# Patient Record
Sex: FEMALE | Race: WHITE | NOT HISPANIC OR LATINO | Employment: OTHER | URBAN - METROPOLITAN AREA
[De-identification: names, ages, dates, MRNs, and addresses within clinical notes are randomized per-mention and may not be internally consistent; named-entity substitution may affect disease eponyms.]

---

## 2017-02-13 RX ORDER — LANCETS
EACH MISCELLANEOUS
Qty: 100 EACH | Refills: 9 | Status: SHIPPED | OUTPATIENT
Start: 2017-02-13 | End: 2018-03-13 | Stop reason: SDUPTHER

## 2017-03-23 ENCOUNTER — OFFICE VISIT (OUTPATIENT)
Dept: INTERNAL MEDICINE | Facility: CLINIC | Age: 67
End: 2017-03-23

## 2017-03-23 VITALS
DIASTOLIC BLOOD PRESSURE: 80 MMHG | HEART RATE: 72 BPM | SYSTOLIC BLOOD PRESSURE: 126 MMHG | WEIGHT: 196.38 LBS | RESPIRATION RATE: 20 BRPM | BODY MASS INDEX: 31.22 KG/M2 | TEMPERATURE: 98.8 F

## 2017-03-23 DIAGNOSIS — E03.9 HYPOTHYROIDISM, UNSPECIFIED TYPE: ICD-10-CM

## 2017-03-23 DIAGNOSIS — E78.49 OTHER HYPERLIPIDEMIA: ICD-10-CM

## 2017-03-23 DIAGNOSIS — E11.9 TYPE 2 DIABETES MELLITUS WITHOUT COMPLICATION, WITHOUT LONG-TERM CURRENT USE OF INSULIN (HCC): ICD-10-CM

## 2017-03-23 DIAGNOSIS — I10 ESSENTIAL HYPERTENSION: Primary | ICD-10-CM

## 2017-03-23 DIAGNOSIS — K63.5 BENIGN COLONIC POLYP: ICD-10-CM

## 2017-03-23 DIAGNOSIS — Z11.59 NEED FOR HEPATITIS C SCREENING TEST: ICD-10-CM

## 2017-03-23 DIAGNOSIS — M19.90 OSTEOARTHRITIS, UNSPECIFIED OSTEOARTHRITIS TYPE, UNSPECIFIED SITE: ICD-10-CM

## 2017-03-23 LAB
ALBUMIN SERPL-MCNC: 4.1 G/DL (ref 3.2–4.8)
ALBUMIN/GLOB SERPL: 1.3 G/DL (ref 1.5–2.5)
ALP SERPL-CCNC: 99 U/L (ref 25–100)
ALT SERPL W P-5'-P-CCNC: 18 U/L (ref 7–40)
ANION GAP SERPL CALCULATED.3IONS-SCNC: 2 MMOL/L (ref 3–11)
ARTICHOKE IGE QN: 71 MG/DL (ref 0–130)
AST SERPL-CCNC: 21 U/L (ref 0–33)
BASOPHILS # BLD AUTO: 0.01 10*3/MM3 (ref 0–0.2)
BASOPHILS NFR BLD AUTO: 0.2 % (ref 0–1)
BILIRUB SERPL-MCNC: 0.3 MG/DL (ref 0.3–1.2)
BUN BLD-MCNC: 16 MG/DL (ref 9–23)
BUN/CREAT SERPL: 22.9 (ref 7–25)
CALCIUM SPEC-SCNC: 9.4 MG/DL (ref 8.7–10.4)
CHLORIDE SERPL-SCNC: 106 MMOL/L (ref 99–109)
CHOLEST SERPL-MCNC: 131 MG/DL (ref 0–200)
CO2 SERPL-SCNC: 34 MMOL/L (ref 20–31)
CREAT BLD-MCNC: 0.7 MG/DL (ref 0.6–1.3)
DEPRECATED RDW RBC AUTO: 46.4 FL (ref 37–54)
EOSINOPHIL # BLD AUTO: 0.2 10*3/MM3 (ref 0.1–0.3)
EOSINOPHIL NFR BLD AUTO: 4.1 % (ref 0–3)
ERYTHROCYTE [DISTWIDTH] IN BLOOD BY AUTOMATED COUNT: 13.2 % (ref 11.3–14.5)
EXPIRATION DATE: NORMAL
GFR SERPL CREATININE-BSD FRML MDRD: 83 ML/MIN/1.73
GLOBULIN UR ELPH-MCNC: 3.1 GM/DL
GLUCOSE BLD-MCNC: 97 MG/DL (ref 70–100)
HBA1C MFR BLD: 5.5 %
HCT VFR BLD AUTO: 42.3 % (ref 34.5–44)
HCV AB SER DONR QL: NORMAL
HDLC SERPL-MCNC: 46 MG/DL (ref 40–60)
HGB BLD-MCNC: 13.8 G/DL (ref 11.5–15.5)
IMM GRANULOCYTES # BLD: 0.01 10*3/MM3 (ref 0–0.03)
IMM GRANULOCYTES NFR BLD: 0.2 % (ref 0–0.6)
LYMPHOCYTES # BLD AUTO: 1.57 10*3/MM3 (ref 0.6–4.8)
LYMPHOCYTES NFR BLD AUTO: 32 % (ref 24–44)
Lab: NORMAL
MCH RBC QN AUTO: 31.4 PG (ref 27–31)
MCHC RBC AUTO-ENTMCNC: 32.6 G/DL (ref 32–36)
MCV RBC AUTO: 96.4 FL (ref 80–99)
MONOCYTES # BLD AUTO: 0.33 10*3/MM3 (ref 0–1)
MONOCYTES NFR BLD AUTO: 6.7 % (ref 0–12)
NEUTROPHILS # BLD AUTO: 2.78 10*3/MM3 (ref 1.5–8.3)
NEUTROPHILS NFR BLD AUTO: 56.8 % (ref 41–71)
PLATELET # BLD AUTO: 221 10*3/MM3 (ref 150–450)
PMV BLD AUTO: 10.2 FL (ref 6–12)
POTASSIUM BLD-SCNC: 4.6 MMOL/L (ref 3.5–5.5)
PROT SERPL-MCNC: 7.2 G/DL (ref 5.7–8.2)
RBC # BLD AUTO: 4.39 10*6/MM3 (ref 3.89–5.14)
SODIUM BLD-SCNC: 142 MMOL/L (ref 132–146)
TRIGL SERPL-MCNC: 57 MG/DL (ref 0–150)
TSH SERPL DL<=0.05 MIU/L-ACNC: 1 MIU/ML (ref 0.35–5.35)
WBC NRBC COR # BLD: 4.9 10*3/MM3 (ref 3.5–10.8)

## 2017-03-23 PROCEDURE — 84443 ASSAY THYROID STIM HORMONE: CPT | Performed by: INTERNAL MEDICINE

## 2017-03-23 PROCEDURE — 85025 COMPLETE CBC W/AUTO DIFF WBC: CPT | Performed by: INTERNAL MEDICINE

## 2017-03-23 PROCEDURE — 99214 OFFICE O/P EST MOD 30 MIN: CPT | Performed by: INTERNAL MEDICINE

## 2017-03-23 PROCEDURE — 36415 COLL VENOUS BLD VENIPUNCTURE: CPT | Performed by: INTERNAL MEDICINE

## 2017-03-23 PROCEDURE — 80061 LIPID PANEL: CPT | Performed by: INTERNAL MEDICINE

## 2017-03-23 PROCEDURE — 80053 COMPREHEN METABOLIC PANEL: CPT | Performed by: INTERNAL MEDICINE

## 2017-03-23 PROCEDURE — 86803 HEPATITIS C AB TEST: CPT | Performed by: INTERNAL MEDICINE

## 2017-03-23 PROCEDURE — 83036 HEMOGLOBIN GLYCOSYLATED A1C: CPT | Performed by: INTERNAL MEDICINE

## 2017-03-23 RX ORDER — CETIRIZINE HYDROCHLORIDE 10 MG/1
10 TABLET ORAL DAILY
COMMUNITY
End: 2018-08-30 | Stop reason: SDUPTHER

## 2017-03-23 NOTE — PROGRESS NOTES
Subjective       Angelina Pimentel is a 66 y.o. female.     Chief Complaint   Patient presents with   • Hypertension      3 month follow up fasting    • Cough       History obtained from the patient.      History of Present Illness     Primary Care Cardiac Diagnostic Constellation: The patient is here today for a 6 month follow-up visit.      Her Diabetes Mellitus type 2 is stable.   The patient is adherent with her medication regimen. She denies medication side effects.   Medication(s): Metformin HCl.   Her Hypertension is primary, but stable.   The patient is adherent with her medication regimen. She denies medication side effects.   Medication(s): Lisinopril.   Her Hyperlipidemia has been stable. Her LDL goal is 70 mg/dL and last LDL was 75 mg/dL.   The patient is adherent with her medication regimen. She denies medication side effects.   Medication(s): Simvastiatin.      Interval Events: Blood sugar at home are in mid 90's fasting.  Last HgA1C was 5.7. Last ophthalmology visit December 2016, normal. She states she checks her feet daily.      Symptoms: Denies chest pain, denies intermittent leg claudication, denies dyspnea, denies lower extremity edema, denies exercise intolerance, denies fatigue, denies numbness of the feet, denies foot pain, denies a foot ulcer, denies visual impairment, denies muscle pain and denies muscle weakness. Associated symptoms include no recent weight changes, no palpitations, no syncope, no headache, no orthopnea, no PND, no polydipsia, no polyuria, no focal neurologic deficits, and no memory loss.     Lifestyle and Disease Management: Diet: She consumes a diverse and healthy diet. Weight Issues: She has weight concerns. Exercise: She exercises regularly. She sees a  1  times per week for 60 minutes per session. Exercise includes walking daily. Smoking: She does not use tobacco.      Colonic Polyp (Brief): The patient is being seen for a routine clinic follow-up of  Colon Polyp(s). Current diagnosis was determined by colonoscopy and 8/30/12- normal.   Symptoms: no hematochezia, no melena, no diarrhea, no constipation, no decreased stool caliber, no change in bowel habits and no abdominal pain. Associated symptoms: no rectal prolapse.   The patient is not currently being treated for this problem.      Hypothyroidism (Follow-Up): The patient is being seen for follow-up of Hypothyroidism. The patient reports doing well.   She has had no significant interval events.   Interval symptoms: denies weight gain, denies cold intolerance, denies fatigue, denies weakness, denies trouble concentrating, denies hair loss and denies dry skin.   Associated symptoms: arthralgias, but no myalgias and no paresthesias.   Medications include levothyroxine (Synthroid).   Medications: the patient is adherent to her medication regimen, but she denies medication side effects.      Osteoarthritis (Follow-Up): The patient states her Osteoarthritis has been stable since the last visit. She has no complications from Osteoarthritis. The patient's Osteoarthritis has not resulted in physical disability. She has no significant interval events.   Symptoms: Associated symptoms include no localized joint swelling and no localized joint stiffness.   Activities: no limitations.   Medications: The patient is adherent with her medication regimen.   Medication(s): a non-steroidal antiinflammatory agent, occasionally.      She is having some allergy symptoms, improving some on Zyrtec.    Current Outpatient Prescriptions on File Prior to Visit   Medication Sig Dispense Refill   • fluticasone (FLONASE) 50 MCG/ACT nasal spray into each nostril Daily.     • Lancets (ONETOUCH ULTRASOFT) lancets USE AS DIRECTED ONCE DAILY 100 each 9   • levothyroxine (SYNTHROID, LEVOTHROID) 100 MCG tablet Take 1 tablet by mouth Daily. 90 tablet 3   • lisinopril (PRINIVIL,ZESTRIL) 10 MG tablet Take 1 tablet by mouth Every Night. 90 tablet 3   •  metFORMIN (GLUCOPHAGE) 500 MG tablet Take 1 tablet by mouth Daily With Breakfast. 90 tablet 2   • Multiple Vitamins-Minerals (CENTRUM SILVER ADULT 50+ PO) Take  by mouth Daily.     • ONE TOUCH ULTRA TEST test strip USE ONE TEST STRIP TO TEST DAILY 100 each 4   • simvastatin (ZOCOR) 20 MG tablet Take 1 tablet by mouth Every Night. 90 tablet 3     No current facility-administered medications on file prior to visit.          The following portions of the patient's history were reviewed and updated as appropriate: allergies, current medications, past family history, past medical history, past social history, past surgical history and problem list.    Review of Systems   Constitutional: Negative for fatigue and unexpected weight change.   HENT: Positive for congestion and postnasal drip.    Eyes: Negative for visual disturbance.   Respiratory: Positive for cough. Negative for shortness of breath and wheezing.    Cardiovascular: Negative for chest pain, palpitations and leg swelling.        No FLORES, orthopnea, or claudication.   Gastrointestinal: Negative for abdominal pain, blood in stool, constipation, diarrhea, nausea and vomiting.        Denies melena.   Endocrine: Negative for cold intolerance, heat intolerance, polydipsia and polyuria.   Musculoskeletal: Negative for arthralgias, joint swelling and myalgias.   Neurological: Negative for dizziness, syncope, light-headedness, numbness and headaches.        No memory issues.   Psychiatric/Behavioral: Negative for decreased concentration.         Objective       Blood pressure 126/80, pulse 72, temperature 98.8 °F (37.1 °C), temperature source Oral, resp. rate 20, weight 196 lb 6 oz (89.1 kg).      Physical Exam   Constitutional:   Borderline obese.   Neck: Normal range of motion. Neck supple. Carotid bruit is not present. No thyromegaly present.   Cardiovascular: Normal rate, regular rhythm, normal heart sounds and intact distal pulses.  Exam reveals no gallop and no  friction rub.    No murmur heard.  No peripheral edema.   Pulmonary/Chest: Effort normal and breath sounds normal.   Abdominal: Soft. Bowel sounds are normal. She exhibits no distension, no abdominal bruit and no mass. There is no hepatosplenomegaly. There is no tenderness.    Angelina had a diabetic foot exam performed today.   During the foot exam she had a monofilament test performed (see form).   Skin Integrity  -  Her right foot skin is not intact (dry).   Angelina's left foot skin is not intact (dry). .  Psychiatric: She has a normal mood and affect.   Nursing note and vitals reviewed.       Lab Results   Component Value Date    HGBA1C 5.5 03/23/2017         Assessment / Plan:    Angelina was seen today for hypertension and cough.    Diagnoses and all orders for this visit:    Essential hypertension    Type 2 diabetes mellitus without complication, without long-term current use of insulin  -     POC Glycosylated Hemoglobin (Hb A1C)  -     Comprehensive Metabolic Panel  -     CBC & Differential  -     CBC Auto Differential    Other hyperlipidemia  -     Lipid Panel    Hypothyroidism, unspecified type  -     TSH    Benign colonic polyp    Osteoarthritis, unspecified osteoarthritis type, unspecified site    Need for hepatitis C screening test  -     Hepatitis C Antibody      Recommend adding an otc nasal steroid for the cough and  allergy symptoms.    The patient agrees to schedule her Medicare Wellness Exam.    Return in about 6 months (around 9/23/2017) for Recheck-Diabetes, fasting.

## 2017-04-05 ENCOUNTER — TELEPHONE (OUTPATIENT)
Dept: INTERNAL MEDICINE | Facility: CLINIC | Age: 67
End: 2017-04-05

## 2017-04-05 ENCOUNTER — CLINICAL SUPPORT (OUTPATIENT)
Dept: INTERNAL MEDICINE | Facility: CLINIC | Age: 67
End: 2017-04-05

## 2017-04-05 DIAGNOSIS — S61.219A LACERATION OF FINGER, INITIAL ENCOUNTER: Primary | ICD-10-CM

## 2017-04-05 DIAGNOSIS — Z00.00 HEALTHCARE MAINTENANCE: Primary | ICD-10-CM

## 2017-04-05 PROCEDURE — 90715 TDAP VACCINE 7 YRS/> IM: CPT | Performed by: PHYSICIAN ASSISTANT

## 2017-04-05 PROCEDURE — 90471 IMMUNIZATION ADMIN: CPT | Performed by: PHYSICIAN ASSISTANT

## 2017-04-05 NOTE — TELEPHONE ENCOUNTER
She cut her finger on a arcadio razar blade while moving some boxes   Her last tetanus was   2/25/2009      It is just a small cut and does not needs sutures  but she is wondering if she can come in for a Tetanus shot    [phone]

## 2017-06-21 ENCOUNTER — OFFICE VISIT (OUTPATIENT)
Dept: NEUROLOGY | Facility: CLINIC | Age: 67
End: 2017-06-21

## 2017-06-21 VITALS
SYSTOLIC BLOOD PRESSURE: 140 MMHG | WEIGHT: 193 LBS | DIASTOLIC BLOOD PRESSURE: 90 MMHG | BODY MASS INDEX: 27.63 KG/M2 | HEIGHT: 70 IN

## 2017-06-21 DIAGNOSIS — G47.33 OBSTRUCTIVE SLEEP APNEA SYNDROME: Primary | ICD-10-CM

## 2017-06-21 PROCEDURE — 99204 OFFICE O/P NEW MOD 45 MIN: CPT | Performed by: PSYCHIATRY & NEUROLOGY

## 2017-06-21 RX ORDER — LISINOPRIL 5 MG/1
TABLET ORAL
COMMUNITY
Start: 2017-04-20 | End: 2017-09-28 | Stop reason: SDUPTHER

## 2017-06-21 NOTE — PROGRESS NOTES
"Subjective   Angelina Pimentel is a 66 y.o. female.     History of Present Illness     The following portions of the patient's history were reviewed today and updated as appropriate:  allergies, current medications, past family history, past medical history, past social history, past surgical history and problem list.      Chief complaint is sleep problems and the patient is seen today in consultation at the request of the referring health care provider  The time I spent with the patient today was used discussing the differential diagnosis, treatment and management options and prognosis. I addressed all of the patient's questions.  The patient was seen by Dr. Harper in 2013 for obstructive sleep apnea that was diagnosed in 2004. At that time she was in the CPAP setting of 13. She apparently would like to have an new mask and would like to have her CPAP adjusted.  She had the pressure of 8 most recently at that seemed to work. She travels a fair amount and this bothered by the size of her machine which is still in 2 parts with one part being the humidifier.    Review of Systems   Constitutional: Negative for appetite change, chills and fatigue.   HENT: Negative for congestion, ear pain, facial swelling and sinus pressure.    Eyes: Negative for pain and redness.   Respiratory: Negative for shortness of breath.    Cardiovascular: Negative for chest pain.   Gastrointestinal: Negative for abdominal pain.   Endocrine: Negative for cold intolerance and heat intolerance.   Genitourinary: Negative for dysuria.   Musculoskeletal: Negative for arthralgias.   Skin: Negative for rash.   Allergic/Immunologic: Negative for immunocompromised state.   Neurological:        Poor sleep   Hematological: Negative for adenopathy.   Psychiatric/Behavioral: Negative for hallucinations.         Objective      height is 70\" (177.8 cm) and weight is 193 lb (87.5 kg). Her blood pressure is 140/90.     The patient's general appearance was " normal today  Carotid pulses were palpable bilaterally  The ophthalmological exam showed the refractory media clear, no blurring of disc margins, there were no hemorrhages noted, blood vessels appeared normal.      Neurologic Exam     Mental Status   Oriented to person.   Oriented to place. Oriented to country, city, area and street.   Oriented to time. Oriented to year, month, date, day and season.   Registration: recalls 3 of 3 objects. Recall at 5 minutes: recalls 3 of 3 objects. Follows 3 step commands.   Attention: normal.   Speech: speech is normal   Level of consciousness: alert  Knowledge: consistent with education.   Able to name object. Able to read. Able to repeat. Able to write. Normal comprehension.     Cranial Nerves     CN II   Visual fields full to confrontation.     CN III, IV, VI   Right pupil: Shape: regular. Consensual response: intact. Accommodation: intact.   Left pupil: Shape: regular. Consensual response: intact.   CN III: no CN III palsy  CN VI: no CN VI palsy  Nystagmus: none   Diplopia: none  Ophthalmoparesis: none  Upgaze: normal  Downgaze: normal  Conjugate gaze: present  Vestibulo-ocular reflex: present    CN V   Facial sensation intact.     CN VII   Facial expression full, symmetric.     CN VIII   CN VIII normal.     CN IX, X   CN IX normal.     CN XI   CN XI normal.     CN XII   CN XII normal.     Motor Exam   Muscle bulk: normal  Overall muscle tone: normal  Right arm pronator drift: absent  Left arm pronator drift: absent    Strength   Strength 5/5 except as noted.     Sensory Exam   Light touch normal.     Gait, Coordination, and Reflexes     Gait  Gait: normal    Coordination   Romberg: negative  Finger to nose coordination: normal  Heel to shin coordination: normal  Tandem walking coordination: normal    Tremor   Resting tremor: absent  Intention tremor: absent  Action tremor: absent    Reflexes   Reflexes 2+ except as noted.       Assessment/Plan     Angelina was seen today for  "sleeping problem.    Diagnoses and all orders for this visit:    Obstructive sleep apnea syndrome          Discussion/Summary:      I explained to the patient that we would have to get her either an AutoPap machine or do a CPAP titration study to find out if she would be a candidate for a dental device because of her sleep apnea should not be more than mild for that. At this point we will get the CPAP printout to see if her sleep apnea is optimally treated. She is to talk to her provider about the possibility of getting a travel CPAP with an AutoPap setting.              Disclaimer: This letter was created using dragon voice recognition software.  This voice recognition software may create errors that may go undetected and can impact the meaning of a sentence or paragraph.  The most frequent error is that the software misidentifies \"he\" and \"she\". However, contextual reading is often able to identify this as a voice recognotion error.  Another frequent error is that the software misidentifies \"the patient\" as \"\"          "

## 2017-08-11 ENCOUNTER — TRANSCRIBE ORDERS (OUTPATIENT)
Dept: ADMINISTRATIVE | Facility: HOSPITAL | Age: 67
End: 2017-08-11

## 2017-08-11 DIAGNOSIS — Z12.31 VISIT FOR SCREENING MAMMOGRAM: Primary | ICD-10-CM

## 2017-09-20 RX ORDER — SIMVASTATIN 20 MG
TABLET ORAL
Qty: 90 TABLET | Refills: 0 | Status: SHIPPED | OUTPATIENT
Start: 2017-09-20 | End: 2017-12-11 | Stop reason: SDUPTHER

## 2017-09-25 ENCOUNTER — HOSPITAL ENCOUNTER (OUTPATIENT)
Dept: MAMMOGRAPHY | Facility: HOSPITAL | Age: 67
Discharge: HOME | End: 2017-09-25
Attending: INTERNAL MEDICINE | Admitting: INTERNAL MEDICINE

## 2017-09-25 DIAGNOSIS — Z12.31 VISIT FOR SCREENING MAMMOGRAM: ICD-10-CM

## 2017-09-25 PROCEDURE — G0202 SCR MAMMO BI INCL CAD: HCPCS

## 2017-09-25 PROCEDURE — 77063 BREAST TOMOSYNTHESIS BI: CPT | Performed by: RADIOLOGY

## 2017-09-25 PROCEDURE — G0202 SCR MAMMO BI INCL CAD: HCPCS | Performed by: RADIOLOGY

## 2017-09-25 PROCEDURE — 77063 BREAST TOMOSYNTHESIS BI: CPT

## 2017-09-28 RX ORDER — LISINOPRIL 5 MG/1
TABLET ORAL
Qty: 180 TABLET | Refills: 3 | Status: SHIPPED | OUTPATIENT
Start: 2017-09-28 | End: 2017-10-11

## 2017-09-28 RX ORDER — LEVOTHYROXINE SODIUM 0.1 MG/1
TABLET ORAL
Qty: 90 TABLET | Refills: 2 | Status: SHIPPED | OUTPATIENT
Start: 2017-09-28 | End: 2018-07-24 | Stop reason: SDUPTHER

## 2017-10-11 ENCOUNTER — OFFICE VISIT (OUTPATIENT)
Dept: INTERNAL MEDICINE | Facility: CLINIC | Age: 67
End: 2017-10-11

## 2017-10-11 VITALS
WEIGHT: 197.38 LBS | SYSTOLIC BLOOD PRESSURE: 120 MMHG | TEMPERATURE: 97 F | BODY MASS INDEX: 28.32 KG/M2 | RESPIRATION RATE: 20 BRPM | HEART RATE: 68 BPM | DIASTOLIC BLOOD PRESSURE: 72 MMHG

## 2017-10-11 DIAGNOSIS — M19.90 OSTEOARTHRITIS, UNSPECIFIED OSTEOARTHRITIS TYPE, UNSPECIFIED SITE: ICD-10-CM

## 2017-10-11 DIAGNOSIS — E03.9 HYPOTHYROIDISM, UNSPECIFIED TYPE: ICD-10-CM

## 2017-10-11 DIAGNOSIS — E78.49 OTHER HYPERLIPIDEMIA: ICD-10-CM

## 2017-10-11 DIAGNOSIS — Z78.0 MENOPAUSE: ICD-10-CM

## 2017-10-11 DIAGNOSIS — E11.9 TYPE 2 DIABETES MELLITUS WITHOUT COMPLICATION, WITHOUT LONG-TERM CURRENT USE OF INSULIN (HCC): Primary | ICD-10-CM

## 2017-10-11 DIAGNOSIS — Z23 NEED FOR IMMUNIZATION AGAINST INFLUENZA: ICD-10-CM

## 2017-10-11 DIAGNOSIS — K63.5 BENIGN COLONIC POLYP: ICD-10-CM

## 2017-10-11 DIAGNOSIS — I10 ESSENTIAL HYPERTENSION: ICD-10-CM

## 2017-10-11 LAB
A/C: NORMAL
ALBUMIN SERPL-MCNC: 4.1 G/DL (ref 3.2–4.8)
ALBUMIN/GLOB SERPL: 1.4 G/DL (ref 1.5–2.5)
ALP SERPL-CCNC: 104 U/L (ref 25–100)
ALT SERPL W P-5'-P-CCNC: 19 U/L (ref 7–40)
ANION GAP SERPL CALCULATED.3IONS-SCNC: 7 MMOL/L (ref 3–11)
ARTICHOKE IGE QN: 86 MG/DL (ref 0–130)
AST SERPL-CCNC: 18 U/L (ref 0–33)
BASOPHILS # BLD AUTO: 0.03 10*3/MM3 (ref 0–0.2)
BASOPHILS NFR BLD AUTO: 0.4 % (ref 0–1)
BILIRUB SERPL-MCNC: 0.4 MG/DL (ref 0.3–1.2)
BUN BLD-MCNC: 16 MG/DL (ref 9–23)
BUN/CREAT SERPL: 22.9 (ref 7–25)
CALCIUM SPEC-SCNC: 9.7 MG/DL (ref 8.7–10.4)
CHLORIDE SERPL-SCNC: 106 MMOL/L (ref 99–109)
CHOLEST SERPL-MCNC: 144 MG/DL (ref 0–200)
CLARITY, POC: CLEAR
CO2 SERPL-SCNC: 31 MMOL/L (ref 20–31)
COLOR UR: YELLOW
CREAT BLD-MCNC: 0.7 MG/DL (ref 0.6–1.3)
DEPRECATED RDW RBC AUTO: 45.7 FL (ref 37–54)
EOSINOPHIL # BLD AUTO: 0.18 10*3/MM3 (ref 0–0.3)
EOSINOPHIL NFR BLD AUTO: 2.7 % (ref 0–3)
ERYTHROCYTE [DISTWIDTH] IN BLOOD BY AUTOMATED COUNT: 13 % (ref 11.3–14.5)
EXPIRATION DATE: ABNORMAL
EXPIRATION DATE: NORMAL
EXPIRATION DATE: NORMAL
GFR SERPL CREATININE-BSD FRML MDRD: 83 ML/MIN/1.73
GLOBULIN UR ELPH-MCNC: 3 GM/DL
GLUCOSE BLD-MCNC: 97 MG/DL (ref 70–100)
GLUCOSE UR STRIP-MCNC: NEGATIVE MG/DL
HBA1C MFR BLD: 5.5 %
HCT VFR BLD AUTO: 42.7 % (ref 34.5–44)
HDLC SERPL-MCNC: 49 MG/DL (ref 40–60)
HGB BLD-MCNC: 14.1 G/DL (ref 11.5–15.5)
IMM GRANULOCYTES # BLD: 0.02 10*3/MM3 (ref 0–0.03)
IMM GRANULOCYTES NFR BLD: 0.3 % (ref 0–0.6)
KETONES UR QL: NEGATIVE
LEUKOCYTE EST, POC: ABNORMAL
LYMPHOCYTES # BLD AUTO: 1.97 10*3/MM3 (ref 0.6–4.8)
LYMPHOCYTES NFR BLD AUTO: 29.4 % (ref 24–44)
Lab: ABNORMAL
Lab: NORMAL
Lab: NORMAL
MCH RBC QN AUTO: 31.7 PG (ref 27–31)
MCHC RBC AUTO-ENTMCNC: 33 G/DL (ref 32–36)
MCV RBC AUTO: 96 FL (ref 80–99)
MONOCYTES # BLD AUTO: 0.42 10*3/MM3 (ref 0–1)
MONOCYTES NFR BLD AUTO: 6.3 % (ref 0–12)
NEUTROPHILS # BLD AUTO: 4.08 10*3/MM3 (ref 1.5–8.3)
NEUTROPHILS NFR BLD AUTO: 60.9 % (ref 41–71)
NITRITE UR-MCNC: NEGATIVE MG/ML
PH UR: 8.5 [PH] (ref 5–8)
PLATELET # BLD AUTO: 231 10*3/MM3 (ref 150–450)
PMV BLD AUTO: 10.2 FL (ref 6–12)
POC CREATININE URINE: 50
POC MICROALBUMIN URINE: 80
POTASSIUM BLD-SCNC: 4.9 MMOL/L (ref 3.5–5.5)
PROT SERPL-MCNC: 7.1 G/DL (ref 5.7–8.2)
PROT UR STRIP-MCNC: ABNORMAL MG/DL
PROT/CREAT UR: 100 MG/G CREA
RBC # BLD AUTO: 4.45 10*6/MM3 (ref 3.89–5.14)
RBC # UR STRIP: NEGATIVE /UL
SODIUM BLD-SCNC: 144 MMOL/L (ref 132–146)
SP GR UR: 1.01 (ref 1–1.03)
TRIGL SERPL-MCNC: 66 MG/DL (ref 0–150)
TSH SERPL DL<=0.05 MIU/L-ACNC: 2.82 MIU/ML (ref 0.35–5.35)
WBC NRBC COR # BLD: 6.7 10*3/MM3 (ref 3.5–10.8)

## 2017-10-11 PROCEDURE — 84443 ASSAY THYROID STIM HORMONE: CPT | Performed by: INTERNAL MEDICINE

## 2017-10-11 PROCEDURE — 90662 IIV NO PRSV INCREASED AG IM: CPT | Performed by: INTERNAL MEDICINE

## 2017-10-11 PROCEDURE — G0008 ADMIN INFLUENZA VIRUS VAC: HCPCS | Performed by: INTERNAL MEDICINE

## 2017-10-11 PROCEDURE — 99214 OFFICE O/P EST MOD 30 MIN: CPT | Performed by: INTERNAL MEDICINE

## 2017-10-11 PROCEDURE — 80061 LIPID PANEL: CPT | Performed by: INTERNAL MEDICINE

## 2017-10-11 PROCEDURE — 83036 HEMOGLOBIN GLYCOSYLATED A1C: CPT | Performed by: INTERNAL MEDICINE

## 2017-10-11 PROCEDURE — 36415 COLL VENOUS BLD VENIPUNCTURE: CPT | Performed by: INTERNAL MEDICINE

## 2017-10-11 PROCEDURE — 85025 COMPLETE CBC W/AUTO DIFF WBC: CPT | Performed by: INTERNAL MEDICINE

## 2017-10-11 PROCEDURE — 82044 UR ALBUMIN SEMIQUANTITATIVE: CPT | Performed by: INTERNAL MEDICINE

## 2017-10-11 PROCEDURE — 80053 COMPREHEN METABOLIC PANEL: CPT | Performed by: INTERNAL MEDICINE

## 2017-10-11 PROCEDURE — 81002 URINALYSIS NONAUTO W/O SCOPE: CPT | Performed by: INTERNAL MEDICINE

## 2017-10-11 NOTE — PROGRESS NOTES
Subjective       Angelina Pimentel is a 67 y.o. female.     Chief Complaint   Patient presents with   • Diabetes     6 month follow up  fasting        History obtained from the patient.      History of Present Illness     History of Present Illness      Primary Care Cardiac Diagnostic Constellation: The patient is here today for a 6 month follow-up visit.       Her Diabetes Mellitus type 2 is stable.   Medication(s): Metformin HCl.   Her Hypertension is stable.   Medication(s): Lisinopril.   Her Hyperlipidemia has been stable. Her LDL goal is 70 mg/dL and last LDL was 71 mg/dL.   Medication(s): Simvastiatin.   The patient is adherent with her medication regimen. She denies medication side effects.       Interval Events: Blood sugar at home are   fasting.  Last HgA1C was 5.5.   Last ophthalmology visit was 7/17/17, no retinopathy.   She states she checks her feet daily.       Symptoms: Denies chest pain, denies intermittent leg claudication, denies dyspnea, denies lower extremity edema, denies exercise intolerance, denies fatigue, denies numbness of the feet, denies foot pain, denies a foot ulcer, denies visual impairment, denies muscle pain,  and denies muscle weakness. Associated symptoms include a 4 pound weight loss, but no palpitations, no syncope, no headache, no orthopnea, no PND, no polydipsia, no polyuria, no focal neurologic deficits, and no memory loss.      Lifestyle and Disease Management: Diet: She consumes a diverse and healthy diet. Weight Issues: She has weight concerns. Exercise: She exercises regularly. She sees a  1  times per week for 60 minutes per session. Exercise includes walking 3-4 times per week.   Smoking: She does not use tobacco.       Colonic Polyp (Brief): The patient is being seen for a routine clinic follow-up of Colon Polyp(s).   Current diagnosis was determined by colonoscopy and 8/30/12- normal.   Symptoms: no hematochezia, no melena, no diarrhea, no  constipation, no decreased stool caliber, no change in bowel habits and no abdominal pain. Associated symptoms: no rectal prolapse.   The patient is not currently being treated for this problem.       Hypothyroidism (Follow-Up): The patient is being seen for follow-up of Hypothyroidism. The patient reports doing well.   Interval Events:  None   Interval symptoms: 4 pound  weight gain, but denies cold intolerance, denies fatigue, denies weakness, denies trouble concentrating, denies hair loss,  and denies dry skin.   Associated symptoms: no arthralgias,  no myalgias, and no paresthesias.   Medications include levothyroxine (Synthroid).    The patient is adherent to her medication regimen, and she denies medication side effects.       Osteoarthritis (Follow-Up): The patient states her Osteoarthritis has been stable since the last visit.   She has no complications from Osteoarthritis.   The patient's Osteoarthritis has not resulted in physical disability.   Interval events:  She has developed hip pain.   Activities: no limitations.   Symptoms: No arthralgias, back pain, or neck pain.  Associated symptoms include no localized joint swelling and no localized joint stiffness.   Medications:  None.    Current Outpatient Prescriptions on File Prior to Visit   Medication Sig Dispense Refill   • cetirizine (zyrTEC) 10 MG tablet Take 10 mg by mouth Daily.     • fluticasone (FLONASE) 50 MCG/ACT nasal spray into each nostril Daily.     • Lancets (ONETOUCH ULTRASOFT) lancets USE AS DIRECTED ONCE DAILY 100 each 9   • levothyroxine (SYNTHROID, LEVOTHROID) 100 MCG tablet TAKE 1 TABLET BY MOUTH  DAILY 90 tablet 2   • lisinopril (PRINIVIL,ZESTRIL) 10 MG tablet Take 1 tablet by mouth Every Night. 90 tablet 3   • metFORMIN (GLUCOPHAGE) 500 MG tablet Take 1 tablet by mouth  daily with breakfast 90 tablet 0   • Multiple Vitamins-Minerals (CENTRUM SILVER ADULT 50+ PO) Take  by mouth Daily.     • ONE TOUCH ULTRA TEST test strip USE ONE  STRIP TO TEST DAILY 100 each 3   • simvastatin (ZOCOR) 20 MG tablet Take 1 tablet by mouth  every evening 90 tablet 0   • BOOSTRIX 5-2.5-18.5 injection      • [DISCONTINUED] lisinopril (PRINIVIL,ZESTRIL) 5 MG tablet TAKE 2 TABLETS BY MOUTH  DAILY 180 tablet 3     No current facility-administered medications on file prior to visit.          The following portions of the patient's history were reviewed and updated as appropriate: allergies, current medications, past family history, past medical history, past social history, past surgical history and problem list.    Review of Systems   Constitutional: Negative for fatigue and unexpected weight change.   Eyes: Negative for visual disturbance.   Respiratory: Negative for cough, shortness of breath and wheezing.    Cardiovascular: Negative for chest pain, palpitations and leg swelling.        No FLORES, orthopnea, or claudication.   Gastrointestinal: Negative for abdominal pain, blood in stool, constipation, diarrhea, nausea and vomiting.        Denies melena.   Endocrine: Negative for cold intolerance, heat intolerance, polydipsia and polyuria.   Musculoskeletal: Negative for arthralgias and myalgias.   Neurological: Negative for dizziness, syncope, light-headedness, numbness and headaches.        No memory issues.   Psychiatric/Behavioral: Negative for decreased concentration.         Objective       Blood pressure 120/72, pulse 68, temperature 97 °F (36.1 °C), temperature source Temporal Artery , resp. rate 20, weight 197 lb 6 oz (89.5 kg).      Physical Exam   Constitutional:   Overweight.   Neck: Normal range of motion. Neck supple. Carotid bruit is not present. No thyromegaly present.   Cardiovascular: Normal rate, regular rhythm, normal heart sounds and intact distal pulses.  Exam reveals no gallop and no friction rub.    No murmur heard.  No peripheral edema.   Pulmonary/Chest: Effort normal and breath sounds normal.   Abdominal: Soft. Bowel sounds are normal. She  exhibits no distension, no abdominal bruit and no mass. There is no hepatosplenomegaly. There is no tenderness.   Psychiatric: She has a normal mood and affect.   Nursing note and vitals reviewed.       Results for orders placed or performed in visit on 10/11/17   POC Glycosylated Hemoglobin (Hb A1C)   Result Value Ref Range    Hemoglobin A1C 5.5 %    Lot Number [id number]     Expiration Date 5-19    POC Microalbumin   Result Value Ref Range    Microalbumin, Urine 80     Creatinine, Urine 50     A/C <300mg/g HIGH ABNORMAL     Lot Number [id number]     Expiration Date 8-31-18    POC Urinalysis Dipstick, Multipro   Result Value Ref Range    Color Yellow Yellow, Straw, Dark Yellow, Eileen    Clarity, UA Clear Clear    Glucose, UA Negative Negative, 1000 mg/dL (3+) mg/dL    Ketones, UA Negative Negative    Specific Gravity  1.015 1.005 - 1.030    Blood, UA Negative Negative    pH, Urine 8.5 (A) 5.0 - 8.0    Protein, POC 30 mg/dL (A) Negative mg/dL    Leukocytes Small (1+) (A) Negative    Nitrite, UA Negative Negative    Protein/Creatinine Ratio, Urine 100.0 mg/G Crea    Lot Number [id number]     Expiration Date 11-17          Assessment / Plan:    Angelina was seen today for hypertension.    Diagnoses and all orders for this visit:    Type 2 diabetes mellitus without complication, without long-term current use of insulin  -     POC Glycosylated Hemoglobin (Hb A1C)  -     POC Microalbumin  -     POC Urinalysis Dipstick, Multipro  -     CBC & Differential  -     TSH  -     Comprehensive Metabolic Panel  -     CBC Auto Differential    Essential hypertension    Other hyperlipidemia  -     Lipid Panel    Hypothyroidism, unspecified type    Benign colonic polyp  -     Ambulatory Referral For Screening Colonoscopy    Osteoarthritis, unspecified osteoarthritis type, unspecified site    Need for immunization against influenza  -     Flu Vaccine High Dose PF 65YR+    Menopause  -     DEXA Bone Density Axial; Future        Return in about 6  months (around 4/11/2018) for Recheck-Diabetes fasting.

## 2017-10-17 ENCOUNTER — OFFICE VISIT (OUTPATIENT)
Dept: INTERNAL MEDICINE | Facility: CLINIC | Age: 67
End: 2017-10-17

## 2017-10-17 ENCOUNTER — HOSPITAL ENCOUNTER (OUTPATIENT)
Dept: BONE DENSITY | Facility: HOSPITAL | Age: 67
Discharge: HOME | End: 2017-10-17
Attending: INTERNAL MEDICINE | Admitting: INTERNAL MEDICINE

## 2017-10-17 VITALS
TEMPERATURE: 97 F | WEIGHT: 197.38 LBS | HEART RATE: 80 BPM | BODY MASS INDEX: 30.98 KG/M2 | HEIGHT: 67 IN | RESPIRATION RATE: 20 BRPM | SYSTOLIC BLOOD PRESSURE: 130 MMHG | DIASTOLIC BLOOD PRESSURE: 70 MMHG

## 2017-10-17 DIAGNOSIS — Z00.00 MEDICARE ANNUAL WELLNESS VISIT, INITIAL: Primary | ICD-10-CM

## 2017-10-17 DIAGNOSIS — Z78.0 MENOPAUSE: ICD-10-CM

## 2017-10-17 DIAGNOSIS — Z00.00 ENCOUNTER FOR HEALTH MAINTENANCE EXAMINATION IN ADULT: ICD-10-CM

## 2017-10-17 DIAGNOSIS — Z13.6 SCREENING FOR CARDIOVASCULAR CONDITION: ICD-10-CM

## 2017-10-17 PROCEDURE — G0438 PPPS, INITIAL VISIT: HCPCS | Performed by: INTERNAL MEDICINE

## 2017-10-17 PROCEDURE — 77080 DXA BONE DENSITY AXIAL: CPT

## 2017-10-17 PROCEDURE — 99397 PER PM REEVAL EST PAT 65+ YR: CPT | Performed by: INTERNAL MEDICINE

## 2017-10-17 NOTE — PATIENT INSTRUCTIONS
Menopause is a normal process in which your reproductive ability comes to an end. This process happens gradually over a span of months to years, usually between the ages of 48 and 55. Menopause is complete when you have missed 12 consecutive menstrual periods.  It is important to talk with your health care provider about some of the most common conditions that affect postmenopausal women, such as heart disease, cancer, and bone loss (osteoporosis). Adopting a healthy lifestyle and getting preventive care can help to promote your health and wellness. Those actions can also lower your chances of developing some of these common conditions.  WHAT SHOULD I KNOW ABOUT MENOPAUSE?  During menopause, you may experience a number of symptoms, such as:  · Moderate-to-severe hot flashes.  · Night sweats.  · Decrease in sex drive.  · Mood swings.  · Headaches.  · Tiredness.  · Irritability.  · Memory problems.  · Insomnia.  Choosing to treat or not to treat menopausal changes is an individual decision that you make with your health care provider.  WHAT SHOULD I KNOW ABOUT HORMONE REPLACEMENT THERAPY AND SUPPLEMENTS?  Hormone therapy products are effective for treating symptoms that are associated with menopause, such as hot flashes and night sweats. Hormone replacement carries certain risks, especially as you become older. If you are thinking about using estrogen or estrogen with progestin treatments, discuss the benefits and risks with your health care provider.  WHAT SHOULD I KNOW ABOUT HEART DISEASE AND STROKE?  Heart disease, heart attack, and stroke become more likely as you age. This may be due, in part, to the hormonal changes that your body experiences during menopause. These can affect how your body processes dietary fats, triglycerides, and cholesterol. Heart attack and stroke are both medical emergencies.  There are many things that you can do to help prevent heart disease and stroke:  · Have your blood pressure  checked at least every 1-2 years. High blood pressure causes heart disease and increases the risk of stroke.  · If you are 55-79 years old, ask your health care provider if you should take aspirin to prevent a heart attack or a stroke.  · Do not use any tobacco products, including cigarettes, chewing tobacco, or electronic cigarettes. If you need help quitting, ask your health care provider.  · It is important to eat a healthy diet and maintain a healthy weight.    Be sure to include plenty of vegetables, fruits, low-fat dairy products, and lean protein.    Avoid eating foods that are high in solid fats, added sugars, or salt (sodium).  · Get regular exercise. This is one of the most important things that you can do for your health.    Try to exercise for at least 150 minutes each week. The type of exercise that you do should increase your heart rate and make you sweat. This is known as moderate-intensity exercise.    Try to do strengthening exercises at least twice each week. Do these in addition to the moderate-intensity exercise.  · Know your numbers. Ask your health care provider to check your cholesterol and your blood glucose. Continue to have your blood tested as directed by your health care provider.  WHAT SHOULD I KNOW ABOUT CANCER SCREENING?  There are several types of cancer. Take the following steps to reduce your risk and to catch any cancer development as early as possible.  Breast Cancer  · Practice breast self-awareness.    This means understanding how your breasts normally appear and feel.    It also means doing regular breast self-exams. Let your health care provider know about any changes, no matter how small.  · If you are 40 or older, have a clinician do a breast exam (clinical breast exam or CBE) every year. Depending on your age, family history, and medical history, it may be recommended that you also have a yearly breast X-ray (mammogram).  · If you have a family history of breast cancer,  talk with your health care provider about genetic screening.  · If you are at high risk for breast cancer, talk with your health care provider about having an MRI and a mammogram every year.  · Breast cancer (BRCA) gene test is recommended for women who have family members with BRCA-related cancers. Results of the assessment will determine the need for genetic counseling and BRCA1 and for BRCA2 testing. BRCA-related cancers include these types:    Breast. This occurs in males or females.    Ovarian.    Tubal. This may also be called fallopian tube cancer.    Cancer of the abdominal or pelvic lining (peritoneal cancer).    Prostate.    Pancreatic.  Cervical, Uterine, and Ovarian Cancer  Your health care provider may recommend that you be screened regularly for cancer of the pelvic organs. These include your ovaries, uterus, and vagina. This screening involves a pelvic exam, which includes checking for microscopic changes to the surface of your cervix (Pap test).  · For women ages 21-65, health care providers may recommend a pelvic exam and a Pap test every three years. For women ages 30-65, they may recommend the Pap test and pelvic exam, combined with testing for human papilloma virus (HPV), every five years. Some types of HPV increase your risk of cervical cancer. Testing for HPV may also be done on women of any age who have unclear Pap test results.  · Other health care providers may not recommend any screening for nonpregnant women who are considered low risk for pelvic cancer and have no symptoms. Ask your health care provider if a screening pelvic exam is right for you.  · If you have had past treatment for cervical cancer or a condition that could lead to cancer, you need Pap tests and screening for cancer for at least 20 years after your treatment. If Pap tests have been discontinued for you, your risk factors (such as having a new sexual partner) need to be reassessed to determine if you should start having  screenings again. Some women have medical problems that increase the chance of getting cervical cancer. In these cases, your health care provider may recommend that you have screening and Pap tests more often.  · If you have a family history of uterine cancer or ovarian cancer, talk with your health care provider about genetic screening.  · If you have vaginal bleeding after reaching menopause, tell your health care provider.  · There are currently no reliable tests available to screen for ovarian cancer.  Lung Cancer  Lung cancer screening is recommended for adults 55-80 years old who are at high risk for lung cancer because of a history of smoking. A yearly low-dose CT scan of the lungs is recommended if you:  · Currently smoke.  · Have a history of at least 30 pack-years of smoking and you currently smoke or have quit within the past 15 years. A pack-year is smoking an average of one pack of cigarettes per day for one year.  Yearly screening should:  · Continue until it has been 15 years since you quit.  · Stop if you develop a health problem that would prevent you from having lung cancer treatment.  Colorectal Cancer  · This type of cancer can be detected and can often be prevented.  · Routine colorectal cancer screening usually begins at age 50 and continues through age 75.  · If you have risk factors for colon cancer, your health care provider may recommend that you be screened at an earlier age.  · If you have a family history of colorectal cancer, talk with your health care provider about genetic screening.  · Your health care provider may also recommend using home test kits to check for hidden blood in your stool.  · A small camera at the end of a tube can be used to examine your colon directly (sigmoidoscopy or colonoscopy). This is done to check for the earliest forms of colorectal cancer.  · Direct examination of the colon should be repeated every 5-10 years until age 75. However, if early forms of  precancerous polyps or small growths are found or if you have a family history or genetic risk for colorectal cancer, you may need to be screened more often.  Skin Cancer  · Check your skin from head to toe regularly.  · Monitor any moles. Be sure to tell your health care provider:    About any new moles or changes in moles, especially if there is a change in a mole's shape or color.    If you have a mole that is larger than the size of a pencil eraser.  · If any of your family members has a history of skin cancer, especially at a young age, talk with your health care provider about genetic screening.  · Always use sunscreen. Apply sunscreen liberally and repeatedly throughout the day.  · Whenever you are outside, protect yourself by wearing long sleeves, pants, a wide-brimmed hat, and sunglasses.  WHAT SHOULD I KNOW ABOUT OSTEOPOROSIS?  Osteoporosis is a condition in which bone destruction happens more quickly than new bone creation. After menopause, you may be at an increased risk for osteoporosis. To help prevent osteoporosis or the bone fractures that can happen because of osteoporosis, the following is recommended:  · If you are 19-50 years old, get at least 1,000 mg of calcium and at least 600 mg of vitamin D per day.  · If you are older than age 50 but younger than age 70, get at least 1,200 mg of calcium and at least 600 mg of vitamin D per day.  · If you are older than age 70, get at least 1,200 mg of calcium and at least 800 mg of vitamin D per day.  Smoking and excessive alcohol intake increase the risk of osteoporosis. Eat foods that are rich in calcium and vitamin D, and do weight-bearing exercises several times each week as directed by your health care provider.  WHAT SHOULD I KNOW ABOUT HOW MENOPAUSE AFFECTS MY MENTAL HEALTH?  Depression may occur at any age, but it is more common as you become older. Common symptoms of depression include:  · Low or sad mood.  · Changes in sleep patterns.  · Changes  in appetite or eating patterns.  · Feeling an overall lack of motivation or enjoyment of activities that you previously enjoyed.  · Frequent crying spells.  Talk with your health care provider if you think that you are experiencing depression.  WHAT SHOULD I KNOW ABOUT IMMUNIZATIONS?  It is important that you get and maintain your immunizations. These include:  · Tetanus, diphtheria, and pertussis (Tdap) booster vaccine.  · Influenza every year before the flu season begins.  · Pneumonia vaccine.  · Shingles vaccine.  Your health care provider may also recommend other immunizations.     This information is not intended to replace advice given to you by your health care provider. Make sure you discuss any questions you have with your health care provider.     Document Released: 02/09/2007 Document Revised: 01/08/2016 Document Reviewed: 08/20/2015  [employer] Interactive Patient Education ©2017 [employer]    Exercising to Lose Weight  Exercising can help you to lose weight. In order to lose weight through exercise, you need to do vigorous-intensity exercise. You can tell that you are exercising with vigorous intensity if you are breathing very hard and fast and cannot hold a conversation while exercising.  Moderate-intensity exercise helps to maintain your current weight. You can tell that you are exercising at a moderate level if you have a higher heart rate and faster breathing, but you are still able to hold a conversation.  HOW OFTEN SHOULD I EXERCISE?  Choose an activity that you enjoy and set realistic goals. Your health care provider can help you to make an activity plan that works for you. Exercise regularly as directed by your health care provider. This may include:  · Doing resistance training twice each week, such as:    Push-ups.    Sit-ups.    Lifting weights.    Using resistance bands.  · Doing a given intensity of exercise for a given amount of time. Choose from these options:    150 minutes of  moderate-intensity exercise every week.    75 minutes of vigorous-intensity exercise every week.    A mix of moderate-intensity and vigorous-intensity exercise every week.  Children, pregnant women, people who are out of shape, people who are overweight, and older adults may need to consult a health care provider for individual recommendations. If you have any sort of medical condition, be sure to consult your health care provider before starting a new exercise program.  WHAT ARE SOME ACTIVITIES THAT CAN HELP ME TO LOSE WEIGHT?   · Walking at a rate of at least 4.5 miles an hour.  · Jogging or running at a rate of 5 miles per hour.  · Biking at a rate of at least 10 miles per hour.  · Lap swimming.  · Roller-skating or in-line skating.  · Cross-country skiing.  · Vigorous competitive sports, such as football, basketball, and soccer.  · Jumping rope.  · Aerobic dancing.  HOW CAN I BE MORE ACTIVE IN MY DAY-TO-DAY ACTIVITIES?  · Use the stairs instead of the elevator.  · Take a walk during your lunch break.  · If you drive, park your car farther away from work or school.  · If you take public transportation, get off one stop early and walk the rest of the way.  · Make all of your phone calls while standing up and walking around.  · Get up, stretch, and walk around every 30 minutes throughout the day.  WHAT GUIDELINES SHOULD I FOLLOW WHILE EXERCISING?  · Do not exercise so much that you hurt yourself, feel dizzy, or get very short of breath.  · Consult your health care provider prior to starting a new exercise program.  · Wear comfortable clothes and shoes with good support.  · Drink plenty of water while you exercise to prevent dehydration or heat stroke. Body water is lost during exercise and must be replaced.  · Work out until you breathe faster and your heart beats faster.     This information is not intended to replace advice given to you by your health care provider. Make sure you discuss any questions you have  with your health care provider.     Document Released: 01/20/2012 Document Revised: 01/08/2016 Document Reviewed: 05/21/2015  [employer] Interactive Patient Education ©2017 [employer]  Heart-Healthy Eating Plan  Many factors influence your heart health, including eating and exercise habits. Heart (coronary) risk increases with abnormal blood fat (lipid) levels. Heart-healthy meal planning includes limiting unhealthy fats, increasing healthy fats, and making other small dietary changes. This includes maintaining a healthy body weight to help keep lipid levels within a normal range.  WHAT IS MY PLAN?   Your health care provider recommends that you:  · Get no more than _________% of the total calories in your daily diet from fat.  · Limit your intake of saturated fat to less than _________% of your total calories each day.  · Limit the amount of cholesterol in your diet to less than _________ mg per day.  WHAT TYPES OF FAT SHOULD I CHOOSE?  · Choose healthy fats more often. Choose monounsaturated and polyunsaturated fats, such as olive oil and canola oil, flaxseeds, walnuts, almonds, and seeds.  · Eat more omega-3 fats. Good choices include salmon, mackerel, sardines, tuna, flaxseed oil, and ground flaxseeds. Aim to eat fish at least two times each week.  · Limit saturated fats. Saturated fats are primarily found in animal products, such as meats, butter, and cream. Plant sources of saturated fats include palm oil, palm kernel oil, and coconut oil.  · Avoid foods with partially hydrogenated oils in them. These contain trans fats. Examples of foods that contain trans fats are stick margarine, some tub margarines, cookies, crackers, and other baked goods.  WHAT GENERAL GUIDELINES DO I NEED TO FOLLOW?  · Check food labels carefully to identify foods with trans fats or high amounts of saturated fat.  · Fill one half of your plate with vegetables and green salads. Eat 4-5 servings of vegetables per day. A serving of  "vegetables equals 1 cup of raw leafy vegetables, ½ cup of raw or cooked cut-up vegetables, or ½ cup of vegetable juice.  · Fill one fourth of your plate with whole grains. Look for the word \"whole\" as the first word in the ingredient list.  · Fill one fourth of your plate with lean protein foods.  · Eat 4-5 servings of fruit per day. A serving of fruit equals one medium whole fruit, ¼ cup of dried fruit, ½ cup of fresh, frozen, or canned fruit, or ½ cup of 100% fruit juice.  · Eat more foods that contain soluble fiber. Examples of foods that contain this type of fiber are apples, broccoli, carrots, beans, peas, and barley. Aim to get 20-30 g of fiber per day.  · Eat more home-cooked food and less restaurant, buffet, and fast food.  · Limit or avoid alcohol.  · Limit foods that are high in starch and sugar.  · Avoid fried foods.  · Cook foods by using methods other than frying. Baking, boiling, grilling, and broiling are all great options. Other fat-reducing suggestions include:    Removing the skin from poultry.    Removing all visible fats from meats.    Skimming the fat off of stews, soups, and gravies before serving them.    Steaming vegetables in water or broth.  · Lose weight if you are overweight. Losing just 5-10% of your initial body weight can help your overall health and prevent diseases such as diabetes and heart disease.  · Increase your consumption of nuts, legumes, and seeds to 4-5 servings per week. One serving of dried beans or legumes equals ½ cup after being cooked, one serving of nuts equals 1½ ounces, and one serving of seeds equals ½ ounce or 1 tablespoon.  · You may need to monitor your salt (sodium) intake, especially if you have high blood pressure. Talk with your health care provider or dietitian to get more information about reducing sodium.  WHAT FOODS CAN I EAT?  Grains  Breads, including [place], white, nancy, wheat, raisin, rye, oatmeal, and Italian. Tortillas that are neither fried nor " made with lard or trans fat. Low-fat rolls, including hotdog and hamburger buns and English muffins. Biscuits. Muffins. Waffles. Pancakes. Light popcorn. Whole-grain cereals. Flatbread. [place] toast. Pretzels. Breadsticks. Rusks. Low-fat snacks and crackers, including oyster, saltine, matzo, rio, animal, and rye. Rice and pasta, including brown rice and those that are made with whole wheat.  Vegetables  All vegetables.  Fruits  All fruits, but limit coconut.  Meats and Other Protein Sources  Lean, well-trimmed beef, veal, pork, and lamb. Chicken and turkey without skin. All fish and shellfish. Wild duck, rabbit, pheasant, and venison. Egg whites or low-cholesterol egg substitutes. Dried beans, peas, lentils, and tofu. Seeds and most nuts.  Dairy  Low-fat or nonfat cheeses, including ricotta, string, and mozzarella. Skim or 1% milk that is liquid, powdered, or evaporated. Buttermilk that is made with low-fat milk. Nonfat or low-fat yogurt.  Beverages  Mineral water. Diet carbonated beverages.  Sweets and Desserts  Sherbets and fruit ices. Honey, jam, marmalade, jelly, and syrups. Meringues and gelatins. Pure sugar candy, such as hard candy, jelly beans, gumdrops, mints, marshmallows, and small amounts of dark chocolate. Lon food cake.  Eat all sweets and desserts in moderation.  Fats and Oils  Nonhydrogenated (trans-free) margarines. Vegetable oils, including soybean, sesame, sunflower, olive, peanut, safflower, corn, canola, and cottonseed. Salad dressings or mayonnaise that are made with a vegetable oil. Limit added fats and oils that you use for cooking, baking, salads, and as spreads.  Other  Cocoa powder. Coffee and tea. All seasonings and condiments.  The items listed above may not be a complete list of recommended foods or beverages. Contact your dietitian for more options.  WHAT FOODS ARE NOT RECOMMENDED?  Grains  Breads that are made with saturated or trans fats, oils, or whole milk. Croissants. Butter  rolls. Cheese breads. Sweet rolls. Donuts. Buttered popcorn. Chow mein noodles. High-fat crackers, such as cheese or butter crackers.  Meats and Other Protein Sources  Fatty meats, such as hotdogs, short ribs, sausage, spareribs, parisi, ribeye roast or steak, and mutton. High-fat deli meats, such as salami and bologna. Caviar. Domestic duck and goose. Organ meats, such as kidney, liver, sweetbreads, brains, gizzard, chitterlings, and heart.  Dairy  Cream, sour cream, cream cheese, and creamed cottage cheese. Whole milk cheeses, including blue (kim), [place] Samm, Brie, Bakari, American, Havarti, Swiss, cheddar, Camembert, and [place].  Whole or 2% milk that is liquid, evaporated, or condensed. Whole buttermilk. Cream sauce or high-fat cheese sauce. Yogurt that is made from whole milk.  Beverages  Regular sodas and drinks with added sugar.  Sweets and Desserts  Frosting. Pudding. Cookies. Cakes other than xiang food cake. Candy that has milk chocolate or white chocolate, hydrogenated fat, butter, coconut, or unknown ingredients. Buttered syrups. Full-fat ice cream or ice cream drinks.  Fats and Oils  Gravy that has suet, meat fat, or shortening. Cocoa butter, hydrogenated oils, palm oil, coconut oil, palm kernel oil. These can often be found in baked products, candy, fried foods, nondairy creamers, and whipped toppings. Solid fats and shortenings, including parisi fat, salt pork, lard, and butter. Nondairy cream substitutes, such as coffee creamers and sour cream substitutes. Salad dressings that are made of unknown oils, cheese, or sour cream.  The items listed above may not be a complete list of foods and beverages to avoid. Contact your dietitian for more information.     This information is not intended to replace advice given to you by your health care provider. Make sure you discuss any questions you have with your health care provider.     Document Released: 09/26/2009 Document Revised: 01/08/2016 Document  Reviewed: 06/11/2015  [employer] Interactive Patient Education ©2017 [employer]

## 2017-10-17 NOTE — PROGRESS NOTES
Subjective   History of Present Illness    History obtained from the patient.      Angelina Pimentel is a 67 y.o. female who presents for an Annual Physical.      Follow up and fasting labs were done 10/11/17.  Colonoscopy was ordered.  DEXA Scan scheduled for today.  Mammogram additional views scheduled for 10/26/17.    PMH, PSH, SocHx, FamHx, Allergies, and Medications: Reviewed and updated.    Outpatient Medications Prior to Visit   Medication Sig Dispense Refill   • cetirizine (zyrTEC) 10 MG tablet Take 10 mg by mouth Daily.     • fluticasone (FLONASE) 50 MCG/ACT nasal spray into each nostril Daily.     • Lancets (ONETOUCH ULTRASOFT) lancets USE AS DIRECTED ONCE DAILY 100 each 9   • levothyroxine (SYNTHROID, LEVOTHROID) 100 MCG tablet TAKE 1 TABLET BY MOUTH  DAILY 90 tablet 2   • lisinopril (PRINIVIL,ZESTRIL) 10 MG tablet Take 1 tablet by mouth Every Night. 90 tablet 3   • metFORMIN (GLUCOPHAGE) 500 MG tablet Take 1 tablet by mouth  daily with breakfast 90 tablet 0   • Multiple Vitamins-Minerals (CENTRUM SILVER ADULT 50+ PO) Take  by mouth Daily.     • ONE TOUCH ULTRA TEST test strip USE ONE STRIP TO TEST DAILY 100 each 3   • simvastatin (ZOCOR) 20 MG tablet Take 1 tablet by mouth  every evening 90 tablet 0     No facility-administered medications prior to visit.        Immunization History   Administered Date(s) Administered   • Flu Vaccine High Dose PF 65YR+ 10/14/2015, 10/10/2016, 10/11/2017   • Pneumococcal Conjugate 13-Valent 11/24/2014   • Pneumococcal Polysaccharide 09/20/2010, 03/25/2016   • Td 04/05/2017   • Tdap 02/25/2009   • Zoster 05/21/2010         Patient Active Problem List   Diagnosis   • Diabetes mellitus   • Benign colonic polyp   • Hyperlipidemia   • Hypothyroidism   • Osteoarthritis   • Hypertension   • Allergic rhinitis   • Basal cell carcinoma of skin   • Essential hypertriglyceridemia   • Mitral valve prolapse syndrome   • Squamous cell carcinoma of skin   • Obstructive sleep  apnea syndrome   • Menopause       Health Habits:  Dental Exam. up to date  Eye Exam. up to date  Hearing Loss:  No  Exercise: 7 times/week.  Current exercise activities include: walking and personal training  Diet: Healthy  Multivitamin: Yes    Safe Driving:  Yes  Seat Belt:  Yes  Bike Helmet:  N/A  Skin Screening:  Yes  Sunscreen: Yes  SBE / VIGNESH: No  Sexual Activity:  Yes  Birth Control:  MONICA  STD Prevention:  N/A    Last Pap: 2008, normal per patient ([facility]).  Last Mammogram:  9/25/17, cat 0  Last DEXA Scan: 8/13/12, normal  Last Colonoscopy: 8/30/12, normal except diverticulosis  Last PSA: N/A    Social:    Social History     Social History   • Marital status:      Spouse name: N/A   • Number of children: 2   • Years of education: N/A     Occupational History   • Retired     Social History Main Topics   • Smoking status: Never Smoker   • Smokeless tobacco: Not on file   • Alcohol use No      Comment: Never drank alcohol   • Drug use: Not on file   • Sexual activity: Not on file     Other Topics Concern   • Not on file     Social History Narrative         Current Medical Providers:    Leola Villa MD (Internal Medicine / Pediatrics)    The [facility] providers who are involved in the care of this patient are listed above.         Review of Systems   Constitutional: Negative for chills, fatigue, fever and unexpected weight change.        No weight gain or weight loss.  No night sweats.  No generalized pain.   HENT: Positive for postnasal drip and sneezing. Negative for congestion, ear pain, hearing loss, nosebleeds, rhinorrhea, sinus pressure, sore throat, tinnitus and voice change.         Denies snoring.   Eyes: Negative for photophobia, pain, discharge, redness, itching and visual disturbance.   Respiratory: Negative for cough, chest tightness, shortness of breath, wheezing and stridor.         No orthopnea, dyspnea on exertion, or PND.  No chest congestion.   No  hemoptysis.   Cardiovascular:  "Negative for chest pain, palpitations and leg swelling.        No claudication or syncope.   Gastrointestinal: Negative for abdominal pain, blood in stool, constipation, diarrhea, nausea, rectal pain and vomiting.        No hematemesis.  No heartburn, dysphagia or odynophagia.  No belching or bloating.  No melena.   Endocrine: Negative for cold intolerance, heat intolerance, polydipsia, polyphagia and polyuria.        No hair loss or dry skin.  No generalized weakness.  No hot flashes.   Genitourinary: Negative for difficulty urinating, dyspareunia, dysuria, flank pain, frequency, hematuria, pelvic pain, urgency, vaginal bleeding and vaginal discharge.        No nocturia, incomplete emptying, or incontinence.   Musculoskeletal: Negative for arthralgias, back pain, gait problem, joint swelling, myalgias, neck pain and neck stiffness.        No joint stiffness.   Has intermittent hip pain.   Skin: Negative for rash.        No new skin lesions or changes in skin lesions. No breast pain or masses.  No nipple discharge or nipple inversion.   Neurological: Negative for dizziness, tremors, syncope, speech difficulty, weakness, light-headedness, numbness and headaches.        No tingling.  No memory loss.  No decreased concentration.   Hematological: Negative for adenopathy. Does not bruise/bleed easily.   Psychiatric/Behavioral: Negative for confusion, sleep disturbance and suicidal ideas. The patient is not nervous/anxious.         No depression.           Objective     Vitals:    10/17/17 0815   BP: 130/70   BP Location: Right arm   Pulse: 80   Resp: 20   Temp: 97 °F (36.1 °C)   TempSrc: Temporal Artery    Weight: 197 lb 6 oz (89.5 kg)   Height: 66.5\" (168.9 cm)   PainSc: 0-No pain       Body mass index is 31.38 kg/(m^2).    Physical Exam   Constitutional:   Obese.   HENT:   Head: Normocephalic and atraumatic.   Right Ear: Tympanic membrane, external ear and ear canal normal.   Left Ear: Tympanic membrane, external ear " and ear canal normal.   Mouth/Throat: Oropharynx is clear and moist. No oropharyngeal exudate.   Tonsils absent.   Eyes: Conjunctivae and EOM are normal. Pupils are equal, round, and reactive to light.   Neck: Normal range of motion. Neck supple. No thyromegaly present.   Cardiovascular: Normal rate, regular rhythm and intact distal pulses.  Exam reveals no gallop and no friction rub.    No murmur heard.  Pulmonary/Chest: Effort normal and breath sounds normal.   Breast exam declined.   Abdominal: Soft. Bowel sounds are normal. She exhibits no distension and no mass. There is no hepatomegaly. There is no tenderness. There is no rebound and no guarding.   Rectal exam deferred.   Genitourinary:   Genitourinary Comments:  deferred.   Musculoskeletal: Normal range of motion. She exhibits no edema or tenderness.   Lymphadenopathy:     She has no cervical adenopathy.        Right cervical: No posterior cervical adenopathy present.       Left cervical: No posterior cervical adenopathy present.        Right: No inguinal and no supraclavicular adenopathy present.        Left: No inguinal and no supraclavicular adenopathy present.   Neurological: She is alert. She has normal strength and normal reflexes. She displays normal reflexes. No cranial nerve deficit. She exhibits normal muscle tone. Coordination and gait normal.   Skin: No lesion and no rash noted.   No atypical skin lesions.   Psychiatric: She has a normal mood and affect.   Nursing note and vitals reviewed.          Assessment/Plan      Angelina was seen today for annual exam.    Diagnoses and all orders for this visit:    Medicare annual wellness visit, initial    Encounter for health maintenance examination in adult    Screening for cardiovascular condition  -     CT Cardiac Calcium Score Without Dye; Future      Return in about 1 year (around 10/17/2018) for Annual physical and Subsequent Medicare Wellness Exam.

## 2017-10-17 NOTE — PROGRESS NOTES
QUICK REFERENCE INFORMATION:  The ABCs of the Annual Wellness Visit    Initial Medicare Wellness Visit    HEALTH RISK ASSESSMENT    1950    Recent Hospitalizations:  No hospitalization(s) within the last year..        Current Medical Providers:  Patient Care Team:  Leola Villa MD as PCP - General (Internal Medicine)  Leola Villa MD as PCP - Family Medicine  Kathie Arias MD as Consulting Physician (Dermatology)  Bc Red MD as Consulting Physician (Ophthalmology)  Almas Henao MD as Consulting Physician (Neurology)        Smoking Status:  History   Smoking Status   • Never Smoker   Smokeless Tobacco   • Not on file       Alcohol Consumption:  History   Alcohol Use No     Comment: Never drank alcohol       Depression Screen:   PHQ-2/PHQ-9 Depression Screening 10/17/2017   Little interest or pleasure in doing things 0   Feeling down, depressed, or hopeless 0   Total Score 0       Health Habits and Functional and Cognitive Screening:  Functional & Cognitive Status 10/17/2017   Do you have difficulty preparing food and eating? No   Do you have difficulty bathing yourself? No   Do you have difficulty getting dressed? No   Do you have difficulty using the toilet? No   Do you have difficulty moving around from place to place? No   In the past year have you fallen or experienced a near fall? No   Do you need help using the phone?  No   Are you deaf or do you have serious difficulty hearing?  No   Do you need help with transportation? No   Do you need help shopping? No   Do you need help preparing meals?  No   Do you need help with housework?  No   Do you need help with laundry? No   Do you need help taking your medications? No   Do you need help managing money? No       Health Habits  Current Diet: Low Carb Diet (limited junk food )  Dental Exam: Up to date  Eye Exam: Up to date  Exercise (times per week): 4 times per week (2-3 hours week )  Current Exercise Activities Include:  Walking          Does the patient have evidence of cognitive impairment? No    Asiprin use counseling: Does not need ASA (and currently is not on it)      Recent Lab Results:    Visual Acuity:  No exam data present    Age-appropriate Screening Schedule:  Refer to the list below for future screening recommendations based on patient's age, sex and/or medical conditions. Orders for these recommended tests are listed in the plan section. The patient has been provided with a written plan.    Health Maintenance   Topic Date Due   • DXA SCAN  08/13/2017   • COLONOSCOPY  08/30/2017   • DIABETIC FOOT EXAM  03/23/2018   • HEMOGLOBIN A1C  04/11/2018   • DIABETIC EYE EXAM  07/17/2018   • LIPID PANEL  10/11/2018   • URINE MICROALBUMIN  10/11/2018   • MAMMOGRAM  09/25/2019   • TDAP/TD VACCINES (3 - Td) 04/05/2027   • INFLUENZA VACCINE  Completed   • PNEUMOCOCCAL VACCINES (65+ LOW/MEDIUM RISK)  Completed   • ZOSTER VACCINE  Completed        Subjective   History of Present Illness    Angelina Pimentel is a 67 y.o. female who presents for an Annual Wellness Visit.    The following portions of the patient's history were reviewed and updated as appropriate: allergies, current medications, past family history, past medical history, past social history, past surgical history and problem list.    Outpatient Medications Prior to Visit   Medication Sig Dispense Refill   • cetirizine (zyrTEC) 10 MG tablet Take 10 mg by mouth Daily.     • fluticasone (FLONASE) 50 MCG/ACT nasal spray into each nostril Daily.     • Lancets (ONETOUCH ULTRASOFT) lancets USE AS DIRECTED ONCE DAILY 100 each 9   • levothyroxine (SYNTHROID, LEVOTHROID) 100 MCG tablet TAKE 1 TABLET BY MOUTH  DAILY 90 tablet 2   • lisinopril (PRINIVIL,ZESTRIL) 10 MG tablet Take 1 tablet by mouth Every Night. 90 tablet 3   • metFORMIN (GLUCOPHAGE) 500 MG tablet Take 1 tablet by mouth  daily with breakfast 90 tablet 0   • Multiple Vitamins-Minerals (CENTRUM SILVER ADULT 50+ PO) Take  by  "mouth Daily.     • ONE TOUCH ULTRA TEST test strip USE ONE STRIP TO TEST DAILY 100 each 3   • simvastatin (ZOCOR) 20 MG tablet Take 1 tablet by mouth  every evening 90 tablet 0     No facility-administered medications prior to visit.        Patient Active Problem List   Diagnosis   • Diabetes mellitus   • Benign colonic polyp   • Hyperlipidemia   • Hypothyroidism   • Osteoarthritis   • Hypertension   • Allergic rhinitis   • Basal cell carcinoma of skin   • Essential hypertriglyceridemia   • Mitral valve prolapse syndrome   • Squamous cell carcinoma of skin   • Obstructive sleep apnea syndrome   • Menopause       Advance Care Planning:  has an advance directive - a copy HAS NOT been provided. Have asked the patient to send this to us to add to record.    Identification of Risk Factors:  Risk factors include: weight , unhealthy diet and cardiovascular risk.    Review of Systems    Compared to one year ago, the patient feels her physical health is better.  Compared to one year ago, the patient feels her mental health is the same.    Objective     Physical Exam    Vitals:    10/17/17 0815   BP: 130/70   BP Location: Right arm   Pulse: 80   Resp: 20   Temp: 97 °F (36.1 °C)   TempSrc: Temporal Artery    Weight: 197 lb 6 oz (89.5 kg)   Height: 66.5\" (168.9 cm)   PainSc: 0-No pain       Body mass index is 31.38 kg/(m^2).  Discussed the patient's BMI with her. The BMI is above average; BMI management plan is completed.  Finger Rub Hearing{Test (right ear):passed  Finger Rub Hearing{Test (left ear):passed    Assessment/Plan      Angelina was seen today for annual exam.    Diagnoses and all orders for this visit:    Medicare annual wellness visit, initial    Encounter for health maintenance examination in adult    Screening for cardiovascular condition  -     CT Cardiac Calcium Score Without Dye; Future          Patient Self-Management and Personalized Health Advice  The patient has been provided with information about: diet, " exercise, weight management and prevention of cardiac or vascular disease and preventive services including:   · Exercise counseling provided, Nutrition counseling provided.    Visit Diagnoses:    Angelina was seen today for annual exam.    Diagnoses and all orders for this visit:    Medicare annual wellness visit, initial    Encounter for health maintenance examination in adult    Screening for cardiovascular condition  -     CT Cardiac Calcium Score Without Dye; Future          Outpatient Encounter Prescriptions as of 10/17/2017   Medication Sig Dispense Refill   • cetirizine (zyrTEC) 10 MG tablet Take 10 mg by mouth Daily.     • fluticasone (FLONASE) 50 MCG/ACT nasal spray into each nostril Daily.     • Lancets (ONETOUCH ULTRASOFT) lancets USE AS DIRECTED ONCE DAILY 100 each 9   • levothyroxine (SYNTHROID, LEVOTHROID) 100 MCG tablet TAKE 1 TABLET BY MOUTH  DAILY 90 tablet 2   • lisinopril (PRINIVIL,ZESTRIL) 10 MG tablet Take 1 tablet by mouth Every Night. 90 tablet 3   • metFORMIN (GLUCOPHAGE) 500 MG tablet Take 1 tablet by mouth  daily with breakfast 90 tablet 0   • Multiple Vitamins-Minerals (CENTRUM SILVER ADULT 50+ PO) Take  by mouth Daily.     • ONE TOUCH ULTRA TEST test strip USE ONE STRIP TO TEST DAILY 100 each 3   • simvastatin (ZOCOR) 20 MG tablet Take 1 tablet by mouth  every evening 90 tablet 0     No facility-administered encounter medications on file as of 10/17/2017.        Reviewed use of high risk medication in the elderly: yes  Reviewed for potential of harmful drug interactions in the elderly: yes    Follow Up:      An After Visit Summary and PPPS with all of these plans were given to the patient.        Return in about 1 year (around 10/17/2018) for Annual physical and Subsequent Medicare Wellness Exam.

## 2017-10-26 ENCOUNTER — HOSPITAL ENCOUNTER (OUTPATIENT)
Dept: MAMMOGRAPHY | Facility: HOSPITAL | Age: 67
Discharge: HOME | End: 2017-10-26
Admitting: INTERNAL MEDICINE

## 2017-10-26 ENCOUNTER — TRANSCRIBE ORDERS (OUTPATIENT)
Dept: MAMMOGRAPHY | Facility: HOSPITAL | Age: 67
End: 2017-10-26

## 2017-10-26 ENCOUNTER — HOSPITAL ENCOUNTER (OUTPATIENT)
Dept: ULTRASOUND IMAGING | Facility: HOSPITAL | Age: 67
Discharge: HOME | End: 2017-10-26

## 2017-10-26 DIAGNOSIS — R92.8 ABNORMAL MAMMOGRAM: Primary | ICD-10-CM

## 2017-10-26 DIAGNOSIS — R92.8 ABNORMAL MAMMOGRAM: ICD-10-CM

## 2017-10-26 PROCEDURE — G0279 TOMOSYNTHESIS, MAMMO: HCPCS

## 2017-10-26 PROCEDURE — G0206 DX MAMMO INCL CAD UNI: HCPCS

## 2017-10-26 PROCEDURE — 76642 ULTRASOUND BREAST LIMITED: CPT

## 2017-10-27 PROCEDURE — G0206 DX MAMMO INCL CAD UNI: HCPCS | Performed by: RADIOLOGY

## 2017-10-27 PROCEDURE — G0279 TOMOSYNTHESIS, MAMMO: HCPCS | Performed by: RADIOLOGY

## 2017-10-27 PROCEDURE — 76642 ULTRASOUND BREAST LIMITED: CPT | Performed by: RADIOLOGY

## 2017-11-14 ENCOUNTER — TELEPHONE (OUTPATIENT)
Dept: MAMMOGRAPHY | Facility: HOSPITAL | Age: 67
End: 2017-11-14

## 2017-11-14 NOTE — TELEPHONE ENCOUNTER
I spoke with Angelina Pimentel after she sent a message to MY Chart with the concern that she questioned  if that was the reason for her needing to return for the diagnostic mammogram. I had reviewed that images in question and found that the postioning was very good, with compression being almost exactly the same from the previous exams.  The postioning was well within the American College of Radiology and the American Registry of Radiolgistic guidelines.  had returned for her diagnotic mammogram and breast ultrasound and the area of concern persisted. She will be having a needle biopsy of the mary of concern on 11/20/ 2017.  I did explain to Ms Pimentel the many  processes in place for our mammography technologist.  She seemed to understand an appreciate the phone call. I did give her my number to call me if she had any other concerns or questions.   Felix REYES(DUTCH) (M)  Clinical Manager of Breast Imaging

## 2017-11-20 ENCOUNTER — HOSPITAL ENCOUNTER (OUTPATIENT)
Dept: MAMMOGRAPHY | Facility: HOSPITAL | Age: 67
Discharge: HOME | End: 2017-11-20
Attending: RADIOLOGY | Admitting: RADIOLOGY

## 2017-11-20 ENCOUNTER — HOSPITAL ENCOUNTER (OUTPATIENT)
Dept: MAMMOGRAPHY | Facility: HOSPITAL | Age: 67
Discharge: HOME | End: 2017-11-20

## 2017-11-20 DIAGNOSIS — R92.8 ABNORMAL MAMMOGRAM: ICD-10-CM

## 2017-11-20 PROCEDURE — A4648 IMPLANTABLE TISSUE MARKER: HCPCS

## 2017-11-20 PROCEDURE — 88305 TISSUE EXAM BY PATHOLOGIST: CPT | Performed by: RADIOLOGY

## 2017-11-20 RX ORDER — LIDOCAINE HYDROCHLORIDE 10 MG/ML
5 INJECTION, SOLUTION INFILTRATION; PERINEURAL ONCE
Status: COMPLETED | OUTPATIENT
Start: 2017-11-20 | End: 2017-11-20

## 2017-11-20 RX ORDER — LIDOCAINE HYDROCHLORIDE AND EPINEPHRINE 10; 10 MG/ML; UG/ML
10 INJECTION, SOLUTION INFILTRATION; PERINEURAL ONCE
Status: COMPLETED | OUTPATIENT
Start: 2017-11-20 | End: 2017-11-20

## 2017-11-20 RX ADMIN — LIDOCAINE HYDROCHLORIDE 5 ML: 10 INJECTION, SOLUTION INFILTRATION; PERINEURAL at 13:42

## 2017-11-20 RX ADMIN — LIDOCAINE HYDROCHLORIDE,EPINEPHRINE BITARTRATE 19 ML: 10; .01 INJECTION, SOLUTION INFILTRATION; PERINEURAL at 13:44

## 2017-11-21 ENCOUNTER — TELEPHONE (OUTPATIENT)
Dept: MAMMOGRAPHY | Facility: HOSPITAL | Age: 67
End: 2017-11-21

## 2017-11-21 LAB
CYTO UR: NORMAL
LAB AP CASE REPORT: NORMAL
LAB AP CLINICAL INFORMATION: NORMAL
LAB AP DIAGNOSIS COMMENT: NORMAL
Lab: NORMAL
PATH REPORT.FINAL DX SPEC: NORMAL
PATH REPORT.GROSS SPEC: NORMAL

## 2017-11-21 PROCEDURE — G0206 DX MAMMO INCL CAD UNI: HCPCS | Performed by: RADIOLOGY

## 2017-11-21 PROCEDURE — 19081 BX BREAST 1ST LESION STRTCTC: CPT | Performed by: RADIOLOGY

## 2017-11-21 NOTE — TELEPHONE ENCOUNTER
11.21.17 @ 1710: Pt notified of path result by Dr Kristina Browne this am.Pt notified recommendations. Verbalizes understanding. Denies discomfort. Denies any signs and symptoms of infection. Notified of surgical consult with Dr Zaldivar  12.14.17 @ 5330. Told to bring photo ID, insurance cards & list of current medications. Patient was encouraged to call back with any questions or concerns. Patient verbalized understanding.

## 2017-12-04 ENCOUNTER — APPOINTMENT (OUTPATIENT)
Dept: CT IMAGING | Facility: HOSPITAL | Age: 67
End: 2017-12-04
Attending: INTERNAL MEDICINE

## 2017-12-11 ENCOUNTER — HOSPITAL ENCOUNTER (OUTPATIENT)
Dept: CT IMAGING | Facility: HOSPITAL | Age: 67
Discharge: HOME | End: 2017-12-11
Attending: INTERNAL MEDICINE | Admitting: INTERNAL MEDICINE

## 2017-12-11 DIAGNOSIS — Z13.6 SCREENING FOR CARDIOVASCULAR CONDITION: ICD-10-CM

## 2017-12-11 PROCEDURE — 75571 CT HRT W/O DYE W/CA TEST: CPT

## 2017-12-11 RX ORDER — SIMVASTATIN 20 MG
20 TABLET ORAL NIGHTLY
Qty: 90 TABLET | Refills: 3 | Status: SHIPPED | OUTPATIENT
Start: 2017-12-11 | End: 2018-10-24 | Stop reason: SDUPTHER

## 2017-12-14 ENCOUNTER — TRANSCRIBE ORDERS (OUTPATIENT)
Dept: MAMMOGRAPHY | Facility: HOSPITAL | Age: 67
End: 2017-12-14

## 2017-12-14 DIAGNOSIS — R92.8 ABNORMAL MAMMOGRAM: Primary | ICD-10-CM

## 2018-02-05 ENCOUNTER — APPOINTMENT (OUTPATIENT)
Dept: PREADMISSION TESTING | Facility: HOSPITAL | Age: 68
End: 2018-02-05

## 2018-02-05 VITALS — HEIGHT: 67 IN | BODY MASS INDEX: 32.52 KG/M2 | WEIGHT: 207.2 LBS

## 2018-02-05 LAB
ANION GAP SERPL CALCULATED.3IONS-SCNC: 6 MMOL/L (ref 3–11)
BUN BLD-MCNC: 17 MG/DL (ref 9–23)
BUN/CREAT SERPL: 24.3 (ref 7–25)
CALCIUM SPEC-SCNC: 9.5 MG/DL (ref 8.7–10.4)
CHLORIDE SERPL-SCNC: 108 MMOL/L (ref 99–109)
CO2 SERPL-SCNC: 28 MMOL/L (ref 20–31)
CREAT BLD-MCNC: 0.7 MG/DL (ref 0.6–1.3)
DEPRECATED RDW RBC AUTO: 45.1 FL (ref 37–54)
ERYTHROCYTE [DISTWIDTH] IN BLOOD BY AUTOMATED COUNT: 12.9 % (ref 11.3–14.5)
GFR SERPL CREATININE-BSD FRML MDRD: 83 ML/MIN/1.73
GLUCOSE BLD-MCNC: 79 MG/DL (ref 70–100)
HCT VFR BLD AUTO: 43 % (ref 34.5–44)
HGB BLD-MCNC: 14.1 G/DL (ref 11.5–15.5)
MCH RBC QN AUTO: 31.4 PG (ref 27–31)
MCHC RBC AUTO-ENTMCNC: 32.8 G/DL (ref 32–36)
MCV RBC AUTO: 95.8 FL (ref 80–99)
PLATELET # BLD AUTO: 233 10*3/MM3 (ref 150–450)
PMV BLD AUTO: 9.7 FL (ref 6–12)
POTASSIUM BLD-SCNC: 4.5 MMOL/L (ref 3.5–5.5)
RBC # BLD AUTO: 4.49 10*6/MM3 (ref 3.89–5.14)
SODIUM BLD-SCNC: 142 MMOL/L (ref 132–146)
WBC NRBC COR # BLD: 7.11 10*3/MM3 (ref 3.5–10.8)

## 2018-02-05 PROCEDURE — 85027 COMPLETE CBC AUTOMATED: CPT | Performed by: ANESTHESIOLOGY

## 2018-02-05 PROCEDURE — 93005 ELECTROCARDIOGRAM TRACING: CPT

## 2018-02-05 PROCEDURE — 36415 COLL VENOUS BLD VENIPUNCTURE: CPT

## 2018-02-05 PROCEDURE — 93010 ELECTROCARDIOGRAM REPORT: CPT | Performed by: INTERNAL MEDICINE

## 2018-02-05 PROCEDURE — 80048 BASIC METABOLIC PNL TOTAL CA: CPT | Performed by: SURGERY

## 2018-02-05 RX ORDER — LISINOPRIL 5 MG/1
10 TABLET ORAL DAILY
COMMUNITY
End: 2018-09-17 | Stop reason: SDUPTHER

## 2018-02-05 NOTE — PAT
MEASURED FOR TEDS/SCDS IN PAT    CALF MEASUREMENT    15  LENGTH MEASUREMENT 14    Abnormal EKG discussed with Dr Parikh at 1014.  Recent heart CT due to family history was normal.  Patient denies chest pain or shortness of breath.  OK for surgery per Dr Parikh.

## 2018-02-06 ENCOUNTER — ANESTHESIA (OUTPATIENT)
Dept: PERIOP | Facility: HOSPITAL | Age: 68
End: 2018-02-06

## 2018-02-06 ENCOUNTER — HOSPITAL ENCOUNTER (OUTPATIENT)
Dept: MAMMOGRAPHY | Facility: HOSPITAL | Age: 68
Discharge: HOME | End: 2018-02-06
Attending: SURGERY

## 2018-02-06 ENCOUNTER — ANESTHESIA EVENT (OUTPATIENT)
Dept: PERIOP | Facility: HOSPITAL | Age: 68
End: 2018-02-06

## 2018-02-06 ENCOUNTER — HOSPITAL ENCOUNTER (OUTPATIENT)
Dept: MAMMOGRAPHY | Facility: HOSPITAL | Age: 68
Discharge: HOME | End: 2018-02-06

## 2018-02-06 ENCOUNTER — HOSPITAL ENCOUNTER (OUTPATIENT)
Facility: HOSPITAL | Age: 68
Setting detail: HOSPITAL OUTPATIENT SURGERY
Discharge: HOME | End: 2018-02-06
Attending: SURGERY | Admitting: SURGERY

## 2018-02-06 VITALS
RESPIRATION RATE: 20 BRPM | OXYGEN SATURATION: 98 % | DIASTOLIC BLOOD PRESSURE: 81 MMHG | WEIGHT: 207 LBS | TEMPERATURE: 98.5 F | BODY MASS INDEX: 32.49 KG/M2 | HEART RATE: 82 BPM | SYSTOLIC BLOOD PRESSURE: 159 MMHG | HEIGHT: 67 IN

## 2018-02-06 DIAGNOSIS — N63.20 MASS OF BREAST, LEFT: ICD-10-CM

## 2018-02-06 DIAGNOSIS — R92.8 ABNORMAL MAMMOGRAM: ICD-10-CM

## 2018-02-06 LAB — GLUCOSE BLDC GLUCOMTR-MCNC: 110 MG/DL (ref 70–130)

## 2018-02-06 PROCEDURE — 25010000002 DEXAMETHASONE PER 1 MG: Performed by: NURSE ANESTHETIST, CERTIFIED REGISTERED

## 2018-02-06 PROCEDURE — 82962 GLUCOSE BLOOD TEST: CPT

## 2018-02-06 PROCEDURE — 25010000002 PROPOFOL 10 MG/ML EMULSION: Performed by: NURSE ANESTHETIST, CERTIFIED REGISTERED

## 2018-02-06 PROCEDURE — 76098 X-RAY EXAM SURGICAL SPECIMEN: CPT | Performed by: RADIOLOGY

## 2018-02-06 PROCEDURE — 19281 PERQ DEVICE BREAST 1ST IMAG: CPT | Performed by: RADIOLOGY

## 2018-02-06 PROCEDURE — 88305 TISSUE EXAM BY PATHOLOGIST: CPT | Performed by: SURGERY

## 2018-02-06 PROCEDURE — 25010000002 ONDANSETRON PER 1 MG: Performed by: NURSE ANESTHETIST, CERTIFIED REGISTERED

## 2018-02-06 PROCEDURE — 25010000002 FENTANYL CITRATE (PF) 100 MCG/2ML SOLUTION: Performed by: NURSE ANESTHETIST, CERTIFIED REGISTERED

## 2018-02-06 PROCEDURE — 76098 X-RAY EXAM SURGICAL SPECIMEN: CPT

## 2018-02-06 RX ORDER — GLYCOPYRROLATE 0.2 MG/ML
INJECTION INTRAMUSCULAR; INTRAVENOUS AS NEEDED
Status: DISCONTINUED | OUTPATIENT
Start: 2018-02-06 | End: 2018-02-06 | Stop reason: SURG

## 2018-02-06 RX ORDER — IPRATROPIUM BROMIDE AND ALBUTEROL SULFATE 2.5; .5 MG/3ML; MG/3ML
3 SOLUTION RESPIRATORY (INHALATION) ONCE AS NEEDED
Status: DISCONTINUED | OUTPATIENT
Start: 2018-02-06 | End: 2018-02-06 | Stop reason: HOSPADM

## 2018-02-06 RX ORDER — LIDOCAINE HYDROCHLORIDE 10 MG/ML
5 INJECTION, SOLUTION INFILTRATION; PERINEURAL ONCE
Status: DISCONTINUED | OUTPATIENT
Start: 2018-02-06 | End: 2018-02-06 | Stop reason: HOSPADM

## 2018-02-06 RX ORDER — SODIUM CHLORIDE 0.9 % (FLUSH) 0.9 %
1-10 SYRINGE (ML) INJECTION AS NEEDED
Status: DISCONTINUED | OUTPATIENT
Start: 2018-02-06 | End: 2018-02-06 | Stop reason: HOSPADM

## 2018-02-06 RX ORDER — MEPERIDINE HYDROCHLORIDE 25 MG/ML
12.5 INJECTION INTRAMUSCULAR; INTRAVENOUS; SUBCUTANEOUS
Status: DISCONTINUED | OUTPATIENT
Start: 2018-02-06 | End: 2018-02-06 | Stop reason: HOSPADM

## 2018-02-06 RX ORDER — FAMOTIDINE 20 MG/1
20 TABLET, FILM COATED ORAL ONCE
Status: COMPLETED | OUTPATIENT
Start: 2018-02-06 | End: 2018-02-06

## 2018-02-06 RX ORDER — PROMETHAZINE HYDROCHLORIDE 25 MG/ML
6.25 INJECTION, SOLUTION INTRAMUSCULAR; INTRAVENOUS ONCE AS NEEDED
Status: DISCONTINUED | OUTPATIENT
Start: 2018-02-06 | End: 2018-02-06 | Stop reason: HOSPADM

## 2018-02-06 RX ORDER — PROMETHAZINE HYDROCHLORIDE 25 MG/1
25 TABLET ORAL ONCE AS NEEDED
Status: DISCONTINUED | OUTPATIENT
Start: 2018-02-06 | End: 2018-02-06 | Stop reason: HOSPADM

## 2018-02-06 RX ORDER — HYDROMORPHONE HYDROCHLORIDE 1 MG/ML
0.5 INJECTION, SOLUTION INTRAMUSCULAR; INTRAVENOUS; SUBCUTANEOUS
Status: DISCONTINUED | OUTPATIENT
Start: 2018-02-06 | End: 2018-02-06 | Stop reason: HOSPADM

## 2018-02-06 RX ORDER — LABETALOL HYDROCHLORIDE 5 MG/ML
5 INJECTION, SOLUTION INTRAVENOUS
Status: DISCONTINUED | OUTPATIENT
Start: 2018-02-06 | End: 2018-02-06 | Stop reason: HOSPADM

## 2018-02-06 RX ORDER — PROMETHAZINE HYDROCHLORIDE 25 MG/1
25 SUPPOSITORY RECTAL ONCE AS NEEDED
Status: DISCONTINUED | OUTPATIENT
Start: 2018-02-06 | End: 2018-02-06 | Stop reason: HOSPADM

## 2018-02-06 RX ORDER — FENTANYL CITRATE 50 UG/ML
INJECTION, SOLUTION INTRAMUSCULAR; INTRAVENOUS AS NEEDED
Status: DISCONTINUED | OUTPATIENT
Start: 2018-02-06 | End: 2018-02-06 | Stop reason: SURG

## 2018-02-06 RX ORDER — DEXAMETHASONE SODIUM PHOSPHATE 4 MG/ML
INJECTION, SOLUTION INTRA-ARTICULAR; INTRALESIONAL; INTRAMUSCULAR; INTRAVENOUS; SOFT TISSUE AS NEEDED
Status: DISCONTINUED | OUTPATIENT
Start: 2018-02-06 | End: 2018-02-06 | Stop reason: SURG

## 2018-02-06 RX ORDER — MAGNESIUM HYDROXIDE 1200 MG/15ML
LIQUID ORAL AS NEEDED
Status: DISCONTINUED | OUTPATIENT
Start: 2018-02-06 | End: 2018-02-06 | Stop reason: HOSPADM

## 2018-02-06 RX ORDER — LIDOCAINE HYDROCHLORIDE 10 MG/ML
INJECTION, SOLUTION EPIDURAL; INFILTRATION; INTRACAUDAL; PERINEURAL AS NEEDED
Status: DISCONTINUED | OUTPATIENT
Start: 2018-02-06 | End: 2018-02-06 | Stop reason: SURG

## 2018-02-06 RX ORDER — LIDOCAINE HYDROCHLORIDE 10 MG/ML
0.5 INJECTION, SOLUTION EPIDURAL; INFILTRATION; INTRACAUDAL; PERINEURAL ONCE AS NEEDED
Status: COMPLETED | OUTPATIENT
Start: 2018-02-06 | End: 2018-02-06

## 2018-02-06 RX ORDER — BUPIVACAINE HYDROCHLORIDE 2.5 MG/ML
INJECTION, SOLUTION INFILTRATION; PERINEURAL AS NEEDED
Status: DISCONTINUED | OUTPATIENT
Start: 2018-02-06 | End: 2018-02-06 | Stop reason: HOSPADM

## 2018-02-06 RX ORDER — PROPOFOL 10 MG/ML
VIAL (ML) INTRAVENOUS AS NEEDED
Status: DISCONTINUED | OUTPATIENT
Start: 2018-02-06 | End: 2018-02-06 | Stop reason: SURG

## 2018-02-06 RX ORDER — ONDANSETRON 2 MG/ML
INJECTION INTRAMUSCULAR; INTRAVENOUS AS NEEDED
Status: DISCONTINUED | OUTPATIENT
Start: 2018-02-06 | End: 2018-02-06 | Stop reason: SURG

## 2018-02-06 RX ORDER — FAMOTIDINE 10 MG/ML
20 INJECTION, SOLUTION INTRAVENOUS ONCE
Status: CANCELLED | OUTPATIENT
Start: 2018-02-06 | End: 2018-02-06

## 2018-02-06 RX ORDER — FENTANYL CITRATE 50 UG/ML
50 INJECTION, SOLUTION INTRAMUSCULAR; INTRAVENOUS
Status: DISCONTINUED | OUTPATIENT
Start: 2018-02-06 | End: 2018-02-06 | Stop reason: HOSPADM

## 2018-02-06 RX ORDER — SODIUM CHLORIDE, SODIUM LACTATE, POTASSIUM CHLORIDE, CALCIUM CHLORIDE 600; 310; 30; 20 MG/100ML; MG/100ML; MG/100ML; MG/100ML
9 INJECTION, SOLUTION INTRAVENOUS CONTINUOUS
Status: DISCONTINUED | OUTPATIENT
Start: 2018-02-06 | End: 2018-02-06 | Stop reason: HOSPADM

## 2018-02-06 RX ADMIN — LIDOCAINE HYDROCHLORIDE 1 ML: 10 INJECTION, SOLUTION INFILTRATION; PERINEURAL at 08:44

## 2018-02-06 RX ADMIN — SODIUM CHLORIDE, POTASSIUM CHLORIDE, SODIUM LACTATE AND CALCIUM CHLORIDE 9 ML/HR: 600; 310; 30; 20 INJECTION, SOLUTION INTRAVENOUS at 09:47

## 2018-02-06 RX ADMIN — PROPOFOL 200 MG: 10 INJECTION, EMULSION INTRAVENOUS at 10:02

## 2018-02-06 RX ADMIN — ONDANSETRON 4 MG: 2 INJECTION INTRAMUSCULAR; INTRAVENOUS at 10:33

## 2018-02-06 RX ADMIN — GLYCOPYRROLATE 0.2 MG: 0.2 INJECTION, SOLUTION INTRAMUSCULAR; INTRAVENOUS at 10:13

## 2018-02-06 RX ADMIN — LIDOCAINE HYDROCHLORIDE 50 MG: 10 INJECTION, SOLUTION EPIDURAL; INFILTRATION; INTRACAUDAL; PERINEURAL at 10:02

## 2018-02-06 RX ADMIN — FENTANYL CITRATE 100 MCG: 50 INJECTION, SOLUTION INTRAMUSCULAR; INTRAVENOUS at 10:02

## 2018-02-06 RX ADMIN — DEXAMETHASONE SODIUM PHOSPHATE 8 MG: 4 INJECTION, SOLUTION INTRAMUSCULAR; INTRAVENOUS at 10:06

## 2018-02-06 RX ADMIN — METHYLENE BLUE 1 MG: 10 INJECTION INTRAVENOUS at 08:43

## 2018-02-06 RX ADMIN — LIDOCAINE HYDROCHLORIDE 0.5 ML: 10 INJECTION, SOLUTION EPIDURAL; INFILTRATION; INTRACAUDAL; PERINEURAL at 09:47

## 2018-02-06 RX ADMIN — FAMOTIDINE 20 MG: 20 TABLET, FILM COATED ORAL at 09:47

## 2018-02-06 NOTE — OP NOTE
Operative Note    Date of Surgery:  2/6/2018    Pre-Operative Diagnosis: Left breast architectural distortion    Post-Operative Diagnosis: Same    Procedure:  Excisional biopsy of left breast architectural distortion following needle localization by radiology for diagnostic purpose    Anesthesia:  General    Surgeon:  Barbara Zaldivar MD    Estimated Blood Loss:  Very minimal    Findings: Clip and distortion appreciated in the specimen and confirmed by radiology    Complications: None      Indication for Procedure: Mrs. Pimentel is a pleasant 67 years old lady who presented with abnormal left breast mammogram showing our architectural distortion the medial aspect of the breast.  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  She does today for the above procedure for diagnostic purpose.    Procedure: Patient was taken out of anesthesia and placed supine on table.  Following induction of general anesthesia using LMA, SCDs were placed.  The left breast was then prepped and draped in a sterile fashion.  Timeout was observed.  Marcaine 0.5% plain was administered in the medial aspect of the left breast next the wire and a small transverse incision was made.  Dissection was carried to expose the wire which was transected skin level.  The breast tissue around the wire was excised with the wire intact and specimen x-ray showed the clip as well as the distortion in the specimen which was confirmed by radiology that sent to pathology for permanent section.  The wound was then irrigated with water with clear returns were noted.  The subcutaneous incision was approximated with interrupted 3-0 Vicryl sutures and the skin incision was approximated with 4-0 Monocryl subcuticular suture.  Steri-Strips and sterile dressing was applied.   Tolerated the procedure well with no consultations.  She was x-rayed and taken to recovery room in stable condition.  Sponge count and needle count were correct at the end of  the procedure.    Barbara Zaldivar MD  02/06/18  11:20 AM

## 2018-02-06 NOTE — ANESTHESIA POSTPROCEDURE EVALUATION
Patient: Angelina Pimentel    Procedure Summary     Date Anesthesia Start Anesthesia Stop Room / Location    02/06/18 0959   [facility] / [facility]       Procedure Diagnosis Surgeon Provider    BREAST BIOPSY WITH NEEDLE LOCALIZATION LEFT ARCHITECTURAL DISTORTION (Left Breast) Abnormal finding on breast imaging MD French Manzo MD          Anesthesia Type: general  Last vitals  /67   Temp 98.5   Pulse 94   Resp 15   SpO2 98     Post Anesthesia Care and Evaluation    Patient location during evaluation: PACU  Patient participation: complete - patient participated  Level of consciousness: awake and alert  Pain score: 0  Pain management: adequate  Airway patency: patent  Anesthetic complications: No anesthetic complications  PONV Status: none  Cardiovascular status: hemodynamically stable and acceptable  Respiratory status: nonlabored ventilation, acceptable and nasal cannula  Hydration status: acceptable

## 2018-02-06 NOTE — BRIEF OP NOTE
BREAST BIOPSY WITH NEEDLE LOCALIZATION  Progress Note    Angelina KIN Pimentel  2/6/2018    Pre-op Diagnosis:   Left breast architectural distortion       Post-Op Diagnosis Codes:     * Abnormal finding on breast imaging [R92.8]    Procedure/CPT® Codes:      Procedure(s):  BREAST BIOPSY WITH NEEDLE LOCALIZATION LEFT ARCHITECTURAL DISTORTION    Surgeon(s):  Barbara Zaldivar MD    Anesthesia: General    Staff:   Circulator: Marjan Baum RN  Scrub Person: Don Centeno  [place]ee: Bing Ruiz; Meg Nelson RN    Estimated Blood Loss: 5 mL    Urine Voided: * No values recorded between 2/6/2018  9:59 AM and 2/6/2018 10:37 AM *    Specimens:                  ID Type Source Tests Collected by Time Destination   A : architectural distortion, sent to breast center Tissue Breast, Left TISSUE EXAM Barbara Zaldivar MD 2/6/2018 1026          Drains:               Complications: none      Barbara Zaldivar MD     Date: 2/6/2018  Time: 10:53 AM

## 2018-02-06 NOTE — H&P
Pre-Op H&P  Angelina Pimentel  [id number]  1950    Chief complaint: Abnormal mammaogram    HPI:    Patient is a 67 y.o.female presents with history of abnormal mammogram with core biopsy with sclerosing adenosis and micropapilloma with discordant pathology.  Here today for excisional biopsy of left breast architectural distortion with needle localization     Review of Systems:  General ROS: negative for chills, fever or skin lesions;  No changes since last office visit  Cardiovascular ROS: no chest pain or dyspnea on exertion.  Cardiac CT neg 12/2017.  Neg activity intolerance  Respiratory ROS: no cough, shortness of breath, or wheezing    Allergies:   Allergies   Allergen Reactions   • Cefdinir Other (See Comments)     Cefdinir SUSR; Adverse Reaction; Abdominal pain   • Penicillins Rash   • Sulfa Antibiotics Rash     Sulfa Drugs       Home Meds:    No current facility-administered medications on file prior to encounter.      Current Outpatient Prescriptions on File Prior to Encounter   Medication Sig Dispense Refill   • cetirizine (zyrTEC) 10 MG tablet Take 10 mg by mouth Daily.     • fluticasone (FLONASE) 50 MCG/ACT nasal spray 1 spray into each nostril As Needed for Rhinitis.     • Lancets (ONETOUCH ULTRASOFT) lancets USE AS DIRECTED ONCE DAILY 100 each 9   • levothyroxine (SYNTHROID, LEVOTHROID) 100 MCG tablet TAKE 1 TABLET BY MOUTH  DAILY 90 tablet 2   • metFORMIN (GLUCOPHAGE) 500 MG tablet TAKE 1 TABLET BY MOUTH  DAILY WITH BREAKFAST 90 tablet 1   • Multiple Vitamins-Minerals (CENTRUM SILVER ADULT 50+ PO) Take 1 tablet by mouth Daily.     • ONE TOUCH ULTRA TEST test strip USE ONE STRIP TO TEST DAILY 100 each 3   • simvastatin (ZOCOR) 20 MG tablet Take 1 tablet by mouth Every Night. 90 tablet 3       PMH:   Past Medical History:   Diagnosis Date   • Basal cell carcinoma of skin     Description: x 3 dx 1996-2008-face x3 dx 1996-2008-face   • Benign colonic polyp     Description: precancerous dx 2000  "Precancerous Dx 2000   • Diabetes mellitus 2010    type II; Metformin daily; checks blood sugars at home 90s-100s   • Essential hypertriglyceridemia      Dx 1/10   • History of Papanicolaou smear of cervix 2008    Normal per pt   • Hyperlipidemia      Dx 1/10   • Hypothyroidism      Thyroid adenoma; thyroid meds daily    • Mitral valve prolapse syndrome    • Obstructive sleep apnea syndrome     wears cpap   • Osteoarthritis      Mild   • Squamous cell carcinoma of skin     Description: dx 2004-right shoulder    • Wears glasses      PSH:    Past Surgical History:   Procedure Laterality Date   • BLEPHAROPLASTY, QUAD Bilateral    • CATARACT EXTRACTION Bilateral     Rt? date, Left 2/12   • COLONOSCOPY     • MAMMO FINE NEEDLE ASPIRATION UNILATERAL Left 2017   • TONSILLECTOMY  1954   • TONSILLECTOMY  1954   • TOTAL ABDOMINAL HYSTERECTOMY WITH SALPINGO OOPHORECTOMY  1996       Immunization History:  Influenza: yes 2017  Pneumococcal: yes < 5 years  Tetanus: yes < 10 years    Social History:   Tobacco:   History   Smoking Status   • Never Smoker   Smokeless Tobacco   • Never Used      Alcohol:     History   Alcohol Use No     Comment: Never drank alcohol       Vitals:         /88 (BP Location: Right arm, Patient Position: Lying)  Pulse 72  Temp 98.7 °F (37.1 °C) (Tympanic)   Resp 18  Ht 170.2 cm (67\")  Wt 93.9 kg (207 lb)  SpO2 98%  BMI 32.42 kg/m2    Physical Exam:  General Appearance:    Alert, cooperative, no distress, appears stated age   Head:    Normocephalic, without obvious abnormality, atraumatic   Lungs:     Clear to auscultation bilaterally, respirations unlabored    Heart:   Regular rate and rhythm, S1 and S2 normal, no murmur, rub    or gallop    Abdomen:    Soft, non-tender.  +bowel sounds   Breast Exam:    deferred   Genitalia:    deferred   Extremities:   Extremities normal, atraumatic, no cyanosis or edema   Skin:   Skin color, texture, turgor normal, no rashes or lesions   Neurologic:   " Grossly intact   Results Review  I reviewed the patient's new clinical results.    Cancer Staging (if applicable)  Cancer Patient: __ yes _x_no __unknown; If yes, clinical stage T:__ N:__M:__, stage group or __N/A    Impression/Plan:  Abnormal mammogram with core biopsy with sclerosing adenosis and micropapilloma with discordant pathology - excisional biopsy of left breast architectural distortion with needle localization     Brea Leon, APRN   2/6/2018   9:44 AM

## 2018-02-06 NOTE — ANESTHESIA PREPROCEDURE EVALUATION
Anesthesia Evaluation     Patient summary reviewed and Nursing notes reviewed   NPO Solid Status: > 8 hours             Airway   Mallampati: II  TM distance: >3 FB  Neck ROM: full  no difficulty expected  Dental - normal exam     Pulmonary - normal exam   Cardiovascular   Exercise tolerance: good (4-7 METS)    Rhythm: regular  Rate: normal        Neuro/Psych  GI/Hepatic/Renal/Endo      Musculoskeletal     Abdominal    Substance History      OB/GYN          Other                      Anesthesia Plan    ASA 2     general     intravenous induction   Anesthetic plan and risks discussed with patient.

## 2018-02-06 NOTE — ANESTHESIA PROCEDURE NOTES
Airway  Urgency: elective    Airway not difficult    General Information and Staff    Patient location during procedure: OR  CRNA: ANDREWS CHRISTENSEN    Indications and Patient Condition  Indications for airway management: airway protection    Preoxygenated: yes  Mask difficulty assessment: 1 - vent by mask    Final Airway Details  Final airway type: supraglottic airway      Successful airway: classic  Size 4    Number of attempts at approach: 1    Additional Comments  LMA placed without difficulty, ventilation with assist, equal breath sounds and symmetric chest rise and fall

## 2018-02-07 ENCOUNTER — TELEPHONE (OUTPATIENT)
Dept: INTERNAL MEDICINE | Facility: CLINIC | Age: 68
End: 2018-02-07

## 2018-02-07 NOTE — TELEPHONE ENCOUNTER
----- Message from Zoie Kaye MA sent at 2/7/2018  9:07 AM EST -----  Contact: PT  PT WOULD LIKE SPEAK WITH HAYLEE. HAS COUPLE QUESTIONS. DID NOT WANT TO GIVE ANY OTHER INFO. CONTACT -[phone]

## 2018-02-07 NOTE — TELEPHONE ENCOUNTER
She states she had a lumpectomy done yesterday  and on the EKG for the  pre op at [facility] it said she had a  Sinus rhythm with premature atrial complexes and a septal infarct , Age undetermined . Abnormal EKG compared with EKG 9/2012.    She is wondering if she needs to be concerned or follow up with Dr boone

## 2018-02-08 ENCOUNTER — OFFICE VISIT (OUTPATIENT)
Dept: INTERNAL MEDICINE | Facility: CLINIC | Age: 68
End: 2018-02-08

## 2018-02-08 VITALS
RESPIRATION RATE: 20 BRPM | BODY MASS INDEX: 32.73 KG/M2 | SYSTOLIC BLOOD PRESSURE: 130 MMHG | HEART RATE: 72 BPM | DIASTOLIC BLOOD PRESSURE: 78 MMHG | WEIGHT: 209 LBS | TEMPERATURE: 98.2 F

## 2018-02-08 DIAGNOSIS — R94.31 ABNORMAL EKG: Primary | ICD-10-CM

## 2018-02-08 PROCEDURE — 93000 ELECTROCARDIOGRAM COMPLETE: CPT | Performed by: INTERNAL MEDICINE

## 2018-02-08 PROCEDURE — 99214 OFFICE O/P EST MOD 30 MIN: CPT | Performed by: INTERNAL MEDICINE

## 2018-02-08 NOTE — PROGRESS NOTES
Subjective       Angelina Pimentel is a 67 y.o. female.     Chief Complaint   Patient presents with   • Abnormal ECG     follow up       History obtained from the patient.      History of Present Illness     The patient had a left breast excisional biopsy on 2/6/18.  She states her pre-op EKG was abnormal.  It was read by Dr. Sebastian.  It was read as sinus rhythm with PAC's, septal infarct, age undetermined, which was new compared to 9/23/12.  There was not enough concern to postpone the procedure.  The patient had an EKG 2/24/12 (read as NSR with PVC's septal Q waves) and on 2/25/09 (sinus bradycardia, other wise normal).  She states she did not have any problem with the anesthesia.    The patient states she had a negative cardiac stress test many years ago.      The patient had a Cardiac CT Scan on 10/17/17, Calcium Score 0.    The following portions of the patient's history were reviewed and updated as appropriate: allergies, current medications, past family history, past medical history, past social history, past surgical history and problem list.      Review of Systems   Constitutional: Negative for chills, fatigue, fever and unexpected weight change.   Eyes: Negative for visual disturbance.   Respiratory: Negative for cough, shortness of breath and wheezing.    Cardiovascular: Negative for chest pain, palpitations and leg swelling.        No FLORES, orthopnea, or claudication.   Gastrointestinal: Negative for abdominal pain, blood in stool, constipation, diarrhea, nausea and vomiting.        Denies melena.   Endocrine: Negative for polydipsia and polyuria.   Musculoskeletal: Positive for arthralgias. Negative for myalgias.   Neurological: Negative for dizziness, syncope, light-headedness and headaches.        No memory issues.   Psychiatric/Behavioral: Negative for decreased concentration.           Objective     Blood pressure 130/78, pulse 72, temperature 98.2 °F (36.8 °C), temperature source Temporal Artery ,  resp. rate 20, weight 94.8 kg (209 lb).    Physical Exam   Constitutional:   Obese.   Neck: Normal range of motion. Neck supple. Carotid bruit is not present. No thyromegaly present.   Cardiovascular: Normal rate, regular rhythm, normal heart sounds and intact distal pulses.  Exam reveals no gallop and no friction rub.    No murmur heard.  No peripheral edema.   Pulmonary/Chest: Effort normal and breath sounds normal.   Abdominal: Soft. Bowel sounds are normal. She exhibits no distension, no abdominal bruit and no mass. There is no hepatosplenomegaly. There is no tenderness.   Psychiatric: She has a normal mood and affect.   Nursing note and vitals reviewed.      ECG 12 Lead  Date/Time: 2/8/2018 8:37 AM  Performed by: STAR LEI  Authorized by: STAR LEI   Comparison: compared with previous ECG from 2/24/2012  Comparison to previous ECG: New T wave inversions and flattening in anterior leads  Rhythm: sinus bradycardia  Ectopy comments: None.  Rate: bradycardic  Conduction: conduction normal  ST Segments: ST segments normal  T flattening: III, aVR, aVF and V1  Other: no other findings  Clinical impression: abnormal ECG            Assessment/Plan   Angelina was seen today for abnormal ecg.    Diagnoses and all orders for this visit:    Abnormal EKG  -     ECG 12 Lead  -     Ambulatory Referral to Cardiology      Return for Next scheduled follow up.

## 2018-02-19 ENCOUNTER — TRANSCRIBE ORDERS (OUTPATIENT)
Dept: MAMMOGRAPHY | Facility: HOSPITAL | Age: 68
End: 2018-02-19

## 2018-02-19 DIAGNOSIS — R92.8 ABNORMAL MAMMOGRAM: Primary | ICD-10-CM

## 2018-02-28 ENCOUNTER — OFFICE VISIT (OUTPATIENT)
Dept: CARDIOLOGY | Facility: CLINIC | Age: 68
End: 2018-02-28

## 2018-02-28 VITALS
BODY MASS INDEX: 32.02 KG/M2 | DIASTOLIC BLOOD PRESSURE: 68 MMHG | HEIGHT: 67 IN | SYSTOLIC BLOOD PRESSURE: 138 MMHG | HEART RATE: 74 BPM | WEIGHT: 204 LBS

## 2018-02-28 DIAGNOSIS — E11.9 TYPE 2 DIABETES MELLITUS WITHOUT COMPLICATION, WITHOUT LONG-TERM CURRENT USE OF INSULIN (HCC): Chronic | ICD-10-CM

## 2018-02-28 DIAGNOSIS — I10 ESSENTIAL HYPERTENSION: Chronic | ICD-10-CM

## 2018-02-28 DIAGNOSIS — E78.49 OTHER HYPERLIPIDEMIA: Chronic | ICD-10-CM

## 2018-02-28 DIAGNOSIS — I11.9 HYPERTENSIVE HEART DISEASE WITHOUT HEART FAILURE: Primary | ICD-10-CM

## 2018-02-28 DIAGNOSIS — R94.31 ABNORMAL ECG: ICD-10-CM

## 2018-02-28 PROCEDURE — 99204 OFFICE O/P NEW MOD 45 MIN: CPT | Performed by: INTERNAL MEDICINE

## 2018-02-28 NOTE — PROGRESS NOTES
Subjective:     Encounter Date:02/28/2018    Patient ID: Angelina Pimentel is a 67 y.o.  white female from [place], [place], a retired  at [employer].    PHYSICIAN: Leola Villa MD  GENERAL SURGEON: Barbara Zaldivar MD  COLORECTAL SURGEON:  Don Ramos MD  NEUROLOGIST/SLEEP PHYSICIAN: Almas Henao MD    Chief Complaint:   Chief Complaint   Patient presents with   • Abnormal ECG     Problem List:  1. Abnormal EKG/hypertensive cardiovascular disease:  A. Remote Stress test for left arm numbness before 2008; negative per patient-data deficit  B. CT cardiac calcium score was 0 on 10/17/17  C. Residual class I symptoms  2. Hypertension  3. Hyperlipidemia  4. Type 2 diabetes mellitus, diagnosed in 2010; last hemoglobin A1c was 5.5%, March 2017  5. History of mitral valve prolapse  6. Hypothyroidism after radiation for thyroid nodule/goiter  7. Mild obesity: BMI 32  8. Surgical history:  A. Left lumpectomy: Benign  B. Colonoscopy  C. MONICA  D. Cataracts  E. Tonsillectomy  F. Eyelid reduction    Allergies   Allergen Reactions   • Cefdinir Other (See Comments)     Cefdinir SUSR; Adverse Reaction; Abdominal pain   • Penicillins Rash   • Sulfa Antibiotics Rash     Sulfa Drugs         Current Outpatient Prescriptions:   •  cetirizine (zyrTEC) 10 MG tablet, Take 10 mg by mouth Daily., Disp: , Rfl:   •  fluticasone (FLONASE) 50 MCG/ACT nasal spray, 1 spray into each nostril As Needed for Rhinitis., Disp: , Rfl:   •  ibuprofen (ADVIL,MOTRIN) 100 MG/5ML suspension, Take  by mouth Every 6 (Six) Hours As Needed for Mild Pain ., Disp: , Rfl:   •  Lancets (ONETOUCH ULTRASOFT) lancets, USE AS DIRECTED ONCE DAILY, Disp: 100 each, Rfl: 9  •  levothyroxine (SYNTHROID, LEVOTHROID) 100 MCG tablet, TAKE 1 TABLET BY MOUTH  DAILY, Disp: 90 tablet, Rfl: 2  •  lisinopril (PRINIVIL,ZESTRIL) 5 MG tablet, Take 10 mg by mouth Daily., Disp: , Rfl:   •  metFORMIN (GLUCOPHAGE) 500 MG tablet, TAKE 1 TABLET  BY MOUTH  DAILY WITH BREAKFAST, Disp: 90 tablet, Rfl: 1  •  Multiple Vitamins-Minerals (CENTRUM SILVER ADULT 50+ PO), Take 1 tablet by mouth Daily., Disp: , Rfl:   •  ONE TOUCH ULTRA TEST test strip, USE ONE STRIP TO TEST DAILY, Disp: 100 each, Rfl: 3  •  simvastatin (ZOCOR) 20 MG tablet, Take 1 tablet by mouth Every Night., Disp: 90 tablet, Rfl: 3    History of Present Illness  This is a 67-year-old white female who presents to establish cardiology care today after having an abnormal ECG.  She is currently asymptomatic and denies any chest pressure, shortness of breath, palpitations, presyncope, syncope, edema, orthopnea, MIs, heart catheterizations, heart monitors, cardiomegaly, atrial fibrillation, PIH, preeclampsia, gestational diabetes, kidney dysfunction, GERD, COPD, or asthma.  She has known hypertension, hyperlipidemia, and type 2 diabetes mellitus.  She remotely had a thyroid nodule and received radiation and is now taking Synthroid.  She has a known murmur and also obstructive sleep apnea but is compliant with CPAP nightly.  She had a remote sleep study, but she is unsure of when this study was.  She also had a remote stress test before 2008 when she was having left arm numbness.  She had a CT cardiac calcium score in October 2017 with a score of 0.  She has never been a smoker.  She works with a  for 45 minutes twice a week and on the other days is doing approximately 40 minutes of walking without any cardiopulmonary complaints.  She has intentionally lost 30 pounds over the past year.  She is hoping that this will help with her diabetes as well as her blood pressure.  She has been told in the past that she had mitral valve prolapse, but she denies palpitations.  Her last hemoglobin A1c was 5.5%. She had a routine colonoscopy last month and denies any melena.  She recently had a left breast lumpectomy by Dr. MERIDA, which was benign.  She denies any CVAs, TIAs, seizures, DVTs, or PEs.  Patient  otherwise denies chest pain, shortness of breath, PND, edema, palpitations, syncope or presyncope at this time.  She is accompanied to the office today by her daughter.    Cardiovascular Disease Risk Factors  hyptertension, hyperlipidemia, diabetes mellitus, increased age    Social History     Social History   • Marital status:      Spouse name: N/A   • Number of children: 2   • Years of education: N/A     Occupational History   • Not on file.     Social History Main Topics   • Smoking status: Never Smoker   • Smokeless tobacco: Never Used   • Alcohol use No      Comment: Never drank alcohol   • Drug use: No   • Sexual activity: Defer     Other Topics Concern   • Not on file     Social History Narrative       Family History   Problem Relation Age of Onset   • Hyperlipidemia Mother    • Colon polyps Mother    • Arthritis Mother    • Cancer Mother    • Hypertension Mother    • Hypertension Other    • Heart disease Other    • Prostate cancer Father    • Heart attack Father    • Diabetes Maternal Grandmother    • Glaucoma Maternal Grandmother    • Diabetes Paternal Grandmother    • Breast cancer Neg Hx    • Ovarian cancer Neg Hx        Review of Systems   Constitution: Negative for weakness and malaise/fatigue.   HENT: Positive for tinnitus.    Eyes: Negative.    Cardiovascular: Negative for chest pain, claudication, cyanosis, dyspnea on exertion, irregular heartbeat, leg swelling, near-syncope, orthopnea, palpitations, paroxysmal nocturnal dyspnea and syncope.   Respiratory: Positive for sleep disturbances due to breathing (compliant with CPAP). Negative for shortness of breath and wheezing.    Endocrine:        Type 2 diabetes mellitus, hypothyroidism   Hematologic/Lymphatic: Negative.    Skin: Positive for dry skin and skin cancer (periodically).   Musculoskeletal: Positive for arthritis.   Gastrointestinal: Negative for change in bowel habit, heartburn and melena.   Genitourinary: Negative.    Neurological:  "Negative for excessive daytime sleepiness, dizziness, focal weakness, light-headedness and seizures.   Psychiatric/Behavioral: Negative.       Obtained and negative except as outlined in problem list and HPI.    Procedures       Objective:       Vitals:    02/28/18 0828 02/28/18 0829 02/28/18 0830 02/28/18 0831   BP: 128/78 118/70 125/75 138/68   BP Location: Right arm Right arm Left arm Left arm   Patient Position: Sitting Standing Standing Sitting   Pulse: 74 82 85 74   Weight: 92.5 kg (204 lb)      Height: 170.2 cm (67\")        Body mass index is 31.95 kg/(m^2).     Physical Exam   Constitutional: She is oriented to person, place, and time. She appears well-developed and well-nourished.   HENT:   Mouth/Throat: Oropharynx is clear and moist and mucous membranes are normal. She does not have dentures. No oral lesions. Normal dentition. No dental abscesses, uvula swelling, lacerations or dental caries.   Eyes:   Fundoscopic exam:       The right eye shows no AV nicking, no exudate and no hemorrhage.        The left eye shows no AV nicking, no exudate and no hemorrhage.   Neck: No JVD present. Carotid bruit is not present. No thyromegaly present.   Cardiovascular: Regular rhythm, S1 normal and S2 normal.  Exam reveals no gallop, no S3 and no friction rub.    Murmur heard.   Medium-pitched harsh early systolic murmur is present with a grade of 1/6  at the upper right sternal border  Pulses:       Dorsalis pedis pulses are 1+ on the right side, and 1+ on the left side.        Posterior tibial pulses are 1+ on the right side, and 1+ on the left side.   Pulmonary/Chest: Effort normal and breath sounds normal. She has no wheezes. She has no rhonchi. She has no rales.   Abdominal: Soft. She exhibits no mass. There is no hepatosplenomegaly. There is no tenderness. There is no guarding.   Bowel sounds audible x4   Musculoskeletal: Normal range of motion. She exhibits no edema.   Lymphadenopathy:     She has no cervical " adenopathy.   Neurological: She is alert and oriented to person, place, and time.   Skin: Skin is warm, dry and intact. No rash noted.   Vitals reviewed.      Lab Review:   Results for orders placed or performed during the hospital encounter of 02/06/18   POC Glucose Once   Result Value Ref Range    Glucose 110 70 - 130 mg/dL   Tissue Pathology Exam - Tissue, Breast, Left   Result Value Ref Range    Case Report       Surgical Pathology Report                         Case: [id number]                                  Authorizing Provider:  Barbara Zaldivar MD    Collected:           02/06/2018 10:26 AM          Ordering Location:     [facility]   Received:            02/06/2018 11:15 AM                                 OR                                                                           Pathologist:           Cal Hilliard MD                                                         Specimen:    Breast, Left, architectural distortion, sent to breast center                              Clinical Information       The working history is abnormal findings on breast imaging; architectural distortion.       Final Diagnosis       LEFT BREAST, SITE NOT FURTHER SPECIFIED, NEEDLE LOCALIZED EXCISIONAL BIOPSY:  Complex sclerosing lesion (see comment).  Fibrocystic changes including fibrosis, adenosis, and focal usual type ductal hyperplasia without atypia.   Microcalcifications present associated with benign fibrocystic change.   No atypia or malignancy identified.   PCC/klb       Comment       There are angulated glands within a fibroelastotic stroma in a pattern compatible with a complex sclerosing lesion. Associated fibrocystic changes are also present.  No atypia is identified.       Gross Description       Received fresh labeled architectural distortion is a 6.8x4.6x1.1 cm unoriented needle localized excisional breast biopsy. The outer surface is inked blue. The specimen is serially sectioned  starting with the margin opposite the needle localization wire to reveal approximately 90% yellow lobulated fat and 10% firm white fibrous tissue. No obvious masses are grossly identified. A biopsy clip is note identified. The specimen is submitted entirely as follows: 1A - margin opposite wire en face; 1B-1L - central section with block 1G containing one slice cut in half, H-I one slice cut in half, and J-K one slice cut in half; 1M - margin containing wire en face. Cold ischemic time is 48 minutes and total time in formalin is greater than 6 hours. LED/mbc       Microscopic Description       The slides are reviewed and demonstrate histopathologic features supporting the above rendered diagnosis.          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  We will obtain echocardiogram to assess for myocardial damage/scar.  We feel that the ECG was probably over read.  She has a history of mitral valve prolapse.  We are encouraged by her physical activity as well as her control of her type 2 diabetes mellitus as well as her blood pressure.  Her last lipid panel was excellent.     Diagnosis Plan   1. Hypertensive heart disease without heart failure  Stable   2. Abnormal ECG  Adult Transthoracic Echo Complete W/ Cont if Necessary Per Protocol   3. Essential hypertension  Controlled   4. Other hyperlipidemia   Controlled   5. Type 2 diabetes mellitus without complication, without long-term current use of insulin  Controlled          Plan:       1. Patient to continue current medications and close follow up with the above providers.  2. Tentative cardiology follow up in February 2019, or patient may return sooner PRN.  3. Echocardiogram  4. 1 800 card provided    Scribed for Louis Sebastian MD by Raisa Bobo, APRN. 2/28/2018  9:33 AM    I, Louis Sebastian MD, [facility], personally performed the services described in this documentation as scribed by the above named individual in my presence, and it is both accurate and complete. At 9:15 AM on 02/28/2018

## 2018-03-13 RX ORDER — LANCETS
EACH MISCELLANEOUS
Qty: 100 EACH | Refills: 8 | Status: SHIPPED | OUTPATIENT
Start: 2018-03-13 | End: 2019-04-24 | Stop reason: SDUPTHER

## 2018-04-12 ENCOUNTER — OFFICE VISIT (OUTPATIENT)
Dept: INTERNAL MEDICINE | Facility: CLINIC | Age: 68
End: 2018-04-12

## 2018-04-12 VITALS
SYSTOLIC BLOOD PRESSURE: 110 MMHG | TEMPERATURE: 97.7 F | DIASTOLIC BLOOD PRESSURE: 60 MMHG | HEART RATE: 72 BPM | RESPIRATION RATE: 20 BRPM | BODY MASS INDEX: 32.62 KG/M2 | WEIGHT: 208.25 LBS

## 2018-04-12 DIAGNOSIS — I10 ESSENTIAL HYPERTENSION: ICD-10-CM

## 2018-04-12 DIAGNOSIS — E03.9 HYPOTHYROIDISM, UNSPECIFIED TYPE: ICD-10-CM

## 2018-04-12 DIAGNOSIS — E11.9 TYPE 2 DIABETES MELLITUS WITHOUT COMPLICATION, WITHOUT LONG-TERM CURRENT USE OF INSULIN (HCC): Primary | ICD-10-CM

## 2018-04-12 DIAGNOSIS — E78.49 OTHER HYPERLIPIDEMIA: ICD-10-CM

## 2018-04-12 DIAGNOSIS — M15.9 PRIMARY OSTEOARTHRITIS INVOLVING MULTIPLE JOINTS: ICD-10-CM

## 2018-04-12 DIAGNOSIS — K63.5 BENIGN COLONIC POLYP: ICD-10-CM

## 2018-04-12 LAB
ALBUMIN SERPL-MCNC: 4.2 G/DL (ref 3.2–4.8)
ALBUMIN/GLOB SERPL: 1.5 G/DL (ref 1.5–2.5)
ALP SERPL-CCNC: 114 U/L (ref 25–100)
ALT SERPL W P-5'-P-CCNC: 33 U/L (ref 7–40)
ANION GAP SERPL CALCULATED.3IONS-SCNC: 7 MMOL/L (ref 3–11)
ARTICHOKE IGE QN: 94 MG/DL (ref 0–130)
AST SERPL-CCNC: 21 U/L (ref 0–33)
BILIRUB SERPL-MCNC: 0.4 MG/DL (ref 0.3–1.2)
BUN BLD-MCNC: 18 MG/DL (ref 9–23)
BUN/CREAT SERPL: 22.5 (ref 7–25)
CALCIUM SPEC-SCNC: 9.2 MG/DL (ref 8.7–10.4)
CHLORIDE SERPL-SCNC: 104 MMOL/L (ref 99–109)
CHOLEST SERPL-MCNC: 146 MG/DL (ref 0–200)
CO2 SERPL-SCNC: 29 MMOL/L (ref 20–31)
CREAT BLD-MCNC: 0.8 MG/DL (ref 0.6–1.3)
EXPIRATION DATE: NORMAL
GFR SERPL CREATININE-BSD FRML MDRD: 71 ML/MIN/1.73
GLOBULIN UR ELPH-MCNC: 2.8 GM/DL
GLUCOSE BLD-MCNC: 94 MG/DL (ref 70–100)
HBA1C MFR BLD: 5.5 %
HDLC SERPL-MCNC: 44 MG/DL (ref 40–60)
Lab: NORMAL
POTASSIUM BLD-SCNC: 4.2 MMOL/L (ref 3.5–5.5)
PROT SERPL-MCNC: 7 G/DL (ref 5.7–8.2)
SODIUM BLD-SCNC: 140 MMOL/L (ref 132–146)
T4 FREE SERPL-MCNC: 1.24 NG/DL (ref 0.89–1.76)
TRIGL SERPL-MCNC: 77 MG/DL (ref 0–150)
TSH SERPL DL<=0.05 MIU/L-ACNC: 2.08 MIU/ML (ref 0.35–5.35)

## 2018-04-12 PROCEDURE — 84443 ASSAY THYROID STIM HORMONE: CPT | Performed by: INTERNAL MEDICINE

## 2018-04-12 PROCEDURE — 84439 ASSAY OF FREE THYROXINE: CPT | Performed by: INTERNAL MEDICINE

## 2018-04-12 PROCEDURE — 83036 HEMOGLOBIN GLYCOSYLATED A1C: CPT | Performed by: INTERNAL MEDICINE

## 2018-04-12 PROCEDURE — 99214 OFFICE O/P EST MOD 30 MIN: CPT | Performed by: INTERNAL MEDICINE

## 2018-04-12 PROCEDURE — 36415 COLL VENOUS BLD VENIPUNCTURE: CPT | Performed by: INTERNAL MEDICINE

## 2018-04-12 PROCEDURE — 80053 COMPREHEN METABOLIC PANEL: CPT | Performed by: INTERNAL MEDICINE

## 2018-04-12 PROCEDURE — 80061 LIPID PANEL: CPT | Performed by: INTERNAL MEDICINE

## 2018-04-12 NOTE — PROGRESS NOTES
Subjective       Angelina Pimentel is a 67 y.o. female.     Chief Complaint   Patient presents with   • Diabetes     6 month follow up   fasting        History obtained from the patient.      History of Present Illness     Primary Care Cardiac Diagnostic Constellation: The patient is here today for a 6 month follow-up visit.       Her Diabetes Mellitus type 2 is stable.   Medication(s): Metformin HCl.   Her Hypertension is stable.   Medication(s): Lisinopril.   Her Hyperlipidemia has been stable.   Her LDL goal is 70 mg/dL and last LDL was 71 mg/dL.   Medication(s): Simvastiatin.   The patient is adherent with her medication regimen. She denies medication side effects.       Interval Events: The patient had a recent abnormal EKG.  She saw Dr Sebastian.  He has scheduled an echo.  Blood sugar at home are   fasting.  Last HgA1C was 5.5.   Last ophthalmology visit was 7/17/17, no retinopathy.   She states she checks her feet daily.       Symptoms: Denies chest pain,  intermittent leg claudication, lower extremity edema,  exercise intolerance,  fatigue, numbness of the feet,  foot pain,  a foot ulcer,  visual impairment,  muscle pain,  and  muscle weakness.   Associated symptoms:  4 pound weight gain, but no palpitations, no syncope, no headache, no orthopnea, no PND, no polydipsia, no polyuria, no focal neurologic deficits, and no memory loss.      Lifestyle and Disease Management: Diet: She consumes a diverse and healthy diet. Weight Issues: She has weight concerns. Exercise: She exercises regularly. She sees a  2  times per week for 60 minutes per session. Exercise includes walking daily.   Smoking: She does not use tobacco.     Hypothyroidism Follow-Up: The patient is being seen for follow-up of Hypothyroidism, which is stable.  Interval Events:  None   Interval symptoms: 4 pound  weight gain, but denies cold intolerance, denies fatigue, denies weakness, denies trouble concentrating, denies hair  loss,  and denies dry skin.   Associated symptoms: no arthralgias,  no myalgias, and no paresthesias.   Medications include levothyroxine (Synthroid).    The patient is adherent to her medication regimen, and she denies medication side effects.         Colonic Polyp Follow-up: The patient is being seen for a routine clinic follow-up of Colon Polyp(s),which is stable.   Current diagnosis was determined by colonoscopy and 1/4/18- no polyps  Symptoms: no hematochezia, no melena, no diarrhea, no constipation, no decreased stool caliber, no change in bowel habits and no abdominal pain. Associated symptoms: no rectal prolapse.   The patient is not currently being treated for this problem.         Osteoarthritis Follow-Up: The patient is being seen for a routine clinic follow-up of Osteoarthritis, which has been stable.   She has no complications from Osteoarthritis.   The patient's Osteoarthritis has not resulted in physical disability.   Interval Events:  None.  Activities: no limitations.   Symptoms: No arthralgias, back pain, or neck pain.  Associated symptoms include no localized joint swelling and no localized joint stiffness.   Medications:  None.    Current Outpatient Prescriptions on File Prior to Visit   Medication Sig Dispense Refill   • cetirizine (zyrTEC) 10 MG tablet Take 10 mg by mouth Daily.     • levothyroxine (SYNTHROID, LEVOTHROID) 100 MCG tablet TAKE 1 TABLET BY MOUTH  DAILY 90 tablet 2   • lisinopril (PRINIVIL,ZESTRIL) 5 MG tablet Take 10 mg by mouth Daily.     • metFORMIN (GLUCOPHAGE) 500 MG tablet TAKE 1 TABLET BY MOUTH  DAILY WITH BREAKFAST 90 tablet 1   • Multiple Vitamins-Minerals (CENTRUM SILVER ADULT 50+ PO) Take 1 tablet by mouth Daily.     • ONE TOUCH ULTRA TEST test strip USE ONE STRIP TO TEST DAILY 100 each 3   • simvastatin (ZOCOR) 20 MG tablet Take 1 tablet by mouth Every Night. 90 tablet 3   • fluticasone (FLONASE) 50 MCG/ACT nasal spray 1 spray into each nostril As Needed for Rhinitis.      • ibuprofen (ADVIL,MOTRIN) 100 MG/5ML suspension Take  by mouth Every 6 (Six) Hours As Needed for Mild Pain .     • Lancets (ONETOUCH ULTRASOFT) lancets USE AS DIRECTED ONCE DAILY 100 each 8     No current facility-administered medications on file prior to visit.        Current outpatient and discharge medications have been reconciled for the patient.  Leola Villa MD        The following portions of the patient's history were reviewed and updated as appropriate: allergies, current medications, past family history, past medical history, past social history, past surgical history and problem list.    Review of Systems   Constitutional: Negative for fatigue and unexpected weight change.   Eyes: Negative for visual disturbance.   Respiratory: Negative for cough, shortness of breath and wheezing.    Cardiovascular: Negative for chest pain, palpitations and leg swelling.        No FLORES, orthopnea, or claudication.   Gastrointestinal: Negative for abdominal pain, blood in stool, constipation, diarrhea, nausea and vomiting.        Denies melena.   Endocrine: Negative for polydipsia and polyuria.   Musculoskeletal: Negative for arthralgias, back pain, joint swelling, myalgias and neck pain.   Neurological: Negative for dizziness, syncope, light-headedness and headaches.        No memory issues.   Psychiatric/Behavioral: Negative for decreased concentration.         Objective       Blood pressure 110/60, pulse 72, temperature 97.7 °F (36.5 °C), temperature source Temporal Artery , resp. rate 20, weight 94.5 kg (208 lb 4 oz).      Physical Exam   Constitutional:   Obese.   Neck: Normal range of motion. Neck supple. Carotid bruit is not present. No thyromegaly present.   Cardiovascular: Normal rate, regular rhythm, normal heart sounds and intact distal pulses.  Exam reveals no gallop and no friction rub.    No murmur heard.  No peripheral edema.   Pulmonary/Chest: Effort normal and breath sounds normal.   Abdominal: Soft.  Bowel sounds are normal. She exhibits no distension, no abdominal bruit and no mass. There is no hepatosplenomegaly. There is no tenderness.    Angelina had a diabetic foot exam performed today.   During the foot exam she had a monofilament test performed (see form.).  Vascular Status -  Her right foot exhibits normal foot vasculature . Her left foot exhibits normal foot vasculature .  Skin Integrity  -  Her right foot skin is intact.Her left foot skin is intact..  Psychiatric: She has a normal mood and affect.   Nursing note and vitals reviewed.    Results for orders placed or performed in visit on 04/12/18   POC Glycosylated Hemoglobin (Hb A1C)   Result Value Ref Range    Hemoglobin A1C 5.5 %    Lot Number [id number]     Expiration Date 10-19          Assessment / Plan:    Angelina was seen today for diabetes.    Diagnoses and all orders for this visit:    Type 2 diabetes mellitus without complication, without long-term current use of insulin  -     POC Glycosylated Hemoglobin (Hb A1C)    Essential hypertension    Other hyperlipidemia  -     Lipid Panel  -     Comprehensive Metabolic Panel    Hypothyroidism, unspecified type  -     TSH  -     T4, Free    Benign colonic polyp    Primary osteoarthritis involving multiple joints      Return in about 6 months (around 10/12/2018) for Annual physical, fasting, and subsequent Medicare Wellness Exam.

## 2018-04-16 ENCOUNTER — HOSPITAL ENCOUNTER (OUTPATIENT)
Dept: CARDIOLOGY | Facility: HOSPITAL | Age: 68
Discharge: HOME | End: 2018-04-16
Attending: INTERNAL MEDICINE | Admitting: INTERNAL MEDICINE

## 2018-04-16 DIAGNOSIS — R94.31 ABNORMAL ECG: ICD-10-CM

## 2018-04-16 LAB
BH CV ECHO MEAS - AO ROOT AREA (BSA CORRECTED): 1.2
BH CV ECHO MEAS - AO ROOT AREA: 4.9 CM^2
BH CV ECHO MEAS - AO ROOT DIAM: 2.5 CM
BH CV ECHO MEAS - BSA(HAYCOCK): 2.1 M^2
BH CV ECHO MEAS - BSA: 2.1 M^2
BH CV ECHO MEAS - BZI_BMI: 32.6 KILOGRAMS/M^2
BH CV ECHO MEAS - BZI_METRIC_HEIGHT: 170.2 CM
BH CV ECHO MEAS - BZI_METRIC_WEIGHT: 94.3 KG
BH CV ECHO MEAS - EDV(CUBED): 84.1 ML
BH CV ECHO MEAS - EDV(TEICH): 86.8 ML
BH CV ECHO MEAS - EF(CUBED): 74.2 %
BH CV ECHO MEAS - EF(TEICH): 66.3 %
BH CV ECHO MEAS - ESV(CUBED): 21.7 ML
BH CV ECHO MEAS - ESV(TEICH): 29.3 ML
BH CV ECHO MEAS - FS: 36.3 %
BH CV ECHO MEAS - IVS/LVPW: 1
BH CV ECHO MEAS - IVSD: 1.2 CM
BH CV ECHO MEAS - LA DIMENSION: 2.8 CM
BH CV ECHO MEAS - LA/AO: 1.1
BH CV ECHO MEAS - LV MASS(C)D: 183.3 GRAMS
BH CV ECHO MEAS - LV MASS(C)DI: 89.2 GRAMS/M^2
BH CV ECHO MEAS - LVIDD: 4.4 CM
BH CV ECHO MEAS - LVIDS: 2.8 CM
BH CV ECHO MEAS - LVPWD: 1.2 CM
BH CV ECHO MEAS - MV A MAX VEL: 110.6 CM/SEC
BH CV ECHO MEAS - MV DEC SLOPE: 254.7 CM/SEC^2
BH CV ECHO MEAS - MV DEC TIME: 0.22 SEC
BH CV ECHO MEAS - MV E MAX VEL: 78.5 CM/SEC
BH CV ECHO MEAS - MV E/A: 0.71
BH CV ECHO MEAS - MV P1/2T MAX VEL: 101.1 CM/SEC
BH CV ECHO MEAS - MV P1/2T: 116.2 MSEC
BH CV ECHO MEAS - MVA P1/2T LCG: 2.2 CM^2
BH CV ECHO MEAS - MVA(P1/2T): 1.9 CM^2
BH CV ECHO MEAS - PA ACC SLOPE: 525.7 CM/SEC^2
BH CV ECHO MEAS - PA ACC TIME: 0.13 SEC
BH CV ECHO MEAS - PA PR(ACCEL): 22 MMHG
BH CV ECHO MEAS - RAP SYSTOLE: 3 MMHG
BH CV ECHO MEAS - RVSP: 27 MMHG
BH CV ECHO MEAS - SI(CUBED): 30.4 ML/M^2
BH CV ECHO MEAS - SI(TEICH): 28 ML/M^2
BH CV ECHO MEAS - SV(CUBED): 62.4 ML
BH CV ECHO MEAS - SV(TEICH): 57.5 ML
BH CV ECHO MEAS - TAPSE (>1.6): 2.7 CM2
BH CV ECHO MEAS - TR MAX VEL: 243.4 CM/SEC
BH CV XLRA - RV BASE: 3.9 CM
BH CV XLRA - RV LENGTH: 6.9 CM
BH CV XLRA - RV MID: 2.9 CM
BH CV XLRA - TDI S': 12.4 CM/SEC
LEFT ATRIUM VOLUME INDEX: 20.9 ML/M2
LEFT ATRIUM VOLUME: 43 CM3
LV EF 2D ECHO EST: 65 %
MAXIMAL PREDICTED HEART RATE: 152 BPM
STRESS TARGET HR: 129 BPM

## 2018-04-16 PROCEDURE — 93306 TTE W/DOPPLER COMPLETE: CPT

## 2018-04-16 PROCEDURE — 93306 TTE W/DOPPLER COMPLETE: CPT | Performed by: INTERNAL MEDICINE

## 2018-07-24 RX ORDER — LEVOTHYROXINE SODIUM 0.1 MG/1
TABLET ORAL
Qty: 90 TABLET | Refills: 0 | Status: SHIPPED | OUTPATIENT
Start: 2018-07-24 | End: 2018-09-27 | Stop reason: SDUPTHER

## 2018-08-07 ENCOUNTER — HOSPITAL ENCOUNTER (OUTPATIENT)
Dept: ULTRASOUND IMAGING | Facility: HOSPITAL | Age: 68
Discharge: HOME | End: 2018-08-07

## 2018-08-07 ENCOUNTER — HOSPITAL ENCOUNTER (OUTPATIENT)
Dept: MAMMOGRAPHY | Facility: HOSPITAL | Age: 68
Discharge: HOME | End: 2018-08-07
Attending: SURGERY | Admitting: SURGERY

## 2018-08-07 ENCOUNTER — TRANSCRIBE ORDERS (OUTPATIENT)
Dept: MAMMOGRAPHY | Facility: HOSPITAL | Age: 68
End: 2018-08-07

## 2018-08-07 DIAGNOSIS — R92.8 ABNORMAL MAMMOGRAM: Primary | ICD-10-CM

## 2018-08-07 DIAGNOSIS — R92.8 ABNORMAL MAMMOGRAM: ICD-10-CM

## 2018-08-07 PROCEDURE — 77066 DX MAMMO INCL CAD BI: CPT | Performed by: RADIOLOGY

## 2018-08-07 PROCEDURE — 77065 DX MAMMO INCL CAD UNI: CPT

## 2018-08-07 PROCEDURE — 77066 DX MAMMO INCL CAD BI: CPT

## 2018-08-07 PROCEDURE — G0279 TOMOSYNTHESIS, MAMMO: HCPCS

## 2018-08-07 PROCEDURE — 76642 ULTRASOUND BREAST LIMITED: CPT | Performed by: RADIOLOGY

## 2018-08-07 PROCEDURE — G0279 TOMOSYNTHESIS, MAMMO: HCPCS | Performed by: RADIOLOGY

## 2018-08-07 PROCEDURE — 76642 ULTRASOUND BREAST LIMITED: CPT

## 2018-08-30 ENCOUNTER — OFFICE VISIT (OUTPATIENT)
Dept: NEUROLOGY | Facility: CLINIC | Age: 68
End: 2018-08-30

## 2018-08-30 VITALS
SYSTOLIC BLOOD PRESSURE: 116 MMHG | DIASTOLIC BLOOD PRESSURE: 68 MMHG | HEIGHT: 67 IN | WEIGHT: 208 LBS | BODY MASS INDEX: 32.65 KG/M2

## 2018-08-30 DIAGNOSIS — G47.33 OBSTRUCTIVE SLEEP APNEA SYNDROME: Primary | ICD-10-CM

## 2018-08-30 PROCEDURE — 99213 OFFICE O/P EST LOW 20 MIN: CPT | Performed by: PHYSICIAN ASSISTANT

## 2018-08-30 NOTE — PROGRESS NOTES
"Subjective     Chief Complaint: RUPAL     History of Present Illness   Angelina Pimentel is a 67 y.o. female who returns to clinic today with a history of RUPAL. She was previously followed by Dr. Henao and Dr. Harper. She was initially diagnosed in 2004. She noted snoring and daytime somnolence. She is currently using her CPAP and has noted resolution of her symptoms. She denies any questions or concerns today.        I have reviewed and confirmed the past family, social and medical history as accurate on 8/30/18.     Review of Systems   Constitutional: Negative.    HENT: Negative.    Eyes: Negative.    Respiratory: Negative.    Cardiovascular: Negative.    Gastrointestinal: Negative.    Endocrine: Negative.    Genitourinary: Negative.    Musculoskeletal: Negative.    Skin: Negative.    Allergic/Immunologic: Negative.    Neurological:        RUPAL      Hematological: Negative.    Psychiatric/Behavioral: Negative.        Objective     /68   Ht 170.2 cm (67\")   Wt 94.3 kg (208 lb)   BMI 32.58 kg/m²     General appearance today is normal.       Physical Exam   Neurological: She has normal strength. She has a normal Finger-Nose-Finger Test.   Psychiatric: Her speech is normal.        Neurologic Exam     Mental Status   Speech: speech is normal   Level of consciousness: alert  Normal comprehension.     Cranial Nerves   Cranial nerves II through XII intact.     Motor Exam   Muscle bulk: normal  Overall muscle tone: normal    Strength   Strength 5/5 throughout.     Sensory Exam   Light touch normal.     Gait, Coordination, and Reflexes     Coordination   Finger to nose coordination: normal    Tremor   Resting tremor: absent          Assessment/Plan   Angelina was seen today for sleep apnea.    Diagnoses and all orders for this visit:    Obstructive sleep apnea syndrome          Discussion/Summary   Angelina Pimentel returns to clinic today with a history of RUPAL. This was discussed in detail. As she is doing well " overall, it was elected to continue using her CPAP machine with her current settings. She will then follow up in 1 year, or sooner if needed.   I spent 15 minutes minutes face to face with the patient with 10 minutes spent on discussing diagnosis, prognosis, evaluation, current status, treatment options and management as discussed above.       As part of this visit I discussed the history with the patient .      Brissa Don PA-C

## 2018-09-10 ENCOUNTER — TELEPHONE (OUTPATIENT)
Dept: CARDIOLOGY | Facility: CLINIC | Age: 68
End: 2018-09-10

## 2018-09-17 ENCOUNTER — OFFICE VISIT (OUTPATIENT)
Dept: INTERNAL MEDICINE | Facility: CLINIC | Age: 68
End: 2018-09-17

## 2018-09-17 VITALS
DIASTOLIC BLOOD PRESSURE: 72 MMHG | HEART RATE: 76 BPM | SYSTOLIC BLOOD PRESSURE: 140 MMHG | TEMPERATURE: 98.1 F | BODY MASS INDEX: 34.63 KG/M2 | WEIGHT: 221.13 LBS | RESPIRATION RATE: 20 BRPM

## 2018-09-17 DIAGNOSIS — L23.7 CONTACT DERMATITIS DUE TO POISON IVY: Primary | ICD-10-CM

## 2018-09-17 PROCEDURE — 99213 OFFICE O/P EST LOW 20 MIN: CPT | Performed by: INTERNAL MEDICINE

## 2018-09-17 RX ORDER — LISINOPRIL 5 MG/1
TABLET ORAL
Qty: 180 TABLET | Refills: 3 | Status: SHIPPED | OUTPATIENT
Start: 2018-09-17 | End: 2019-03-15 | Stop reason: SDUPTHER

## 2018-09-17 RX ORDER — MOMETASONE FUROATE 1 MG/G
CREAM TOPICAL DAILY PRN
Qty: 60 G | Refills: 0 | Status: SHIPPED | OUTPATIENT
Start: 2018-09-17 | End: 2018-10-01

## 2018-09-17 RX ORDER — METHYLPREDNISOLONE 4 MG/1
TABLET ORAL
Qty: 1 EACH | Refills: 0 | Status: SHIPPED | OUTPATIENT
Start: 2018-09-17 | End: 2018-10-23

## 2018-09-17 NOTE — PROGRESS NOTES
Subjective       Angelina Pimentel is a 68 y.o. female.     Chief Complaint   Patient presents with   • Poison Corrine     Lt arm        History obtained from the patient.      Poison Ivy   This is a new problem. Episode onset: 2 weeks ago. The problem has been gradually improving since onset. The affected locations include the left arm. The rash is characterized by blistering, itchiness and redness. Associated symptoms include joint pain (not new). Pertinent negatives include no congestion, cough, diarrhea, eye pain, facial edema, fatigue, fever, rhinorrhea, shortness of breath, sore throat or vomiting. Past treatments include anti-itch cream. The treatment provided mild relief. Her past medical history is significant for allergies. There is no history of asthma or eczema.        The following portions of the patient's history were reviewed and updated as appropriate: allergies, current medications, past family history, past medical history, past social history, past surgical history and problem list.      Review of Systems   Constitutional: Negative for chills, fatigue and fever.   HENT: Negative for congestion, postnasal drip, rhinorrhea and sore throat.    Eyes: Negative for pain.   Respiratory: Negative for cough, shortness of breath and wheezing.    Cardiovascular: Negative for chest pain.   Gastrointestinal: Negative for abdominal pain, diarrhea, nausea and vomiting.   Musculoskeletal: Positive for arthralgias and joint pain (not new). Negative for joint swelling and myalgias.   Skin: Positive for rash.   Hematological: Negative for adenopathy.           Objective     Blood pressure 140/72, pulse 76, temperature 98.1 °F (36.7 °C), temperature source Temporal Artery , resp. rate 20, weight 100 kg (221 lb 2 oz).    Physical Exam   Constitutional:   Obese.   Cardiovascular: Normal rate, regular rhythm and normal heart sounds.    No murmur heard.  Pulmonary/Chest: Effort normal and breath sounds normal.   No stridor.    Neurological: She is alert.   Skin: Rash (erythematous, papular vesicular rash on the left upper arm and left forearm) noted.   Psychiatric: She has a normal mood and affect.   Nursing note and vitals reviewed.        Assessment/Plan   Angelina was seen today for poison ivy.    Diagnoses and all orders for this visit:    Contact dermatitis due to poison ivy  -     MethylPREDNISolone (MEDROL, JAC,) 4 MG tablet; Take as directed on package instructions.  -     mometasone (ELOCON) 0.1 % cream; Apply  topically to the appropriate area as directed Daily As Needed (rash) for up to 14 days.            Return if symptoms worsen or fail to improve.

## 2018-09-27 RX ORDER — LEVOTHYROXINE SODIUM 0.1 MG/1
100 TABLET ORAL DAILY
Qty: 90 TABLET | Refills: 1 | Status: SHIPPED | OUTPATIENT
Start: 2018-09-27 | End: 2019-03-15 | Stop reason: SDUPTHER

## 2018-10-23 ENCOUNTER — OFFICE VISIT (OUTPATIENT)
Dept: INTERNAL MEDICINE | Facility: CLINIC | Age: 68
End: 2018-10-23

## 2018-10-23 VITALS
HEART RATE: 72 BPM | WEIGHT: 218 LBS | TEMPERATURE: 97 F | SYSTOLIC BLOOD PRESSURE: 126 MMHG | DIASTOLIC BLOOD PRESSURE: 80 MMHG | HEIGHT: 67 IN | BODY MASS INDEX: 34.21 KG/M2

## 2018-10-23 DIAGNOSIS — E78.49 OTHER HYPERLIPIDEMIA: ICD-10-CM

## 2018-10-23 DIAGNOSIS — M15.9 PRIMARY OSTEOARTHRITIS INVOLVING MULTIPLE JOINTS: ICD-10-CM

## 2018-10-23 DIAGNOSIS — K63.5 BENIGN COLONIC POLYP: ICD-10-CM

## 2018-10-23 DIAGNOSIS — E11.9 TYPE 2 DIABETES MELLITUS WITHOUT COMPLICATION, WITHOUT LONG-TERM CURRENT USE OF INSULIN (HCC): ICD-10-CM

## 2018-10-23 DIAGNOSIS — E03.9 ACQUIRED HYPOTHYROIDISM: ICD-10-CM

## 2018-10-23 DIAGNOSIS — Z00.00 ENCOUNTER FOR HEALTH MAINTENANCE EXAMINATION IN ADULT: ICD-10-CM

## 2018-10-23 DIAGNOSIS — Z00.00 MEDICARE ANNUAL WELLNESS VISIT, SUBSEQUENT: Primary | ICD-10-CM

## 2018-10-23 DIAGNOSIS — I10 ESSENTIAL HYPERTENSION: ICD-10-CM

## 2018-10-23 LAB
A/C: NORMAL
ALBUMIN SERPL-MCNC: 4.23 G/DL (ref 3.2–4.8)
ALBUMIN/GLOB SERPL: 1.8 G/DL (ref 1.5–2.5)
ALP SERPL-CCNC: 117 U/L (ref 25–100)
ALT SERPL W P-5'-P-CCNC: 21 U/L (ref 7–40)
ANION GAP SERPL CALCULATED.3IONS-SCNC: 4 MMOL/L (ref 3–11)
ARTICHOKE IGE QN: 103 MG/DL (ref 0–130)
AST SERPL-CCNC: 21 U/L (ref 0–33)
BASOPHILS # BLD AUTO: 0.02 10*3/MM3 (ref 0–0.2)
BASOPHILS NFR BLD AUTO: 0.3 % (ref 0–1)
BILIRUB SERPL-MCNC: 0.4 MG/DL (ref 0.3–1.2)
BUN BLD-MCNC: 14 MG/DL (ref 9–23)
BUN/CREAT SERPL: 17.9 (ref 7–25)
CALCIUM SPEC-SCNC: 8.9 MG/DL (ref 8.7–10.4)
CHLORIDE SERPL-SCNC: 110 MMOL/L (ref 99–109)
CHOLEST SERPL-MCNC: 153 MG/DL (ref 0–200)
CLARITY, POC: CLEAR
CO2 SERPL-SCNC: 27 MMOL/L (ref 20–31)
COLOR UR: YELLOW
CREAT BLD-MCNC: 0.78 MG/DL (ref 0.6–1.3)
DEPRECATED RDW RBC AUTO: 46.5 FL (ref 37–54)
EOSINOPHIL # BLD AUTO: 0.13 10*3/MM3 (ref 0–0.3)
EOSINOPHIL NFR BLD AUTO: 1.9 % (ref 0–3)
ERYTHROCYTE [DISTWIDTH] IN BLOOD BY AUTOMATED COUNT: 13.4 % (ref 11.3–14.5)
EXPIRATION DATE: ABNORMAL
EXPIRATION DATE: NORMAL
EXPIRATION DATE: NORMAL
GFR SERPL CREATININE-BSD FRML MDRD: 73 ML/MIN/1.73
GLOBULIN UR ELPH-MCNC: 2.4 GM/DL
GLUCOSE BLD-MCNC: 86 MG/DL (ref 70–100)
GLUCOSE UR STRIP-MCNC: NEGATIVE MG/DL
HBA1C MFR BLD: 5.7 %
HCT VFR BLD AUTO: 42 % (ref 34.5–44)
HDLC SERPL-MCNC: 45 MG/DL (ref 40–60)
HGB BLD-MCNC: 13.8 G/DL (ref 11.5–15.5)
IMM GRANULOCYTES # BLD: 0.03 10*3/MM3 (ref 0–0.03)
IMM GRANULOCYTES NFR BLD: 0.4 % (ref 0–0.6)
KETONES UR QL: NEGATIVE
LEUKOCYTE EST, POC: ABNORMAL
LYMPHOCYTES # BLD AUTO: 1.8 10*3/MM3 (ref 0.6–4.8)
LYMPHOCYTES NFR BLD AUTO: 25.7 % (ref 24–44)
Lab: ABNORMAL
Lab: NORMAL
Lab: NORMAL
MCH RBC QN AUTO: 31 PG (ref 27–31)
MCHC RBC AUTO-ENTMCNC: 32.9 G/DL (ref 32–36)
MCV RBC AUTO: 94.4 FL (ref 80–99)
MONOCYTES # BLD AUTO: 0.48 10*3/MM3 (ref 0–1)
MONOCYTES NFR BLD AUTO: 6.9 % (ref 0–12)
NEUTROPHILS # BLD AUTO: 4.57 10*3/MM3 (ref 1.5–8.3)
NEUTROPHILS NFR BLD AUTO: 65.2 % (ref 41–71)
NITRITE UR-MCNC: NEGATIVE MG/ML
NRBC BLD MANUAL-RTO: 0 /100 WBC (ref 0–0)
PH UR: 7 [PH] (ref 5–8)
PLATELET # BLD AUTO: 256 10*3/MM3 (ref 150–450)
PMV BLD AUTO: 10.5 FL (ref 6–12)
POC CREATININE URINE: 100
POC MICROALBUMIN URINE: 30
POTASSIUM BLD-SCNC: 4.5 MMOL/L (ref 3.5–5.5)
PROT SERPL-MCNC: 6.6 G/DL (ref 5.7–8.2)
PROT UR STRIP-MCNC: NEGATIVE MG/DL
PROT/CREAT UR: 100 MG/G CREA
RBC # BLD AUTO: 4.45 10*6/MM3 (ref 3.89–5.14)
RBC # UR STRIP: NEGATIVE /UL
SODIUM BLD-SCNC: 141 MMOL/L (ref 132–146)
SP GR UR: 1.01 (ref 1–1.03)
TRIGL SERPL-MCNC: 85 MG/DL (ref 0–150)
TSH SERPL DL<=0.05 MIU/L-ACNC: 2.19 MIU/ML (ref 0.35–5.35)
WBC NRBC COR # BLD: 7 10*3/MM3 (ref 3.5–10.8)

## 2018-10-23 PROCEDURE — 80053 COMPREHEN METABOLIC PANEL: CPT | Performed by: INTERNAL MEDICINE

## 2018-10-23 PROCEDURE — 36415 COLL VENOUS BLD VENIPUNCTURE: CPT | Performed by: INTERNAL MEDICINE

## 2018-10-23 PROCEDURE — 99397 PER PM REEVAL EST PAT 65+ YR: CPT | Performed by: INTERNAL MEDICINE

## 2018-10-23 PROCEDURE — 84443 ASSAY THYROID STIM HORMONE: CPT | Performed by: INTERNAL MEDICINE

## 2018-10-23 PROCEDURE — 81003 URINALYSIS AUTO W/O SCOPE: CPT | Performed by: INTERNAL MEDICINE

## 2018-10-23 PROCEDURE — 80061 LIPID PANEL: CPT | Performed by: INTERNAL MEDICINE

## 2018-10-23 PROCEDURE — 85025 COMPLETE CBC W/AUTO DIFF WBC: CPT | Performed by: INTERNAL MEDICINE

## 2018-10-23 PROCEDURE — 82044 UR ALBUMIN SEMIQUANTITATIVE: CPT | Performed by: INTERNAL MEDICINE

## 2018-10-23 PROCEDURE — G0439 PPPS, SUBSEQ VISIT: HCPCS | Performed by: INTERNAL MEDICINE

## 2018-10-23 PROCEDURE — 83036 HEMOGLOBIN GLYCOSYLATED A1C: CPT | Performed by: INTERNAL MEDICINE

## 2018-10-23 NOTE — PATIENT INSTRUCTIONS
Health Maintenance for Postmenopausal Women  Menopause is a normal process in which your reproductive ability comes to an end. This process happens gradually over a span of months to years, usually between the ages of 48 and 55. Menopause is complete when you have missed 12 consecutive menstrual periods.  It is important to talk with your health care provider about some of the most common conditions that affect postmenopausal women, such as heart disease, cancer, and bone loss (osteoporosis). Adopting a healthy lifestyle and getting preventive care can help to promote your health and wellness. Those actions can also lower your chances of developing some of these common conditions.  What should I know about menopause?  During menopause, you may experience a number of symptoms, such as:  · Moderate-to-severe hot flashes.  · Night sweats.  · Decrease in sex drive.  · Mood swings.  · Headaches.  · Tiredness.  · Irritability.  · Memory problems.  · Insomnia.    Choosing to treat or not to treat menopausal changes is an individual decision that you make with your health care provider.  What should I know about hormone replacement therapy and supplements?  Hormone therapy products are effective for treating symptoms that are associated with menopause, such as hot flashes and night sweats. Hormone replacement carries certain risks, especially as you become older. If you are thinking about using estrogen or estrogen with progestin treatments, discuss the benefits and risks with your health care provider.  What should I know about heart disease and stroke?  Heart disease, heart attack, and stroke become more likely as you age. This may be due, in part, to the hormonal changes that your body experiences during menopause. These can affect how your body processes dietary fats, triglycerides, and cholesterol. Heart attack and stroke are both medical emergencies.  There are many things that you can do to help prevent heart disease  and stroke:  · Have your blood pressure checked at least every 1-2 years. High blood pressure causes heart disease and increases the risk of stroke.  · If you are 55-79 years old, ask your health care provider if you should take aspirin to prevent a heart attack or a stroke.  · Do not use any tobacco products, including cigarettes, chewing tobacco, or electronic cigarettes. If you need help quitting, ask your health care provider.  · It is important to eat a healthy diet and maintain a healthy weight.  ? Be sure to include plenty of vegetables, fruits, low-fat dairy products, and lean protein.  ? Avoid eating foods that are high in solid fats, added sugars, or salt (sodium).  · Get regular exercise. This is one of the most important things that you can do for your health.  ? Try to exercise for at least 150 minutes each week. The type of exercise that you do should increase your heart rate and make you sweat. This is known as moderate-intensity exercise.  ? Try to do strengthening exercises at least twice each week. Do these in addition to the moderate-intensity exercise.  · Know your numbers. Ask your health care provider to check your cholesterol and your blood glucose. Continue to have your blood tested as directed by your health care provider.    What should I know about cancer screening?  There are several types of cancer. Take the following steps to reduce your risk and to catch any cancer development as early as possible.  Breast Cancer  · Practice breast self-awareness.  ? This means understanding how your breasts normally appear and feel.  ? It also means doing regular breast self-exams. Let your health care provider know about any changes, no matter how small.  · If you are 40 or older, have a clinician do a breast exam (clinical breast exam or CBE) every year. Depending on your age, family history, and medical history, it may be recommended that you also have a yearly breast X-ray (mammogram).  · If you  have a family history of breast cancer, talk with your health care provider about genetic screening.  · If you are at high risk for breast cancer, talk with your health care provider about having an MRI and a mammogram every year.  · Breast cancer (BRCA) gene test is recommended for women who have family members with BRCA-related cancers. Results of the assessment will determine the need for genetic counseling and BRCA1 and for BRCA2 testing. BRCA-related cancers include these types:  ? Breast. This occurs in males or females.  ? Ovarian.  ? Tubal. This may also be called fallopian tube cancer.  ? Cancer of the abdominal or pelvic lining (peritoneal cancer).  ? Prostate.  ? Pancreatic.    Cervical, Uterine, and Ovarian Cancer  Your health care provider may recommend that you be screened regularly for cancer of the pelvic organs. These include your ovaries, uterus, and vagina. This screening involves a pelvic exam, which includes checking for microscopic changes to the surface of your cervix (Pap test).  · For women ages 21-65, health care providers may recommend a pelvic exam and a Pap test every three years. For women ages 30-65, they may recommend the Pap test and pelvic exam, combined with testing for human papilloma virus (HPV), every five years. Some types of HPV increase your risk of cervical cancer. Testing for HPV may also be done on women of any age who have unclear Pap test results.  · Other health care providers may not recommend any screening for nonpregnant women who are considered low risk for pelvic cancer and have no symptoms. Ask your health care provider if a screening pelvic exam is right for you.  · If you have had past treatment for cervical cancer or a condition that could lead to cancer, you need Pap tests and screening for cancer for at least 20 years after your treatment. If Pap tests have been discontinued for you, your risk factors (such as having a new sexual partner) need to be  reassessed to determine if you should start having screenings again. Some women have medical problems that increase the chance of getting cervical cancer. In these cases, your health care provider may recommend that you have screening and Pap tests more often.  · If you have a family history of uterine cancer or ovarian cancer, talk with your health care provider about genetic screening.  · If you have vaginal bleeding after reaching menopause, tell your health care provider.  · There are currently no reliable tests available to screen for ovarian cancer.    Lung Cancer  Lung cancer screening is recommended for adults 55-80 years old who are at high risk for lung cancer because of a history of smoking. A yearly low-dose CT scan of the lungs is recommended if you:  · Currently smoke.  · Have a history of at least 30 pack-years of smoking and you currently smoke or have quit within the past 15 years. A pack-year is smoking an average of one pack of cigarettes per day for one year.    Yearly screening should:  · Continue until it has been 15 years since you quit.  · Stop if you develop a health problem that would prevent you from having lung cancer treatment.    Colorectal Cancer  · This type of cancer can be detected and can often be prevented.  · Routine colorectal cancer screening usually begins at age 50 and continues through age 75.  · If you have risk factors for colon cancer, your health care provider may recommend that you be screened at an earlier age.  · If you have a family history of colorectal cancer, talk with your health care provider about genetic screening.  · Your health care provider may also recommend using home test kits to check for hidden blood in your stool.  · A small camera at the end of a tube can be used to examine your colon directly (sigmoidoscopy or colonoscopy). This is done to check for the earliest forms of colorectal cancer.  · Direct examination of the colon should be repeated every  5-10 years until age 75. However, if early forms of precancerous polyps or small growths are found or if you have a family history or genetic risk for colorectal cancer, you may need to be screened more often.    Skin Cancer  · Check your skin from head to toe regularly.  · Monitor any moles. Be sure to tell your health care provider:  ? About any new moles or changes in moles, especially if there is a change in a mole's shape or color.  ? If you have a mole that is larger than the size of a pencil eraser.  · If any of your family members has a history of skin cancer, especially at a young age, talk with your health care provider about genetic screening.  · Always use sunscreen. Apply sunscreen liberally and repeatedly throughout the day.  · Whenever you are outside, protect yourself by wearing long sleeves, pants, a wide-brimmed hat, and sunglasses.    What should I know about osteoporosis?  Osteoporosis is a condition in which bone destruction happens more quickly than new bone creation. After menopause, you may be at an increased risk for osteoporosis. To help prevent osteoporosis or the bone fractures that can happen because of osteoporosis, the following is recommended:  · If you are 19-50 years old, get at least 1,000 mg of calcium and at least 600 mg of vitamin D per day.  · If you are older than age 50 but younger than age 70, get at least 1,200 mg of calcium and at least 600 mg of vitamin D per day.  · If you are older than age 70, get at least 1,200 mg of calcium and at least 800 mg of vitamin D per day.    Smoking and excessive alcohol intake increase the risk of osteoporosis. Eat foods that are rich in calcium and vitamin D, and do weight-bearing exercises several times each week as directed by your health care provider.  What should I know about how menopause affects my mental health?  Depression may occur at any age, but it is more common as you become older. Common symptoms of depression  include:  · Low or sad mood.  · Changes in sleep patterns.  · Changes in appetite or eating patterns.  · Feeling an overall lack of motivation or enjoyment of activities that you previously enjoyed.  · Frequent crying spells.    Talk with your health care provider if you think that you are experiencing depression.  What should I know about immunizations?  It is important that you get and maintain your immunizations. These include:  · Tetanus, diphtheria, and pertussis (Tdap) booster vaccine.  · Influenza every year before the flu season begins.  · Pneumonia vaccine.  · Shingles vaccine.    Your health care provider may also recommend other immunizations.  This information is not intended to replace advice given to you by your health care provider. Make sure you discuss any questions you have with your health care provider.  Document Released: 02/09/2007 Document Revised: 07/07/2017 Document Reviewed: 09/20/2016  [employer] Interactive Patient Education © 2018 [employer]    Heart-Healthy Eating Plan  Many factors influence your heart health, including eating and exercise habits. Heart (coronary) risk increases with abnormal blood fat (lipid) levels. Heart-healthy meal planning includes limiting unhealthy fats, increasing healthy fats, and making other small dietary changes. This includes maintaining a healthy body weight to help keep lipid levels within a normal range.  What is my plan?  Your health care provider recommends that you:  · Get no more than _________% of the total calories in your daily diet from fat.  · Limit your intake of saturated fat to less than _________% of your total calories each day.  · Limit the amount of cholesterol in your diet to less than _________ mg per day.    What types of fat should I choose?  · Choose healthy fats more often. Choose monounsaturated and polyunsaturated fats, such as olive oil and canola oil, flaxseeds, walnuts, almonds, and seeds.  · Eat more omega-3 fats. Good  "choices include salmon, mackerel, sardines, tuna, flaxseed oil, and ground flaxseeds. Aim to eat fish at least two times each week.  · Limit saturated fats. Saturated fats are primarily found in animal products, such as meats, butter, and cream. Plant sources of saturated fats include palm oil, palm kernel oil, and coconut oil.  · Avoid foods with partially hydrogenated oils in them. These contain trans fats. Examples of foods that contain trans fats are stick margarine, some tub margarines, cookies, crackers, and other baked goods.  What general guidelines do I need to follow?  · Check food labels carefully to identify foods with trans fats or high amounts of saturated fat.  · Fill one half of your plate with vegetables and green salads. Eat 4-5 servings of vegetables per day. A serving of vegetables equals 1 cup of raw leafy vegetables, ½ cup of raw or cooked cut-up vegetables, or ½ cup of vegetable juice.  · Fill one fourth of your plate with whole grains. Look for the word \"whole\" as the first word in the ingredient list.  · Fill one fourth of your plate with lean protein foods.  · Eat 4-5 servings of fruit per day. A serving of fruit equals one medium whole fruit, ¼ cup of dried fruit, ½ cup of fresh, frozen, or canned fruit, or ½ cup of 100% fruit juice.  · Eat more foods that contain soluble fiber. Examples of foods that contain this type of fiber are apples, broccoli, carrots, beans, peas, and barley. Aim to get 20-30 g of fiber per day.  · Eat more home-cooked food and less restaurant, buffet, and fast food.  · Limit or avoid alcohol.  · Limit foods that are high in starch and sugar.  · Avoid fried foods.  · Cook foods by using methods other than frying. Baking, boiling, grilling, and broiling are all great options. Other fat-reducing suggestions include:  ? Removing the skin from poultry.  ? Removing all visible fats from meats.  ? Skimming the fat off of stews, soups, and gravies before serving " them.  ? Steaming vegetables in water or broth.  · Lose weight if you are overweight. Losing just 5-10% of your initial body weight can help your overall health and prevent diseases such as diabetes and heart disease.  · Increase your consumption of nuts, legumes, and seeds to 4-5 servings per week. One serving of dried beans or legumes equals ½ cup after being cooked, one serving of nuts equals 1½ ounces, and one serving of seeds equals ½ ounce or 1 tablespoon.  · You may need to monitor your salt (sodium) intake, especially if you have high blood pressure. Talk with your health care provider or dietitian to get more information about reducing sodium.  What foods can I eat?  Grains    Breads, including [place], white, nancy, wheat, raisin, rye, oatmeal, and Italian. Tortillas that are neither fried nor made with lard or trans fat. Low-fat rolls, including hotdog and hamburger buns and English muffins. Biscuits. Muffins. Waffles. Pancakes. Light popcorn. Whole-grain cereals. Flatbread. [place] toast. Pretzels. Breadsticks. Rusks. Low-fat snacks and crackers, including oyster, saltine, matzo, rio, animal, and rye. Rice and pasta, including brown rice and those that are made with whole wheat.  Vegetables  All vegetables.  Fruits  All fruits, but limit coconut.  Meats and Other Protein Sources  Lean, well-trimmed beef, veal, pork, and lamb. Chicken and turkey without skin. All fish and shellfish. Wild duck, rabbit, pheasant, and venison. Egg whites or low-cholesterol egg substitutes. Dried beans, peas, lentils, and tofu. Seeds and most nuts.  Dairy  Low-fat or nonfat cheeses, including ricotta, string, and mozzarella. Skim or 1% milk that is liquid, powdered, or evaporated. Buttermilk that is made with low-fat milk. Nonfat or low-fat yogurt.  Beverages  Mineral water. Diet carbonated beverages.  Sweets and Desserts  Sherbets and fruit ices. Honey, jam, marmalade, jelly, and syrups. Meringues and gelatins. Pure sugar  candy, such as hard candy, jelly beans, gumdrops, mints, marshmallows, and small amounts of dark chocolate. Lon food cake.  Eat all sweets and desserts in moderation.  Fats and Oils  Nonhydrogenated (trans-free) margarines. Vegetable oils, including soybean, sesame, sunflower, olive, peanut, safflower, corn, canola, and cottonseed. Salad dressings or mayonnaise that are made with a vegetable oil. Limit added fats and oils that you use for cooking, baking, salads, and as spreads.  Other  Cocoa powder. Coffee and tea. All seasonings and condiments.  The items listed above may not be a complete list of recommended foods or beverages. Contact your dietitian for more options.  What foods are not recommended?  Grains  Breads that are made with saturated or trans fats, oils, or whole milk. Croissants. Butter rolls. Cheese breads. Sweet rolls. Donuts. Buttered popcorn. Chow mein noodles. High-fat crackers, such as cheese or butter crackers.  Meats and Other Protein Sources  Fatty meats, such as hotdogs, short ribs, sausage, spareribs, parisi, ribeye roast or steak, and mutton. High-fat deli meats, such as salami and bologna. Caviar. Domestic duck and goose. Organ meats, such as kidney, liver, sweetbreads, brains, gizzard, chitterlings, and heart.  Dairy  Cream, sour cream, cream cheese, and creamed cottage cheese. Whole milk cheeses, including blue (kim), [place] Samm, Brie, Bakari, American, Hav[place]i, Swiss, cheddar, Camembert, and [place]. Whole or 2% milk that is liquid, evaporated, or condensed. Whole buttermilk. Cream sauce or high-fat cheese sauce. Yogurt that is made from whole milk.  Beverages  Regular sodas and drinks with added sugar.  Sweets and Desserts  Frosting. Pudding. Cookies. Cakes other than lon food cake. Candy that has milk chocolate or white chocolate, hydrogenated fat, butter, coconut, or unknown ingredients. Buttered syrups. Full-fat ice cream or ice cream drinks.  Fats and Oils  Gravy that has  suet, meat fat, or shortening. Cocoa butter, hydrogenated oils, palm oil, coconut oil, palm kernel oil. These can often be found in baked products, candy, fried foods, nondairy creamers, and whipped toppings. Solid fats and shortenings, including parisi fat, salt pork, lard, and butter. Nondairy cream substitutes, such as coffee creamers and sour cream substitutes. Salad dressings that are made of unknown oils, cheese, or sour cream.  The items listed above may not be a complete list of foods and beverages to avoid. Contact your dietitian for more information.  This information is not intended to replace advice given to you by your health care provider. Make sure you discuss any questions you have with your health care provider.  Document Released: 09/26/2009 Document Revised: 07/07/2017 Document Reviewed: 06/11/2015  [employer] Interactive Patient Education © 2018 [employer]    Exercising to Lose Weight  Exercising can help you to lose weight. In order to lose weight through exercise, you need to do vigorous-intensity exercise. You can tell that you are exercising with vigorous intensity if you are breathing very hard and fast and cannot hold a conversation while exercising.  Moderate-intensity exercise helps to maintain your current weight. You can tell that you are exercising at a moderate level if you have a higher heart rate and faster breathing, but you are still able to hold a conversation.  How often should I exercise?  Choose an activity that you enjoy and set realistic goals. Your health care provider can help you to make an activity plan that works for you. Exercise regularly as directed by your health care provider. This may include:  · Doing resistance training twice each week, such as:  ? Push-ups.  ? Sit-ups.  ? Lifting weights.  ? Using resistance bands.  · Doing a given intensity of exercise for a given amount of time. Choose from these options:  ? 150 minutes of moderate-intensity exercise every  week.  ? 75 minutes of vigorous-intensity exercise every week.  ? A mix of moderate-intensity and vigorous-intensity exercise every week.    Children, pregnant women, people who are out of shape, people who are overweight, and older adults may need to consult a health care provider for individual recommendations. If you have any sort of medical condition, be sure to consult your health care provider before starting a new exercise program.  What are some activities that can help me to lose weight?  · Walking at a rate of at least 4.5 miles an hour.  · Jogging or running at a rate of 5 miles per hour.  · Biking at a rate of at least 10 miles per hour.  · Lap swimming.  · Roller-skating or in-line skating.  · Cross-country skiing.  · Vigorous competitive sports, such as football, basketball, and soccer.  · Jumping rope.  · Aerobic dancing.  How can I be more active in my day-to-day activities?  · Use the stairs instead of the elevator.  · Take a walk during your lunch break.  · If you drive, park your car farther away from work or school.  · If you take public transportation, get off one stop early and walk the rest of the way.  · Make all of your phone calls while standing up and walking around.  · Get up, stretch, and walk around every 30 minutes throughout the day.  What guidelines should I follow while exercising?  · Do not exercise so much that you hurt yourself, feel dizzy, or get very short of breath.  · Consult your health care provider prior to starting a new exercise program.  · Wear comfortable clothes and shoes with good support.  · Drink plenty of water while you exercise to prevent dehydration or heat stroke. Body water is lost during exercise and must be replaced.  · Work out until you breathe faster and your heart beats faster.  This information is not intended to replace advice given to you by your health care provider. Make sure you discuss any questions you have with your health care  provider.  Document Released: 01/20/2012 Document Revised: 05/25/2017 Document Reviewed: 05/21/2015  [employer] Interactive Patient Education © 2018 [employer]

## 2018-10-23 NOTE — PROGRESS NOTES
QUICK REFERENCE INFORMATION:  The ABCs of the Annual Wellness Visit    Subsequent Medicare Wellness Visit    HEALTH RISK ASSESSMENT    1950    Recent Hospitalizations:  No hospitalization(s) within the last year..        Current Medical Providers:  Patient Care Team:  Leola Villa MD as PCP - General (Internal Medicine)  Leola Villa MD as PCP - Family Medicine  Kathie Arias MD as Consulting Physician (Dermatology)  Bc Red MD as Consulting Physician (Ophthalmology)  Almas Henao MD (Inactive) as Consulting Physician (Neurology)  Aurea Summers DPM as Consulting Physician (Podiatry)  Louis Sebastian MD as Consulting Physician (Cardiology)        Smoking Status:  History   Smoking Status   • Never Smoker   Smokeless Tobacco   • Never Used       Alcohol Consumption:  History   Alcohol Use No     Comment: Never drank alcohol       Depression Screen:   PHQ-2/PHQ-9 Depression Screening 10/23/2018   Little interest or pleasure in doing things 0   Feeling down, depressed, or hopeless 0   Total Score 0       Health Habits and Functional and Cognitive Screening:  Functional & Cognitive Status 10/23/2018   Do you have difficulty preparing food and eating? No   Do you have difficulty bathing yourself, getting dressed or grooming yourself? No   Do you have difficulty using the toilet? No   Do you have difficulty moving around from place to place? No   Do you have trouble with steps or getting out of a bed or a chair? No   In the past year have you fallen or experienced a near fall? No   Current Diet Limited Junk Food   Dental Exam Up to date   Eye Exam Up to date   Exercise (times per week) 4 times per week   Current Exercise Activities Include Walking   Do you need help using the phone?  No   Are you deaf or do you have serious difficulty hearing?  No   Do you need help with transportation? -   Do you need help shopping? No   Do you need help preparing meals?  No   Do you need help  with housework?  No   Do you need help with laundry? No   Do you need help taking your medications? No   Do you need help managing money? No   Do you ever drive or ride in a car without wearing a seat belt? No   Have you felt unusual stress, anger or loneliness in the last month? No   Who do you live with? Spouse   If you need help, do you have trouble finding someone available to you? No   Have you been bothered in the last four weeks by sexual problems? No   Do you have difficulty concentrating, remembering or making decisions? No           Does the patient have evidence of cognitive impairment? No    Aspirin use counseling: Does not need ASA (and currently is not on it)      Recent Lab Results:  CMP:  Lab Results   Component Value Date     (H) 09/10/2015    BUN 14 10/23/2018    CREATININE 0.78 10/23/2018    EGFRIFNONA 73 10/23/2018    EGFRIFAFRI 95 09/10/2015    BCR 17.9 10/23/2018     10/23/2018    K 4.5 10/23/2018    CO2 27.0 10/23/2018    CALCIUM 8.9 10/23/2018    PROTENTOTREF 7.4 09/10/2015    ALBUMIN 4.23 10/23/2018    LABGLOBREF 3.1 09/10/2015    LABIL2 1.4 09/10/2015    BILITOT 0.4 10/23/2018    ALKPHOS 117 (H) 10/23/2018    AST 21 10/23/2018    ALT 21 10/23/2018     Lipid Panel:  Lab Results   Component Value Date    CHOL 153 10/23/2018    TRIG 85 10/23/2018    HDL 45 10/23/2018    LDLHDL 1.60 09/29/2016     HbA1c:  Lab Results   Component Value Date    HGBA1C 5.7 10/23/2018       Visual Acuity:  No exam data present    Age-appropriate Screening Schedule:  Refer to the list below for future screening recommendations based on patient's age, sex and/or medical conditions. Orders for these recommended tests are listed in the plan section. The patient has been provided with a written plan.    Health Maintenance   Topic Date Due   • DIABETIC EYE EXAM  07/17/2018   • URINE MICROALBUMIN  10/11/2018   • HEMOGLOBIN A1C  10/12/2018   • ZOSTER VACCINE (3 of 3) 10/25/2018   • DIABETIC FOOT EXAM   04/12/2019   • LIPID PANEL  04/12/2019   • MAMMOGRAM  08/07/2020   • DXA SCAN  10/17/2022   • COLONOSCOPY  01/04/2023   • TDAP/TD VACCINES (3 - Td) 04/05/2027   • INFLUENZA VACCINE  Completed   • PNEUMOCOCCAL VACCINES (65+ LOW/MEDIUM RISK)  Completed        Subjective   History of Present Illness    Angelina Pimentel is a 68 y.o. female who presents for an Subsequent Wellness Visit.    The following portions of the patient's history were reviewed and updated as appropriate: allergies, current medications, past family history, past medical history, past social history, past surgical history and problem list.    Outpatient Medications Prior to Visit   Medication Sig Dispense Refill   • Cetirizine HCl (ZYRTEC ALLERGY) 10 MG capsule Zyrtec 10 mg tablet     • Cholecalciferol (VITAMIN D3) 5000 units capsule capsule Take 5,000 Units by mouth Daily.     • fluticasone (FLONASE) 50 MCG/ACT nasal spray 1 spray into each nostril As Needed for Rhinitis.     • ibuprofen (ADVIL,MOTRIN) 100 MG/5ML suspension Take  by mouth Every 6 (Six) Hours As Needed for Mild Pain .     • Lancets (ONETOUCH ULTRASOFT) lancets USE AS DIRECTED ONCE DAILY 100 each 8   • levothyroxine (SYNTHROID, LEVOTHROID) 100 MCG tablet Take 1 tablet by mouth Daily. 90 tablet 1   • lisinopril (PRINIVIL,ZESTRIL) 5 MG tablet TAKE 2 TABLETS BY MOUTH  DAILY (Patient taking differently: TAKE 2 TABLETS BY MOUTH  AT BEDTIME) 180 tablet 3   • metFORMIN (GLUCOPHAGE) 500 MG tablet TAKE 1 TABLET BY MOUTH  DAILY WITH BREAKFAST 90 tablet 1   • Multiple Vitamins-Minerals (CENTRUM SILVER ADULT 50+ PO) Take 1 tablet by mouth Daily.     • ONE TOUCH ULTRA TEST test strip USE ONE STRIP TO TEST DAILY 100 each 4   • simvastatin (ZOCOR) 20 MG tablet Take 1 tablet by mouth Every Night. 90 tablet 3   • MethylPREDNISolone (MEDROL, JAC,) 4 MG tablet Take as directed on package instructions. 1 each 0     No facility-administered medications prior to visit.        Patient Active Problem List  "  Diagnosis   • Diabetes mellitus (CMS/[facility])   • Benign colonic polyp   • Hyperlipidemia   • Hypothyroidism   • Osteoarthritis   • Hypertension   • Allergic rhinitis   • Basal cell carcinoma of skin   • Essential hypertriglyceridemia   • Mitral valve prolapse syndrome   • Squamous cell carcinoma of skin   • Obstructive sleep apnea syndrome   • Menopause   • Hypertensive heart disease without heart failure   • Abnormal ECG       Advance Care Planning:  has an advance directive - a copy HAS NOT been provided. Have asked the patient to send this to us to add to record.    Identification of Risk Factors:  Risk factors include: weight , unhealthy diet and cardiovascular risk.    Review of Systems    Compared to one year ago, the patient feels her physical health is the same.  Compared to one year ago, the patient feels her mental health is the same.    Objective     Physical Exam    Vitals:    10/23/18 0805   BP: 126/80   BP Location: Right arm   Pulse: 72   Temp: 97 °F (36.1 °C)   TempSrc: Temporal Artery    Weight: 98.9 kg (218 lb)   Height: 169.5 cm (66.75\")   PainSc: 0-No pain       Patient's Body mass index is 34.4 kg/m². BMI is above normal parameters. Recommendations include: educational material, exercise counseling and nutrition counseling.  Finger Rub Hearing{Test (right ear):passed  Finger Rub Hearing{Test (left ear):passed        Assessment/Plan   Patient Self-Management and Personalized Health Advice  The patient has been provided with information about: diet, exercise and weight management and preventive services including:   · Advance directive, Exercise counseling provided, Fall Risk assessment done, Nutrition counseling provided.    Visit Diagnoses:    ICD-10-CM ICD-9-CM   1. Medicare annual wellness visit, subsequent Z00.00 V70.0   2. Encounter for health maintenance examination in adult Z00.00 V70.0   3. Type 2 diabetes mellitus without complication, without long-term current use of insulin (CMS/[facility]) " E11.9 250.00   4. Essential hypertension I10 401.9   5. Other hyperlipidemia E78.49 272.4   6. Acquired hypothyroidism E03.9 244.9   7. Benign colonic polyp K63.5 211.3   8. Primary osteoarthritis involving multiple joints M15.0 715.09       Orders Placed This Encounter   Procedures   • Lipid Panel   • Comprehensive Metabolic Panel   • TSH   • CBC Auto Differential   • POC Microalbumin   • POC Urinalysis Dipstick, Multipro   • POC Glycosylated Hemoglobin (Hb A1C)   • CBC & Differential     Order Specific Question:   Manual Differential     Answer:   No       Outpatient Encounter Prescriptions as of 10/23/2018   Medication Sig Dispense Refill   • Cetirizine HCl (ZYRTEC ALLERGY) 10 MG capsule Zyrtec 10 mg tablet     • Cholecalciferol (VITAMIN D3) 5000 units capsule capsule Take 5,000 Units by mouth Daily.     • fluticasone (FLONASE) 50 MCG/ACT nasal spray 1 spray into each nostril As Needed for Rhinitis.     • ibuprofen (ADVIL,MOTRIN) 100 MG/5ML suspension Take  by mouth Every 6 (Six) Hours As Needed for Mild Pain .     • Lancets (ONETOUCH ULTRASOFT) lancets USE AS DIRECTED ONCE DAILY 100 each 8   • levothyroxine (SYNTHROID, LEVOTHROID) 100 MCG tablet Take 1 tablet by mouth Daily. 90 tablet 1   • lisinopril (PRINIVIL,ZESTRIL) 5 MG tablet TAKE 2 TABLETS BY MOUTH  DAILY (Patient taking differently: TAKE 2 TABLETS BY MOUTH  AT BEDTIME) 180 tablet 3   • metFORMIN (GLUCOPHAGE) 500 MG tablet TAKE 1 TABLET BY MOUTH  DAILY WITH BREAKFAST 90 tablet 1   • Multiple Vitamins-Minerals (CENTRUM SILVER ADULT 50+ PO) Take 1 tablet by mouth Daily.     • ONE TOUCH ULTRA TEST test strip USE ONE STRIP TO TEST DAILY 100 each 4   • simvastatin (ZOCOR) 20 MG tablet Take 1 tablet by mouth Every Night. 90 tablet 3   • [DISCONTINUED] MethylPREDNISolone (MEDROL, JAC,) 4 MG tablet Take as directed on package instructions. 1 each 0     No facility-administered encounter medications on file as of 10/23/2018.        Reviewed use of high risk  medication in the elderly: yes  Reviewed for potential of harmful drug interactions in the elderly: yes    Follow Up:  Return in about 6 months (around 4/23/2019) for Recheck- Diabetes, fasting.     An After Visit Summary and PPPS with all of these plans were given to the patient.

## 2018-10-23 NOTE — PROGRESS NOTES
Subjective     Chief Complaint:  Physical Exam.    History of Present Illness    History obtained from the patient.    Primary Care Cardiac Diagnostic Constellation: The patient is here today for a 6 month follow-up visit.       Her Diabetes Mellitus type 2 is stable.   Medication(s): Metformin HCl and Lisinopril.   Her Hypertension is stable.   Medication(s): Lisinopril.   Her Hyperlipidemia has been unstable.   Her LDL goal is 70 mg/dL and last LDL was 94 mg/dL.   Medication(s): Simvastiatin.   The patient is adherent with her medication regimen. She denies medication side effects.       Interval Events:  Last HgA1C was 5.5.   Last ophthalmology visit was in January 2018, no retinopathy per patient.   She states she checks her feet daily.       Symptoms: Denies chest pain,  intermittent leg claudication, lower extremity edema, palpitations, syncope, orthopnea, and PND,     Associated symptoms:  10 pound weight gain in 6 months, but no headache, fatigue, polydipsia, polyuria, myalgias, arthralgias, visual impairment,    focal neurologic deficits, or memory loss. Denies numbness of the feet,  foot pain,  or a foot ulcer,     Lifestyle and Disease Management: Diet: She consumes a diverse and healthy diet (but was just on a cruise). Weight Issues: She has weight concerns. Exercise: She exercises regularly. She sees a  2  times per week for 60 minutes per session.  She walks 3 x per week.   Smoking: She does not use tobacco.      Hypothyroidism Follow-Up: The patient is being seen for follow-up of Hypothyroidism, which is stable.  Interval Events:  T4 and TSH were normal 4/12/18.  Symptoms: 10 pound  weight gain, but denies cold intolerance,  fatigue, weakness,  trouble concentrating, hair loss, and dry skin.   Associated symptoms: no arthralgias,  no myalgias, and no paresthesias.   Medications:  Levothyroxine (Synthroid).    The patient is adherent to her medication regimen, and she denies  medication side effects.          Colonic Polyp Follow-up: The patient is being seen for a routine clinic follow-up of Colon Polyp(s),which is stable.   Interval Events:  Current diagnosis was determined by Colonoscopy and last 1/4/18- no polyps  Symptoms: no hematochezia, no melena, no diarrhea, no constipation, no decreased stool caliber, no change in bowel habits,  and no abdominal pain. Associated symptoms: no rectal prolapse.   Medication:  None.         Osteoarthritis Follow-Up: The patient is being seen for a routine clinic follow-up of Osteoarthritis, which has been stable.   She has no complications from Osteoarthritis.   The patient's Osteoarthritis has not resulted in physical disability.   Interval Events:  None.  Activities: no limitations.   Symptoms: has finger pain.  No arthralgias, back pain,hip pain, shoulder pain, knee pain,  or neck pain.  Associated symptoms:  joint swelling and  joint stiffness in fingers.   Medications:  Ibuprofen prn.      Angelina Pimentel is a 68 y.o. female who presents for an Annual Physical.      PMH, PSH, SocHx, FamHx, Allergies, and Medications: Reviewed and updated.    Outpatient Medications Prior to Visit   Medication Sig Dispense Refill   • Cetirizine HCl (ZYRTEC ALLERGY) 10 MG capsule Zyrtec 10 mg tablet     • Cholecalciferol (VITAMIN D3) 5000 units capsule capsule Take 5,000 Units by mouth Daily.     • fluticasone (FLONASE) 50 MCG/ACT nasal spray 1 spray into each nostril As Needed for Rhinitis.     • ibuprofen (ADVIL,MOTRIN) 100 MG/5ML suspension Take  by mouth Every 6 (Six) Hours As Needed for Mild Pain .     • Lancets (ONETOUCH ULTRASOFT) lancets USE AS DIRECTED ONCE DAILY 100 each 8   • levothyroxine (SYNTHROID, LEVOTHROID) 100 MCG tablet Take 1 tablet by mouth Daily. 90 tablet 1   • lisinopril (PRINIVIL,ZESTRIL) 5 MG tablet TAKE 2 TABLETS BY MOUTH  DAILY (Patient taking differently: TAKE 2 TABLETS BY MOUTH  AT BEDTIME) 180 tablet 3   • metFORMIN (GLUCOPHAGE)  500 MG tablet TAKE 1 TABLET BY MOUTH  DAILY WITH BREAKFAST 90 tablet 1   • Multiple Vitamins-Minerals (CENTRUM SILVER ADULT 50+ PO) Take 1 tablet by mouth Daily.     • ONE TOUCH ULTRA TEST test strip USE ONE STRIP TO TEST DAILY 100 each 4   • simvastatin (ZOCOR) 20 MG tablet Take 1 tablet by mouth Every Night. 90 tablet 3   • MethylPREDNISolone (MEDROL, JAC,) 4 MG tablet Take as directed on package instructions. 1 each 0     No facility-administered medications prior to visit.        Immunization History   Administered Date(s) Administered   • Flu Vaccine High Dose PF 65YR+ 10/14/2015, 10/10/2016, 10/11/2017, 08/30/2018   • Hepatitis A 06/01/1998, 01/01/1999   • Pneumococcal Conjugate 13-Valent (PCV13) 11/24/2014   • Pneumococcal Polysaccharide (PPSV23) 09/20/2010, 03/25/2016   • Shingrix 05/30/2018, 08/30/2018   • Td 04/05/2017   • Tdap 02/25/2009   • Zostavax 05/21/2010         Patient Active Problem List   Diagnosis   • Diabetes mellitus (CMS/HCC)   • Benign colonic polyp   • Hyperlipidemia   • Hypothyroidism   • Osteoarthritis   • Hypertension   • Allergic rhinitis   • Basal cell carcinoma of skin   • Essential hypertriglyceridemia   • Mitral valve prolapse syndrome   • Squamous cell carcinoma of skin   • Obstructive sleep apnea syndrome   • Menopause   • Hypertensive heart disease without heart failure   • Abnormal ECG       Health Habits:  Dental Exam. up to date  Eye Exam. up to date  Hearing Loss:  No  Exercise: 5 times/week.  Current exercise activities include: walking and works out with a   Diet: overall healthy  Multivitamin: Yes    Safe Driving:  Yes  Seat Belt:  Yes  Bike Helmet:  N/A  Skin Screening:  Yes  Sunscreen: Yes  SBE / VIGNESH: No  Sexual Activity:  Yes  Birth Control:  Menopause  STD Prevention:  N/A    Last Pap: 2008, normal per patient (MONICA/BSO).  Last Mammogram:  8/7/18, cat 3  Last DEXA Scan: 10/17/17, normal  Last Colonoscopy: 1/4/18, diverticulosis, no polyp  Last PSA:  N/A    Social:    Social History     Social History   • Marital status:      Spouse name: N/A   • Number of children: 2   • Years of education: N/A     Occupational History   • Retired      Social History Main Topics   • Smoking status: Never Smoker   • Smokeless tobacco: Never Used   • Alcohol use No      Comment: Never drank alcohol   • Drug use: No   • Sexual activity: Yes     Partners: Male     Birth control/ protection: Post-menopausal     Other Topics Concern   • Not on file     Social History Narrative   • No narrative on file         Current Medical Providers:    Leola Villa MD (Internal Medicine / Pediatrics)    The [facility] providers who are involved in the care of this patient are listed above.         Review of Systems   Constitutional: Positive for unexpected weight change. Negative for chills, fatigue and fever.        No night sweats.  No generalized pain.   HENT: Negative for congestion, ear pain, hearing loss, nosebleeds, postnasal drip, rhinorrhea, sinus pressure, sneezing, sore throat, tinnitus and voice change.         Reports occasional snoring, on CPAP.   Eyes: Negative for photophobia, pain, discharge, redness, itching and visual disturbance.   Respiratory: Negative for apnea, cough, chest tightness, shortness of breath, wheezing and stridor.         No orthopnea, dyspnea on exertion, or PND.  No chest congestion.   No  hemoptysis.   Cardiovascular: Negative for chest pain, palpitations and leg swelling.        No claudication or syncope.   Gastrointestinal: Negative for abdominal pain, blood in stool, constipation, diarrhea, nausea, rectal pain and vomiting.        No hematemesis.  No heartburn, dysphagia or odynophagia.  No belching or bloating.  No melena.   Endocrine: Negative for cold intolerance, heat intolerance, polydipsia, polyphagia and polyuria.        No hair loss or dry skin.  No generalized weakness.  No hot flashes.   Genitourinary: Negative for difficulty  "urinating, dyspareunia, dysuria, flank pain, frequency, hematuria, pelvic pain, urgency, vaginal bleeding and vaginal discharge.        No nocturia, incomplete emptying, or incontinence.   Musculoskeletal: Positive for joint swelling. Negative for arthralgias, back pain, gait problem, myalgias, neck pain and neck stiffness.        Has finger joint stiffness.   Skin: Negative for rash.        No new skin lesions or changes in skin lesions. No breast pain or masses.  No nipple discharge or nipple inversion.   Neurological: Negative for dizziness, tremors, syncope, speech difficulty, weakness, light-headedness, numbness and headaches.        No tingling.  No memory loss.  No decreased concentration.   Hematological: Negative for adenopathy. Does not bruise/bleed easily.   Psychiatric/Behavioral: Negative for confusion, sleep disturbance and suicidal ideas. The patient is not nervous/anxious.         No depression.           Objective     Vitals:    10/23/18 0805   BP: 126/80   BP Location: Right arm   Pulse: 72   Temp: 97 °F (36.1 °C)   TempSrc: Temporal Artery    Weight: 98.9 kg (218 lb)   Height: 169.5 cm (66.75\")   PainSc: 0-No pain       Body mass index is 34.4 kg/m².    Physical Exam   Constitutional:   Obese.   HENT:   Head: Normocephalic and atraumatic.   Right Ear: Tympanic membrane, external ear and ear canal normal.   Left Ear: Tympanic membrane, external ear and ear canal normal.   Mouth/Throat: Oropharynx is clear and moist.   Tonsils absent.   Eyes: Pupils are equal, round, and reactive to light. Conjunctivae and EOM are normal.   Fundi normal bilaterally.   Neck: Normal range of motion. Neck supple. Carotid bruit is not present. No thyromegaly present.   Cardiovascular: Normal rate, regular rhythm, normal heart sounds and intact distal pulses.  Exam reveals no gallop and no friction rub.    No murmur heard.  Pulmonary/Chest: Effort normal and breath sounds normal. Right breast exhibits no inverted nipple, " no mass, no nipple discharge, no skin change and no tenderness. Left breast exhibits no inverted nipple, no mass, no nipple discharge, no skin change and no tenderness.   Abdominal: Soft. Bowel sounds are normal. She exhibits no distension, no abdominal bruit and no mass. There is no hepatosplenomegaly. There is no tenderness.   Genitourinary:   Genitourinary Comments:  and rectal exam deferred.   Musculoskeletal: Normal range of motion.   Lymphadenopathy:     She has no cervical adenopathy.     She has no axillary adenopathy.        Right: No inguinal and no supraclavicular adenopathy present.        Left: No inguinal and no supraclavicular adenopathy present.   Neurological: She is alert. She has normal strength and normal reflexes. No cranial nerve deficit.   Skin: No rash noted.   No atypical skin lesions.   Psychiatric: She has a normal mood and affect.   Nursing note and vitals reviewed.    Counseling was given to patient for the following topics: appropriate exercise, healthy eating habits, disease prevention, risk factors for cancer, importance of self breast exam and breast health, importance of immunizations, including risks and benefits, bone health, sun safety, seatbelt use and safe driving.  Written information provided to patient on these topics and other health maintenance issues.      Results for orders placed or performed in visit on 10/23/18   POC Microalbumin   Result Value Ref Range    Microalbumin, Urine 30     Creatinine, Urine 100     A/C <30mg/g NORMAL     Lot Number [id number]     Expiration Date 8-31-19    POC Urinalysis Dipstick, Multipro   Result Value Ref Range    Color Yellow Yellow, Straw, Dark Yellow, Eileen    Clarity, UA Clear Clear    Glucose, UA Negative Negative, 1000 mg/dL (3+) mg/dL    Ketones, UA Negative Negative    Specific Gravity  1.015 1.005 - 1.030    Blood, UA Negative Negative    pH, Urine 7.0 5.0 - 8.0    Protein, POC Negative Negative mg/dL    Nitrite, UA Negative  Negative    Leukocytes Small (1+) (A) Negative    Protein/Creatinine Ratio, Urine 100.0 mg/G Crea    Lot Number [id number]     Expiration Date 12-31-18    POC Glycosylated Hemoglobin (Hb A1C)   Result Value Ref Range    Hemoglobin A1C 5.7 %    Lot Number [id number]     Expiration Date 5-20          Assessment/Plan       Angelina was seen today for annual exam.    Diagnoses and all orders for this visit:    Medicare annual wellness visit, subsequent    Encounter for health maintenance examination in adult  -     POC Microalbumin  -     POC Urinalysis Dipstick, Multipro  -     POC Glycosylated Hemoglobin (Hb A1C)  -     Lipid Panel  -     Comprehensive Metabolic Panel  -     TSH  -     CBC & Differential  -     CBC Auto Differential    Type 2 diabetes mellitus without complication, without long-term current use of insulin (CMS/[facility])  -     POC Microalbumin  -     POC Urinalysis Dipstick, Multipro  -     POC Glycosylated Hemoglobin (Hb A1C)  -     Comprehensive Metabolic Panel  -     CBC & Differential  -     CBC Auto Differential    Essential hypertension    Other hyperlipidemia  -     Lipid Panel    Acquired hypothyroidism  -     TSH    Benign colonic polyp    Primary osteoarthritis involving multiple joints          Return in about 6 months (around 4/23/2019) for Recheck- Diabetes, fasting.

## 2018-10-24 RX ORDER — SIMVASTATIN 20 MG
20 TABLET ORAL NIGHTLY
Qty: 90 TABLET | Refills: 2 | Status: SHIPPED | OUTPATIENT
Start: 2018-10-24 | End: 2019-06-17 | Stop reason: SDUPTHER

## 2018-12-10 ENCOUNTER — OFFICE VISIT (OUTPATIENT)
Dept: INTERNAL MEDICINE | Facility: CLINIC | Age: 68
End: 2018-12-10

## 2018-12-10 VITALS
SYSTOLIC BLOOD PRESSURE: 140 MMHG | WEIGHT: 218.38 LBS | TEMPERATURE: 97.6 F | RESPIRATION RATE: 20 BRPM | HEART RATE: 72 BPM | DIASTOLIC BLOOD PRESSURE: 82 MMHG | BODY MASS INDEX: 34.46 KG/M2

## 2018-12-10 DIAGNOSIS — J06.9 UPPER RESPIRATORY TRACT INFECTION, UNSPECIFIED TYPE: Primary | ICD-10-CM

## 2018-12-10 PROCEDURE — 99214 OFFICE O/P EST MOD 30 MIN: CPT | Performed by: INTERNAL MEDICINE

## 2018-12-10 RX ORDER — AZITHROMYCIN 250 MG/1
TABLET, FILM COATED ORAL
Qty: 6 TABLET | Refills: 0 | Status: SHIPPED | OUTPATIENT
Start: 2018-12-10 | End: 2019-04-24

## 2018-12-10 NOTE — PROGRESS NOTES
Subjective       Angelina Pimentel is a 68 y.o. female.     Chief Complaint   Patient presents with   • URI     x 10 days        History obtained from the patient.      URI    This is a new problem. Episode onset: 9 days ago. The problem has been waxing and waning. There has been no fever. Associated symptoms include congestion, coughing (wet, producti ve), headaches, nausea, rhinorrhea (clear), sneezing and swollen glands. Pertinent negatives include no abdominal pain, chest pain, diarrhea, ear pain, joint pain, joint swelling, neck pain, rash, sinus pain, sore throat, vomiting or wheezing. Treatments tried: Allegra D, Zyrtec, and Mucinex. The treatment provided mild relief.        The following portions of the patient's history were reviewed and updated as appropriate: allergies, current medications, past family history, past medical history, past social history, past surgical history and problem list.      Review of Systems   Constitutional: Negative for chills, fatigue and fever.   HENT: Positive for congestion, postnasal drip, rhinorrhea (clear), sneezing and voice change (hoarse). Negative for ear pain, sinus pressure, sinus pain and sore throat.    Eyes: Negative for pain, discharge, redness and itching.   Respiratory: Positive for cough (wet, producti ve). Negative for shortness of breath and wheezing.    Cardiovascular: Negative for chest pain.   Gastrointestinal: Positive for nausea. Negative for abdominal pain, diarrhea and vomiting.   Musculoskeletal: Negative for arthralgias, joint pain, joint swelling, myalgias, neck pain and neck stiffness.   Skin: Negative for rash.   Neurological: Positive for headaches.   Hematological: Positive for adenopathy.           Objective     Blood pressure 140/82, pulse 72, temperature 97.6 °F (36.4 °C), temperature source Temporal, resp. rate 20, weight 99.1 kg (218 lb 6 oz).    Physical Exam   Constitutional: She appears well-developed and well-nourished.   HENT:   Head:  Normocephalic and atraumatic.   Right Ear: Tympanic membrane, external ear and ear canal normal.   Left Ear: Tympanic membrane, external ear and ear canal normal.   Mouth/Throat: Oropharynx is clear and moist and mucous membranes are normal. No oral lesions.   Tonsils absent.  No sinus tenderness to palpation.   Eyes: Conjunctivae are normal.   Neck: Normal range of motion. Neck supple.   Cardiovascular: Normal rate and regular rhythm.   No murmur heard.  Pulmonary/Chest: Effort normal and breath sounds normal.   Lymphadenopathy:     She has no cervical adenopathy.   Skin: No rash noted.   Psychiatric: She has a normal mood and affect.   Nursing note and vitals reviewed.        Assessment/Plan   Angelina was seen today for uri.    Diagnoses and all orders for this visit:    Upper respiratory tract infection, unspecified type  -     azithromycin (ZITHROMAX Z-JAC) 250 MG tablet; Take 2 tablets the first day, then 1 tablet daily for 4 days.      Continue Mucinex.  Add Flonase.      Return if symptoms worsen or fail to improve.

## 2019-02-08 ENCOUNTER — APPOINTMENT (OUTPATIENT)
Dept: MAMMOGRAPHY | Facility: HOSPITAL | Age: 69
End: 2019-02-08

## 2019-03-08 ENCOUNTER — HOSPITAL ENCOUNTER (OUTPATIENT)
Dept: MAMMOGRAPHY | Facility: HOSPITAL | Age: 69
Discharge: HOME | End: 2019-03-08
Admitting: RADIOLOGY

## 2019-03-08 ENCOUNTER — TRANSCRIBE ORDERS (OUTPATIENT)
Dept: MAMMOGRAPHY | Facility: HOSPITAL | Age: 69
End: 2019-03-08

## 2019-03-08 DIAGNOSIS — R92.8 ABNORMAL MAMMOGRAM: ICD-10-CM

## 2019-03-08 DIAGNOSIS — R92.8 ABNORMAL MAMMOGRAM: Primary | ICD-10-CM

## 2019-03-08 PROCEDURE — 77065 DX MAMMO INCL CAD UNI: CPT

## 2019-03-08 PROCEDURE — 77065 DX MAMMO INCL CAD UNI: CPT | Performed by: RADIOLOGY

## 2019-03-15 ENCOUNTER — TELEPHONE (OUTPATIENT)
Dept: INTERNAL MEDICINE | Facility: CLINIC | Age: 69
End: 2019-03-15

## 2019-03-15 RX ORDER — LISINOPRIL 5 MG/1
TABLET ORAL
Qty: 90 TABLET | Refills: 1 | Status: SHIPPED | OUTPATIENT
Start: 2019-03-15 | End: 2019-03-18

## 2019-03-15 RX ORDER — LEVOTHYROXINE SODIUM 0.1 MG/1
100 TABLET ORAL DAILY
Qty: 90 TABLET | Refills: 1 | Status: SHIPPED | OUTPATIENT
Start: 2019-03-15 | End: 2019-10-07 | Stop reason: SDUPTHER

## 2019-03-15 NOTE — TELEPHONE ENCOUNTER
----- Message from Amaury Hunter sent at 3/14/2019  2:45 PM EDT -----  Contact: PATIENT(CAME BY)  PATIENT CAME BY TO SHOW NEW INSURANCE CARD.  ALSO: NEEDS MED REFILLS:    LEVOTHYROXINE  TEST STRIPS  LACINOPRIL    PLEASE CALL IN TO [email or web address].

## 2019-03-18 ENCOUNTER — TELEPHONE (OUTPATIENT)
Dept: INTERNAL MEDICINE | Facility: CLINIC | Age: 69
End: 2019-03-18

## 2019-03-18 RX ORDER — LISINOPRIL 10 MG/1
TABLET ORAL
Qty: 90 TABLET | Refills: 3 | Status: SHIPPED | OUTPATIENT
Start: 2019-03-18 | End: 2019-06-13 | Stop reason: SDUPTHER

## 2019-03-18 NOTE — TELEPHONE ENCOUNTER
----- Message from Alanis Zavaleta sent at 3/18/2019  3:29 PM EDT -----  Patient called stating she has been taking 2 Lisinopril a day. Her new rx states to only take 1 a day. She needs to clarify how many she is to be taking. Patient can be reached at [phone].

## 2019-03-18 NOTE — TELEPHONE ENCOUNTER
Spoke with Angelina    rx was sent in for 5mg daily.  Corrected rx for 10 mg daily sent to pharmacy.   Angelina notified  Verb understanding given

## 2019-04-24 ENCOUNTER — OFFICE VISIT (OUTPATIENT)
Dept: INTERNAL MEDICINE | Facility: CLINIC | Age: 69
End: 2019-04-24

## 2019-04-24 VITALS
BODY MASS INDEX: 35.37 KG/M2 | SYSTOLIC BLOOD PRESSURE: 120 MMHG | HEART RATE: 72 BPM | TEMPERATURE: 97 F | DIASTOLIC BLOOD PRESSURE: 72 MMHG | WEIGHT: 224.13 LBS | RESPIRATION RATE: 20 BRPM

## 2019-04-24 DIAGNOSIS — E11.9 TYPE 2 DIABETES MELLITUS WITHOUT COMPLICATION, WITHOUT LONG-TERM CURRENT USE OF INSULIN (HCC): Primary | ICD-10-CM

## 2019-04-24 DIAGNOSIS — R41.3 MEMORY LOSS: ICD-10-CM

## 2019-04-24 DIAGNOSIS — K63.5 BENIGN COLONIC POLYP: ICD-10-CM

## 2019-04-24 DIAGNOSIS — I10 ESSENTIAL HYPERTENSION: ICD-10-CM

## 2019-04-24 DIAGNOSIS — M15.9 PRIMARY OSTEOARTHRITIS INVOLVING MULTIPLE JOINTS: ICD-10-CM

## 2019-04-24 DIAGNOSIS — E03.9 ACQUIRED HYPOTHYROIDISM: ICD-10-CM

## 2019-04-24 DIAGNOSIS — E78.49 OTHER HYPERLIPIDEMIA: ICD-10-CM

## 2019-04-24 LAB
ALBUMIN SERPL-MCNC: 4.3 G/DL (ref 3.5–5.2)
ALBUMIN/GLOB SERPL: 1.3 G/DL
ALP SERPL-CCNC: 116 U/L (ref 39–117)
ALT SERPL W P-5'-P-CCNC: 20 U/L (ref 1–33)
ANION GAP SERPL CALCULATED.3IONS-SCNC: 10.3 MMOL/L
AST SERPL-CCNC: 20 U/L (ref 1–32)
BASOPHILS # BLD AUTO: 0.03 10*3/MM3 (ref 0–0.2)
BASOPHILS NFR BLD AUTO: 0.5 % (ref 0–1.5)
BILIRUB SERPL-MCNC: 0.4 MG/DL (ref 0.2–1.2)
BUN BLD-MCNC: 16 MG/DL (ref 8–23)
BUN/CREAT SERPL: 20.5 (ref 7–25)
CALCIUM SPEC-SCNC: 9 MG/DL (ref 8.6–10.5)
CHLORIDE SERPL-SCNC: 108 MMOL/L (ref 98–107)
CHOLEST SERPL-MCNC: 130 MG/DL (ref 0–200)
CO2 SERPL-SCNC: 24.7 MMOL/L (ref 22–29)
CREAT BLD-MCNC: 0.78 MG/DL (ref 0.57–1)
DEPRECATED RDW RBC AUTO: 46.9 FL (ref 37–54)
EOSINOPHIL # BLD AUTO: 0.12 10*3/MM3 (ref 0–0.4)
EOSINOPHIL NFR BLD AUTO: 1.9 % (ref 0.3–6.2)
ERYTHROCYTE [DISTWIDTH] IN BLOOD BY AUTOMATED COUNT: 13.2 % (ref 12.3–15.4)
EXPIRATION DATE: NORMAL
GFR SERPL CREATININE-BSD FRML MDRD: 73 ML/MIN/1.73
GLOBULIN UR ELPH-MCNC: 3.2 GM/DL
GLUCOSE BLD-MCNC: 101 MG/DL (ref 65–99)
HBA1C MFR BLD: 5.7 %
HCT VFR BLD AUTO: 44.7 % (ref 34–46.6)
HDLC SERPL-MCNC: 41 MG/DL (ref 40–60)
HGB BLD-MCNC: 14.3 G/DL (ref 12–15.9)
IMM GRANULOCYTES # BLD AUTO: 0.02 10*3/MM3 (ref 0–0.05)
IMM GRANULOCYTES NFR BLD AUTO: 0.3 % (ref 0–0.5)
LDLC SERPL CALC-MCNC: 75 MG/DL (ref 0–100)
LDLC/HDLC SERPL: 1.83 {RATIO}
LYMPHOCYTES # BLD AUTO: 1.92 10*3/MM3 (ref 0.7–3.1)
LYMPHOCYTES NFR BLD AUTO: 30.8 % (ref 19.6–45.3)
Lab: NORMAL
MCH RBC QN AUTO: 31 PG (ref 26.6–33)
MCHC RBC AUTO-ENTMCNC: 32 G/DL (ref 31.5–35.7)
MCV RBC AUTO: 96.8 FL (ref 79–97)
MONOCYTES # BLD AUTO: 0.44 10*3/MM3 (ref 0.1–0.9)
MONOCYTES NFR BLD AUTO: 7.1 % (ref 5–12)
NEUTROPHILS # BLD AUTO: 3.71 10*3/MM3 (ref 1.7–7)
NEUTROPHILS NFR BLD AUTO: 59.4 % (ref 42.7–76)
NRBC BLD AUTO-RTO: 0 /100 WBC (ref 0–0.2)
PLATELET # BLD AUTO: 266 10*3/MM3 (ref 140–450)
PMV BLD AUTO: 10.8 FL (ref 6–12)
POTASSIUM BLD-SCNC: 4.5 MMOL/L (ref 3.5–5.2)
PROT SERPL-MCNC: 7.5 G/DL (ref 6–8.5)
RBC # BLD AUTO: 4.62 10*6/MM3 (ref 3.77–5.28)
SODIUM BLD-SCNC: 143 MMOL/L (ref 136–145)
T4 FREE SERPL-MCNC: 1.27 NG/DL (ref 0.93–1.7)
TRIGL SERPL-MCNC: 69 MG/DL (ref 0–150)
TSH SERPL DL<=0.05 MIU/L-ACNC: 2.22 MIU/ML (ref 0.27–4.2)
VIT B12 BLD-MCNC: 386 PG/ML (ref 211–946)
VLDLC SERPL-MCNC: 13.8 MG/DL (ref 5–40)
WBC NRBC COR # BLD: 6.24 10*3/MM3 (ref 3.4–10.8)

## 2019-04-24 PROCEDURE — 36415 COLL VENOUS BLD VENIPUNCTURE: CPT | Performed by: INTERNAL MEDICINE

## 2019-04-24 PROCEDURE — 85025 COMPLETE CBC W/AUTO DIFF WBC: CPT | Performed by: INTERNAL MEDICINE

## 2019-04-24 PROCEDURE — 84439 ASSAY OF FREE THYROXINE: CPT | Performed by: INTERNAL MEDICINE

## 2019-04-24 PROCEDURE — 84443 ASSAY THYROID STIM HORMONE: CPT | Performed by: INTERNAL MEDICINE

## 2019-04-24 PROCEDURE — 80053 COMPREHEN METABOLIC PANEL: CPT | Performed by: INTERNAL MEDICINE

## 2019-04-24 PROCEDURE — 99214 OFFICE O/P EST MOD 30 MIN: CPT | Performed by: INTERNAL MEDICINE

## 2019-04-24 PROCEDURE — 80061 LIPID PANEL: CPT | Performed by: INTERNAL MEDICINE

## 2019-04-24 PROCEDURE — 82607 VITAMIN B-12: CPT | Performed by: INTERNAL MEDICINE

## 2019-04-24 PROCEDURE — 83036 HEMOGLOBIN GLYCOSYLATED A1C: CPT | Performed by: INTERNAL MEDICINE

## 2019-04-24 RX ORDER — LANCETS
EACH MISCELLANEOUS
Qty: 100 EACH | Refills: 3 | Status: SHIPPED | OUTPATIENT
Start: 2019-04-24 | End: 2020-07-22

## 2019-04-24 NOTE — PROGRESS NOTES
Subjective       Angelina Pimentel is a 68 y.o. female.     Chief Complaint   Patient presents with   • Diabetes     6 month follow up  fasting        History obtained from the patient.      History of Present Illness     Primary Care Cardiac Diagnostic Constellation: The patient is here today for a 6 month follow-up visit.       Her Diabetes Mellitus type 2 is stable.   Medication(s): Metformin HCl and Lisinopril.   Her Hypertension is stable.   Medication(s): Lisinopril.   Her Hyperlipidemia has been unstable.   Her LDL goal is 70 mg/dL and last LDL was 103 mg/dL.   Medication(s): Simvastiatin.   The patient is adherent with her medication regimen. She denies medication side effects.       Interval Events: The patient states she had an episode of memory loss last week.  She was outside gardening, and then came in to take a shower.  After the shower, she could not remember what day it was.  She did check the date on her phone and then went to speak with her , telling him she did not know what day it was.  She also could not remember anything that happened in the last couple of days.  This episode lasted 30 minutes.  The patient did not have any other symptoms associated with the memory loss episode, and it has not recurred.  Blood sugar at home has been 100-110, fasting.  She does not check postprandial levels.  She denies episodes of low blood sugar.  Last HgA1C was 5.7.  Last ophthalmology visit was in July 2018, no retinopathy per patient.   She states she checks her feet daily.       Symptoms: Denies chest pain, dyspnea, FLORES, orthopnea, PND, palpitations, syncope, lower extremity edema, intermittent leg claudication, lightheadedness, and dizziness.   Associated symptoms: 6 pound weight gain in 6 months.  No fatigue, headache, polydipsia, polyuria, myalgias, arthralgias, visual impairment, memory loss, concentration problems, or focal neurologic deficits. Denies numbness / tingling of the feet, foot  pain, and a foot ulcer,      Lifestyle and Disease Management: Diet: The patient has not been consuming a diverse and healthy diet. Weight Issues: She has weight concerns. Exercise: She exercises regularly, but less than before. She sees a  1  times per week for 60 minutes per session.  She walks 2-3 x per week.   Smoking: She does not use tobacco.      Hypothyroidism Follow-Up: The patient is being seen for follow-up of Hypothyroidism, which is stable.  Interval Events:  TSH was normal 10/23/18.  Symptoms: 6 pound weight gain since last visit.  Denies cold intolerance, fatigue,weakness, weight loss, trouble concentrating, hair loss, and dry skin.   Associated symptoms: no arthralgias, myalgias, or paresthesias.   Medications:  Levothyroxine (Synthroid).    The patient is adherent to her medication regimen, and she denies medication side effects.          Colonic Polyp Follow-up: The patient is being seen for a routine clinic follow-up of Colon Polyp(s), which is stable.   Interval Events:  Current diagnosis was determined by Colonoscopy and last 1/4/18- no polyps  Symptoms: no abdominal pain, diarrhea, constipation, hematochezia, melena,  decreased stool caliber, or change in bowel habits.  Associated symptoms: no rectal prolapse.   Medication:  None.         Osteoarthritis Follow-Up: The patient is being seen for a routine clinic follow-up of Osteoarthritis, which has been stable.   She has no complications from Osteoarthritis.   The patient's Osteoarthritis has not resulted in physical disability.   Interval Events:  None.  Activities: no limitations.   Symptoms: has finger pain.  No arthralgias, back pain, neck pain, hip pain, shoulder pain, or knee pain.  Associated symptoms:  joint swelling and joint stiffness in finger.   Medications:  Ibuprofen prn.       Current Outpatient Medications on File Prior to Visit   Medication Sig Dispense Refill   • Cetirizine HCl (ZYRTEC ALLERGY) 10 MG capsule  Zyrtec 10 mg tablet     • Cholecalciferol (VITAMIN D3) 5000 units capsule capsule Take 5,000 Units by mouth Daily.     • fluticasone (FLONASE) 50 MCG/ACT nasal spray 1 spray into each nostril As Needed for Rhinitis.     • glucose blood (ONE TOUCH ULTRA TEST) test strip Test once daily 100 each 11   • ibuprofen (ADVIL,MOTRIN) 100 MG/5ML suspension Take  by mouth Every 6 (Six) Hours As Needed for Mild Pain .     • levothyroxine (SYNTHROID, LEVOTHROID) 100 MCG tablet Take 1 tablet by mouth Daily. 90 tablet 1   • lisinopril (PRINIVIL,ZESTRIL) 10 MG tablet Take one tablet at bedtime 90 tablet 3   • Multiple Vitamins-Minerals (CENTRUM SILVER ADULT 50+ PO) Take 1 tablet by mouth Daily.     • simvastatin (ZOCOR) 20 MG tablet TAKE 1 TABLET BY MOUTH  EVERY NIGHT 90 tablet 2   • [DISCONTINUED] Lancets (ONETOUCH ULTRASOFT) lancets USE AS DIRECTED ONCE DAILY 100 each 8   • [DISCONTINUED] metFORMIN (GLUCOPHAGE) 500 MG tablet TAKE 1 TABLET BY MOUTH  DAILY WITH BREAKFAST 90 tablet 2   • [DISCONTINUED] azithromycin (ZITHROMAX Z-JAC) 250 MG tablet Take 2 tablets the first day, then 1 tablet daily for 4 days. 6 tablet 0     No current facility-administered medications on file prior to visit.        Current outpatient and discharge medications have been reconciled for the patient.  Reviewed by: Leola Villa MD        The following portions of the patient's history were reviewed and updated as appropriate: allergies, current medications, past family history, past medical history, past social history, past surgical history and problem list.    Review of Systems   Constitutional: Positive for unexpected weight change. Negative for fatigue.   Eyes: Negative for visual disturbance.   Respiratory: Negative for cough, shortness of breath and wheezing.    Cardiovascular: Negative for chest pain, palpitations and leg swelling.        No FLORES, orthopnea, or claudication.   Gastrointestinal: Negative for abdominal pain, blood in stool,  constipation, diarrhea, nausea and vomiting.        Denies melena.   Endocrine: Negative for polydipsia and polyuria.   Musculoskeletal: Negative for arthralgias and myalgias.   Neurological: Negative for dizziness, syncope, light-headedness and headaches.        No memory issues.   Psychiatric/Behavioral: Negative for decreased concentration.         Objective       Blood pressure 120/72, pulse 72, temperature 97 °F (36.1 °C), temperature source Temporal, resp. rate 20, weight 102 kg (224 lb 2 oz).      Physical Exam   Constitutional:   Obese.   Neck: Normal range of motion. Neck supple. Carotid bruit is not present. No thyromegaly present.   Cardiovascular: Normal rate, regular rhythm, normal heart sounds and intact distal pulses. Exam reveals no gallop and no friction rub.   No murmur heard.  No peripheral edema.   Pulmonary/Chest: Effort normal and breath sounds normal.   Abdominal: Soft. Bowel sounds are normal. She exhibits no distension, no abdominal bruit and no mass. There is no hepatosplenomegaly. There is no tenderness.    Angelina had a diabetic foot exam performed today.   During the foot exam she had a monofilament test performed (See form).  Vascular Status -  Her right foot exhibits normal foot vasculature . Her left foot exhibits normal foot vasculature .  Skin Integrity  -  Her right foot skin is intact (slightly dry).Her left foot skin is intact (slightly dry)..  Psychiatric: She has a normal mood and affect.   Nursing note and vitals reviewed.       Results for orders placed or performed in visit on 04/24/19   POC Glycosylated Hemoglobin (Hb A1C)   Result Value Ref Range    Hemoglobin A1C 5.7 %    Lot Number [id number]     Expiration Date 1-27-21        Assessment / Plan:  Angelina was seen today for diabetes.    Diagnoses and all orders for this visit:    Type 2 diabetes mellitus without complication, without long-term current use of insulin (CMS/[facility])  -     Comprehensive Metabolic Panel  -      CBC & Differential  -     POC Glycosylated Hemoglobin (Hb A1C)  -     CBC Auto Differential  -     Lancets (ONETOUCH ULTRASOFT) lancets; use as directed  -     metFORMIN (GLUCOPHAGE) 500 MG tablet; Take 1 tablet by mouth Daily With Breakfast.    Essential hypertension    Other hyperlipidemia  -     Lipid Panel    Acquired hypothyroidism  -     TSH  -     T4, Free    Benign colonic polyp    Primary osteoarthritis involving multiple joints    Memory loss (Rule out TIA)  -     Vitamin B12  -     MRI Brain With & Without Contrast; Future   Start Aspirin 81 mg daily.   Keep Neurology follow-up appointment on 8/30/19.      Return in about 6 months (around 10/24/2019) for Annual physical, fasting, and schedule subseq Medicare Wellness Exam same day.

## 2019-04-29 ENCOUNTER — TELEPHONE (OUTPATIENT)
Dept: INTERNAL MEDICINE | Facility: CLINIC | Age: 69
End: 2019-04-29

## 2019-05-01 ENCOUNTER — HOSPITAL ENCOUNTER (OUTPATIENT)
Dept: MRI IMAGING | Facility: HOSPITAL | Age: 69
Discharge: HOME | End: 2019-05-01
Admitting: INTERNAL MEDICINE

## 2019-05-01 DIAGNOSIS — R41.3 MEMORY LOSS: ICD-10-CM

## 2019-05-01 PROCEDURE — 70553 MRI BRAIN STEM W/O & W/DYE: CPT

## 2019-05-01 PROCEDURE — A9577 INJ MULTIHANCE: HCPCS | Performed by: INTERNAL MEDICINE

## 2019-05-01 PROCEDURE — 0 GADOBENATE DIMEGLUMINE 529 MG/ML SOLUTION: Performed by: INTERNAL MEDICINE

## 2019-05-01 RX ADMIN — GADOBENATE DIMEGLUMINE 20 ML: 529 INJECTION, SOLUTION INTRAVENOUS at 08:40

## 2019-05-01 NOTE — TELEPHONE ENCOUNTER
Spoke with patient and advised to continue current medication and keep monitoring BP's. If she noticed a trend in the BP's going up to let our office know so we could possibly re-evaluate. She verbalized good understanding, thanked our office and we ended the call.

## 2019-05-01 NOTE — TELEPHONE ENCOUNTER
The patient has dropped off blood pressure readings.  I have reviewed these.  Most of them are in the normal range.  Despite the elevated reading she had over the weekend, I would recommend continuing her current  medication.  She should continue to monitor this however

## 2019-05-03 ENCOUNTER — TELEPHONE (OUTPATIENT)
Dept: INTERNAL MEDICINE | Facility: CLINIC | Age: 69
End: 2019-05-03

## 2019-05-03 NOTE — TELEPHONE ENCOUNTER
----- Message from Amaury Abrams sent at 5/3/2019  9:23 AM EDT -----  Contact: Angelina Pimentel  Pt called wanting to know if she should continue takiing the aspirin since her results from MRI was negative. Please give pt a call back at [phone]

## 2019-05-13 ENCOUNTER — APPOINTMENT (OUTPATIENT)
Dept: MAMMOGRAPHY | Facility: HOSPITAL | Age: 69
End: 2019-05-13

## 2019-06-13 ENCOUNTER — TELEPHONE (OUTPATIENT)
Dept: INTERNAL MEDICINE | Facility: CLINIC | Age: 69
End: 2019-06-13

## 2019-06-13 RX ORDER — LISINOPRIL 10 MG/1
TABLET ORAL
Qty: 90 TABLET | Refills: 3 | Status: SHIPPED | OUTPATIENT
Start: 2019-06-13 | End: 2020-02-25 | Stop reason: DRUGHIGH

## 2019-06-13 NOTE — TELEPHONE ENCOUNTER
Patient no longer uses [facility], Rx sent to [facility] at [facility] per patient request. Verbally understands.

## 2019-06-13 NOTE — TELEPHONE ENCOUNTER
----- Message from Gricelda Calloway sent at 6/13/2019  8:45 AM EDT -----  [phone]  PT NEEDS REFILL ON LISINOPRIL 2XS A DAY , [facility] ONLY HAS IT AT 1 A DAY AND PT TAKES 2   [facility]

## 2019-06-13 NOTE — TELEPHONE ENCOUNTER
Please call and clarify dosing with the patient.  Per our medication list, she is on Lisinopril 10 mg, 1 daily.

## 2019-06-17 ENCOUNTER — TELEPHONE (OUTPATIENT)
Dept: INTERNAL MEDICINE | Facility: CLINIC | Age: 69
End: 2019-06-17

## 2019-06-17 RX ORDER — SIMVASTATIN 20 MG
20 TABLET ORAL NIGHTLY
Qty: 90 TABLET | Refills: 2 | Status: SHIPPED | OUTPATIENT
Start: 2019-06-17 | End: 2020-03-16 | Stop reason: SDUPTHER

## 2019-06-17 NOTE — TELEPHONE ENCOUNTER
----- Message from Mónica Edmond sent at 6/17/2019  9:05 AM EDT -----  Patient states she needs a refill on simvastatin (ZOCOR) 20 MG tablet 90 day supply [facility]. She can be reached at [phone]

## 2019-09-10 ENCOUNTER — HOSPITAL ENCOUNTER (OUTPATIENT)
Dept: MAMMOGRAPHY | Facility: HOSPITAL | Age: 69
Discharge: HOME | End: 2019-09-10
Admitting: SURGERY

## 2019-09-10 DIAGNOSIS — R92.8 ABNORMAL MAMMOGRAM: ICD-10-CM

## 2019-09-10 PROCEDURE — 77066 DX MAMMO INCL CAD BI: CPT | Performed by: RADIOLOGY

## 2019-09-10 PROCEDURE — 77066 DX MAMMO INCL CAD BI: CPT

## 2019-09-10 PROCEDURE — G0279 TOMOSYNTHESIS, MAMMO: HCPCS

## 2019-09-10 PROCEDURE — G0279 TOMOSYNTHESIS, MAMMO: HCPCS | Performed by: RADIOLOGY

## 2019-09-17 ENCOUNTER — TRANSCRIBE ORDERS (OUTPATIENT)
Dept: ADMINISTRATIVE | Facility: HOSPITAL | Age: 69
End: 2019-09-17

## 2019-09-17 DIAGNOSIS — R92.8 ABNORMAL MAMMOGRAPHY: Primary | ICD-10-CM

## 2019-10-07 RX ORDER — LEVOTHYROXINE SODIUM 0.1 MG/1
TABLET ORAL
Qty: 90 TABLET | Refills: 0 | Status: SHIPPED | OUTPATIENT
Start: 2019-10-07 | End: 2020-01-09

## 2019-10-11 ENCOUNTER — FLU SHOT (OUTPATIENT)
Dept: INTERNAL MEDICINE | Facility: CLINIC | Age: 69
End: 2019-10-11

## 2019-10-11 DIAGNOSIS — Z23 NEED FOR IMMUNIZATION AGAINST INFLUENZA: Primary | ICD-10-CM

## 2019-10-11 PROCEDURE — 90653 IIV ADJUVANT VACCINE IM: CPT | Performed by: INTERNAL MEDICINE

## 2019-10-11 PROCEDURE — G0008 ADMIN INFLUENZA VIRUS VAC: HCPCS | Performed by: INTERNAL MEDICINE

## 2019-10-23 ENCOUNTER — OFFICE VISIT (OUTPATIENT)
Dept: INTERNAL MEDICINE | Facility: CLINIC | Age: 69
End: 2019-10-23

## 2019-10-23 VITALS
HEART RATE: 76 BPM | SYSTOLIC BLOOD PRESSURE: 132 MMHG | TEMPERATURE: 98.7 F | WEIGHT: 223 LBS | BODY MASS INDEX: 35 KG/M2 | RESPIRATION RATE: 20 BRPM | HEIGHT: 67 IN | DIASTOLIC BLOOD PRESSURE: 82 MMHG

## 2019-10-23 DIAGNOSIS — Z00.00 MEDICARE ANNUAL WELLNESS VISIT, SUBSEQUENT: Primary | ICD-10-CM

## 2019-10-23 DIAGNOSIS — R82.998 LEUKOCYTES IN URINE: ICD-10-CM

## 2019-10-23 DIAGNOSIS — E78.49 OTHER HYPERLIPIDEMIA: ICD-10-CM

## 2019-10-23 DIAGNOSIS — E03.9 ACQUIRED HYPOTHYROIDISM: ICD-10-CM

## 2019-10-23 DIAGNOSIS — I10 ESSENTIAL HYPERTENSION: ICD-10-CM

## 2019-10-23 DIAGNOSIS — E11.9 TYPE 2 DIABETES MELLITUS WITHOUT COMPLICATION, WITHOUT LONG-TERM CURRENT USE OF INSULIN (HCC): ICD-10-CM

## 2019-10-23 DIAGNOSIS — Z00.00 ENCOUNTER FOR HEALTH MAINTENANCE EXAMINATION IN ADULT: ICD-10-CM

## 2019-10-23 DIAGNOSIS — M15.9 PRIMARY OSTEOARTHRITIS INVOLVING MULTIPLE JOINTS: ICD-10-CM

## 2019-10-23 DIAGNOSIS — K63.5 BENIGN COLONIC POLYP: ICD-10-CM

## 2019-10-23 LAB
A/C: NORMAL
ALBUMIN SERPL-MCNC: 4.4 G/DL (ref 3.5–5.2)
ALBUMIN/GLOB SERPL: 1.4 G/DL
ALP SERPL-CCNC: 124 U/L (ref 39–117)
ALT SERPL W P-5'-P-CCNC: 16 U/L (ref 1–33)
ANION GAP SERPL CALCULATED.3IONS-SCNC: 13.1 MMOL/L (ref 5–15)
AST SERPL-CCNC: 17 U/L (ref 1–32)
BASOPHILS # BLD AUTO: 0.04 10*3/MM3 (ref 0–0.2)
BASOPHILS NFR BLD AUTO: 0.6 % (ref 0–1.5)
BILIRUB SERPL-MCNC: 0.3 MG/DL (ref 0.2–1.2)
BUN BLD-MCNC: 14 MG/DL (ref 8–23)
BUN/CREAT SERPL: 17.5 (ref 7–25)
CALCIUM SPEC-SCNC: 9.3 MG/DL (ref 8.6–10.5)
CHLORIDE SERPL-SCNC: 102 MMOL/L (ref 98–107)
CHOLEST SERPL-MCNC: 135 MG/DL (ref 0–200)
CLARITY, POC: ABNORMAL
CO2 SERPL-SCNC: 26.9 MMOL/L (ref 22–29)
COLOR UR: YELLOW
CREAT BLD-MCNC: 0.8 MG/DL (ref 0.57–1)
DEPRECATED RDW RBC AUTO: 42.1 FL (ref 37–54)
EOSINOPHIL # BLD AUTO: 0.14 10*3/MM3 (ref 0–0.4)
EOSINOPHIL NFR BLD AUTO: 2.2 % (ref 0.3–6.2)
ERYTHROCYTE [DISTWIDTH] IN BLOOD BY AUTOMATED COUNT: 12.4 % (ref 12.3–15.4)
EXPIRATION DATE: ABNORMAL
EXPIRATION DATE: NORMAL
EXPIRATION DATE: NORMAL
GFR SERPL CREATININE-BSD FRML MDRD: 71 ML/MIN/1.73
GLOBULIN UR ELPH-MCNC: 3.2 GM/DL
GLUCOSE BLD-MCNC: 100 MG/DL (ref 65–99)
GLUCOSE UR STRIP-MCNC: NEGATIVE MG/DL
HBA1C MFR BLD: 5.9 %
HCT VFR BLD AUTO: 42.3 % (ref 34–46.6)
HDLC SERPL-MCNC: 41 MG/DL (ref 40–60)
HGB BLD-MCNC: 14.7 G/DL (ref 12–15.9)
IMM GRANULOCYTES # BLD AUTO: 0.03 10*3/MM3 (ref 0–0.05)
IMM GRANULOCYTES NFR BLD AUTO: 0.5 % (ref 0–0.5)
KETONES UR QL: NEGATIVE
LDLC SERPL CALC-MCNC: 75 MG/DL (ref 0–100)
LDLC/HDLC SERPL: 1.82 {RATIO}
LEUKOCYTE EST, POC: ABNORMAL
LYMPHOCYTES # BLD AUTO: 1.64 10*3/MM3 (ref 0.7–3.1)
LYMPHOCYTES NFR BLD AUTO: 25.4 % (ref 19.6–45.3)
Lab: ABNORMAL
Lab: NORMAL
Lab: NORMAL
MCH RBC QN AUTO: 32.3 PG (ref 26.6–33)
MCHC RBC AUTO-ENTMCNC: 34.8 G/DL (ref 31.5–35.7)
MCV RBC AUTO: 93 FL (ref 79–97)
MONOCYTES # BLD AUTO: 0.44 10*3/MM3 (ref 0.1–0.9)
MONOCYTES NFR BLD AUTO: 6.8 % (ref 5–12)
NEUTROPHILS # BLD AUTO: 4.17 10*3/MM3 (ref 1.7–7)
NEUTROPHILS NFR BLD AUTO: 64.5 % (ref 42.7–76)
NITRITE UR-MCNC: NEGATIVE MG/ML
NRBC BLD AUTO-RTO: 0 /100 WBC (ref 0–0.2)
PH UR: 7 [PH] (ref 5–8)
PLATELET # BLD AUTO: 267 10*3/MM3 (ref 140–450)
PMV BLD AUTO: 10.1 FL (ref 6–12)
POC CREATININE URINE: 100
POC MICROALBUMIN URINE: 80
POTASSIUM BLD-SCNC: 4.7 MMOL/L (ref 3.5–5.2)
PROT SERPL-MCNC: 7.6 G/DL (ref 6–8.5)
PROT UR STRIP-MCNC: NEGATIVE MG/DL
PROT/CREAT UR: 100 MG/G CREA
RBC # BLD AUTO: 4.55 10*6/MM3 (ref 3.77–5.28)
RBC # UR STRIP: ABNORMAL /UL
SODIUM BLD-SCNC: 142 MMOL/L (ref 136–145)
SP GR UR: 1.01 (ref 1–1.03)
T4 FREE SERPL-MCNC: 1.28 NG/DL (ref 0.93–1.7)
TRIGL SERPL-MCNC: 97 MG/DL (ref 0–150)
TSH SERPL DL<=0.05 MIU/L-ACNC: 2.77 UIU/ML (ref 0.27–4.2)
VLDLC SERPL-MCNC: 19.4 MG/DL (ref 5–40)
WBC NRBC COR # BLD: 6.46 10*3/MM3 (ref 3.4–10.8)

## 2019-10-23 PROCEDURE — 83036 HEMOGLOBIN GLYCOSYLATED A1C: CPT | Performed by: INTERNAL MEDICINE

## 2019-10-23 PROCEDURE — 84443 ASSAY THYROID STIM HORMONE: CPT | Performed by: INTERNAL MEDICINE

## 2019-10-23 PROCEDURE — 81003 URINALYSIS AUTO W/O SCOPE: CPT | Performed by: INTERNAL MEDICINE

## 2019-10-23 PROCEDURE — 80053 COMPREHEN METABOLIC PANEL: CPT | Performed by: INTERNAL MEDICINE

## 2019-10-23 PROCEDURE — 84439 ASSAY OF FREE THYROXINE: CPT | Performed by: INTERNAL MEDICINE

## 2019-10-23 PROCEDURE — 99397 PER PM REEVAL EST PAT 65+ YR: CPT | Performed by: INTERNAL MEDICINE

## 2019-10-23 PROCEDURE — 85025 COMPLETE CBC W/AUTO DIFF WBC: CPT | Performed by: INTERNAL MEDICINE

## 2019-10-23 PROCEDURE — G0439 PPPS, SUBSEQ VISIT: HCPCS | Performed by: INTERNAL MEDICINE

## 2019-10-23 PROCEDURE — 36415 COLL VENOUS BLD VENIPUNCTURE: CPT | Performed by: INTERNAL MEDICINE

## 2019-10-23 PROCEDURE — 87086 URINE CULTURE/COLONY COUNT: CPT | Performed by: INTERNAL MEDICINE

## 2019-10-23 PROCEDURE — 80061 LIPID PANEL: CPT | Performed by: INTERNAL MEDICINE

## 2019-10-23 PROCEDURE — 82044 UR ALBUMIN SEMIQUANTITATIVE: CPT | Performed by: INTERNAL MEDICINE

## 2019-10-23 NOTE — PATIENT INSTRUCTIONS
Health Maintenance for Postmenopausal Women  Menopause is a normal process in which your reproductive ability comes to an end. This process happens gradually over a span of months to years, usually between the ages of 48 and 55. Menopause is complete when you have missed 12 consecutive menstrual periods.  It is important to talk with your health care provider about some of the most common conditions that affect postmenopausal women, such as heart disease, cancer, and bone loss (osteoporosis). Adopting a healthy lifestyle and getting preventive care can help to promote your health and wellness. Those actions can also lower your chances of developing some of these common conditions.  What should I know about menopause?  During menopause, you may experience a number of symptoms, such as:  · Moderate-to-severe hot flashes.  · Night sweats.  · Decrease in sex drive.  · Mood swings.  · Headaches.  · Tiredness.  · Irritability.  · Memory problems.  · Insomnia.  Choosing to treat or not to treat menopausal changes is an individual decision that you make with your health care provider.  What should I know about hormone replacement therapy and supplements?  Hormone therapy products are effective for treating symptoms that are associated with menopause, such as hot flashes and night sweats. Hormone replacement carries certain risks, especially as you become older. If you are thinking about using estrogen or estrogen with progestin treatments, discuss the benefits and risks with your health care provider.  What should I know about heart disease and stroke?  Heart disease, heart attack, and stroke become more likely as you age. This may be due, in part, to the hormonal changes that your body experiences during menopause. These can affect how your body processes dietary fats, triglycerides, and cholesterol. Heart attack and stroke are both medical emergencies.  There are many things that you can do to help prevent heart disease  and stroke:  · Have your blood pressure checked at least every 1-2 years. High blood pressure causes heart disease and increases the risk of stroke.  · If you are 55-79 years old, ask your health care provider if you should take aspirin to prevent a heart attack or a stroke.  · Do not use any tobacco products, including cigarettes, chewing tobacco, or electronic cigarettes. If you need help quitting, ask your health care provider.  · It is important to eat a healthy diet and maintain a healthy weight.  ? Be sure to include plenty of vegetables, fruits, low-fat dairy products, and lean protein.  ? Avoid eating foods that are high in solid fats, added sugars, or salt (sodium).  · Get regular exercise. This is one of the most important things that you can do for your health.  ? Try to exercise for at least 150 minutes each week. The type of exercise that you do should increase your heart rate and make you sweat. This is known as moderate-intensity exercise.  ? Try to do strengthening exercises at least twice each week. Do these in addition to the moderate-intensity exercise.  · Know your numbers. Ask your health care provider to check your cholesterol and your blood glucose. Continue to have your blood tested as directed by your health care provider.    What should I know about cancer screening?  There are several types of cancer. Take the following steps to reduce your risk and to catch any cancer development as early as possible.  Breast Cancer  · Practice breast self-awareness.  ? This means understanding how your breasts normally appear and feel.  ? It also means doing regular breast self-exams. Let your health care provider know about any changes, no matter how small.  · If you are 40 or older, have a clinician do a breast exam (clinical breast exam or CBE) every year. Depending on your age, family history, and medical history, it may be recommended that you also have a yearly breast X-ray (mammogram).  · If you  have a family history of breast cancer, talk with your health care provider about genetic screening.  · If you are at high risk for breast cancer, talk with your health care provider about having an MRI and a mammogram every year.  · Breast cancer (BRCA) gene test is recommended for women who have family members with BRCA-related cancers. Results of the assessment will determine the need for genetic counseling and BRCA1 and for BRCA2 testing. BRCA-related cancers include these types:  ? Breast. This occurs in males or females.  ? Ovarian.  ? Tubal. This may also be called fallopian tube cancer.  ? Cancer of the abdominal or pelvic lining (peritoneal cancer).  ? Prostate.  ? Pancreatic.  Cervical, Uterine, and Ovarian Cancer  Your health care provider may recommend that you be screened regularly for cancer of the pelvic organs. These include your ovaries, uterus, and vagina. This screening involves a pelvic exam, which includes checking for microscopic changes to the surface of your cervix (Pap test).  · For women ages 21-65, health care providers may recommend a pelvic exam and a Pap test every three years. For women ages 30-65, they may recommend the Pap test and pelvic exam, combined with testing for human papilloma virus (HPV), every five years. Some types of HPV increase your risk of cervical cancer. Testing for HPV may also be done on women of any age who have unclear Pap test results.  · Other health care providers may not recommend any screening for nonpregnant women who are considered low risk for pelvic cancer and have no symptoms. Ask your health care provider if a screening pelvic exam is right for you.  · If you have had past treatment for cervical cancer or a condition that could lead to cancer, you need Pap tests and screening for cancer for at least 20 years after your treatment. If Pap tests have been discontinued for you, your risk factors (such as having a new sexual partner) need to be reassessed  to determine if you should start having screenings again. Some women have medical problems that increase the chance of getting cervical cancer. In these cases, your health care provider may recommend that you have screening and Pap tests more often.  · If you have a family history of uterine cancer or ovarian cancer, talk with your health care provider about genetic screening.  · If you have vaginal bleeding after reaching menopause, tell your health care provider.  · There are currently no reliable tests available to screen for ovarian cancer.  Lung Cancer  Lung cancer screening is recommended for adults 55-80 years old who are at high risk for lung cancer because of a history of smoking. A yearly low-dose CT scan of the lungs is recommended if you:  · Currently smoke.  · Have a history of at least 30 pack-years of smoking and you currently smoke or have quit within the past 15 years. A pack-year is smoking an average of one pack of cigarettes per day for one year.  Yearly screening should:  · Continue until it has been 15 years since you quit.  · Stop if you develop a health problem that would prevent you from having lung cancer treatment.  Colorectal Cancer  · This type of cancer can be detected and can often be prevented.  · Routine colorectal cancer screening usually begins at age 50 and continues through age 75.  · If you have risk factors for colon cancer, your health care provider may recommend that you be screened at an earlier age.  · If you have a family history of colorectal cancer, talk with your health care provider about genetic screening.  · Your health care provider may also recommend using home test kits to check for hidden blood in your stool.  · A small camera at the end of a tube can be used to examine your colon directly (sigmoidoscopy or colonoscopy). This is done to check for the earliest forms of colorectal cancer.  · Direct examination of the colon should be repeated every 5-10 years until  age 75. However, if early forms of precancerous polyps or small growths are found or if you have a family history or genetic risk for colorectal cancer, you may need to be screened more often.  Skin Cancer  · Check your skin from head to toe regularly.  · Monitor any moles. Be sure to tell your health care provider:  ? About any new moles or changes in moles, especially if there is a change in a mole's shape or color.  ? If you have a mole that is larger than the size of a pencil eraser.  · If any of your family members has a history of skin cancer, especially at a young age, talk with your health care provider about genetic screening.  · Always use sunscreen. Apply sunscreen liberally and repeatedly throughout the day.  · Whenever you are outside, protect yourself by wearing long sleeves, pants, a wide-brimmed hat, and sunglasses.  What should I know about osteoporosis?  Osteoporosis is a condition in which bone destruction happens more quickly than new bone creation. After menopause, you may be at an increased risk for osteoporosis. To help prevent osteoporosis or the bone fractures that can happen because of osteoporosis, the following is recommended:  · If you are 19-50 years old, get at least 1,000 mg of calcium and at least 600 mg of vitamin D per day.  · If you are older than age 50 but younger than age 70, get at least 1,200 mg of calcium and at least 600 mg of vitamin D per day.  · If you are older than age 70, get at least 1,200 mg of calcium and at least 800 mg of vitamin D per day.  Smoking and excessive alcohol intake increase the risk of osteoporosis. Eat foods that are rich in calcium and vitamin D, and do weight-bearing exercises several times each week as directed by your health care provider.  What should I know about how menopause affects my mental health?  Depression may occur at any age, but it is more common as you become older. Common symptoms of depression include:  · Low or sad  mood.  · Changes in sleep patterns.  · Changes in appetite or eating patterns.  · Feeling an overall lack of motivation or enjoyment of activities that you previously enjoyed.  · Frequent crying spells.  Talk with your health care provider if you think that you are experiencing depression.  What should I know about immunizations?  It is important that you get and maintain your immunizations. These include:  · Tetanus, diphtheria, and pertussis (Tdap) booster vaccine.  · Influenza every year before the flu season begins.  · Pneumonia vaccine.  · Shingles vaccine.  Your health care provider may also recommend other immunizations.  This information is not intended to replace advice given to you by your health care provider. Make sure you discuss any questions you have with your health care provider.  Document Released: 02/09/2007 Document Revised: 07/07/2017 Document Reviewed: 09/20/2016  [employer] Patient Education © 2019 [employer]      Heart-Healthy Eating Plan  Many factors influence your heart (coronary) health, including eating and exercise habits. Coronary risk increases with abnormal blood fat (lipid) levels. Heart-healthy meal planning includes limiting unhealthy fats, increasing healthy fats, and making other diet and lifestyle changes.  What is my plan?  Your health care provider may recommend that you:  · Limit your fat intake to _________% or less of your total calories each day.  · Limit your saturated fat intake to _________% or less of your total calories each day.  · Limit the amount of cholesterol in your diet to less than _________ mg per day.  What are tips for following this plan?  Cooking  Cook foods using methods other than frying. Baking, boiling, grilling, and broiling are all good options. Other ways to reduce fat include:  · Removing the skin from poultry.  · Removing all visible fats from meats.  · Steaming vegetables in water or broth.  Meal planning    · At meals, imagine  dividing your plate into fourths:  ? Fill one-half of your plate with vegetables and green salads.  ? Fill one-fourth of your plate with whole grains.  ? Fill one-fourth of your plate with lean protein foods.  · Eat 4-5 servings of vegetables per day. One serving equals 1 cup raw or cooked vegetable, or 2 cups raw leafy greens.  · Eat 4-5 servings of fruit per day. One serving equals 1 medium whole fruit, ¼ cup dried fruit, ½ cup fresh, frozen, or canned fruit, or ½ cup 100% fruit juice.  · Eat more foods that contain soluble fiber. Examples include apples, broccoli, carrots, beans, peas, and barley. Aim to get 25-30 g of fiber per day.  · Increase your consumption of legumes, nuts, and seeds to 4-5 servings per week. One serving of dried beans or legumes equals ½ cup cooked, 1 serving of nuts is ¼ cup, and 1 serving of seeds equals 1 tablespoon.  Fats  · Choose healthy fats more often. Choose monounsaturated and polyunsaturated fats, such as olive and canola oils, flaxseeds, walnuts, almonds, and seeds.  · Eat more omega-3 fats. Choose salmon, mackerel, sardines, tuna, flaxseed oil, and ground flaxseeds. Aim to eat fish at least 2 times each week.  · Check food labels carefully to identify foods with trans fats or high amounts of saturated fat.  · Limit saturated fats. These are found in animal products, such as meats, butter, and cream. Plant sources of saturated fats include palm oil, palm kernel oil, and coconut oil.  · Avoid foods with partially hydrogenated oils in them. These contain trans fats. Examples are stick margarine, some tub margarines, cookies, crackers, and other baked goods.  · Avoid fried foods.  General information  · Eat more home-cooked food and less restaurant, buffet, and fast food.  · Limit or avoid alcohol.  · Limit foods that are high in starch and sugar.  · Lose weight if you are overweight. Losing just 5-10% of your body weight can help your overall health and prevent diseases such as  diabetes and heart disease.  · Monitor your salt (sodium) intake, especially if you have high blood pressure. Talk with your health care provider about your sodium intake.  · Try to incorporate more vegetarian meals weekly.  What foods can I eat?  Fruits  All fresh, canned (in natural juice), or frozen fruits.  Vegetables  Fresh or frozen vegetables (raw, steamed, roasted, or grilled). Green salads.  Grains  Most grains. Choose whole wheat and whole grains most of the time. Rice and pasta, including brown rice and pastas made with whole wheat.  Meats and other proteins  Lean, well-trimmed beef, veal, pork, and lamb. Chicken and turkey without skin. All fish and shellfish. Wild duck, rabbit, pheasant, and venison. Egg whites or low-cholesterol egg substitutes. Dried beans, peas, lentils, and tofu. Seeds and most nuts.  Dairy  Low-fat or nonfat cheeses, including ricotta and mozzarella. Skim or 1% milk (liquid, powdered, or evaporated). Buttermilk made with low-fat milk. Nonfat or low-fat yogurt.  Fats and oils  Non-hydrogenated (trans-free) margarines. Vegetable oils, including soybean, sesame, sunflower, olive, peanut, safflower, corn, canola, and cottonseed. Salad dressings or mayonnaise made with a vegetable oil.  Beverages  Water (mineral or sparkling). Coffee and tea. Diet carbonated beverages.  Sweets and desserts  Sherbet, gelatin, and fruit ice. Small amounts of dark chocolate.  Limit all sweets and desserts.  Seasonings and condiments  All seasonings and condiments.  The items listed above may not be a complete list of foods and beverages you can eat. Contact a dietitian for more options.  What foods are not recommended?  Fruits  Canned fruit in heavy syrup. Fruit in cream or butter sauce. Fried fruit. Limit coconut.  Vegetables  Vegetables cooked in cheese, cream, or butter sauce. Fried vegetables.  Grains  Breads made with saturated or trans fats, oils, or whole milk. Croissants. Sweet rolls. Donuts.  High-fat crackers, such as cheese crackers.  Meats and other proteins  Fatty meats, such as hot dogs, ribs, sausage, parisi, rib-eye roast or steak. High-fat deli meats, such as salami and bologna. Caviar. Domestic duck and goose. Organ meats, such as liver.  Dairy  Cream, sour cream, cream cheese, and creamed cottage cheese. Whole milk cheeses. Whole or 2% milk (liquid, evaporated, or condensed). Whole buttermilk. Cream sauce or high-fat cheese sauce. Whole-milk yogurt.  Fats and oils  Meat fat, or shortening. Cocoa butter, hydrogenated oils, palm oil, coconut oil, palm kernel oil. Solid fats and shortenings, including parisi fat, salt pork, lard, and butter. Nondairy cream substitutes. Salad dressings with cheese or sour cream.  Beverages  Regular sodas and any drinks with added sugar.  Sweets and desserts  Frosting. Pudding. Cookies. Cakes. Pies. Milk chocolate or white chocolate. Buttered syrups. Full-fat ice cream or ice cream drinks.  The items listed above may not be a complete list of foods and beverages to avoid. Contact a dietitian for more information.  Summary  · Heart-healthy meal planning includes limiting unhealthy fats, increasing healthy fats, and making other diet and lifestyle changes.  · Lose weight if you are overweight. Losing just 5-10% of your body weight can help your overall health and prevent diseases such as diabetes and heart disease.  · Focus on eating a balance of foods, including fruits and vegetables, low-fat or nonfat dairy, lean protein, nuts and legumes, whole grains, and heart-healthy oils and fats.  This information is not intended to replace advice given to you by your health care provider. Make sure you discuss any questions you have with your health care provider.  Document Released: 09/26/2009 Document Revised: 01/25/2019 Document Reviewed: 01/25/2019  [employer] Interactive Patient Education © 2019 [employer]      Exercising to Lose Weight  Exercise is structured, repetitive  physical activity to improve fitness and health. Getting regular exercise is important for everyone. It is especially important if you are overweight. Being overweight increases your risk of heart disease, stroke, diabetes, high blood pressure, and several types of cancer. Reducing your calorie intake and exercising can help you lose weight.  Exercise is usually categorized as moderate or vigorous intensity. To lose weight, most people need to do a certain amount of moderate-intensity or vigorous-intensity exercise each week.  Moderate-intensity exercise    Moderate-intensity exercise is any activity that gets you moving enough to burn at least three times more energy (calories) than if you were sitting.  Examples of moderate exercise include:  · Walking a mile in 15 minutes.  · Doing light yard work.  · Biking at an easy pace.  Most people should get at least 150 minutes (2 hours and 30 minutes) a week of moderate-intensity exercise to maintain their body weight.  Vigorous-intensity exercise  Vigorous-intensity exercise is any activity that gets you moving enough to burn at least six times more calories than if you were sitting. When you exercise at this intensity, you should be working hard enough that you are not able to carry on a conversation.  Examples of vigorous exercise include:  · Running.  · Playing a team sport, such as football, basketball, and soccer.  · Jumping rope.  Most people should get at least 75 minutes (1 hour and 15 minutes) a week of vigorous-intensity exercise to maintain their body weight.  How can exercise affect me?  When you exercise enough to burn more calories than you eat, you lose weight. Exercise also reduces body fat and builds muscle. The more muscle you have, the more calories you burn. Exercise also:  · Improves mood.  · Reduces stress and tension.  · Improves your overall fitness, flexibility, and endurance.  · Increases bone strength.  The amount of exercise you need to lose  weight depends on:  · Your age.  · The type of exercise.  · Any health conditions you have.  · Your overall physical ability.  Talk to your health care provider about how much exercise you need and what types of activities are safe for you.  What actions can I take to lose weight?  Nutrition    · Make changes to your diet as told by your health care provider or diet and nutrition specialist (dietitian). This may include:  ? Eating fewer calories.  ? Eating more protein.  ? Eating less unhealthy fats.  ? Eating a diet that includes fresh fruits and vegetables, whole grains, low-fat dairy products, and lean protein.  ? Avoiding foods with added fat, salt, and sugar.  · Drink plenty of water while you exercise to prevent dehydration or heat stroke.  Activity  · Choose an activity that you enjoy and set realistic goals. Your health care provider can help you make an exercise plan that works for you.  · Exercise at a moderate or vigorous intensity most days of the week.  ? The intensity of exercise may vary from person to person. You can tell how intense a workout is for you by paying attention to your breathing and heartbeat. Most people will notice their breathing and heartbeat get faster with more intense exercise.  · Do resistance training twice each week, such as:  ? Push-ups.  ? Sit-ups.  ? Lifting weights.  ? Using resistance bands.  · Getting short amounts of exercise can be just as helpful as long structured periods of exercise. If you have trouble finding time to exercise, try to include exercise in your daily routine.  ? Get up, stretch, and walk around every 30 minutes throughout the day.  ? Go for a walk during your lunch break.  ? Park your car farther away from your destination.  ? If you take public transportation, get off one stop early and walk the rest of the way.  ? Make phone calls while standing up and walking around.  ? Take the stairs instead of elevators or escalators.  · Wear comfortable clothes  and shoes with good support.  · Do not exercise so much that you hurt yourself, feel dizzy, or get very short of breath.  Where to find more information  · [place] Department of Health and Human Services: www.hhs.gov  · Centers for Disease Control and Prevention (CDC): www.cdc.gov  Contact a health care provider:  · Before starting a new exercise program.  · If you have questions or concerns about your weight.  · If you have a medical problem that keeps you from exercising.  Get help right away if you have any of the following while exercising:  · Injury.  · Dizziness.  · Difficulty breathing or shortness of breath that does not go away when you stop exercising.  · Chest pain.  · Rapid heartbeat.  Summary  · Being overweight increases your risk of heart disease, stroke, diabetes, high blood pressure, and several types of cancer.  · Losing weight happens when you burn more calories than you eat.  · Reducing the amount of calories you eat in addition to getting regular moderate or vigorous exercise each week helps you lose weight.  This information is not intended to replace advice given to you by your health care provider. Make sure you discuss any questions you have with your health care provider.  Document Released: 01/20/2012 Document Revised: 12/31/2018 Document Reviewed: 12/31/2018  [employer] Interactive Patient Education © 2019 [employer]

## 2019-10-23 NOTE — PROGRESS NOTES
Subjective     Chief Complaint:  Physical Exam.    History of Present Illness    History obtained from the patient.    Primary Care Cardiac Diagnostic Constellation: The patient is here today for a 6 month follow-up visit.       Her Diabetes Mellitus type 2 has been stable.   Medication(s): Metformin HCl, Simvastatin, and Lisinopril.   Her Hypertension has been stable.   Medication(s): Lisinopril.   Her Hyperlipidemia has been stable.   Her LDL goal is < 70 mg/dL and last LDL was 75 mg/dL.   Medication(s): Simvastiatin.   The patient is adherent with her medication regimen. She denies medication side effects.       Interval Events: The patient had a 30 minute episode of memory loss in April 2019.  Labs and head MRI were normal.  She has not had a recurrence.  Last Hemoglobin A1c on 4/24/2019 was 5.7.   Blood sugar at home has been 100-110, fasting.  She does not check postprandial levels.  She denies episodes of low blood sugar.   Last Ophthalmology visit was in July 2019, no retinopathy per patient (Dr. Red).   She states she checks her feet daily.       Symptoms: Denies chest pain, dyspnea, FLORES, orthopnea, PND, palpitations, syncope, lower extremity edema, intermittent leg claudication, lightheadedness, and dizziness.   Associated Symptoms: No significant weight change since last visit.  No fatigue, headache, polydipsia, polyuria, myalgias, arthralgias, visual impairment, memory loss, concentration problems, or focal neurologic deficits. Denies numbness / tingling of the feet, foot pain, and a foot ulcer,      Lifestyle and Disease Management: Diet: The patient has not been consuming a diverse and healthy diet. Weight Issues: She has weight concerns. Exercise: She exercises regularly, but less than before. She sees a  1  times per week for 60 minutes per session.  She walks 2-3 x per week.   Smoking: She does not use tobacco.      Hypothyroidism Follow-Up: The patient is being seen for  follow-up of Hypothyroidism, which is stable.  Interval Events:  TSH and T4 were normal 4/24/19.  Symptoms:  No significant weight change since last visit.. Denies cold intolerance, fatigue,weakness, memory loss,  trouble concentrating, hair loss, and dry skin.   Associated Symptoms: no arthralgias, myalgias, or paresthesias.   Medications:  Levothyroxine (Synthroid).    The patient is adherent to her medication regimen, and she denies medication side effects.      Colonic Polyp Follow-up: The patient is being seen for a routine clinic follow-up of Colon Polyp(s), which is stable.   Interval Events:  Current diagnosis was determined by Colonoscopy and last 1/4/18- no polyps  Symptoms: no abdominal pain, diarrhea, constipation, hematochezia, melena, decreased stool caliber, or change in bowel habits.  Associated symptoms: no rectal prolapse.   Medication:  None.         Osteoarthritis Follow-Up: The patient is being seen for a routine clinic follow-up of Osteoarthritis, which has been stable.   She has no complications from Osteoarthritis.   The patient's Osteoarthritis has not resulted in physical disability.   Interval Events:  None.  Activities: no limitations.   Symptoms: has finger pain.  No arthralgias, back pain, neck pain, hip pain, shoulder pain, or knee pain.  Associated Symptoms:  joint swelling and joint stiffness in fingers.   Medications:  Ibuprofen prn.               Current Outpatient Medications on File Prior to Visit   Medication Sig Dispense Refill   • Cetirizine HCl (ZYRTEC ALLERGY) 10 MG capsule Zyrtec 10 mg tablet       • Cholecalciferol (VITAMIN D3) 5000 units capsule capsule Take 5,000 Units by mouth Daily.       • fluticasone (FLONASE) 50 MCG/ACT nasal spray 1 spray into each nostril As Needed for Rhinitis.       • glucose blood (ONE TOUCH ULTRA TEST) test strip Test once daily 100 each 11   • ibuprofen (ADVIL,MOTRIN) 100 MG/5ML suspension Take  by mouth Every 6 (Six) Hours As Needed for Mild  Pain .       • levothyroxine (SYNTHROID, LEVOTHROID) 100 MCG tablet Take 1 tablet by mouth Daily. 90 tablet 1   • lisinopril (PRINIVIL,ZESTRIL) 10 MG tablet Take one tablet at bedtime 90 tablet 3   • Multiple Vitamins-Minerals (CENTRUM SILVER ADULT 50+ PO) Take 1 tablet by mouth Daily.       • simvastatin (ZOCOR) 20 MG tablet TAKE 1 TABLET BY MOUTH  EVERY NIGHT 90 tablet 2   • [DISCONTINUED] Lancets (ONETOUCH ULTRASOFT) lancets USE AS DIRECTED ONCE DAILY 100 each 8   • [DISCONTINUED] metFORMIN (GLUCOPHAGE) 500 MG tablet TAKE 1 TABLET BY MOUTH  DAILY WITH BREAKFAST 90 tablet 2   • [DISCONTINUED] azithromycin (ZITHROMAX Z-JAC) 250 MG tablet Take 2 tablets the first day, then 1 tablet daily for 4 days. 6 tablet 0      No current facility-administered medications on file prior to visit.          Current outpatient and discharge medications have been reconciled for the patient.  Reviewed by: Leola Villa MD           The following portions of the patient's history were reviewed and updated as appropriate: allergies, current medications, past family history, past medical history, past social history, past surgical history and problem list.     Review of Systems   Constitutional: Positive for unexpected weight change. Negative for fatigue.   Eyes: Negative for visual disturbance.   Respiratory: Negative for cough, shortness of breath and wheezing.    Cardiovascular: Negative for chest pain, palpitations and leg swelling.        No FLORES, orthopnea, or claudication.   Gastrointestinal: Negative for abdominal pain, blood in stool, constipation, diarrhea, nausea and vomiting.        Denies melena.   Endocrine: Negative for polydipsia and polyuria.   Musculoskeletal: Negative for arthralgias and myalgias.   Neurological: Negative for dizziness, syncope, light-headedness and headaches.        No memory issues.   Psychiatric/Behavioral: Negative for decreased concentration.         Angelina Pimentel is a 69 y.o. female who  presents for an Annual Physical.      PMH, PSH, SocHx, FamHx, Allergies, and Medications: Reviewed and updated.    Outpatient Medications Prior to Visit   Medication Sig Dispense Refill   • Cetirizine HCl (ZYRTEC ALLERGY) 10 MG capsule Zyrtec 10 mg tablet     • fluticasone (FLONASE) 50 MCG/ACT nasal spray 1 spray into each nostril As Needed for Rhinitis.     • glucose blood (ONE TOUCH ULTRA TEST) test strip Test once daily 100 each 11   • ibuprofen (ADVIL,MOTRIN) 100 MG/5ML suspension Take  by mouth Every 6 (Six) Hours As Needed for Mild Pain .     • Lancets (ONETOUCH ULTRASOFT) lancets use as directed 100 each 3   • levothyroxine (SYNTHROID, LEVOTHROID) 100 MCG tablet TAKE ONE TABLET BY MOUTH DAILY 90 tablet 0   • lisinopril (PRINIVIL,ZESTRIL) 10 MG tablet Take one tablet at bedtime 90 tablet 3   • metFORMIN (GLUCOPHAGE) 500 MG tablet Take 1 tablet by mouth Daily With Breakfast. 90 tablet 3   • Multiple Vitamins-Minerals (CENTRUM SILVER ADULT 50+ PO) Take 1 tablet by mouth Daily.     • simvastatin (ZOCOR) 20 MG tablet Take 1 tablet by mouth Every Night. 90 tablet 2   • Cholecalciferol (VITAMIN D3) 5000 units capsule capsule Take 5,000 Units by mouth Daily.       No facility-administered medications prior to visit.        Immunization History   Administered Date(s) Administered   • FLUAD TRI 65YR+ 10/11/2019   • Flu Vaccine High Dose PF 65YR+ 10/14/2015, 10/10/2016, 10/11/2017, 08/30/2018   • Hepatitis A 06/01/1998, 01/01/1999   • Pneumococcal Conjugate 13-Valent (PCV13) 11/24/2014   • Pneumococcal Polysaccharide (PPSV23) 09/20/2010, 03/25/2016   • Shingrix 05/30/2018, 08/30/2018   • Td 04/05/2017   • Tdap 02/25/2009   • Zostavax 05/21/2010         Patient Active Problem List   Diagnosis   • Diabetes mellitus (CMS/HCC)   • Benign colonic polyp   • Hyperlipidemia   • Hypothyroidism   • Osteoarthritis   • Hypertension   • Allergic rhinitis   • Basal cell carcinoma of skin   • Essential hypertriglyceridemia   • Mitral  valve prolapse syndrome   • Squamous cell carcinoma of skin   • Obstructive sleep apnea syndrome   • Menopause   • Hypertensive heart disease without heart failure   • Abnormal ECG       Health Habits:  Dental Exam. up to date  Eye Exam. up to date  Hearing Loss:  No  Exercise: 2-3 times/week.  Current exercise activities include: walking and   Diet: Healthy  Multivitamin: Yes    Safe Driving:  Yes  Seat Belt:  Yes  Bike Helmet:  N/A  Skin Screening:  Yes  Sunscreen: Yes  SBE / VIGNESH: Yes  Sexual Activity:  No  Birth Control:  Menopause  STD Prevention:  N/A    Last Pap: 2008, normal per patient report (MONICA/BSO in 1996)  Last Mammogram: 9/10/2019, category 3.  Last DEXA Scan: 10/17/2017, normal.  Last Colonoscopy: 1/4/2018, normal except diverticulosis.  Last PSA: N/A    Social:    Social History     Socioeconomic History   • Marital status:      Spouse name: Not on file   • Number of children: 2   • Years of education: Not on file   • Highest education level: Not on file   Occupational History   • Occupation: Retired   Tobacco Use   • Smoking status: Never Smoker   • Smokeless tobacco: Never Used   Substance and Sexual Activity   • Alcohol use: No     Comment: Never drank alcohol   • Drug use: No   • Sexual activity: Yes     Partners: Male     Birth control/protection: Post-menopausal         Current Medical Providers:    Leola Villa MD (Internal Medicine / Pediatrics)    The [facility] providers who are involved in the care of this patient are listed above.         Review of Systems   Constitutional: Negative for chills, fatigue, fever and unexpected weight change.        No night sweats.    HENT: Negative for congestion, ear pain, hearing loss, nosebleeds, postnasal drip, rhinorrhea, sinus pressure, sinus pain, sneezing, sore throat, tinnitus and voice change.         Denies snoring.   Eyes: Negative for photophobia, pain, discharge, redness, itching and visual disturbance.  "  Respiratory: Negative for cough, chest tightness, shortness of breath, wheezing and stridor.         No chest congestion.  No hemoptysis.   Cardiovascular: Negative for chest pain, palpitations and leg swelling.        No orthopnea, FLORES, or PND.  No claudication or syncope.   Gastrointestinal: Negative for abdominal pain, blood in stool, constipation, diarrhea, nausea, rectal pain and vomiting.        No melena.  No hematemesis.  No heartburn, dysphagia or odynophagia.  No early satiety, belching, or bloating.    Endocrine: Negative for cold intolerance, heat intolerance, polydipsia, polyphagia and polyuria.        No hair loss or dry skin.  No hot flashes.     Genitourinary: Negative for difficulty urinating, dyspareunia, dysuria, flank pain, frequency, hematuria, pelvic pain, urgency, vaginal bleeding and vaginal discharge.        No nocturia, incomplete emptying, or incontinence.   Musculoskeletal: Positive for joint swelling. Negative for arthralgias, back pain, gait problem, myalgias, neck pain and neck stiffness.        Has joint stiffness fingers.   Skin: Negative for rash.        No new skin lesions or changes in skin lesions. No breast pain or masses.  No nipple discharge or nipple inversion.   Neurological: Negative for dizziness, tremors, syncope, speech difficulty, weakness, light-headedness, numbness and headaches.        No tingling.  No memory loss.  No decreased concentration.   Hematological: Negative for adenopathy. Does not bruise/bleed easily.   Psychiatric/Behavioral: Negative for confusion, sleep disturbance and suicidal ideas. The patient is not nervous/anxious.         No depression.           Objective     Vitals:    10/23/19 0833   BP: 132/82   BP Location: Right arm   Pulse: 76   Resp: 20   Temp: 98.7 °F (37.1 °C)   TempSrc: Temporal   Weight: 101 kg (223 lb)   Height: 168.9 cm (66.5\")       Body mass index is 35.45 kg/m².    Physical Exam   Constitutional:   Obese.   HENT:   Head: " Normocephalic and atraumatic.   Right Ear: Tympanic membrane, external ear and ear canal normal.   Left Ear: Tympanic membrane, external ear and ear canal normal.   Mouth/Throat: Oropharynx is clear and moist. No oral lesions.   Tonsils normal.   Eyes: Conjunctivae and EOM are normal. Pupils are equal, round, and reactive to light.   Neck: Normal range of motion. Neck supple. Carotid bruit is not present. No thyroid mass and no thyromegaly present.   Cardiovascular: Normal rate, regular rhythm, normal heart sounds and intact distal pulses. Exam reveals no gallop and no friction rub.   No murmur heard.  No peripheral edema.   Pulmonary/Chest: Effort normal and breath sounds normal.   Breast exam declined due to recent mammogram.   Abdominal: Soft. Bowel sounds are normal. She exhibits no distension, no abdominal bruit and no mass. There is no hepatosplenomegaly. There is no tenderness.   Genitourinary:   Genitourinary Comments:  and rectal exam deferred.   Musculoskeletal: Normal range of motion.   Lymphadenopathy:     She has no cervical adenopathy. No inguinal adenopathy noted on the right or left side.        Right: No inguinal and no supraclavicular adenopathy present.        Left: No inguinal and no supraclavicular adenopathy present.   Neurological: She is alert. She has normal strength and normal reflexes. No cranial nerve deficit. Coordination and gait normal.   Skin: No lesion and no rash noted.   No atypical skin lesions.   Psychiatric: She has a normal mood and affect.   Nursing note and vitals reviewed.      PHQ-2 Depression Screening  Little interest or pleasure in doing things? 0   Feeling down, depressed, or hopeless? 0   PHQ-2 Total Score 0         Counseling was given to patient for the following topics:  appropriate exercise, healthy eating habits, disease prevention, risk factors for cancer, importance of self breast exam and breast health, importance of immunizations, including risks and  benefits, bone health, sun safety, seatbelt use and safe driving. Also discussed the importance of regular dental and vision care, as well recommendation for a yearly screening skin exam after age 40.  Written information provided to patient on these topics and other health maintenance issues.    Results for orders placed or performed in visit on 10/23/19   POC Glycosylated Hemoglobin (Hb A1C)   Result Value Ref Range    Hemoglobin A1C 5.9 %    Lot Number [id number]     Expiration Date 6-16-21    POC Urinalysis Dipstick, Multipro   Result Value Ref Range    Color Yellow Yellow, Straw, Dark Yellow, Eileen    Clarity, UA Hazy (A) Clear    Glucose, UA Negative Negative, 1000 mg/dL (3+) mg/dL    Ketones, UA Negative Negative    Specific Gravity  1.015 1.005 - 1.030    Blood, UA Trace (A) Negative    pH, Urine 7.0 5.0 - 8.0    Protein, POC Negative Negative mg/dL    Nitrite, UA Negative Negative    Leukocytes Moderate (2+) (A) Negative    Protein/Creatinine Ratio, Urine 100.0 mg/G Crea    Lot Number [id number]     Expiration Date 3-31-20    POC Microalbumin   Result Value Ref Range    Microalbumin, Urine 80     Creatinine, Urine 100     A/C 30-300mg/g ABNORMAL     Lot Number [id number]     Expiration Date 5-31-20          Assessment/Plan       Angelina was seen today for medicare wellness-subsequent and annual exam.    Diagnoses and all orders for this visit:    Medicare annual wellness visit, subsequent    Encounter for health maintenance examination in adult  -     CBC & Differential  -     Lipid Panel  -     Comprehensive Metabolic Panel  -     TSH  -     T4, Free  -     POC Glycosylated Hemoglobin (Hb A1C)  -     POC Urinalysis Dipstick, Multipro  -     POC Microalbumin  -     CBC Auto Differential    Type 2 diabetes mellitus without complication, without long-term current use of insulin (CMS/[facility])  -     CBC & Differential  -     Lipid Panel  -     Comprehensive Metabolic Panel  -     TSH  -     POC Glycosylated Hemoglobin  (Hb A1C)  -     POC Urinalysis Dipstick, Multipro  -     POC Microalbumin  -     CBC Auto Differential   Continue current medication(s) as noted in the history of present illness.    Essential hypertension  -     CBC & Differential  -     Lipid Panel  -     Comprehensive Metabolic Panel  -     TSH  -     POC Urinalysis Dipstick, Multipro  -     CBC Auto Differential   Continue current medication(s) as noted in the history of present illness.    Other hyperlipidemia  -     CBC & Differential  -     Lipid Panel  -     Comprehensive Metabolic Panel  -     TSH  -     CBC Auto Differential   Continue current medication(s) as noted in the history of present illness.    Acquired hypothyroidism  -     TSH  -     T4, Free   Continue current medication(s) as noted in the history of present illness.    Benign colonic polyp   Colonoscopy up-to-date.    Primary osteoarthritis involving multiple joints   Continue current medication(s) as noted in the history of present illness.    Leukocytes in urine  -     Urine Culture - Urine, Urine, Clean Catch          Return in about 6 months (around 4/23/2020) for Recheck Diabetes, fasting.

## 2019-10-23 NOTE — PROGRESS NOTES
The ABCs of the Annual Wellness Visit  Subsequent Medicare Wellness Visit    Chief Complaint   Patient presents with   • Medicare Wellness-subsequent   • Annual Exam     fasting        Subjective   History of Present Illness:  Angelina Pimentel is a 69 y.o. female who presents for a Subsequent Medicare Wellness Visit.    HEALTH RISK ASSESSMENT    Recent Hospitalizations:  No hospitalization(s) within the last year.    Current Medical Providers:  Patient Care Team:  Leola Villa MD as PCP - General (Internal Medicine)  Leola Villa MD as PCP - Family Medicine  Kathie Arias MD as Consulting Physician (Dermatology)  Bc Red MD as Consulting Physician (Ophthalmology)  Louis Sebastian MD as Consulting Physician (Cardiology)    Smoking Status:  Social History     Tobacco Use   Smoking Status Never Smoker   Smokeless Tobacco Never Used       Alcohol Consumption:  Social History     Substance and Sexual Activity   Alcohol Use No    Comment: Never drank alcohol       Depression Screen:   PHQ-2/PHQ-9 Depression Screening 10/23/2019   Little interest or pleasure in doing things 0   Feeling down, depressed, or hopeless 0   Total Score 0       Fall Risk Screen:  STEADI Fall Risk Assessment was completed, and patient is at LOW risk for falls.Assessment completed on:10/23/2019    Health Habits and Functional and Cognitive Screening:  Functional & Cognitive Status 10/23/2019   Do you have difficulty preparing food and eating? No   Do you have difficulty bathing yourself, getting dressed or grooming yourself? No   Do you have difficulty using the toilet? No   Do you have difficulty moving around from place to place? No   Do you have trouble with steps or getting out of a bed or a chair? No   Current Diet Diabetic Diet   Dental Exam Up to date   Eye Exam Up to date   Exercise (times per week) 2 times per week   Current Exercise Activities Include Walking   Do you need help using the phone?  No   Are you  deaf or do you have serious difficulty hearing?  No   Do you need help with transportation? No   Do you need help shopping? No   Do you need help preparing meals?  No   Do you need help with housework?  No   Do you need help with laundry? No   Do you need help taking your medications? No   Do you need help managing money? No   Do you ever drive or ride in a car without wearing a seat belt? No   Have you felt unusual stress, anger or loneliness in the last month? Yes   Who do you live with? Spouse   If you need help, do you have trouble finding someone available to you? No   Have you been bothered in the last four weeks by sexual problems? No   Do you have difficulty concentrating, remembering or making decisions? No         Does the patient have evidence of cognitive impairment? No    Asprin use counseling:Does not need ASA (and currently is not on it)    Age-appropriate Screening Schedule:  Refer to the list below for future screening recommendations based on patient's age, sex and/or medical conditions. Orders for these recommended tests are listed in the plan section. The patient has been provided with a written plan.    Health Maintenance   Topic Date Due   • HEMOGLOBIN A1C  04/23/2020   • DIABETIC FOOT EXAM  04/24/2020   • DIABETIC EYE EXAM  07/01/2020   • LIPID PANEL  10/23/2020   • URINE MICROALBUMIN  10/23/2020   • MAMMOGRAM  09/10/2021   • DXA SCAN  10/17/2022   • COLONOSCOPY  01/04/2023   • TDAP/TD VACCINES (3 - Td) 04/05/2027   • INFLUENZA VACCINE  Completed   • PNEUMOCOCCAL VACCINES (65+ LOW/MEDIUM RISK)  Completed   • ZOSTER VACCINE  Completed          The following portions of the patient's history were reviewed and updated as appropriate: allergies, current medications, past family history, past medical history, past social history, past surgical history and problem list.    Outpatient Medications Prior to Visit   Medication Sig Dispense Refill   • Cetirizine HCl (ZYRTEC ALLERGY) 10 MG capsule Zyrtec  10 mg tablet     • fluticasone (FLONASE) 50 MCG/ACT nasal spray 1 spray into each nostril As Needed for Rhinitis.     • glucose blood (ONE TOUCH ULTRA TEST) test strip Test once daily 100 each 11   • ibuprofen (ADVIL,MOTRIN) 100 MG/5ML suspension Take  by mouth Every 6 (Six) Hours As Needed for Mild Pain .     • Lancets (ONETOUCH ULTRASOFT) lancets use as directed 100 each 3   • levothyroxine (SYNTHROID, LEVOTHROID) 100 MCG tablet TAKE ONE TABLET BY MOUTH DAILY 90 tablet 0   • lisinopril (PRINIVIL,ZESTRIL) 10 MG tablet Take one tablet at bedtime 90 tablet 3   • metFORMIN (GLUCOPHAGE) 500 MG tablet Take 1 tablet by mouth Daily With Breakfast. 90 tablet 3   • Multiple Vitamins-Minerals (CENTRUM SILVER ADULT 50+ PO) Take 1 tablet by mouth Daily.     • simvastatin (ZOCOR) 20 MG tablet Take 1 tablet by mouth Every Night. 90 tablet 2   • Cholecalciferol (VITAMIN D3) 5000 units capsule capsule Take 5,000 Units by mouth Daily.       No facility-administered medications prior to visit.        Patient Active Problem List   Diagnosis   • Diabetes mellitus (CMS/HCC)   • Benign colonic polyp   • Hyperlipidemia   • Hypothyroidism   • Osteoarthritis   • Hypertension   • Allergic rhinitis   • Basal cell carcinoma of skin   • Essential hypertriglyceridemia   • Mitral valve prolapse syndrome   • Squamous cell carcinoma of skin   • Obstructive sleep apnea syndrome   • Menopause   • Hypertensive heart disease without heart failure   • Abnormal ECG       Advanced Care Planning:  Patient has an advance directive - a copy has not been provided. Have asked the patient to send this to us to add to record    Review of Systems    Compared to one year ago, the patient feels her physical health is the same.  Compared to one year ago, the patient feels her mental health is the same.    Reviewed chart for potential of high risk medication in the elderly: yes  Reviewed chart for potential of harmful drug interactions in the  "elderly:yes    Objective         Vitals:    10/23/19 0833   BP: 132/82   BP Location: Right arm   Pulse: 76   Resp: 20   Temp: 98.7 °F (37.1 °C)   TempSrc: Temporal   Weight: 101 kg (223 lb)   Height: 168.9 cm (66.5\")       Body mass index is 35.45 kg/m².  Discussed the patient's BMI with her. The BMI is above average; BMI management plan is completed.  Finger Rub Hearing{Test (right ear):passed  Finger Rub Hearing{Test (left ear):passed    Physical Exam       Assessment/Plan   Medicare Risks and Personalized Health Plan  CMS Preventative Services Quick Reference  Cardiovascular risk  Fall Risk  Obesity/Overweight   ACP information given    The above risks/problems have been discussed with the patient.  Pertinent information has been shared with the patient in the After Visit Summary.  Follow up plans and orders are seen below in the Assessment/Plan Section.    Diagnoses and all orders for this visit:    1. Medicare annual wellness visit, subsequent (Primary)    2. Encounter for health maintenance examination in adult  -     CBC & Differential  -     Lipid Panel  -     Comprehensive Metabolic Panel  -     TSH  -     T4, Free  -     POC Glycosylated Hemoglobin (Hb A1C)  -     POC Urinalysis Dipstick, Multipro  -     POC Microalbumin  -     CBC Auto Differential    3. Type 2 diabetes mellitus without complication, without long-term current use of insulin (CMS/[facility])  -     CBC & Differential  -     Lipid Panel  -     Comprehensive Metabolic Panel  -     TSH  -     POC Glycosylated Hemoglobin (Hb A1C)  -     POC Urinalysis Dipstick, Multipro  -     POC Microalbumin  -     CBC Auto Differential    4. Essential hypertension  -     CBC & Differential  -     Lipid Panel  -     Comprehensive Metabolic Panel  -     TSH  -     POC Urinalysis Dipstick, Multipro  -     CBC Auto Differential    5. Other hyperlipidemia  -     CBC & Differential  -     Lipid Panel  -     Comprehensive Metabolic Panel  -     TSH  -     CBC Auto " Differential    6. Acquired hypothyroidism  -     TSH  -     T4, Free    7. Benign colonic polyp    8. Primary osteoarthritis involving multiple joints    9. Leukocytes in urine  -     Urine Culture - Urine, Urine, Clean Catch    Other orders  -     Cancel: Urinalysis, Microscopic Only - Urine, Clean Catch      Follow Up:  Return in about 6 months (around 4/23/2020) for Recheck Diabetes, fasting.     An After Visit Summary and PPPS were given to the patient.

## 2019-10-24 LAB — BACTERIA SPEC AEROBE CULT: NORMAL

## 2020-01-09 RX ORDER — LEVOTHYROXINE SODIUM 0.1 MG/1
TABLET ORAL
Qty: 90 TABLET | Refills: 0 | Status: SHIPPED | OUTPATIENT
Start: 2020-01-09 | End: 2020-03-23

## 2020-01-15 ENCOUNTER — TELEPHONE (OUTPATIENT)
Dept: INTERNAL MEDICINE | Facility: CLINIC | Age: 70
End: 2020-01-15

## 2020-01-15 NOTE — TELEPHONE ENCOUNTER
Patient is leaving Friday and requests these be filled as quickly as possible.  Please return call and advise.

## 2020-02-25 ENCOUNTER — OFFICE VISIT (OUTPATIENT)
Dept: CARDIOLOGY | Facility: CLINIC | Age: 70
End: 2020-02-25

## 2020-02-25 VITALS
HEIGHT: 67 IN | SYSTOLIC BLOOD PRESSURE: 160 MMHG | HEART RATE: 76 BPM | WEIGHT: 230 LBS | OXYGEN SATURATION: 97 % | BODY MASS INDEX: 36.1 KG/M2 | DIASTOLIC BLOOD PRESSURE: 86 MMHG

## 2020-02-25 DIAGNOSIS — E11.9 TYPE 2 DIABETES MELLITUS WITHOUT COMPLICATION, WITHOUT LONG-TERM CURRENT USE OF INSULIN (HCC): ICD-10-CM

## 2020-02-25 DIAGNOSIS — E78.5 DYSLIPIDEMIA: ICD-10-CM

## 2020-02-25 DIAGNOSIS — I11.9 HYPERTENSIVE HEART DISEASE WITHOUT HEART FAILURE: ICD-10-CM

## 2020-02-25 DIAGNOSIS — I34.1 MITRAL VALVE PROLAPSE SYNDROME: Chronic | ICD-10-CM

## 2020-02-25 DIAGNOSIS — I10 ESSENTIAL HYPERTENSION: Primary | Chronic | ICD-10-CM

## 2020-02-25 PROCEDURE — 99214 OFFICE O/P EST MOD 30 MIN: CPT | Performed by: INTERNAL MEDICINE

## 2020-02-25 RX ORDER — LISINOPRIL 20 MG/1
20 TABLET ORAL DAILY
Qty: 90 TABLET | Refills: 3 | Status: SHIPPED | OUTPATIENT
Start: 2020-02-25 | End: 2021-03-01

## 2020-02-25 NOTE — PROGRESS NOTES
Subjective:     Encounter Date:02/25/2020    Patient ID: Angelina Pimentel is a 69 y.o.  white female from [place], [place], a retired  at [employer].     INTERNIST: Leola Villa MD  GENERAL SURGEON: Barbara Zaldivar MD  COLORECTAL SURGEON:  Don Ramos MD  NEUROLOGIST/SLEEP PHYSICIAN: Almas Henao MD    Chief Complaint:   Chief Complaint   Patient presents with   • Hypertensive heart disease without heart failure     F/U     Problem List:  1. Abnormal EKG/hypertensive cardiovascular disease:  a. Remote stress test for left arm numbness before 2008; negative per patient-data deficit  b. CT cardiac calcium score was 0 on 10/17/2017  c. Residual class I symptoms with echocardiogram demonstrating mild LVH and diastolic relaxation abnormality, no PE, valvulopathy, or pulmonary artery hypertension, April 2018  d. Residual class I symptoms, February 2020  2. Hypertension  3. Hyperlipidemia  4. Type 2 diabetes mellitus, diagnosed in 2010; last hemoglobin A1c was 5.5%, March 2017  5. History of mitral valve prolapse  6. Hypothyroidism after radiation for thyroid nodule/goiter  7. Moderate obesity: BMI 36  8. Benign positional vertigo, December 2019, with right CRP  9. Transient global amnesia with acceptable brain MRI, May 2019  10. Surgical history:  a. Left lumpectomy: Benign  b. Colonoscopy  c. MONICA  d. Cataracts  e. Tonsillectomy  f. Eyelid reduction    Allergies   Allergen Reactions   • Cefdinir Other (See Comments)     Cefdinir SUSR; Adverse Reaction; Abdominal pain   • Penicillins Rash   • Sulfa Antibiotics Rash     Sulfa Drugs       Current Outpatient Medications:   •  Cetirizine HCl (ZYRTEC ALLERGY) 10 MG capsule, Zyrtec 10 mg tablet, Disp: , Rfl:   •  fluticasone (FLONASE) 50 MCG/ACT nasal spray, 1 spray into each nostril As Needed for Rhinitis., Disp: , Rfl:   •  glucose blood (ONE TOUCH ULTRA TEST) test strip, Test once daily, Disp: 100 each, Rfl: 11  •   "ibuprofen (ADVIL,MOTRIN) 100 MG/5ML suspension, Take  by mouth Every 6 (Six) Hours As Needed for Mild Pain ., Disp: , Rfl:   •  Lancets (ONETOUCH ULTRASOFT) lancets, use as directed, Disp: 100 each, Rfl: 3  •  levothyroxine (SYNTHROID, LEVOTHROID) 100 MCG tablet, TAKE ONE TABLET BY MOUTH DAILY, Disp: 90 tablet, Rfl: 0  •  lisinopril (PRINIVIL,ZESTRIL) 10 MG tablet, Take one tablet at bedtime, Disp: 90 tablet, Rfl: 3  •  metFORMIN (GLUCOPHAGE) 500 MG tablet, Take 1 tablet by mouth Daily With Breakfast., Disp: 90 tablet, Rfl: 3  •  Multiple Vitamins-Minerals (CENTRUM SILVER ADULT 50+ PO), Take 1 tablet by mouth Daily., Disp: , Rfl:   •  simvastatin (ZOCOR) 20 MG tablet, Take 1 tablet by mouth Every Night., Disp: 90 tablet, Rfl: 2    History of Present Illness: Patient returns for followup after a nearly 2-year hiatus; she was a consult at her last office visit on 02/28/2018.  She had influenza immunization on 10/11/2019 in Dr. Carrington Gomez's office.  She has felt well since last being seen in our office, \"and that's part of the reason I haven't been back.\"  Her weight is up quite a bit since her last office visit, and the problems with increased weight are discussed with her.  She states that she signed up for [employer] last week.  She does not monitor her blood pressure but does have a cuff at home.  She is advised to monitor her blood pressure about 1-2 times weekly and is told that it should remain around 130-140/80-90 on a regular basis.  She has had no ER visits, hospitalizations, serious illnesses, or surgeries in the interim.  She has not traveled much lately, other than visiting her grandchildren in [place], [place].  Patient otherwise denies chest pain, shortness of breath, PND, edema, palpitations, syncope or presyncope at this time.      Review of Systems   Musculoskeletal: Positive for joint pain (occasionally, not constant).   Allergic/Immunologic: Positive for environmental allergies.        " "Drug allergies      Obtained and negative except as outlined in problem list and HPI.    Procedures       Objective:       Vitals:    02/25/20 0847 02/25/20 0851 02/25/20 0913   BP: 167/90 141/87 160/86   BP Location: Left arm Left arm Left arm   Patient Position: Sitting Standing Sitting   Pulse: 72 76    SpO2: 97%     Weight: 104 kg (230 lb)     Height: 170.2 cm (67\")       Body mass index is 36.02 kg/m².   Last weight:  204 lbs.    Physical Exam   Constitutional: She is oriented to person, place, and time. She appears well-developed and well-nourished.   Neck: No JVD present. Carotid bruit is not present. No thyromegaly present.   Cardiovascular: Regular rhythm, S1 normal and S2 normal. Exam reveals no gallop, no S3 and no friction rub.   Murmur heard.   Medium-pitched early systolic murmur is present with a grade of 2/6 at the lower left sternal border.  Pulses:       Dorsalis pedis pulses are 2+ on the right side, and 2+ on the left side.        Posterior tibial pulses are 2+ on the right side, and 2+ on the left side.   Pulmonary/Chest: Effort normal and breath sounds normal. She has no wheezes. She has no rhonchi. She has no rales.   Abdominal: Soft. She exhibits no mass. There is no hepatosplenomegaly. There is no tenderness. There is no guarding.   Bowel sounds audible x4   Musculoskeletal: Normal range of motion. She exhibits no edema.   Lymphadenopathy:     She has no cervical adenopathy.   Neurological: She is alert and oriented to person, place, and time.   Skin: Skin is warm, dry and intact. No rash noted.   Vitals reviewed.        Lab Review:   10/23/2019:  · CHITRA - 80  · Urinalysis - hazy with trace blood and moderate leukocytes, otherwise unremarkable    Lab Results   Component Value Date    GLUCOSE 100 (H) 10/23/2019    BUN 14 10/23/2019    CREATININE 0.80 10/23/2019    EGFRIFNONA 71 10/23/2019    EGFRIFAFRI 95 09/10/2015    BCR 17.5 10/23/2019    CO2 26.9 10/23/2019    CALCIUM 9.3 10/23/2019    " PROTENTOTREF 7.4 09/10/2015    ALBUMIN 4.40 10/23/2019    LABIL2 1.4 09/10/2015    AST 17 10/23/2019    ALT 16 10/23/2019   Sodium - 142  Potassium - 4.7  Chloride - 102    Lab Results   Component Value Date    WBC 6.46 10/23/2019    HGB 14.7 10/23/2019    HCT 42.3 10/23/2019    MCV 93.0 10/23/2019     10/23/2019       Lab Results   Component Value Date    HGBA1C 5.9 10/23/2019       Lab Results   Component Value Date    TSH 2.770 10/23/2019   Free T4 - 1.28    Lab Results   Component Value Date    CHOL 135 10/23/2019    CHOL 130 04/24/2019    CHOL 153 10/23/2018     Lab Results   Component Value Date    TRIG 97 10/23/2019    TRIG 69 04/24/2019    TRIG 85 10/23/2018     Lab Results   Component Value Date    HDL 41 10/23/2019    HDL 41 04/24/2019    HDL 45 10/23/2018   LDL - 75 (10/23/2019)    04/16/2018, echocardiogram:  · Mild tricuspid valve regurgitation is present.  · Mild mitral valve regurgitation is present.  · Left ventricular systolic function is normal. Estimated EF = 65%.  · Left ventricular wall thickness is consistent with mild concentric hypertrophy.  · Left ventricular diastolic dysfunction (grade I) consistent with impaired relaxation.  · There is no evidence of pericardial effusion.  · No evidence of pulmonary hypertension is present.  · Normal right ventricular cavity size, wall thickness, systolic function and septal motion noted.  · No significant structural valvular abnormality demonstrated.    05/01/2019, brain MRI: Pt sates she had an episode of memory loss that lasted about 30 minutes about 2 weeks ago. She was unable to remember events that had just happened in the the last 2 days, and could not remember the date.  She has had no further similar episodes     IMPRESSION:  No evidence of acute intracranial disease.    Assessment:       Overall continued acceptable course with no new interim cardiopulmonary complaints with good functional status. We will defer additional diagnostic or  therapeutic intervention from a cardiac perspective at this time.  Hopefully, we will be advised when the patient has laboratory studies done so we can review those results, if possible.     Diagnosis Plan   1. Essential hypertension  Suboptimal control in large part related to weight gain; see below   2. Mitral valve prolapse syndrome  Asymptomatic; defer additional testing   3. Hypertensive heart disease without heart failure  No recurrent angina pectoris or CHF on current activity schedule; continue current treatment     4. Type 2 diabetes mellitus without complication, without long-term current use of insulin (CMS/[facility])  Excellent recent assessment, October 2019   5. Dyslipidemia  Acceptable FLP, October 2019; continue simvastatin          Plan:         1. Patient to continue current medications and close follow up with the above providers other than to alter her dose of lisinopril to 20 mg daily.  2. Patient is to monitor her blood pressure 1-2 times weekly and advise our office if it consistently runs greater than 140/90.  3. Tentative cardiology follow up in February 2021, or patient may return sooner PRN.  4. 1-800 card provided.    Transcribed by Rebekah Finch for Dr. Louis Sebastian at 9:08 AM on 02/25/2020    ILouis MD, [facility], personally performed the services described in this documentation as scribed by the above named individual in my presence, and it is both accurate and complete. At 9:44 AM on 02/25/2020

## 2020-03-13 RX ORDER — SIMVASTATIN 20 MG
TABLET ORAL
Qty: 90 TABLET | Refills: 1 | OUTPATIENT
Start: 2020-03-13

## 2020-03-16 ENCOUNTER — TELEPHONE (OUTPATIENT)
Dept: INTERNAL MEDICINE | Facility: CLINIC | Age: 70
End: 2020-03-16

## 2020-03-16 RX ORDER — SIMVASTATIN 20 MG
20 TABLET ORAL NIGHTLY
Qty: 90 TABLET | Refills: 2 | Status: SHIPPED | OUTPATIENT
Start: 2020-03-16 | End: 2020-12-14

## 2020-03-16 NOTE — TELEPHONE ENCOUNTER
Pt called and stated that her refill request for simvastatin (ZOCOR) 20 MG tablet was denied and she doesn't know why.  Pt states that she has been taking this med for many years.    Pt callback: [phone]    Please advise.

## 2020-03-23 RX ORDER — LEVOTHYROXINE SODIUM 0.1 MG/1
TABLET ORAL
Qty: 90 TABLET | Refills: 0 | Status: SHIPPED | OUTPATIENT
Start: 2020-03-23 | End: 2020-07-09

## 2020-05-21 DIAGNOSIS — E11.9 TYPE 2 DIABETES MELLITUS WITHOUT COMPLICATION, WITHOUT LONG-TERM CURRENT USE OF INSULIN (HCC): ICD-10-CM

## 2020-06-10 ENCOUNTER — OFFICE VISIT (OUTPATIENT)
Dept: INTERNAL MEDICINE | Facility: CLINIC | Age: 70
End: 2020-06-10

## 2020-06-10 VITALS
HEART RATE: 64 BPM | RESPIRATION RATE: 20 BRPM | SYSTOLIC BLOOD PRESSURE: 124 MMHG | WEIGHT: 218.13 LBS | BODY MASS INDEX: 34.16 KG/M2 | TEMPERATURE: 97.7 F | DIASTOLIC BLOOD PRESSURE: 70 MMHG

## 2020-06-10 DIAGNOSIS — K63.5 BENIGN COLONIC POLYP: ICD-10-CM

## 2020-06-10 DIAGNOSIS — E03.9 ACQUIRED HYPOTHYROIDISM: ICD-10-CM

## 2020-06-10 DIAGNOSIS — I10 ESSENTIAL HYPERTENSION: ICD-10-CM

## 2020-06-10 DIAGNOSIS — M15.9 PRIMARY OSTEOARTHRITIS INVOLVING MULTIPLE JOINTS: ICD-10-CM

## 2020-06-10 DIAGNOSIS — E11.9 TYPE 2 DIABETES MELLITUS WITHOUT COMPLICATION, WITHOUT LONG-TERM CURRENT USE OF INSULIN (HCC): Primary | ICD-10-CM

## 2020-06-10 DIAGNOSIS — E78.49 OTHER HYPERLIPIDEMIA: ICD-10-CM

## 2020-06-10 LAB
ALBUMIN SERPL-MCNC: 4.1 G/DL (ref 3.5–5.2)
ALBUMIN/GLOB SERPL: 1.3 G/DL
ALP SERPL-CCNC: 119 U/L (ref 39–117)
ALT SERPL W P-5'-P-CCNC: 13 U/L (ref 1–33)
ANION GAP SERPL CALCULATED.3IONS-SCNC: 9.4 MMOL/L (ref 5–15)
AST SERPL-CCNC: 17 U/L (ref 1–32)
BASOPHILS # BLD AUTO: 0.03 10*3/MM3 (ref 0–0.2)
BASOPHILS NFR BLD AUTO: 0.4 % (ref 0–1.5)
BILIRUB SERPL-MCNC: 0.3 MG/DL (ref 0.2–1.2)
BUN BLD-MCNC: 11 MG/DL (ref 8–23)
BUN/CREAT SERPL: 14.3 (ref 7–25)
CALCIUM SPEC-SCNC: 9.1 MG/DL (ref 8.6–10.5)
CHLORIDE SERPL-SCNC: 105 MMOL/L (ref 98–107)
CHOLEST SERPL-MCNC: 115 MG/DL (ref 0–200)
CO2 SERPL-SCNC: 26.6 MMOL/L (ref 22–29)
CREAT BLD-MCNC: 0.77 MG/DL (ref 0.57–1)
DEPRECATED RDW RBC AUTO: 43.3 FL (ref 37–54)
EOSINOPHIL # BLD AUTO: 0.12 10*3/MM3 (ref 0–0.4)
EOSINOPHIL NFR BLD AUTO: 1.7 % (ref 0.3–6.2)
ERYTHROCYTE [DISTWIDTH] IN BLOOD BY AUTOMATED COUNT: 12.4 % (ref 12.3–15.4)
EXPIRATION DATE: NORMAL
GFR SERPL CREATININE-BSD FRML MDRD: 74 ML/MIN/1.73
GLOBULIN UR ELPH-MCNC: 3.1 GM/DL
GLUCOSE BLD-MCNC: 96 MG/DL (ref 65–99)
HBA1C MFR BLD: 6.1 %
HCT VFR BLD AUTO: 42.5 % (ref 34–46.6)
HDLC SERPL-MCNC: 40 MG/DL (ref 40–60)
HGB BLD-MCNC: 14.2 G/DL (ref 12–15.9)
IMM GRANULOCYTES # BLD AUTO: 0.02 10*3/MM3 (ref 0–0.05)
IMM GRANULOCYTES NFR BLD AUTO: 0.3 % (ref 0–0.5)
LDLC SERPL CALC-MCNC: 61 MG/DL (ref 0–100)
LDLC/HDLC SERPL: 1.54 {RATIO}
LYMPHOCYTES # BLD AUTO: 1.92 10*3/MM3 (ref 0.7–3.1)
LYMPHOCYTES NFR BLD AUTO: 27.9 % (ref 19.6–45.3)
Lab: NORMAL
MCH RBC QN AUTO: 31.2 PG (ref 26.6–33)
MCHC RBC AUTO-ENTMCNC: 33.4 G/DL (ref 31.5–35.7)
MCV RBC AUTO: 93.4 FL (ref 79–97)
MONOCYTES # BLD AUTO: 0.44 10*3/MM3 (ref 0.1–0.9)
MONOCYTES NFR BLD AUTO: 6.4 % (ref 5–12)
NEUTROPHILS # BLD AUTO: 4.34 10*3/MM3 (ref 1.7–7)
NEUTROPHILS NFR BLD AUTO: 63.3 % (ref 42.7–76)
NRBC BLD AUTO-RTO: 0 /100 WBC (ref 0–0.2)
PLATELET # BLD AUTO: 252 10*3/MM3 (ref 140–450)
PMV BLD AUTO: 10.5 FL (ref 6–12)
POTASSIUM BLD-SCNC: 4.1 MMOL/L (ref 3.5–5.2)
PROT SERPL-MCNC: 7.2 G/DL (ref 6–8.5)
RBC # BLD AUTO: 4.55 10*6/MM3 (ref 3.77–5.28)
SODIUM BLD-SCNC: 141 MMOL/L (ref 136–145)
TRIGL SERPL-MCNC: 68 MG/DL (ref 0–150)
TSH SERPL DL<=0.05 MIU/L-ACNC: 1.77 UIU/ML (ref 0.27–4.2)
VLDLC SERPL-MCNC: 13.6 MG/DL (ref 5–40)
WBC NRBC COR # BLD: 6.87 10*3/MM3 (ref 3.4–10.8)

## 2020-06-10 PROCEDURE — 80053 COMPREHEN METABOLIC PANEL: CPT | Performed by: INTERNAL MEDICINE

## 2020-06-10 PROCEDURE — 83036 HEMOGLOBIN GLYCOSYLATED A1C: CPT | Performed by: INTERNAL MEDICINE

## 2020-06-10 PROCEDURE — 80061 LIPID PANEL: CPT | Performed by: INTERNAL MEDICINE

## 2020-06-10 PROCEDURE — 99214 OFFICE O/P EST MOD 30 MIN: CPT | Performed by: INTERNAL MEDICINE

## 2020-06-10 PROCEDURE — 36415 COLL VENOUS BLD VENIPUNCTURE: CPT | Performed by: INTERNAL MEDICINE

## 2020-06-10 PROCEDURE — 85025 COMPLETE CBC W/AUTO DIFF WBC: CPT | Performed by: INTERNAL MEDICINE

## 2020-06-10 PROCEDURE — 84443 ASSAY THYROID STIM HORMONE: CPT | Performed by: INTERNAL MEDICINE

## 2020-06-10 NOTE — PROGRESS NOTES
Subjective       Angelina Pimentel is a 69 y.o. female.     Chief Complaint   Patient presents with   • Diabetes     6 month follow up   fasting        History obtained from the patient.      History of Present Illness     The patient is here today for a 6 month follow-up visit.     Primary Care Cardiac Diagnostic Constellation:       Her Diabetes Mellitus type 2 has been stable.   Medication(s): Metformin HCl, Simvastatin, and Lisinopril.   Her Hypertension has been stable.   Medication(s): Lisinopril.   Her Hyperlipidemia has been stable.   Her LDL goal is < 70 mg/dL and last LDL was 75 mg/dL.   Medication(s): Simvastiatin.   The patient is adherent with her medication regimen. She denies medication side effects.       Interval Events: She saw Dr. Sebastian, Cardiology, on 2/25/2020.  He increased her Lisinopril to 20 mg daily. Last Hemoglobin A1c on 10/23/2019 was 5.7.  Blood sugar at home has been 100-110, fasting.  She does not check postprandial levels.  She denies episodes of low blood sugar.  She has not been checking blood pressure at home.  Last Ophthalmology visit was in July 2019, no retinopathy per patient (Dr. Red).   She states she checks her feet daily.       Symptoms: Denies chest pain, dyspnea, FLORES, orthopnea, PND, palpitations, syncope, lower extremity edema, intermittent leg claudication, lightheadedness, and dizziness.   Associated Symptoms: Weight decreased 12 pounds intentionally, since 2/25/2020.  No fatigue, headache, polydipsia, polyuria, myalgias, arthralgias, visual impairment, memory loss, concentration problems, or focal neurologic deficits. Denies numbness / tingling of the feet, foot pain, and a foot ulcer,      Lifestyle and Disease Management: Diet: The patient has not been consuming a diverse and healthy diet (Weight Watchers). Weight Issues: She has weight concerns. Exercise:  She sees a  1  times per week for 60 minutes per session.  She walks 2-3 x per  week.  Tobacco Use: Never a smoker.      Hypothyroidism Follow-Up: The patient is being seen for follow-up of Hypothyroidism, which is stable.  Interval Events:  TSH and T4 were normal 10/23/19.  Symptoms:  Weight decreased 12 pounds intentionally, since 2/25/2020.  Denies cold intolerance, fatigue,weakness, memory loss,  trouble concentrating, hair loss, and dry skin.   Associated Symptoms: no arthralgias, myalgias, or paresthesias.   Medications:  Levothyroxine (Synthroid).    The patient is adherent to her medication regimen, and she denies medication side effects.      Colonic Polyp Follow-up: The patient is being seen for a routine clinic follow-up of Colon Polyp(s), which is stable.   Interval Events:  Current diagnosis was determined by Colonoscopy and last 1/4/18- no polyps  Symptoms: no abdominal pain, diarrhea, constipation, hematochezia, melena, decreased stool caliber, or change in bowel habits.  Associated Symptoms: no rectal prolapse.   Medication:  None.         Osteoarthritis Follow-Up: The patient is being seen for a routine clinic follow-up of Osteoarthritis, which has been stable.   She has no complications from Osteoarthritis.   The patient's Osteoarthritis has not resulted in physical disability.   Interval Events:  None.  Activities: no limitations.   Symptoms:  finger pain resolved.  No arthralgias, back pain, neck pain, hip pain, shoulder pain, or knee pain.  Associated Symptoms: No joint swelling or joint stiffness.   Medications:  Ibuprofen prn.          Current Outpatient Medications on File Prior to Visit   Medication Sig Dispense Refill   • Cetirizine HCl (ZYRTEC ALLERGY) 10 MG capsule Zyrtec 10 mg tablet     • fluticasone (FLONASE) 50 MCG/ACT nasal spray 1 spray into each nostril As Needed for Rhinitis.     • glucose blood (ONE TOUCH ULTRA TEST) test strip Test once daily 100 each 11   • ibuprofen (ADVIL,MOTRIN) 100 MG/5ML suspension Take  by mouth Every 6 (Six) Hours As Needed for Mild  Pain .     • Lancets (ONETOUCH ULTRASOFT) lancets use as directed 100 each 3   • levothyroxine (SYNTHROID, LEVOTHROID) 100 MCG tablet TAKE ONE TABLET BY MOUTH DAILY 90 tablet 0   • lisinopril (PRINIVIL,ZESTRIL) 20 MG tablet Take 1 tablet by mouth Daily. 90 tablet 3   • metFORMIN (GLUCOPHAGE) 500 MG tablet TAKE ONE TABLET BY MOUTH DAILY WITH BREAKFAST 90 tablet 0   • Multiple Vitamins-Minerals (CENTRUM SILVER ADULT 50+ PO) Take 1 tablet by mouth Daily.     • simvastatin (ZOCOR) 20 MG tablet Take 1 tablet by mouth Every Night. 90 tablet 2   • glucose blood test strip Daily.       No current facility-administered medications on file prior to visit.        Current outpatient and discharge medications have been reconciled for the patient.  Reviewed by: Leola Villa MD        The following portions of the patient's history were reviewed and updated as appropriate: allergies, current medications, past family history, past medical history, past social history, past surgical history and problem list.    Review of Systems   Constitutional: Negative for fatigue and unexpected weight change.   Eyes: Negative for visual disturbance.   Respiratory: Negative for cough, shortness of breath and wheezing.    Cardiovascular: Negative for chest pain, palpitations and leg swelling.        No FLORES, orthopnea, or claudication.   Gastrointestinal: Negative for abdominal pain, blood in stool, constipation, diarrhea, nausea and vomiting.        Denies melena.   Endocrine: Negative for polydipsia and polyuria.   Musculoskeletal: Negative for arthralgias and myalgias.   Neurological: Negative for dizziness, syncope, light-headedness and headaches.        No memory issues.   Psychiatric/Behavioral: Negative for decreased concentration.         Objective       Blood pressure 124/70, pulse 64, temperature 97.7 °F (36.5 °C), temperature source Temporal, resp. rate 20, weight 98.9 kg (218 lb 2 oz).      Physical Exam   Constitutional:   Obese.    Neck: Normal range of motion. Neck supple. Carotid bruit is not present. No thyromegaly present.   Cardiovascular: Normal rate, regular rhythm, normal heart sounds and intact distal pulses. Exam reveals no gallop and no friction rub.   No murmur heard.  No peripheral edema.   Pulmonary/Chest: Effort normal and breath sounds normal.   Abdominal: Soft. Bowel sounds are normal. She exhibits no distension, no abdominal bruit and no mass. There is no hepatosplenomegaly. There is no tenderness.    Angelina had a diabetic foot exam performed today.   During the foot exam she had a monofilament test performed (see form).  Vascular Status -  Her right foot exhibits normal foot vasculature . Her left foot exhibits normal foot vasculature .  Skin Integrity  -  Her right foot skin is intact.Her left foot skin is intact..  Psychiatric: She has a normal mood and affect.   Nursing note and vitals reviewed.       Advance Care Planning   ACP discussion was held with the patient during this visit. Patient does not have an advance directive, information provided.   ACP information provided on the AVS.    Results for orders placed or performed in visit on 06/10/20   POC Glycosylated Hemoglobin (Hb A1C)   Result Value Ref Range    Hemoglobin A1C 6.1 %    Lot Number [id number]     Expiration Date 1-27-22        Assessment / Plan:  Angelina was seen today for diabetes.    Diagnoses and all orders for this visit:    Type 2 diabetes mellitus without complication, without long-term current use of insulin (CMS/[facility])  -     POC Glycosylated Hemoglobin (Hb A1C)  -     TSH  -     CBC & Differential  -     Lipid Panel  -     Comprehensive Metabolic Panel  -     CBC Auto Differential   Continue current medication(s) as noted in the history of present illness.    Essential hypertension  -     TSH  -     CBC & Differential  -     Lipid Panel  -     Comprehensive Metabolic Panel  -     CBC Auto Differential   Continue current medication(s) as noted  in the history of present illness.    Other hyperlipidemia  -     TSH  -     CBC & Differential  -     Lipid Panel  -     Comprehensive Metabolic Panel  -     CBC Auto Differential   Continue current medication(s) as noted in the history of present illness.    Acquired hypothyroidism  -     TSH   Continue current medication(s) as noted in the history of present illness.    Benign colonic polyp   Colonoscopy up-to-date.    Primary osteoarthritis involving multiple joints   Continue current medication(s) as noted in the history of present illness.          Return in about 6 months (around 12/10/2020) for Annual physical, fasting, and schedule subseq Medicare Wellness Examsame day/same appt time.

## 2020-06-10 NOTE — PATIENT INSTRUCTIONS
Advance Care Planning and Advance Directives     You make decisions on a daily basis - decisions about where you want to live, your career, your home, your life. Perhaps one of the most important decisions you face is your choice for future medical care. Take time to talk with your family and your healthcare team and start planning today.  Advance Care Planning is a process that can help you:  · Understand possible future healthcare decisions in light of your own experiences  · Reflect on those decision in light of your goals and values  · Discuss your decisions with those closest to you and the healthcare professionals that care for you  · Make a plan by creating a document that reflects your wishes    Surrogate Decision Maker  In the event of a medical emergency, which has left you unable to communicate or to make your own decisions, you would need someone to make decisions for you.  It is important to discuss your preferences for medical treatment with this person while you are in good health.     Qualities of a surrogate decision maker:  • Willing to take on this role and responsibility  • Knows what you want for future medical care  • Willing to follow your wishes even if they don't agree with them  • Able to make difficult medical decisions under stressful circumstances    Advance Directives  These are legal documents you can create that will guide your healthcare team and decision maker(s) when needed. These documents can be stored in the electronic medical record.    · Living Will - a legal document to guide your care if you have a terminal condition or a serious illness and are unable to communicate. States vary by statute in document names/types, but most forms may include one or more of the following:        -  Directions regarding life-prolonging treatments        -  Directions regarding artificially provided nutrition/hydration        -  Choosing a healthcare decision maker        -  Direction  regarding organ/tissue donation    · Durable Power of  for Healthcare - this document names an -in-fact to make medical decisions for you, but it may also allow this person to make personal and financial decisions for you. Please seek the advice of an  if you need this type of document.    **Advance Directives are not required and no one may discriminate against you if you do not sign one.    Medical Orders  Many states allow specific forms/orders signed by your physician to record your wishes for medical treatment in your current state of health. This form, signed in personal communication with your physician, addresses resuscitation and other medical interventions that you may or may not want.      For more information or to schedule a time with a [facility] Advance Care Planning Facilitator contact: [email or web address] or call [phone] and someone will contact you directly.  4

## 2020-07-09 RX ORDER — LEVOTHYROXINE SODIUM 0.1 MG/1
TABLET ORAL
Qty: 90 TABLET | Refills: 0 | Status: SHIPPED | OUTPATIENT
Start: 2020-07-09 | End: 2020-10-02

## 2020-07-22 DIAGNOSIS — E11.9 TYPE 2 DIABETES MELLITUS WITHOUT COMPLICATION, WITHOUT LONG-TERM CURRENT USE OF INSULIN (HCC): ICD-10-CM

## 2020-07-22 RX ORDER — LANCETS
EACH MISCELLANEOUS
Qty: 100 EACH | Refills: 4 | Status: SHIPPED | OUTPATIENT
Start: 2020-07-22 | End: 2021-07-23

## 2020-08-20 DIAGNOSIS — E11.9 TYPE 2 DIABETES MELLITUS WITHOUT COMPLICATION, WITHOUT LONG-TERM CURRENT USE OF INSULIN (HCC): ICD-10-CM

## 2020-09-14 ENCOUNTER — HOSPITAL ENCOUNTER (OUTPATIENT)
Dept: MAMMOGRAPHY | Facility: HOSPITAL | Age: 70
Discharge: HOME | End: 2020-09-14
Admitting: SURGERY

## 2020-09-14 DIAGNOSIS — R92.8 ABNORMAL MAMMOGRAPHY: ICD-10-CM

## 2020-09-14 PROCEDURE — 77066 DX MAMMO INCL CAD BI: CPT

## 2020-09-14 PROCEDURE — 77066 DX MAMMO INCL CAD BI: CPT | Performed by: RADIOLOGY

## 2020-09-14 PROCEDURE — G0279 TOMOSYNTHESIS, MAMMO: HCPCS

## 2020-09-14 PROCEDURE — G0279 TOMOSYNTHESIS, MAMMO: HCPCS | Performed by: RADIOLOGY

## 2020-09-15 ENCOUNTER — TRANSCRIBE ORDERS (OUTPATIENT)
Dept: MAMMOGRAPHY | Facility: HOSPITAL | Age: 70
End: 2020-09-15

## 2020-09-15 DIAGNOSIS — Z12.31 VISIT FOR SCREENING MAMMOGRAM: Primary | ICD-10-CM

## 2020-10-02 DIAGNOSIS — E03.9 ACQUIRED HYPOTHYROIDISM: Primary | Chronic | ICD-10-CM

## 2020-10-02 RX ORDER — LEVOTHYROXINE SODIUM 0.1 MG/1
TABLET ORAL
Qty: 90 TABLET | Refills: 3 | Status: SHIPPED | OUTPATIENT
Start: 2020-10-02 | End: 2021-09-27

## 2020-11-21 DIAGNOSIS — E11.9 TYPE 2 DIABETES MELLITUS WITHOUT COMPLICATION, WITHOUT LONG-TERM CURRENT USE OF INSULIN (HCC): ICD-10-CM

## 2020-12-10 ENCOUNTER — OFFICE VISIT (OUTPATIENT)
Dept: INTERNAL MEDICINE | Facility: CLINIC | Age: 70
End: 2020-12-10

## 2020-12-10 VITALS
WEIGHT: 221.25 LBS | TEMPERATURE: 96.4 F | DIASTOLIC BLOOD PRESSURE: 80 MMHG | HEIGHT: 66 IN | SYSTOLIC BLOOD PRESSURE: 130 MMHG | HEART RATE: 64 BPM | RESPIRATION RATE: 20 BRPM | BODY MASS INDEX: 35.56 KG/M2

## 2020-12-10 DIAGNOSIS — Z00.00 ENCOUNTER FOR HEALTH MAINTENANCE EXAMINATION IN ADULT: ICD-10-CM

## 2020-12-10 DIAGNOSIS — K63.5 BENIGN COLONIC POLYP: ICD-10-CM

## 2020-12-10 DIAGNOSIS — E03.9 ACQUIRED HYPOTHYROIDISM: ICD-10-CM

## 2020-12-10 DIAGNOSIS — M15.9 PRIMARY OSTEOARTHRITIS INVOLVING MULTIPLE JOINTS: ICD-10-CM

## 2020-12-10 DIAGNOSIS — E78.1 ESSENTIAL HYPERTRIGLYCERIDEMIA: ICD-10-CM

## 2020-12-10 DIAGNOSIS — E11.9 TYPE 2 DIABETES MELLITUS WITHOUT COMPLICATION, WITHOUT LONG-TERM CURRENT USE OF INSULIN (HCC): ICD-10-CM

## 2020-12-10 DIAGNOSIS — Z00.00 MEDICARE ANNUAL WELLNESS VISIT, SUBSEQUENT: Primary | ICD-10-CM

## 2020-12-10 DIAGNOSIS — I10 ESSENTIAL HYPERTENSION: ICD-10-CM

## 2020-12-10 LAB
ALBUMIN SERPL-MCNC: 4.3 G/DL (ref 3.5–5.2)
ALBUMIN/GLOB SERPL: 1.3 G/DL
ALP SERPL-CCNC: 132 U/L (ref 39–117)
ALT SERPL W P-5'-P-CCNC: 16 U/L (ref 1–33)
ANION GAP SERPL CALCULATED.3IONS-SCNC: 10.7 MMOL/L (ref 5–15)
AST SERPL-CCNC: 16 U/L (ref 1–32)
BASOPHILS # BLD AUTO: 0.04 10*3/MM3 (ref 0–0.2)
BASOPHILS NFR BLD AUTO: 0.6 % (ref 0–1.5)
BILIRUB SERPL-MCNC: 0.2 MG/DL (ref 0–1.2)
BUN SERPL-MCNC: 13 MG/DL (ref 8–23)
BUN/CREAT SERPL: 15.5 (ref 7–25)
CALCIUM SPEC-SCNC: 9.4 MG/DL (ref 8.6–10.5)
CHLORIDE SERPL-SCNC: 106 MMOL/L (ref 98–107)
CHOLEST SERPL-MCNC: 151 MG/DL (ref 0–200)
CO2 SERPL-SCNC: 26.3 MMOL/L (ref 22–29)
CREAT SERPL-MCNC: 0.84 MG/DL (ref 0.57–1)
DEPRECATED RDW RBC AUTO: 41.2 FL (ref 37–54)
EOSINOPHIL # BLD AUTO: 0.14 10*3/MM3 (ref 0–0.4)
EOSINOPHIL NFR BLD AUTO: 2.1 % (ref 0.3–6.2)
ERYTHROCYTE [DISTWIDTH] IN BLOOD BY AUTOMATED COUNT: 12.1 % (ref 12.3–15.4)
EXPIRATION DATE: NORMAL
GFR SERPL CREATININE-BSD FRML MDRD: 67 ML/MIN/1.73
GLOBULIN UR ELPH-MCNC: 3.4 GM/DL
GLUCOSE SERPL-MCNC: 102 MG/DL (ref 65–99)
HBA1C MFR BLD: 5.6 %
HCT VFR BLD AUTO: 42.8 % (ref 34–46.6)
HDLC SERPL-MCNC: 49 MG/DL (ref 40–60)
HGB BLD-MCNC: 14.6 G/DL (ref 12–15.9)
IMM GRANULOCYTES # BLD AUTO: 0.03 10*3/MM3 (ref 0–0.05)
IMM GRANULOCYTES NFR BLD AUTO: 0.4 % (ref 0–0.5)
LDLC SERPL CALC-MCNC: 84 MG/DL (ref 0–100)
LDLC/HDLC SERPL: 1.7 {RATIO}
LYMPHOCYTES # BLD AUTO: 1.76 10*3/MM3 (ref 0.7–3.1)
LYMPHOCYTES NFR BLD AUTO: 26 % (ref 19.6–45.3)
Lab: NORMAL
MCH RBC QN AUTO: 31.5 PG (ref 26.6–33)
MCHC RBC AUTO-ENTMCNC: 34.1 G/DL (ref 31.5–35.7)
MCV RBC AUTO: 92.4 FL (ref 79–97)
MONOCYTES # BLD AUTO: 0.42 10*3/MM3 (ref 0.1–0.9)
MONOCYTES NFR BLD AUTO: 6.2 % (ref 5–12)
NEUTROPHILS NFR BLD AUTO: 4.39 10*3/MM3 (ref 1.7–7)
NEUTROPHILS NFR BLD AUTO: 64.7 % (ref 42.7–76)
NRBC BLD AUTO-RTO: 0 /100 WBC (ref 0–0.2)
PLATELET # BLD AUTO: 269 10*3/MM3 (ref 140–450)
PMV BLD AUTO: 10.1 FL (ref 6–12)
POTASSIUM SERPL-SCNC: 4.7 MMOL/L (ref 3.5–5.2)
PROT SERPL-MCNC: 7.7 G/DL (ref 6–8.5)
RBC # BLD AUTO: 4.63 10*6/MM3 (ref 3.77–5.28)
SODIUM SERPL-SCNC: 143 MMOL/L (ref 136–145)
T4 FREE SERPL-MCNC: 1.29 NG/DL (ref 0.93–1.7)
TRIGL SERPL-MCNC: 94 MG/DL (ref 0–150)
TSH SERPL DL<=0.05 MIU/L-ACNC: 2.96 UIU/ML (ref 0.27–4.2)
VLDLC SERPL-MCNC: 18 MG/DL (ref 5–40)
WBC # BLD AUTO: 6.78 10*3/MM3 (ref 3.4–10.8)

## 2020-12-10 PROCEDURE — 85025 COMPLETE CBC W/AUTO DIFF WBC: CPT | Performed by: INTERNAL MEDICINE

## 2020-12-10 PROCEDURE — 83036 HEMOGLOBIN GLYCOSYLATED A1C: CPT | Performed by: INTERNAL MEDICINE

## 2020-12-10 PROCEDURE — 84439 ASSAY OF FREE THYROXINE: CPT | Performed by: INTERNAL MEDICINE

## 2020-12-10 PROCEDURE — 36415 COLL VENOUS BLD VENIPUNCTURE: CPT | Performed by: INTERNAL MEDICINE

## 2020-12-10 PROCEDURE — 80061 LIPID PANEL: CPT | Performed by: INTERNAL MEDICINE

## 2020-12-10 PROCEDURE — G0439 PPPS, SUBSEQ VISIT: HCPCS | Performed by: INTERNAL MEDICINE

## 2020-12-10 PROCEDURE — 84443 ASSAY THYROID STIM HORMONE: CPT | Performed by: INTERNAL MEDICINE

## 2020-12-10 PROCEDURE — 99397 PER PM REEVAL EST PAT 65+ YR: CPT | Performed by: INTERNAL MEDICINE

## 2020-12-10 PROCEDURE — 80053 COMPREHEN METABOLIC PANEL: CPT | Performed by: INTERNAL MEDICINE

## 2020-12-10 NOTE — PROGRESS NOTES
The ABCs of the Annual Wellness Visit  Subsequent Medicare Wellness Visit    Chief Complaint   Patient presents with   • Medicare Wellness-subsequent   • Annual Exam     fasting        Subjective   History of Present Illness:  Angelina Pimentel is a 70 y.o. female who presents for a Subsequent Medicare Wellness Visit.    HEALTH RISK ASSESSMENT    Recent Hospitalizations:  No hospitalization(s) within the last year.    Current Medical Providers:  Patient Care Team:  Leola Villa MD as PCP - General (Internal Medicine)  Leola Villa MD as PCP - Family Medicine  Kathie Arias MD as Consulting Physician (Dermatology)  Bc Red MD as Consulting Physician (Ophthalmology)  Louis Sebastian MD as Consulting Physician (Cardiology)    Smoking Status:  Social History     Tobacco Use   Smoking Status Never Smoker   Smokeless Tobacco Never Used       Alcohol Consumption:  Social History     Substance and Sexual Activity   Alcohol Use No    Comment: Never drank alcohol       Depression Screen:   PHQ-2/PHQ-9 Depression Screening 12/10/2020   Little interest or pleasure in doing things 0   Feeling down, depressed, or hopeless 0   Total Score 0       Fall Risk Screen:  STEADI Fall Risk Assessment was completed, and patient is at LOW risk for falls.Assessment completed on:12/10/2020    Health Habits and Functional and Cognitive Screening:  Functional & Cognitive Status 12/10/2020   Do you have difficulty preparing food and eating? No   Do you have difficulty bathing yourself, getting dressed or grooming yourself? No   Do you have difficulty using the toilet? No   Do you have difficulty moving around from place to place? No   Do you have trouble with steps or getting out of a bed or a chair? No   Current Diet Well Balanced Diet        Current Diet Comment unhealthy    Dental Exam Up to date   Eye Exam Up to date        Eye Exam Comment -   Exercise (times per week) 2 times per week        Exercise Frequency  Comment -   Current Exercise Activities Include (No Data)   Do you need help using the phone?  No   Are you deaf or do you have serious difficulty hearing?  No   Do you need help with transportation? No   Do you need help shopping? No   Do you need help preparing meals?  No   Do you need help with housework?  No   Do you need help with laundry? No   Do you need help taking your medications? No   Do you need help managing money? No   Do you ever drive or ride in a car without wearing a seat belt? No   Have you felt unusual stress, anger or loneliness in the last month? No   Who do you live with? Spouse   If you need help, do you have trouble finding someone available to you? No   Have you been bothered in the last four weeks by sexual problems? No   Do you have difficulty concentrating, remembering or making decisions? -         Does the patient have evidence of cognitive impairment? No    Asprin use counseling:Does not need ASA (and currently is not on it)    Age-appropriate Screening Schedule:  Refer to the list below for future screening recommendations based on patient's age, sex and/or medical conditions. Orders for these recommended tests are listed in the plan section. The patient has been provided with a written plan.    Health Maintenance   Topic Date Due   • URINE MICROALBUMIN  10/23/2020   • HEMOGLOBIN A1C  06/10/2021   • DIABETIC FOOT EXAM  06/15/2021   • DIABETIC EYE EXAM  07/06/2021   • LIPID PANEL  12/10/2021   • MAMMOGRAM  09/14/2022   • DXA SCAN  10/17/2022   • COLONOSCOPY  01/04/2023   • TDAP/TD VACCINES (3 - Td) 04/05/2027   • INFLUENZA VACCINE  Completed   • ZOSTER VACCINE  Completed          The following portions of the patient's history were reviewed and updated as appropriate: allergies, current medications, past family history, past medical history, past social history, past surgical history and problem list.    Outpatient Medications Prior to Visit   Medication Sig Dispense Refill   •  Cetirizine HCl (ZYRTEC ALLERGY) 10 MG capsule Zyrtec 10 mg tablet     • fluticasone (FLONASE) 50 MCG/ACT nasal spray 1 spray into each nostril As Needed for Rhinitis.     • glucose blood (ONE TOUCH ULTRA TEST) test strip Test once daily 100 each 11   • glucose blood test strip Daily.     • ibuprofen (ADVIL,MOTRIN) 100 MG/5ML suspension Take  by mouth Every 6 (Six) Hours As Needed for Mild Pain .     • Lancets (ONETOUCH ULTRASOFT) lancets USE ONE LANCET TO TEST DAILY 100 each 4   • levothyroxine (SYNTHROID, LEVOTHROID) 100 MCG tablet TAKE ONE TABLET BY MOUTH DAILY 90 tablet 3   • lisinopril (PRINIVIL,ZESTRIL) 20 MG tablet Take 1 tablet by mouth Daily. 90 tablet 3   • metFORMIN (GLUCOPHAGE) 500 MG tablet TAKE ONE TABLET BY MOUTH DAILY WITH BREAKFAST 90 tablet 0   • Multiple Vitamins-Minerals (CENTRUM SILVER ADULT 50+ PO) Take 1 tablet by mouth Daily.     • simvastatin (ZOCOR) 20 MG tablet Take 1 tablet by mouth Every Night. 90 tablet 2     No facility-administered medications prior to visit.        Patient Active Problem List   Diagnosis   • Diabetes mellitus (CMS/HCC)   • Benign colonic polyp   • Hypothyroidism   • Osteoarthritis   • Hypertension   • Allergic rhinitis   • Basal cell carcinoma of skin   • Essential hypertriglyceridemia   • Mitral valve prolapse syndrome   • Squamous cell carcinoma of skin   • Obstructive sleep apnea syndrome   • Menopause   • Hypertensive heart disease without heart failure   • Abnormal ECG   • Dyslipidemia       Advanced Care Planning:  ACP discussion was held with the patient during this visit. Patient does not have an advance directive, information provided.   ACP information provided on the AVS.      Review of Systems    Compared to one year ago, the patient feels her physical health is the same.  Compared to one year ago, the patient feels her mental health is the same.    Reviewed chart for potential of high risk medication in the elderly: yes  Reviewed chart for potential of  "harmful drug interactions in the elderly:yes    Objective         Vitals:    12/10/20 0817   BP: 130/80   BP Location: Right arm   Pulse: 64   Resp: 20   Temp: 96.4 °F (35.8 °C)   TempSrc: Temporal   Weight: 100 kg (221 lb 4 oz)   Height: 167.6 cm (66\")   PainSc: 0-No pain       Body mass index is 35.71 kg/m².  Discussed the patient's BMI with her. The BMI is above average; no BMI management plan is appropriate..  Finger Rub Hearing{Test (right ear):passed  Finger Rub Hearing{Test (left ear):passed     Physical Exam    Lab Results   Component Value Date    TRIG 94 12/10/2020    HDL 49 12/10/2020    LDL 84 12/10/2020    VLDL 18 12/10/2020    HGBA1C 5.6 12/10/2020        Assessment/Plan   Medicare Risks and Personalized Health Plan  CMS Preventative Services Quick Reference  Advance Directive Discussion  Depression/Dysphoria (screen)  Fall Risk (screen)  Obesity/Overweight     The above risks/problems have been discussed with the patient.  Pertinent information has been shared with the patient in the After Visit Summary.  Follow up plans and orders are seen below in the Assessment/Plan Section.    Diagnoses and all orders for this visit:    1. Medicare annual wellness visit, subsequent (Primary)    2. Encounter for health maintenance examination in adult  -     POC Glycosylated Hemoglobin (Hb A1C)  -     Cancel: POC Microalbumin  -     Cancel: POC Urinalysis Dipstick, Automated  -     TSH  -     T4, Free  -     CBC & Differential  -     Lipid Panel  -     Comprehensive Metabolic Panel  -     CBC Auto Differential    3. Type 2 diabetes mellitus without complication, without long-term current use of insulin (CMS/[facility])  -     POC Glycosylated Hemoglobin (Hb A1C)  -     Cancel: POC Microalbumin  -     Cancel: POC Urinalysis Dipstick, Automated  -     TSH  -     CBC & Differential  -     Lipid Panel  -     Comprehensive Metabolic Panel  -     CBC Auto Differential    4. Essential hypertension  -     Cancel: POC Urinalysis " Dipstick, Automated  -     TSH  -     CBC & Differential  -     Lipid Panel  -     Comprehensive Metabolic Panel  -     CBC Auto Differential    5. Essential hypertriglyceridemia  -     TSH  -     CBC & Differential  -     Lipid Panel  -     Comprehensive Metabolic Panel  -     CBC Auto Differential    6. Acquired hypothyroidism  -     TSH  -     T4, Free    7. Benign colonic polyp    8. Primary osteoarthritis involving multiple joints      Follow Up:  Return in about 6 months (around 6/10/2021) for Recheck Diabetes, fasting.     An After Visit Summary and PPPS were given to the patient.

## 2020-12-10 NOTE — PATIENT INSTRUCTIONS
Health Maintenance for Postmenopausal Women  Menopause is a normal process in which your ability to get pregnant comes to an end. This process happens slowly over many months or years, usually between the ages of 48 and 55. Menopause is complete when you have missed your menstrual periods for 12 months.  It is important to talk with your health care provider about some of the most common conditions that affect women after menopause (postmenopausal women). These include heart disease, cancer, and bone loss (osteoporosis). Adopting a healthy lifestyle and getting preventive care can help to promote your health and wellness. The actions you take can also lower your chances of developing some of these common conditions.  What should I know about menopause?  During menopause, you may get a number of symptoms, such as:  · Hot flashes. These can be moderate or severe.  · Night sweats.  · Decrease in sex drive.  · Mood swings.  · Headaches.  · Tiredness.  · Irritability.  · Memory problems.  · Insomnia.  Choosing to treat or not to treat these symptoms is a decision that you make with your health care provider.  Do I need hormone replacement therapy?  · Hormone replacement therapy is effective in treating symptoms that are caused by menopause, such as hot flashes and night sweats.  · Hormone replacement carries certain risks, especially as you become older. If you are thinking about using estrogen or estrogen with progestin, discuss the benefits and risks with your health care provider.  What is my risk for heart disease and stroke?  The risk of heart disease, heart attack, and stroke increases as you age. One of the causes may be a change in the body's hormones during menopause. This can affect how your body uses dietary fats, triglycerides, and cholesterol. Heart attack and stroke are medical emergencies. There are many things that you can do to help prevent heart disease and stroke.  Watch your blood pressure  · High  blood pressure causes heart disease and increases the risk of stroke. This is more likely to develop in people who have high blood pressure readings, are of  descent, or are overweight.  · Have your blood pressure checked:  ? Every 3-5 years if you are 18-39 years of age.  ? Every year if you are 40 years old or older.  Eat a healthy diet    · Eat a diet that includes plenty of vegetables, fruits, low-fat dairy products, and lean protein.  · Do not eat a lot of foods that are high in solid fats, added sugars, or sodium.  Get regular exercise  Get regular exercise. This is one of the most important things you can do for your health. Most adults should:  · Try to exercise for at least 150 minutes each week. The exercise should increase your heart rate and make you sweat (moderate-intensity exercise).  · Try to do strengthening exercises at least twice each week. Do these in addition to the moderate-intensity exercise.  · Spend less time sitting. Even light physical activity can be beneficial.  Other tips  · Work with your health care provider to achieve or maintain a healthy weight.  · Do not use any products that contain nicotine or tobacco, such as cigarettes, e-cigarettes, and chewing tobacco. If you need help quitting, ask your health care provider.  · Know your numbers. Ask your health care provider to check your cholesterol and your blood sugar (glucose). Continue to have your blood tested as directed by your health care provider.  Do I need screening for cancer?  Depending on your health history and family history, you may need to have cancer screening at different stages of your life. This may include screening for:  · Breast cancer.  · Cervical cancer.  · Lung cancer.  · Colorectal cancer.  What is my risk for osteoporosis?  After menopause, you may be at increased risk for osteoporosis. Osteoporosis is a condition in which bone destruction happens more quickly than new bone creation. To help prevent  osteoporosis or the bone fractures that can happen because of osteoporosis, you may take the following actions:  · If you are 19-50 years old, get at least 1,000 mg of calcium and at least 600 mg of vitamin D per day.  · If you are older than age 50 but younger than age 70, get at least 1,200 mg of calcium and at least 600 mg of vitamin D per day.  · If you are older than age 70, get at least 1,200 mg of calcium and at least 800 mg of vitamin D per day.  Smoking and drinking excessive alcohol increase the risk of osteoporosis. Eat foods that are rich in calcium and vitamin D, and do weight-bearing exercises several times each week as directed by your health care provider.  How does menopause affect my mental health?  Depression may occur at any age, but it is more common as you become older. Common symptoms of depression include:  · Low or sad mood.  · Changes in sleep patterns.  · Changes in appetite or eating patterns.  · Feeling an overall lack of motivation or enjoyment of activities that you previously enjoyed.  · Frequent crying spells.  Talk with your health care provider if you think that you are experiencing depression.  General instructions  See your health care provider for regular wellness exams and vaccines. This may include:  · Scheduling regular health, dental, and eye exams.  · Getting and maintaining your vaccines. These include:  ? Influenza vaccine. Get this vaccine each year before the flu season begins.  ? Pneumonia vaccine.  ? Shingles vaccine.  ? Tetanus, diphtheria, and pertussis (Tdap) booster vaccine.  Your health care provider may also recommend other immunizations.  Tell your health care provider if you have ever been abused or do not feel safe at home.  Summary  · Menopause is a normal process in which your ability to get pregnant comes to an end.  · This condition causes hot flashes, night sweats, decreased interest in sex, mood swings, headaches, or lack of sleep.  · Treatment for this  condition may include hormone replacement therapy.  · Take actions to keep yourself healthy, including exercising regularly, eating a healthy diet, watching your weight, and checking your blood pressure and blood sugar levels.  · Get screened for cancer and depression. Make sure that you are up to date with all your vaccines.  This information is not intended to replace advice given to you by your health care provider. Make sure you discuss any questions you have with your health care provider.  Document Revised: 12/11/2019 Document Reviewed: 12/11/2019  [employer] Patient Education © 2020 [employer]      Heart-Healthy Eating Plan  Many factors influence your heart (coronary) health, including eating and exercise habits. Coronary risk increases with abnormal blood fat (lipid) levels. Heart-healthy meal planning includes limiting unhealthy fats, increasing healthy fats, and making other diet and lifestyle changes.  What is my plan?  Your health care provider may recommend that you:  · Limit your fat intake to _________% or less of your total calories each day.  · Limit your saturated fat intake to _________% or less of your total calories each day.  · Limit the amount of cholesterol in your diet to less than _________ mg per day.  What are tips for following this plan?  Cooking  Cook foods using methods other than frying. Baking, boiling, grilling, and broiling are all good options. Other ways to reduce fat include:  · Removing the skin from poultry.  · Removing all visible fats from meats.  · Steaming vegetables in water or broth.  Meal planning    · At meals, imagine dividing your plate into fourths:  ? Fill one-half of your plate with vegetables and green salads.  ? Fill one-fourth of your plate with whole grains.  ? Fill one-fourth of your plate with lean protein foods.  · Eat 4-5 servings of vegetables per day. One serving equals 1 cup raw or cooked vegetable, or 2 cups raw leafy greens.  · Eat 4-5 servings of  fruit per day. One serving equals 1 medium whole fruit, ¼ cup dried fruit, ½ cup fresh, frozen, or canned fruit, or ½ cup 100% fruit juice.  · Eat more foods that contain soluble fiber. Examples include apples, broccoli, carrots, beans, peas, and barley. Aim to get 25-30 g of fiber per day.  · Increase your consumption of legumes, nuts, and seeds to 4-5 servings per week. One serving of dried beans or legumes equals ½ cup cooked, 1 serving of nuts is ¼ cup, and 1 serving of seeds equals 1 tablespoon.  Fats  · Choose healthy fats more often. Choose monounsaturated and polyunsaturated fats, such as olive and canola oils, flaxseeds, walnuts, almonds, and seeds.  · Eat more omega-3 fats. Choose salmon, mackerel, sardines, tuna, flaxseed oil, and ground flaxseeds. Aim to eat fish at least 2 times each week.  · Check food labels carefully to identify foods with trans fats or high amounts of saturated fat.  · Limit saturated fats. These are found in animal products, such as meats, butter, and cream. Plant sources of saturated fats include palm oil, palm kernel oil, and coconut oil.  · Avoid foods with partially hydrogenated oils in them. These contain trans fats. Examples are stick margarine, some tub margarines, cookies, crackers, and other baked goods.  · Avoid fried foods.  General information  · Eat more home-cooked food and less restaurant, buffet, and fast food.  · Limit or avoid alcohol.  · Limit foods that are high in starch and sugar.  · Lose weight if you are overweight. Losing just 5-10% of your body weight can help your overall health and prevent diseases such as diabetes and heart disease.  · Monitor your salt (sodium) intake, especially if you have high blood pressure. Talk with your health care provider about your sodium intake.  · Try to incorporate more vegetarian meals weekly.  What foods can I eat?  Fruits  All fresh, canned (in natural juice), or frozen fruits.  Vegetables  Fresh or frozen vegetables  (raw, steamed, roasted, or grilled). Green salads.  Grains  Most grains. Choose whole wheat and whole grains most of the time. Rice and pasta, including brown rice and pastas made with whole wheat.  Meats and other proteins  Lean, well-trimmed beef, veal, pork, and lamb. Chicken and turkey without skin. All fish and shellfish. Wild duck, rabbit, pheasant, and venison. Egg whites or low-cholesterol egg substitutes. Dried beans, peas, lentils, and tofu. Seeds and most nuts.  Dairy  Low-fat or nonfat cheeses, including ricotta and mozzarella. Skim or 1% milk (liquid, powdered, or evaporated). Buttermilk made with low-fat milk. Nonfat or low-fat yogurt.  Fats and oils  Non-hydrogenated (trans-free) margarines. Vegetable oils, including soybean, sesame, sunflower, olive, peanut, safflower, corn, canola, and cottonseed. Salad dressings or mayonnaise made with a vegetable oil.  Beverages  Water (mineral or sparkling). Coffee and tea. Diet carbonated beverages.  Sweets and desserts  Sherbet, gelatin, and fruit ice. Small amounts of dark chocolate.  Limit all sweets and desserts.  Seasonings and condiments  All seasonings and condiments.  The items listed above may not be a complete list of foods and beverages you can eat. Contact a dietitian for more options.  What foods are not recommended?  Fruits  Canned fruit in heavy syrup. Fruit in cream or butter sauce. Fried fruit. Limit coconut.  Vegetables  Vegetables cooked in cheese, cream, or butter sauce. Fried vegetables.  Grains  Breads made with saturated or trans fats, oils, or whole milk. Croissants. Sweet rolls. Donuts. High-fat crackers, such as cheese crackers.  Meats and other proteins  Fatty meats, such as hot dogs, ribs, sausage, parisi, rib-eye roast or steak. High-fat deli meats, such as salami and bologna. Caviar. Domestic duck and goose. Organ meats, such as liver.  Dairy  Cream, sour cream, cream cheese, and creamed cottage cheese. Whole milk cheeses. Whole or  2% milk (liquid, evaporated, or condensed). Whole buttermilk. Cream sauce or high-fat cheese sauce. Whole-milk yogurt.  Fats and oils  Meat fat, or shortening. Cocoa butter, hydrogenated oils, palm oil, coconut oil, palm kernel oil. Solid fats and shortenings, including parisi fat, salt pork, lard, and butter. Nondairy cream substitutes. Salad dressings with cheese or sour cream.  Beverages  Regular sodas and any drinks with added sugar.  Sweets and desserts  Frosting. Pudding. Cookies. Cakes. Pies. Milk chocolate or white chocolate. Buttered syrups. Full-fat ice cream or ice cream drinks.  The items listed above may not be a complete list of foods and beverages to avoid. Contact a dietitian for more information.  Summary  · Heart-healthy meal planning includes limiting unhealthy fats, increasing healthy fats, and making other diet and lifestyle changes.  · Lose weight if you are overweight. Losing just 5-10% of your body weight can help your overall health and prevent diseases such as diabetes and heart disease.  · Focus on eating a balance of foods, including fruits and vegetables, low-fat or nonfat dairy, lean protein, nuts and legumes, whole grains, and heart-healthy oils and fats.  This information is not intended to replace advice given to you by your health care provider. Make sure you discuss any questions you have with your health care provider.  Document Revised: 01/25/2019 Document Reviewed: 01/25/2019  [employer] Patient Education © 2020 [employer]      Exercising to Lose Weight  Exercise is structured, repetitive physical activity to improve fitness and health. Getting regular exercise is important for everyone. It is especially important if you are overweight. Being overweight increases your risk of heart disease, stroke, diabetes, high blood pressure, and several types of cancer. Reducing your calorie intake and exercising can help you lose weight.  Exercise is usually categorized as moderate or  vigorous intensity. To lose weight, most people need to do a certain amount of moderate-intensity or vigorous-intensity exercise each week.  Moderate-intensity exercise    Moderate-intensity exercise is any activity that gets you moving enough to burn at least three times more energy (calories) than if you were sitting.  Examples of moderate exercise include:  · Walking a mile in 15 minutes.  · Doing light yard work.  · Biking at an easy pace.  Most people should get at least 150 minutes (2 hours and 30 minutes) a week of moderate-intensity exercise to maintain their body weight.  Vigorous-intensity exercise  Vigorous-intensity exercise is any activity that gets you moving enough to burn at least six times more calories than if you were sitting. When you exercise at this intensity, you should be working hard enough that you are not able to carry on a conversation.  Examples of vigorous exercise include:  · Running.  · Playing a team sport, such as football, basketball, and soccer.  · Jumping rope.  Most people should get at least 75 minutes (1 hour and 15 minutes) a week of vigorous-intensity exercise to maintain their body weight.  How can exercise affect me?  When you exercise enough to burn more calories than you eat, you lose weight. Exercise also reduces body fat and builds muscle. The more muscle you have, the more calories you burn. Exercise also:  · Improves mood.  · Reduces stress and tension.  · Improves your overall fitness, flexibility, and endurance.  · Increases bone strength.  The amount of exercise you need to lose weight depends on:  · Your age.  · The type of exercise.  · Any health conditions you have.  · Your overall physical ability.  Talk to your health care provider about how much exercise you need and what types of activities are safe for you.  What actions can I take to lose weight?  Nutrition    · Make changes to your diet as told by your health care provider or diet and nutrition  specialist (dietitian). This may include:  ? Eating fewer calories.  ? Eating more protein.  ? Eating less unhealthy fats.  ? Eating a diet that includes fresh fruits and vegetables, whole grains, low-fat dairy products, and lean protein.  ? Avoiding foods with added fat, salt, and sugar.  · Drink plenty of water while you exercise to prevent dehydration or heat stroke.  Activity  · Choose an activity that you enjoy and set realistic goals. Your health care provider can help you make an exercise plan that works for you.  · Exercise at a moderate or vigorous intensity most days of the week.  ? The intensity of exercise may vary from person to person. You can tell how intense a workout is for you by paying attention to your breathing and heartbeat. Most people will notice their breathing and heartbeat get faster with more intense exercise.  · Do resistance training twice each week, such as:  ? Push-ups.  ? Sit-ups.  ? Lifting weights.  ? Using resistance bands.  · Getting short amounts of exercise can be just as helpful as long structured periods of exercise. If you have trouble finding time to exercise, try to include exercise in your daily routine.  ? Get up, stretch, and walk around every 30 minutes throughout the day.  ? Go for a walk during your lunch break.  ? Park your car farther away from your destination.  ? If you take public transportation, get off one stop early and walk the rest of the way.  ? Make phone calls while standing up and walking around.  ? Take the stairs instead of elevators or escalators.  · Wear comfortable clothes and shoes with good support.  · Do not exercise so much that you hurt yourself, feel dizzy, or get very short of breath.  Where to find more information  · [place] Department of Health and Human Services: www.hhs.gov  · Centers for Disease Control and Prevention (CDC): www.cdc.gov  Contact a health care provider:  · Before starting a new exercise program.  · If you have questions or  concerns about your weight.  · If you have a medical problem that keeps you from exercising.  Get help right away if you have any of the following while exercising:  · Injury.  · Dizziness.  · Difficulty breathing or shortness of breath that does not go away when you stop exercising.  · Chest pain.  · Rapid heartbeat.  Summary  · Being overweight increases your risk of heart disease, stroke, diabetes, high blood pressure, and several types of cancer.  · Losing weight happens when you burn more calories than you eat.  · Reducing the amount of calories you eat in addition to getting regular moderate or vigorous exercise each week helps you lose weight.  This information is not intended to replace advice given to you by your health care provider. Make sure you discuss any questions you have with your health care provider.  Document Revised: 12/31/2018 Document Reviewed: 12/31/2018  [employer] Patient Education © 2020 [employer]

## 2020-12-10 NOTE — PROGRESS NOTES
Subjective     Chief Complaint:  Physical Exam.    History of Present Illness    History obtained from the patient.     Primary Care Cardiac Diagnostic Constellation: The patient is here today for a 6 month follow-up visit.       Her Diabetes Mellitus type 2 has been stable.   Medication(s): Metformin, Simvastatin, and Lisinopril.   Her Hypertension has been stable.   Medication(s): Lisinopril.   Her Hyperlipidemia has been stable.   Her LDL goal is < 70 mg/dL and last LDL was 61.   Medication(s): Simvastiatin.   The patient is adherent with her medication regimen. She denies medication side effects.       Interval Events: Last Hemoglobin A1c on 6/10/20 was 6.1.  Blood sugar at home has been 100-120, fasting.  She does not check postprandial levels.  She denies episodes of low blood sugar.  She has not been checking blood pressure at home.  Last Ophthalmology visit was 7/6/20, no retinopathy (Dr. Red).  She states she checks her feet daily.       Symptoms: Denies chest pain, dyspnea, FLORES, orthopnea, PND, palpitations, syncope, lower extremity edema, claudication, lightheadedness, and dizziness.   Associated Symptoms: Weight increased 3 pounds since last visit.  No fatigue, headache, polydipsia, polyuria, myalgias, arthralgias, visual impairment, memory loss, concentration problems, or focal neurologic deficits. Denies numbness / tingling of the feet, foot pain, and a foot ulcer,      Lifestyle and Disease Management: Diet: The patient has been consuming a diverse and healthy diet (Weight Watchers), except increased sweets recently. Weight Issues: She has weight concerns. Exercise:  She goes to the gym twice per week (cardio and weights).  Tobacco Use: Never a smoker.      Hypothyroidism Follow-Up: The patient is being seen for follow-up of Hypothyroidism, which is stable.  Interval Events:  TSH and T4 were normal 10/23/19.  Symptoms:  Weight increased 3 pounds since last visit.  Denies diarre,  constipation heat/cold intolerance, fatigue, weakness, memory loss,  trouble concentrating, hair loss, and dry skin.   Associated Symptoms: no arthralgias, myalgias, or paresthesias.   Medications:  Levothyroxine (Synthroid).    The patient is adherent to her medication regimen, and she denies medication side effects.      Colonic Polyp Follow-up: The patient is being seen for a routine clinic follow-up of Colon Polyp(s), which is stable.   Interval Events:  Current diagnosis was determined by Colonoscopy and last 1/4/18- no polyps  Symptoms: no abdominal pain, diarrhea, constipation, hematochezia, melena, decreased stool caliber, or change in bowel habits.  Associated Symptoms: no rectal prolapse.   Medication:  None.         Osteoarthritis Follow-Up: The patient is being seen for a routine clinic follow-up of Osteoarthritis, which has been stable.   She has no complications from Osteoarthritis.   The patient's Osteoarthritis has not resulted in physical disability.   Interval Events:  None.  Activities: no limitations.   Symptoms:  No arthralgias, back pain, neck pain, hip pain, shoulder pain, hand pain, or knee pain.  Associated Symptoms: No joint swelling or joint stiffness.   Medications:  Ibuprofen prn.         Angelina Pimentel is a 70 y.o. female who presents for an Annual Physical.      PMH, PSH, SocHx, FamHx, Allergies, and Medications: Reviewed and updated.    Outpatient Medications Prior to Visit   Medication Sig Dispense Refill   • Cetirizine HCl (ZYRTEC ALLERGY) 10 MG capsule Zyrtec 10 mg tablet     • fluticasone (FLONASE) 50 MCG/ACT nasal spray 1 spray into each nostril As Needed for Rhinitis.     • glucose blood (ONE TOUCH ULTRA TEST) test strip Test once daily 100 each 11   • glucose blood test strip Daily.     • ibuprofen (ADVIL,MOTRIN) 100 MG/5ML suspension Take  by mouth Every 6 (Six) Hours As Needed for Mild Pain .     • Lancets (ONETOUCH ULTRASOFT) lancets USE ONE LANCET TO TEST DAILY 100  each 4   • levothyroxine (SYNTHROID, LEVOTHROID) 100 MCG tablet TAKE ONE TABLET BY MOUTH DAILY 90 tablet 3   • lisinopril (PRINIVIL,ZESTRIL) 20 MG tablet Take 1 tablet by mouth Daily. 90 tablet 3   • metFORMIN (GLUCOPHAGE) 500 MG tablet TAKE ONE TABLET BY MOUTH DAILY WITH BREAKFAST 90 tablet 0   • Multiple Vitamins-Minerals (CENTRUM SILVER ADULT 50+ PO) Take 1 tablet by mouth Daily.     • simvastatin (ZOCOR) 20 MG tablet Take 1 tablet by mouth Every Night. 90 tablet 2     No facility-administered medications prior to visit.        Immunization History   Administered Date(s) Administered   • FLUAD TRI 65YR+ 10/11/2019   • Fluad Quad 65+ 08/31/2020   • Fluzone High Dose =>65 Years ([employer] ONLY) 10/14/2015, 10/10/2016, 10/11/2017, 08/30/2018   • Hepatitis A 06/01/1998, 01/01/1999   • Pneumococcal Conjugate 13-Valent (PCV13) 11/24/2014   • Pneumococcal Polysaccharide (PPSV23) 09/20/2010, 03/25/2016   • Shingrix 05/30/2018, 08/30/2018   • Td 04/05/2017   • Tdap 02/25/2009   • Zostavax 05/21/2010         Patient Active Problem List   Diagnosis   • Diabetes mellitus (CMS/HCC)   • Benign colonic polyp   • Hypothyroidism   • Osteoarthritis   • Hypertension   • Allergic rhinitis   • Basal cell carcinoma of skin   • Essential hypertriglyceridemia   • Mitral valve prolapse syndrome   • Squamous cell carcinoma of skin   • Obstructive sleep apnea syndrome   • Menopause   • Hypertensive heart disease without heart failure   • Abnormal ECG   • Dyslipidemia       Health Habits:  Dental Exam. up to date  Eye Exam. up to date  Hearing Loss:  [place]  Exercise: 2 times/week.  Current exercise activities include: cardiovascular workout on exercise equipment and weightlifting  Diet: Healthy overall  Multivitamin: Yes    Safe Driving:  Yes  Seat Belt:  Yes  Bike Helmet:  N/A  Skin Screening:  Yes  Sunscreen: Yes  SBE / VIGNESH: No  Sexual Activity:  Yes  Birth Control:  Menopause  STD Prevention:  N/A    Last Pap: 2008, normal per patient report  (MONICA/BSO)  Last Mammogram:  9/14/20, cat 2  Last DEXA Scan: 10/17/17, normal  Last Colonoscopy: 1/4/18, diverticulosis, no polyp  Last PSA: N/A    Social:    Social History     Socioeconomic History   • Marital status:      Spouse name: Not on file   • Number of children: 2   • Years of education: Not on file   • Highest education level: Not on file   Occupational History   • Occupation: Retired   Tobacco Use   • Smoking status: Never Smoker   • Smokeless tobacco: Never Used   Substance and Sexual Activity   • Alcohol use: No     Comment: Never drank alcohol   • Drug use: No   • Sexual activity: Yes     Partners: Male     Birth control/protection: Post-menopausal         Current Medical Providers:    Leola Villa MD (Internal Medicine / Pediatrics)    The [facility] providers who are involved in the care of this patient are listed above.         Review of Systems   Constitutional: Negative for chills, fatigue, fever and unexpected weight change.        No night sweats.    HENT: Negative for congestion, ear pain, hearing loss, nosebleeds, postnasal drip, rhinorrhea, sinus pressure, sinus pain, sneezing, sore throat, tinnitus and voice change.         Denies snoring.   Eyes: Positive for itching. Negative for photophobia, pain, discharge, redness and visual disturbance.   Respiratory: Negative for cough, chest tightness, shortness of breath, wheezing and stridor.         No chest congestion.  No hemoptysis.   Cardiovascular: Negative for chest pain, palpitations and leg swelling.        No orthopnea, FLORES, or PND.  No claudication or syncope.   Gastrointestinal: Negative for abdominal pain, blood in stool, constipation, diarrhea, nausea, rectal pain and vomiting.        No melena.  No hematemesis.  No heartburn, dysphagia or odynophagia.  No early satiety, belching, or bloating.    Endocrine: Negative for cold intolerance, heat intolerance, polydipsia, polyphagia and polyuria.        No hair loss or dry  "skin.  No hot flashes.     Genitourinary: Negative for difficulty urinating, dyspareunia, dysuria, flank pain, frequency, hematuria, pelvic pain, urgency, vaginal bleeding and vaginal discharge.        No nocturia, incomplete emptying, or incontinence.   Musculoskeletal: Negative for arthralgias, back pain, gait problem, joint swelling, myalgias, neck pain and neck stiffness.        No joint stiffness.   Skin: Negative for rash.        No new skin lesions or changes in skin lesions. No breast pain or masses.  No nipple discharge or nipple inversion.   Neurological: Negative for dizziness, tremors, syncope, speech difficulty, weakness, light-headedness, numbness and headaches.        No tingling.  No memory loss.  No decreased concentration.   Hematological: Negative for adenopathy. Does not bruise/bleed easily.   Psychiatric/Behavioral: Negative for confusion, sleep disturbance and suicidal ideas. The patient is not nervous/anxious.         No depression.           Objective     Vitals:    12/10/20 0817   BP: 130/80   BP Location: Right arm   Pulse: 64   Resp: 20   Temp: 96.4 °F (35.8 °C)   TempSrc: Temporal   Weight: 100 kg (221 lb 4 oz)   Height: 167.6 cm (66\")   PainSc: 0-No pain       Body mass index is 35.71 kg/m².    Physical Exam  Vitals signs and nursing note reviewed.   Constitutional:       Appearance: Normal appearance. She is well-developed. She is obese.   HENT:      Head: Normocephalic and atraumatic.      Right Ear: Tympanic membrane, ear canal and external ear normal.      Left Ear: Tympanic membrane, ear canal and external ear normal.      Mouth/Throat:      Mouth: Mucous membranes are moist. No oral lesions.      Pharynx: Oropharynx is clear.      Comments: Tonsils absent.  Eyes:      Extraocular Movements: Extraocular movements intact.      Conjunctiva/sclera: Conjunctivae normal.      Pupils: Pupils are equal, round, and reactive to light.      Comments: Fundi normal bilaterally.   Neck:      " Musculoskeletal: Normal range of motion and neck supple.      Thyroid: No thyromegaly.      Vascular: No carotid bruit.   Cardiovascular:      Rate and Rhythm: Normal rate and regular rhythm.      Pulses: Normal pulses.      Heart sounds: Normal heart sounds. No murmur. No friction rub. No gallop.    Pulmonary:      Effort: Pulmonary effort is normal.      Breath sounds: Normal breath sounds.   Chest:      Breasts:         Right: No inverted nipple, mass, nipple discharge, skin change or tenderness.         Left: No inverted nipple, mass, nipple discharge, skin change or tenderness.   Abdominal:      General: Bowel sounds are normal. There is no distension or abdominal bruit.      Palpations: Abdomen is soft. There is no hepatomegaly, splenomegaly or mass.      Tenderness: There is no abdominal tenderness.   Genitourinary:     Comments:  and rectal exam deferred.  Musculoskeletal: Normal range of motion.      Right lower leg: No edema.      Left lower leg: No edema.   Lymphadenopathy:      Cervical: No cervical adenopathy.      Upper Body:      Right upper body: No supraclavicular or axillary adenopathy.      Left upper body: No supraclavicular or axillary adenopathy.   Skin:     Findings: No rash.      Comments: No atypical skin lesions.   Neurological:      Mental Status: She is alert.      Cranial Nerves: Cranial nerves are intact.      Motor: Motor function is intact. No abnormal muscle tone.      Coordination: Coordination is intact.      Gait: Gait is intact.      Deep Tendon Reflexes: Reflexes are normal and symmetric.   Psychiatric:         Mood and Affect: Mood normal.         PHQ-2 Depression Screening  Little interest or pleasure in doing things? 0   Feeling down, depressed, or hopeless? 0   PHQ-2 Total Score 0         Counseling was given to patient for the following topics:  appropriate exercise, healthy eating habits, disease prevention, risk factors for cancer, importance of self breast exam and  breast health, importance of immunizations, including risks and benefits, bone health, sun safety, seatbelt use and safe driving. Also discussed the importance of regular dental and vision care, as well recommendation for a yearly screening skin exam after age 40.  Written information provided to patient on these topics and other health maintenance issues.    Results for orders placed or performed in visit on 12/10/20   POC Glycosylated Hemoglobin (Hb A1C)    Specimen: Blood   Result Value Ref Range    Hemoglobin A1C 5.6 %    Lot Number [id number]     Expiration Date 9/1/22        Assessment/Plan       Diagnoses and all orders for this visit:    1. Medicare annual wellness visit, subsequent (Primary)    2. Encounter for health maintenance examination in adult  -     POC Glycosylated Hemoglobin (Hb A1C)  -     Cancel: POC Microalbumin (patient unable to void)  -     Cancel: POC Urinalysis Dipstick, Automated (patient unable to void)  -     TSH  -     T4, Free  -     CBC & Differential  -     Lipid Panel  -     Comprehensive Metabolic Panel  -     CBC Auto Differential    3. Type 2 diabetes mellitus without complication, without long-term current use of insulin (CMS/[facility])  -     POC Glycosylated Hemoglobin (Hb A1C)  -     Cancel: POC Microalbumin (patient unable to void)  -     Cancel: POC Urinalysis Dipstick, Automated (patient unable to void)  -     TSH  -     CBC & Differential  -     Lipid Panel  -     Comprehensive Metabolic Panel  -     CBC Auto Differential   Continue current medication(s) as noted in the history of present illness.    4. Essential hypertension  -     Cancel: POC Urinalysis Dipstick, Automated (patient unable to void)  -     TSH  -     CBC & Differential  -     Lipid Panel  -     Comprehensive Metabolic Panel  -     CBC Auto Differential   Continue current medication(s) as noted in the history of present illness.    5. Essential hypertriglyceridemia  -     TSH  -     CBC & Differential  -      Lipid Panel  -     Comprehensive Metabolic Panel  -     CBC Auto Differential   Continue current medication(s) as noted in the history of present illness.    6. Acquired hypothyroidism  -     TSH  -     T4, Free   Continue current medication(s) as noted in the history of present illness.    7. Benign colonic polyp   Colonoscopy up-to-date.    8. Primary osteoarthritis involving multiple joints   Continue current medication(s) as noted in the history of present illness.        Return in about 6 months (around 6/10/2021) for Recheck Diabetes, fasting.

## 2020-12-12 DIAGNOSIS — E78.1 ESSENTIAL HYPERTRIGLYCERIDEMIA: Primary | Chronic | ICD-10-CM

## 2020-12-14 RX ORDER — SIMVASTATIN 20 MG
TABLET ORAL
Qty: 90 TABLET | Refills: 3 | Status: SHIPPED | OUTPATIENT
Start: 2020-12-14 | End: 2021-12-13

## 2020-12-14 NOTE — TELEPHONE ENCOUNTER
Last Office Visit: 12/10/2020  Next Office Visit: 6/10/2021    Labs completed in past 6 months? yes  Labs completed in past year? yes    Last Refill Date: 3/16/2020  Quantity: 90  Refills: 2    Pharmacy: [facility] [phone] PH - [phone]

## 2021-02-12 DIAGNOSIS — E11.9 TYPE 2 DIABETES MELLITUS WITHOUT COMPLICATION, WITHOUT LONG-TERM CURRENT USE OF INSULIN (HCC): ICD-10-CM

## 2021-02-27 DIAGNOSIS — E11.9 TYPE 2 DIABETES MELLITUS WITHOUT COMPLICATION, WITHOUT LONG-TERM CURRENT USE OF INSULIN (HCC): Primary | ICD-10-CM

## 2021-03-01 RX ORDER — LISINOPRIL 20 MG/1
TABLET ORAL
Qty: 90 TABLET | Refills: 2 | Status: SHIPPED | OUTPATIENT
Start: 2021-03-01 | End: 2021-11-19

## 2021-03-01 RX ORDER — BLOOD SUGAR DIAGNOSTIC
STRIP MISCELLANEOUS
Qty: 100 EACH | Refills: 3 | Status: SHIPPED | OUTPATIENT
Start: 2021-03-01 | End: 2022-03-04

## 2021-03-03 ENCOUNTER — OFFICE VISIT (OUTPATIENT)
Dept: CARDIOLOGY | Facility: CLINIC | Age: 71
End: 2021-03-03

## 2021-03-03 VITALS
WEIGHT: 224.8 LBS | BODY MASS INDEX: 36.13 KG/M2 | SYSTOLIC BLOOD PRESSURE: 132 MMHG | HEIGHT: 66 IN | HEART RATE: 72 BPM | OXYGEN SATURATION: 97 % | DIASTOLIC BLOOD PRESSURE: 80 MMHG

## 2021-03-03 DIAGNOSIS — I11.9 HYPERTENSIVE HEART DISEASE WITHOUT HEART FAILURE: Primary | ICD-10-CM

## 2021-03-03 DIAGNOSIS — I34.1 MITRAL VALVE PROLAPSE SYNDROME: ICD-10-CM

## 2021-03-03 DIAGNOSIS — E78.5 DYSLIPIDEMIA: ICD-10-CM

## 2021-03-03 DIAGNOSIS — E11.9 TYPE 2 DIABETES MELLITUS WITHOUT COMPLICATION, WITHOUT LONG-TERM CURRENT USE OF INSULIN (HCC): ICD-10-CM

## 2021-03-03 DIAGNOSIS — I10 ESSENTIAL HYPERTENSION: ICD-10-CM

## 2021-03-03 PROCEDURE — 99214 OFFICE O/P EST MOD 30 MIN: CPT | Performed by: INTERNAL MEDICINE

## 2021-03-03 NOTE — PROGRESS NOTES
Subjective:     Encounter Date:03/03/2021    Patient ID: Angelina Pimentel is a 70 y.o.  white female from [place], [place], a retired  at [employer].     INTERNIST: Leola Villa MD  GENERAL SURGEON: Barbara Zaldivar MD  COLORECTAL SURGEON:  Don Ramos MD  NEUROLOGIST/SLEEP PHYSICIAN: Almas Henao MD    Chief Complaint:   Chief Complaint   Patient presents with   • Essential hypertension   • Hypertensive heart disease without heart failure       Problem List:  1. Abnormal EKG/hypertensive cardiovascular disease:  a. Remote stress test for left arm numbness before 2008; negative per patient-data deficit  b. CT cardiac calcium score was 0 on 10/17/2017  c. Residual class I symptoms with echocardiogram demonstrating mild LVH and diastolic relaxation abnormality, no PE, valvulopathy, or pulmonary artery hypertension, April 2018  d. Residual class I symptoms, February 2020, March 2021  2. Hypertension  3. Hyperlipidemia  4. Type 2 diabetes mellitus, diagnosed in 2010; last hemoglobin A1c was 5.5%, March 2017, 5.6% December 2020  5. History of mitral valve prolapse  6. Hypothyroidism after radiation for thyroid nodule/goiter  7. Moderate obesity: BMI 36.28  8. Benign positional vertigo, December 2019, with right CRP  9. Transient global amnesia with acceptable brain MRI, May 2019  10. Surgical history:  a. Left lumpectomy: Benign  b. Colonoscopy  c. MONICA  d. Cataracts  e. Tonsillectomy  f. Eyelid reduction    Allergies   Allergen Reactions   • Cefdinir Other (See Comments)     Cefdinir SUSR; Adverse Reaction; Abdominal pain   • Penicillins Rash   • Sulfa Antibiotics Rash     Sulfa Drugs       Current Outpatient Medications:   •  Cetirizine HCl (ZYRTEC ALLERGY) 10 MG capsule, Zyrtec 10 mg tablet, Disp: , Rfl:   •  fluticasone (FLONASE) 50 MCG/ACT nasal spray, 1 spray into each nostril As Needed for Rhinitis., Disp: , Rfl:   •  glucose blood test strip, Daily., Disp: ,  Rfl:   •  ibuprofen (ADVIL,MOTRIN) 100 MG/5ML suspension, Take  by mouth Every 6 (Six) Hours As Needed for Mild Pain ., Disp: , Rfl:   •  Lancets (ONETOUCH ULTRASOFT) lancets, USE ONE LANCET TO TEST DAILY, Disp: 100 each, Rfl: 4  •  levothyroxine (SYNTHROID, LEVOTHROID) 100 MCG tablet, TAKE ONE TABLET BY MOUTH DAILY, Disp: 90 tablet, Rfl: 3  •  lisinopril (PRINIVIL,ZESTRIL) 20 MG tablet, TAKE ONE TABLET BY MOUTH DAILY, Disp: 90 tablet, Rfl: 2  •  metFORMIN (GLUCOPHAGE) 500 MG tablet, TAKE ONE TABLET BY MOUTH DAILY WITH BREAKFAST, Disp: 90 tablet, Rfl: 3  •  Multiple Vitamins-Minerals (CENTRUM SILVER ADULT 50+ PO), Take 1 tablet by mouth Daily., Disp: , Rfl:   •  OneTouch Ultra test strip, USE ONE STRIP TO TEST DAILY, Disp: 100 each, Rfl: 3  •  simvastatin (ZOCOR) 20 MG tablet, TAKE ONE TABLET BY MOUTH ONCE NIGHTLY, Disp: 90 tablet, Rfl: 3    History of Present Illness: Patient returns for scheduled 12-month follow up. She has been feeling well overall from a cardiovascular standpoint. Patient denies chest pain, shortness of breath, palpitations, edema, dizziness, and syncope. She has had no interim ER visits, hospitalizations, serious illnesses, or surgeries. She participates in exercise and strength training with a  two days a week and she is able to carry out her daily activities without cardiopulmonary limitations. She does not monitor her blood pressure at home but reports it was normal at her most recent visit with Dr. Villa. She has had both doses of her COVID immunizations. Her daughter has returned to teaching and both she and her son had COVID.         ROS   Obtained and negative except as outlined in problem list and HPI.    Procedures       Objective:       Vitals:    03/03/21 0849 03/03/21 0851 03/03/21 0901   BP: 141/75 152/76 132/80   BP Location: Left arm Left arm Right arm   Patient Position: Sitting Standing Sitting   Pulse: 67 72    SpO2: 97%     Weight: 102 kg (224 lb 12.8 oz)     Height:  "167.6 cm (66\")       Body mass index is 36.28 kg/m².  Last weight: 230 lbs    Vitals signs reviewed.   Constitutional:       Appearance: Well-developed.   Neck:      Thyroid: No thyromegaly.      Vascular: No carotid bruit or JVD.      Lymphadenopathy: No cervical adenopathy.   Pulmonary:      Effort: Pulmonary effort is normal.      Breath sounds: Normal breath sounds. No wheezing. No rhonchi. No rales.   Cardiovascular:      Regular rhythm.      Murmurs: There is a grade 1/6 mid frequency early systolic murmur at the LLSB.      No gallop. No S3 gallop.   Pulses:     Dorsalis pedis: 2+ bilaterally.     Posterior tibial: 2+ bilaterally.  Edema:     Peripheral edema absent.   Abdominal:      Palpations: Abdomen is soft. There is no abdominal mass.      Tenderness: There is no abdominal tenderness. There is no guarding.   Musculoskeletal: Normal range of motion.   Skin:     General: Skin is warm and dry.      Findings: No rash.   Neurological:      Mental Status: Alert and oriented to person, place, and time.           Lab Review:   Lab Results   Component Value Date    GLUCOSE 102 (H) 12/10/2020    BUN 13 12/10/2020    CREATININE 0.84 12/10/2020    EGFRIFNONA 67 12/10/2020    BCR 15.5 12/10/2020     12/10/2020    K 4.7 12/10/2020     12/10/2020    CO2 26.3 12/10/2020    CALCIUM 9.4 12/10/2020    ALBUMIN 4.30 12/10/2020    AST 16 12/10/2020    ALT 16 12/10/2020       Lab Results   Component Value Date    WBC 6.78 12/10/2020    HGB 14.6 12/10/2020    HCT 42.8 12/10/2020    MCV 92.4 12/10/2020     12/10/2020       Lab Results   Component Value Date    HGBA1C 5.6 12/10/2020       Lab Results   Component Value Date    TSH 2.960 12/10/2020       Lab Results   Component Value Date    CHOL 151 12/10/2020    CHOL 115 06/10/2020    CHOL 135 10/23/2019     Lab Results   Component Value Date    TRIG 94 12/10/2020    TRIG 68 06/10/2020    TRIG 97 10/23/2019     Lab Results   Component Value Date    HDL 49 " 12/10/2020    HDL 40 06/10/2020    HDL 41 10/23/2019     Lab Results   Component Value Date    LDL 84 12/10/2020    LDL 61 06/10/2020    LDL 75 10/23/2019     Mammogram, 9/14/2020:  Stable bilateral mammogram. BI-RADS CATEGORY: 2, BENIGN        Assessment:       Overall continued acceptable course with no new interim cardiopulmonary complaints with good functional status. We will defer additional diagnostic or therapeutic intervention from a cardiac perspective at this time.     Diagnosis Plan   1. Hypertensive heart disease without heart failure  No recurrent angina pectoris or CHF on current activity. Continue current treatment.   2. Mitral valve prolapse syndrome  Stable and asymptomatic.   3. Essential hypertension  Well controlled. Continue current treatment.   4. Dyslipidemia  Well controlled. Continue simvastatin.   5. Type 2 diabetes mellitus without complication, without long-term current use of insulin (CMS/[facility], [facility])  A1c 5.6% in December 2020. Continue current treatment and follow up with Dr. Villa for monitoring.          Plan:         1. Patient to continue current medications and close follow up with the above providers.  2. She is encouraged to remain active and monitor blood pressure.  3. Tentative cardiology follow up in March 2022 or patient may return sooner PRN.    I, Omkar Ramirez, attest that this documentation has been prepared under the direction and in the presence of Louis Sebastian MD 03/03/2021    ILouis MD, [facility], personally performed the services described in this documentation as scribed by the above named individual in my presence, and it is both accurate and complete. At 09:04 EST on 03/03/2021

## 2021-06-10 ENCOUNTER — OFFICE VISIT (OUTPATIENT)
Dept: INTERNAL MEDICINE | Facility: CLINIC | Age: 71
End: 2021-06-10

## 2021-06-10 VITALS
HEART RATE: 64 BPM | DIASTOLIC BLOOD PRESSURE: 72 MMHG | RESPIRATION RATE: 20 BRPM | WEIGHT: 230 LBS | BODY MASS INDEX: 37.12 KG/M2 | TEMPERATURE: 98.6 F | SYSTOLIC BLOOD PRESSURE: 153 MMHG

## 2021-06-10 DIAGNOSIS — M15.9 PRIMARY OSTEOARTHRITIS INVOLVING MULTIPLE JOINTS: ICD-10-CM

## 2021-06-10 DIAGNOSIS — R82.998 LEUKOCYTES IN URINE: ICD-10-CM

## 2021-06-10 DIAGNOSIS — K63.5 BENIGN COLONIC POLYP: ICD-10-CM

## 2021-06-10 DIAGNOSIS — E78.1 ESSENTIAL HYPERTRIGLYCERIDEMIA: ICD-10-CM

## 2021-06-10 DIAGNOSIS — E11.9 TYPE 2 DIABETES MELLITUS WITHOUT COMPLICATION, WITHOUT LONG-TERM CURRENT USE OF INSULIN (HCC): Primary | ICD-10-CM

## 2021-06-10 DIAGNOSIS — E03.9 ACQUIRED HYPOTHYROIDISM: ICD-10-CM

## 2021-06-10 DIAGNOSIS — M79.672 BILATERAL FOOT PAIN: ICD-10-CM

## 2021-06-10 DIAGNOSIS — I10 ESSENTIAL HYPERTENSION: ICD-10-CM

## 2021-06-10 DIAGNOSIS — R07.81 RIB PAIN ON LEFT SIDE: ICD-10-CM

## 2021-06-10 DIAGNOSIS — M79.671 BILATERAL FOOT PAIN: ICD-10-CM

## 2021-06-10 LAB
A/C: NORMAL
ALBUMIN SERPL-MCNC: 4.1 G/DL (ref 3.5–5.2)
ALBUMIN/GLOB SERPL: 1.3 G/DL
ALP SERPL-CCNC: 136 U/L (ref 39–117)
ALT SERPL W P-5'-P-CCNC: 16 U/L (ref 1–33)
ANION GAP SERPL CALCULATED.3IONS-SCNC: 11.2 MMOL/L (ref 5–15)
AST SERPL-CCNC: 14 U/L (ref 1–32)
BASOPHILS # BLD AUTO: 0.05 10*3/MM3 (ref 0–0.2)
BASOPHILS NFR BLD AUTO: 0.8 % (ref 0–1.5)
BILIRUB BLD-MCNC: NEGATIVE MG/DL
BILIRUB SERPL-MCNC: 0.3 MG/DL (ref 0–1.2)
BUN SERPL-MCNC: 16 MG/DL (ref 8–23)
BUN/CREAT SERPL: 19.3 (ref 7–25)
CALCIUM SPEC-SCNC: 9.2 MG/DL (ref 8.6–10.5)
CHLORIDE SERPL-SCNC: 106 MMOL/L (ref 98–107)
CHOLEST SERPL-MCNC: 141 MG/DL (ref 0–200)
CLARITY, POC: CLEAR
CO2 SERPL-SCNC: 25.8 MMOL/L (ref 22–29)
COLOR UR: YELLOW
CREAT SERPL-MCNC: 0.83 MG/DL (ref 0.57–1)
DEPRECATED RDW RBC AUTO: 42 FL (ref 37–54)
EOSINOPHIL # BLD AUTO: 0.17 10*3/MM3 (ref 0–0.4)
EOSINOPHIL NFR BLD AUTO: 2.6 % (ref 0.3–6.2)
ERYTHROCYTE [DISTWIDTH] IN BLOOD BY AUTOMATED COUNT: 12.3 % (ref 12.3–15.4)
EXPIRATION DATE: ABNORMAL
EXPIRATION DATE: NORMAL
EXPIRATION DATE: NORMAL
GFR SERPL CREATININE-BSD FRML MDRD: 68 ML/MIN/1.73
GLOBULIN UR ELPH-MCNC: 3.1 GM/DL
GLUCOSE SERPL-MCNC: 105 MG/DL (ref 65–99)
GLUCOSE UR STRIP-MCNC: NEGATIVE MG/DL
HBA1C MFR BLD: 5.9 %
HCT VFR BLD AUTO: 43.4 % (ref 34–46.6)
HDLC SERPL-MCNC: 40 MG/DL (ref 40–60)
HGB BLD-MCNC: 14.7 G/DL (ref 12–15.9)
IMM GRANULOCYTES # BLD AUTO: 0.05 10*3/MM3 (ref 0–0.05)
IMM GRANULOCYTES NFR BLD AUTO: 0.8 % (ref 0–0.5)
KETONES UR QL: NEGATIVE
LDLC SERPL CALC-MCNC: 84 MG/DL (ref 0–100)
LDLC/HDLC SERPL: 2.09 {RATIO}
LEUKOCYTE EST, POC: ABNORMAL
LYMPHOCYTES # BLD AUTO: 1.94 10*3/MM3 (ref 0.7–3.1)
LYMPHOCYTES NFR BLD AUTO: 29.8 % (ref 19.6–45.3)
Lab: ABNORMAL
Lab: NORMAL
Lab: NORMAL
MCH RBC QN AUTO: 31.6 PG (ref 26.6–33)
MCHC RBC AUTO-ENTMCNC: 33.9 G/DL (ref 31.5–35.7)
MCV RBC AUTO: 93.3 FL (ref 79–97)
MONOCYTES # BLD AUTO: 0.5 10*3/MM3 (ref 0.1–0.9)
MONOCYTES NFR BLD AUTO: 7.7 % (ref 5–12)
NEUTROPHILS NFR BLD AUTO: 3.8 10*3/MM3 (ref 1.7–7)
NEUTROPHILS NFR BLD AUTO: 58.3 % (ref 42.7–76)
NITRITE UR-MCNC: NEGATIVE MG/ML
NRBC BLD AUTO-RTO: 0 /100 WBC (ref 0–0.2)
PH UR: 6 [PH] (ref 5–8)
PLATELET # BLD AUTO: 270 10*3/MM3 (ref 140–450)
PMV BLD AUTO: 10.3 FL (ref 6–12)
POC CREATININE URINE: 100
POC MICROALBUMIN URINE: 30
POTASSIUM SERPL-SCNC: 4.5 MMOL/L (ref 3.5–5.2)
PROT SERPL-MCNC: 7.2 G/DL (ref 6–8.5)
PROT UR STRIP-MCNC: NEGATIVE MG/DL
RBC # BLD AUTO: 4.65 10*6/MM3 (ref 3.77–5.28)
RBC # UR STRIP: NEGATIVE /UL
SODIUM SERPL-SCNC: 143 MMOL/L (ref 136–145)
SP GR UR: 1.01 (ref 1–1.03)
TRIGL SERPL-MCNC: 88 MG/DL (ref 0–150)
TSH SERPL DL<=0.05 MIU/L-ACNC: 3.02 UIU/ML (ref 0.27–4.2)
UROBILINOGEN UR QL: NORMAL
VLDLC SERPL-MCNC: 17 MG/DL (ref 5–40)
WBC # BLD AUTO: 6.51 10*3/MM3 (ref 3.4–10.8)

## 2021-06-10 PROCEDURE — 80053 COMPREHEN METABOLIC PANEL: CPT | Performed by: INTERNAL MEDICINE

## 2021-06-10 PROCEDURE — 99214 OFFICE O/P EST MOD 30 MIN: CPT | Performed by: INTERNAL MEDICINE

## 2021-06-10 PROCEDURE — 85025 COMPLETE CBC W/AUTO DIFF WBC: CPT | Performed by: INTERNAL MEDICINE

## 2021-06-10 PROCEDURE — 87086 URINE CULTURE/COLONY COUNT: CPT | Performed by: INTERNAL MEDICINE

## 2021-06-10 PROCEDURE — 83036 HEMOGLOBIN GLYCOSYLATED A1C: CPT | Performed by: INTERNAL MEDICINE

## 2021-06-10 PROCEDURE — 81003 URINALYSIS AUTO W/O SCOPE: CPT | Performed by: INTERNAL MEDICINE

## 2021-06-10 PROCEDURE — 84443 ASSAY THYROID STIM HORMONE: CPT | Performed by: INTERNAL MEDICINE

## 2021-06-10 PROCEDURE — 82044 UR ALBUMIN SEMIQUANTITATIVE: CPT | Performed by: INTERNAL MEDICINE

## 2021-06-10 PROCEDURE — 80061 LIPID PANEL: CPT | Performed by: INTERNAL MEDICINE

## 2021-06-10 RX ORDER — MELOXICAM 15 MG/1
15 TABLET ORAL DAILY PRN
Qty: 30 TABLET | Refills: 2 | Status: SHIPPED | OUTPATIENT
Start: 2021-06-10 | End: 2021-07-09

## 2021-06-10 RX ORDER — FEXOFENADINE HCL AND PSEUDOEPHEDRINE HCI 180; 240 MG/1; MG/1
1 TABLET, EXTENDED RELEASE ORAL DAILY PRN
COMMUNITY
End: 2021-11-10

## 2021-06-10 NOTE — PATIENT INSTRUCTIONS
Continue NSAIDS (Meloxicam) for 2 full weeks, then as needed.  Hold Ibuprofen while on Meloxicam.  Recommend ice, 2-3 times daily.  Please call if no better in 2 weeks.

## 2021-06-10 NOTE — PROGRESS NOTES
Subjective       nAgelina Pimentel is a 70 y.o. female.     Chief Complaint   Patient presents with   • Diabetes      6 month follow up   fasting       History obtained from the patient.      History of Present Illness     Primary Care Cardiac Diagnostic [facility]: The patient is here today for a 6 month follow-up visit.       Her Diabetes Mellitus type 2 has been stable.   Medication(s): Metformin, Simvastatin, and Lisinopril.   Her Hypertension has been stable.   Medication(s): Lisinopril.   Her Hyperlipidemia has been stable.   Her LDL goal is < 70 mg/dL and last LDL was 84, TG 94.   Medication(s): Simvastiatin.   The patient is adherent with her medication regimen. She denies medication side effects.       Interval Events: Last Hemoglobin A1c on 12/10/2020 was 5.6.  Blood sugar at home has been (?)  fasting.  She does not check postprandial levels.  She denies episodes of low blood sugar.  She has not been checking blood pressure at home.  Last Ophthalmology visit was 1/11/21, no retinopathy (- Dr. Red).  She states she checks her feet daily.  She saw Dr. Sebastian on 3/3/2021 for yearly follow-up.  There were no medication changes.      Symptoms: Denies chest pain, dyspnea, FLORES, orthopnea, PND, palpitations, syncope, lower extremity edema, claudication, lightheadedness, and dizziness.   Associated Symptoms: Weight increased 9 pounds since last visit.  No fatigue, headache, polydipsia, polyuria, myalgias, arthralgias, visual impairment, memory loss, concentration problems, or focal neurologic deficits.  Has new onset bilateral foot pain.  Denies numbness / tingling of the feet,  and a foot ulcer,      Lifestyle and Disease Management: Diet: The patient has been consuming a diverse and half healthy diet (Weight Watchers). Weight Issues: She has weight concerns. Exercise:  She goes to the gym twice per week (cardio and weights).  Tobacco Use: Never a smoker.      Hypothyroidism Follow-Up: The patient is  being seen for follow-up of Hypothyroidism, which is stable.  Interval Events:  TSH and T4 were normal 12/10/20.  Symptoms:  Weight increased 9 pounds since last visit.  Denies diarrhea, constipation heat/cold intolerance, fatigue, weakness, memory loss,  trouble concentrating, hair loss, and dry skin.   Associated Symptoms: no arthralgias, myalgias, or paresthesias.   Medications:  Levothyroxine (Synthroid).       Colonic Polyp Follow-up: The patient is being seen for a routine clinic follow-up of Colon Polyp(s), which is stable.   Interval Events:  Current diagnosis was determined by Colonoscopy and last 1/4/18- no polyps  Symptoms: no abdominal pain, diarrhea, constipation, hematochezia, melena, decreased stool caliber, or change in bowel habits.  Associated Symptoms: no rectal prolapse.   Medication:  None.         Osteoarthritis Follow-Up: The patient is being seen for a routine clinic follow-up of Osteoarthritis, which has been stable.   She has no complications from Osteoarthritis.   The patient's Osteoarthritis has not resulted in physical disability.   Interval Events: The patient is complaining of pain in both of her feet, on the bottom surface just proximal to the toes.  This is been occurring for 2 to 3 weeks intermittently.  There is no known injury or trauma.  She denies overuse activity.  There is no erythema, edema, or warmth.  She states it hurts only when she walks.  She also has intermittent pain under her left breast/left rib area for the past 4 to 5 weeks. She denies injury or trauma.  There is no associated chest pain, shortness of breath, wheezing, cough, fever, or chills.  She is not taking any medication for either of these pains.  Activities: no limitations.   Symptoms:  No arthralgias, back pain, neck pain, hip pain, shoulder pain, hand pain, or knee pain.  Associated Symptoms: No joint swelling or joint stiffness.   Medications:  Ibuprofen prn.    Current Outpatient Medications on File  Prior to Visit   Medication Sig Dispense Refill   • Cetirizine HCl (ZYRTEC ALLERGY) 10 MG capsule Zyrtec 10 mg tablet     • fexofenadine-pseudoephedrine (ALLEGRA-D 24) 180-240 MG per 24 hr tablet Take 1 tablet by mouth Daily As Needed for Allergies.     • fluticasone (FLONASE) 50 MCG/ACT nasal spray 1 spray into each nostril As Needed for Rhinitis.     • glucose blood test strip Daily.     • ibuprofen (ADVIL,MOTRIN) 100 MG/5ML suspension Take  by mouth Every 6 (Six) Hours As Needed for Mild Pain .     • Lancets (ONETOUCH ULTRASOFT) lancets USE ONE LANCET TO TEST DAILY 100 each 4   • levothyroxine (SYNTHROID, LEVOTHROID) 100 MCG tablet TAKE ONE TABLET BY MOUTH DAILY 90 tablet 3   • lisinopril (PRINIVIL,ZESTRIL) 20 MG tablet TAKE ONE TABLET BY MOUTH DAILY 90 tablet 2   • metFORMIN (GLUCOPHAGE) 500 MG tablet TAKE ONE TABLET BY MOUTH DAILY WITH BREAKFAST 90 tablet 3   • Multiple Vitamins-Minerals (CENTRUM SILVER ADULT 50+ PO) Take 1 tablet by mouth Daily.     • OneTouch Ultra test strip USE ONE STRIP TO TEST DAILY 100 each 3   • simvastatin (ZOCOR) 20 MG tablet TAKE ONE TABLET BY MOUTH ONCE NIGHTLY 90 tablet 3     No current facility-administered medications on file prior to visit.       Current outpatient and discharge medications have been reconciled for the patient.  Reviewed by: Leola Villa MD        The following portions of the patient's history were reviewed and updated as appropriate: allergies, current medications, past family history, past medical history, past social history, past surgical history and problem list.    Review of Systems   Constitutional: Negative for fatigue and unexpected weight change.   Eyes: Negative for visual disturbance.   Respiratory: Negative for cough, shortness of breath and wheezing.    Cardiovascular: Negative for chest pain, palpitations and leg swelling.        No FLORES, orthopnea, or claudication.   Gastrointestinal: Negative for abdominal pain, blood in stool, constipation,  diarrhea, nausea and vomiting.        Denies melena.   Endocrine: Negative for polydipsia and polyuria.   Musculoskeletal: Negative for arthralgias, joint swelling and myalgias.   Neurological: Negative for dizziness, syncope, light-headedness and headaches.        No memory issues.   Psychiatric/Behavioral: Negative for decreased concentration.         Objective       Blood pressure 153/72, pulse 64, temperature 98.6 °F (37 °C), temperature source Temporal, resp. rate 20, weight 104 kg (230 lb).  Body mass index is 37.12 kg/m².      Physical Exam  Vitals and nursing note reviewed.   Constitutional:       Appearance: She is well-developed. She is obese.   Neck:      Thyroid: No thyroid mass or thyromegaly.      Vascular: No carotid bruit.   Cardiovascular:      Rate and Rhythm: Normal rate and regular rhythm.      Pulses: Normal pulses.      Heart sounds: Murmur (2/6 WHITNEY) heard.   No friction rub. No gallop.    Pulmonary:      Effort: Pulmonary effort is normal.      Breath sounds: Normal breath sounds.   Chest:      Chest wall: No tenderness (no anterior/posterior left rib tenderness to palpation).   Abdominal:      General: Bowel sounds are normal. There is no distension or abdominal bruit.      Palpations: Abdomen is soft. There is no hepatomegaly, splenomegaly or mass.      Tenderness: There is no abdominal tenderness.   Musculoskeletal:      Cervical back: Normal range of motion and neck supple.      Right lower leg: No edema.      Left lower leg: No edema.      Comments: There is no tenderness to palpation of the feet.  There is no erythema, edema, or warmth.  There is no pain with bilateral ankle flexion and extension.  There is no pain with bilateral foot inversion and eversion.   Feet:      Right foot:      Skin integrity: Dry skin present. No ulcer, blister, skin breakdown or callus.      Left foot:      Skin integrity: Dry skin present. No ulcer, blister, skin breakdown or callus.   Neurological:       Mental Status: She is alert.   Psychiatric:         Mood and Affect: Mood normal.       Diabetic Foot Exam Performed  Monofilament Test Performed    Results for orders placed or performed in visit on 06/10/21   POC Urinalysis Dipstick, Automated    Specimen: Urine   Result Value Ref Range    Color Yellow Yellow, Straw, Dark Yellow, Eileen    Clarity, UA Clear Clear    Specific Gravity  1.015 1.005 - 1.030    pH, Urine 6.0 5.0 - 8.0    Leukocytes 500 Jacoby/ul (A) Negative    Nitrite, UA Negative Negative    Protein, POC Negative Negative mg/dL    Glucose, UA Negative Negative, 1000 mg/dL (3+) mg/dL    Ketones, UA Negative Negative    Urobilinogen, UA Normal Normal    Bilirubin Negative Negative    Blood, UA Negative Negative    Lot Number [id number]     Expiration Date 11-30-21    POC Microalbumin    Specimen: Urine   Result Value Ref Range    Microalbumin, Urine 30     Creatinine, Urine 100     A/C <30mg/g NORAML     Lot Number [id number]     Expiration Date 8-31-22    POC Glycosylated Hemoglobin (Hb A1C)    Specimen: Blood   Result Value Ref Range    Hemoglobin A1C 5.9 %    Lot Number [id number]     Expiration Date 3-16-23        Assessment / Plan:  Diagnoses and all orders for this visit:    1. Type 2 diabetes mellitus without complication, without long-term current use of insulin (CMS/[facility]) (Primary)  -     POC Urinalysis Dipstick, Automated  -     POC Microalbumin  -     POC Glycosylated Hemoglobin (Hb A1C)  -     TSH  -     CBC & Differential  -     Lipid Panel  -     Comprehensive Metabolic Panel   Continue current medication(s) as noted in the history of present illness.    2. Essential hypertension  -     POC Urinalysis Dipstick, Automated  -     TSH  -     CBC & Differential  -     Lipid Panel  -     Comprehensive Metabolic Panel   Continue current medication(s) as noted in the history of present illness.    3. Essential hypertriglyceridemia  -     TSH  -     CBC & Differential  -     Lipid Panel  -      Comprehensive Metabolic Panel   Continue current medication(s) as noted in the history of present illness.    4. Acquired hypothyroidism   -     TSH   Continue current medication(s) as noted in the history of present illness.    5. Benign colonic polyp   Colonoscopy up-to-date.    6. Primary osteoarthritis involving multiple joints   The patient was instructed to hold Ibuprofen while on Meloxicam.    7. Bilateral foot pain  -     meloxicam (MOBIC) 15 MG tablet; Take 1 tablet by mouth Daily As Needed for Moderate Pain .  Dispense: 30 tablet; Refill: 2   The patient was instructed to continue NSAIDS (Meloxicam) for pain 2 full weeks, then as needed.     Recommended ice, 2-3 times daily.     She agrees to call if no better in 2 weeks.    8. Rib pain on left side  -     meloxicam (MOBIC) 15 MG tablet; Take 1 tablet by mouth Daily As Needed for Moderate Pain .  Dispense: 30 tablet; Refill: 2   The patient was instructed to continue NSAIDS (Meloxicam) for pain 2 full weeks, then as needed.     Recommended ice, 2-3 times daily.     She agrees to call if no better in 2 weeks.    9. Leukocytes in urine  -     Urine Culture - Urine, Urine, Clean Catch        Return for Next scheduled follow up.

## 2021-06-11 LAB — BACTERIA SPEC AEROBE CULT: NO GROWTH

## 2021-07-09 ENCOUNTER — OFFICE VISIT (OUTPATIENT)
Dept: INTERNAL MEDICINE | Facility: CLINIC | Age: 71
End: 2021-07-09

## 2021-07-09 VITALS
RESPIRATION RATE: 20 BRPM | SYSTOLIC BLOOD PRESSURE: 161 MMHG | BODY MASS INDEX: 37.18 KG/M2 | TEMPERATURE: 98 F | HEART RATE: 78 BPM | DIASTOLIC BLOOD PRESSURE: 75 MMHG | WEIGHT: 230.38 LBS

## 2021-07-09 DIAGNOSIS — B37.31 CANDIDIASIS OF VULVA: Primary | ICD-10-CM

## 2021-07-09 PROCEDURE — 99213 OFFICE O/P EST LOW 20 MIN: CPT | Performed by: INTERNAL MEDICINE

## 2021-07-09 RX ORDER — NYSTATIN 100000 U/G
OINTMENT TOPICAL 3 TIMES DAILY
Qty: 30 G | Refills: 1 | Status: SHIPPED | OUTPATIENT
Start: 2021-07-09 | End: 2021-07-16

## 2021-07-09 NOTE — PROGRESS NOTES
Subjective       Angelina Pimentel is a 70 y.o. female.     Chief Complaint   Patient presents with   • Rash     groin area        History obtained from the patient.      Rash  This is a new problem. Episode onset: 3 weeks ago. The problem has been waxing and waning since onset. The affected locations include the groin. The rash is characterized by redness and itchiness. She was exposed to nothing. Associated symptoms include fatigue ( had knee replacement). Pertinent negatives include no congestion, cough, diarrhea, eye pain, facial edema, fever, joint pain, rhinorrhea, shortness of breath, sore throat or vomiting. Treatments tried: Lotrimin, but only for 2-3 days at a time. On Zyrtec daily and Flonase as needed. The treatment provided moderate relief. Her past medical history is significant for allergies. There is no history of asthma or eczema.      No new soaps, detergents, or medication.  No recent travel, hiking, or camping. No known tick or insect bites.    The following portions of the patient's history were reviewed and updated as appropriate: allergies, current medications, past family history, past medical history, past social history, past surgical history and problem list.      Review of Systems   Constitutional: Positive for fatigue ( had knee replacement). Negative for fever.   HENT: Negative for congestion, rhinorrhea and sore throat.    Eyes: Negative for pain.   Respiratory: Negative for cough and shortness of breath.    Gastrointestinal: Negative for diarrhea and vomiting.   Musculoskeletal: Negative for joint pain.   Skin: Positive for rash.           Objective     Blood pressure 161/75, pulse 78, temperature 98 °F (36.7 °C), temperature source Temporal, resp. rate 20, weight 104 kg (230 lb 6 oz).    Physical Exam  Vitals and nursing note reviewed.   Constitutional:       Appearance: She is obese.   Skin:     Findings: Rash (erythematous rash bilateral vulva and intertrigenous  areas) present.   Neurological:      Mental Status: She is alert.   Psychiatric:         Mood and Affect: Mood normal.           Assessment/Plan   Diagnoses and all orders for this visit:    1. Candidiasis of vulva (Primary)  -     nystatin (MYCOSTATIN) 065889 UNIT/GM ointment; Apply  topically to the appropriate area as directed 3 (Three) Times a Day for 7 days.  Dispense: 30 g; Refill: 1            Return if symptoms worsen or fail to improve.

## 2021-07-22 ENCOUNTER — TELEPHONE (OUTPATIENT)
Dept: INTERNAL MEDICINE | Facility: CLINIC | Age: 71
End: 2021-07-22

## 2021-07-22 DIAGNOSIS — B37.2 CANDIDAL DERMATITIS: Primary | ICD-10-CM

## 2021-07-22 DIAGNOSIS — E11.9 TYPE 2 DIABETES MELLITUS WITHOUT COMPLICATION, WITHOUT LONG-TERM CURRENT USE OF INSULIN (HCC): ICD-10-CM

## 2021-07-22 RX ORDER — FLUCONAZOLE 150 MG/1
150 TABLET ORAL ONCE
Qty: 2 TABLET | Refills: 1 | Status: SHIPPED | OUTPATIENT
Start: 2021-07-22 | End: 2021-07-22

## 2021-07-22 NOTE — TELEPHONE ENCOUNTER
Prescription for Diflucan sent to the pharmacy.  Please have her call if this does not take care of the problem.

## 2021-07-22 NOTE — TELEPHONE ENCOUNTER
Caller: Angelina Pimentel    Relationship: Self    Best call back number: [phone]    What is the best time to reach you: ANYTIME     Who are you requesting to speak with (clinical staff, provider,  specific staff member): DOCTOR LEI         What was the call regarding: PATIENT STATES THAT SHE WAS SEEN ON 07/09/2021 FOR A YEAST INFECTION SHE HAS BEEN USING THE NYSTATIN OINTMENT THAT SHE WAS PRESCRIBED HOWEVER HER SYMPTOMS ARE NOT GOING AWAY. SHE STATES THE SYMPTOMS SEEM TO GO AWAY ONE DAY THEN COME BACK THE NEXT. THE PATIENT STATES THAT ON THE RIGHT SIDE THE INFECTION HAS SPREAD UP     Do you require a callback: YES

## 2021-07-23 RX ORDER — LANCETS
EACH MISCELLANEOUS
Qty: 100 EACH | Refills: 3 | Status: SHIPPED | OUTPATIENT
Start: 2021-07-23 | End: 2022-09-06

## 2021-08-06 ENCOUNTER — TELEPHONE (OUTPATIENT)
Dept: INTERNAL MEDICINE | Facility: CLINIC | Age: 71
End: 2021-08-06

## 2021-08-06 DIAGNOSIS — B37.2 CANDIDAL DERMATITIS: ICD-10-CM

## 2021-08-06 DIAGNOSIS — N76.0 ACUTE VAGINITIS: Primary | ICD-10-CM

## 2021-08-06 RX ORDER — NYSTATIN 100000 U/G
OINTMENT TOPICAL 2 TIMES DAILY
Qty: 30 G | Refills: 2 | Status: SHIPPED | OUTPATIENT
Start: 2021-08-06 | End: 2021-08-13

## 2021-08-06 RX ORDER — FLUCONAZOLE 150 MG/1
150 TABLET ORAL ONCE
Qty: 2 TABLET | Refills: 1 | Status: SHIPPED | OUTPATIENT
Start: 2021-08-06 | End: 2021-08-06

## 2021-08-06 NOTE — TELEPHONE ENCOUNTER
Caller: Angelina Pimentel    Relationship: Self    Best call back number: [phone] (H)    Medication needed:   NYSTATIN CREAM (100,000 UNITS/GRAM)  ORAL MEDICATION TO TREAT THE REOCCURRING YEAST INFECTION    When do you need the refill by: TODAY    What additional details did the patient provide when requesting the medication:   PATIENT STATES THAT SHE HAS COMPLETED THE ORAL MEDICATION (7/16/21), PATIENT STILL HAVE SOME OF THE CREAM. PATIENT STATES THAT THE ITCH IS INTERNAL AND THE IS A TOPICAL CREAM NOT TO BE USED INTERNALLY. PATIENT IS REQUESTING ANOTHER ROUND OF ORAL ANTIBIOTIC TO AIDE HER WITH THE REOCCURRING YEAST INFECTION.    Does the patient have less than a 3 day supply:  [x] Yes  [] No    What is the patient's preferred pharmacy:      [facility] [id number] [place], [place] [phone] FX    PATIENT HAS BEEN ADVISED THAT THIS REQUEST HAS BEEN MARKED AS A HIGH PRIORITY TO ALLOW 48 HOURS FOR THE CLINICAL TEAM TO FOLLOW UP ON THIS REQUEST,  IF SYMPTOMS WORSENS TO SEEK OUT EMERGENT CARE. PATIENT  FULLY UNDERSTANDS.

## 2021-09-14 ENCOUNTER — OFFICE VISIT (OUTPATIENT)
Dept: INTERNAL MEDICINE | Facility: CLINIC | Age: 71
End: 2021-09-14

## 2021-09-14 VITALS
HEART RATE: 21 BPM | DIASTOLIC BLOOD PRESSURE: 82 MMHG | WEIGHT: 230 LBS | TEMPERATURE: 97.8 F | RESPIRATION RATE: 20 BRPM | SYSTOLIC BLOOD PRESSURE: 148 MMHG | BODY MASS INDEX: 37.12 KG/M2

## 2021-09-14 DIAGNOSIS — B37.2 CANDIDAL DERMATITIS: ICD-10-CM

## 2021-09-14 DIAGNOSIS — E11.9 TYPE 2 DIABETES MELLITUS WITHOUT COMPLICATION, WITHOUT LONG-TERM CURRENT USE OF INSULIN (HCC): ICD-10-CM

## 2021-09-14 DIAGNOSIS — N76.0 ACUTE VAGINITIS: Primary | ICD-10-CM

## 2021-09-14 PROCEDURE — 99214 OFFICE O/P EST MOD 30 MIN: CPT | Performed by: INTERNAL MEDICINE

## 2021-09-14 RX ORDER — NYSTATIN 100000 U/G
1 OINTMENT TOPICAL 3 TIMES DAILY
Qty: 30 G | Refills: 1 | Status: SHIPPED | OUTPATIENT
Start: 2021-09-14 | End: 2021-09-21

## 2021-09-14 RX ORDER — FLUCONAZOLE 200 MG/1
200 TABLET ORAL DAILY
Qty: 14 TABLET | Refills: 0 | Status: SHIPPED | OUTPATIENT
Start: 2021-09-14 | End: 2021-09-28

## 2021-09-14 RX ORDER — FLUCONAZOLE 150 MG/1
TABLET ORAL
COMMUNITY
Start: 2021-08-11 | End: 2021-09-14

## 2021-09-14 NOTE — PROGRESS NOTES
Subjective       Angelina Pimentel is a 71 y.o. female.     Chief Complaint   Patient presents with   • Vaginitis       History obtained from the patient.      Vaginitis  This is a recurrent problem. Episode onset: 3 months ago, recurred today. The problem occurs intermittently (clears up for 5- 7 days at a time). Pertinent negatives include no abdominal pain, arthralgias, chills, fever, myalgias, nausea, rash or vomiting. Associated symptoms comments: Has vaginal itching.. Exacerbated by: wears panty liners, but that's not new. Treatments tried: Diflucan and Monistat. The treatment provided significant relief.      The patient has Diabetes and her last Hemoglobin A1c was 5.9. She feels these vaginal infections may be due to eating sugary treats.    The following portions of the patient's history were reviewed and updated as appropriate: allergies, current medications, past family history, past medical history, past social history, past surgical history and problem list.      Review of Systems   Constitutional: Negative for chills and fever.   Gastrointestinal: Negative for abdominal pain, diarrhea, nausea and vomiting.   Genitourinary: Negative for dysuria, frequency, hematuria, pelvic pain, urgency, vaginal bleeding and vaginal discharge.   Musculoskeletal: Negative for arthralgias and myalgias.   Skin: Negative for rash.           Objective     Blood pressure 148/82, pulse (!) 21, temperature 97.8 °F (36.6 °C), resp. rate 20, weight 104 kg (230 lb).    Physical Exam  Vitals and nursing note reviewed.   Constitutional:       Appearance: She is well-developed. She is obese.   Cardiovascular:      Rate and Rhythm: Normal rate and regular rhythm.      Heart sounds: Normal heart sounds. No murmur heard.     Pulmonary:      Effort: Pulmonary effort is normal.      Breath sounds: Normal breath sounds.   Abdominal:      General: Bowel sounds are normal. There is no distension.      Palpations: Abdomen is soft. There is  no hepatomegaly, splenomegaly or mass.      Tenderness: There is no abdominal tenderness. There is no right CVA tenderness or left CVA tenderness.   Genitourinary:     Comments: Erythematous macular rash bilateral vulva with mild irritation in the vaginal introitus.  Skin:     Findings: No rash.   Neurological:      Mental Status: She is alert.   Psychiatric:         Mood and Affect: Mood normal.           Assessment/Plan   Diagnoses and all orders for this visit:    1. Acute vaginitis (Primary)  -     fluconazole (Diflucan) 200 MG tablet; Take 1 tablet by mouth Daily for 14 days.  Dispense: 14 tablet; Refill: 0    2. Candidal dermatitis  -     nystatin (MYCOSTATIN) 208048 UNIT/GM ointment; Apply 1 application topically to the appropriate area as directed 3 (Three) Times a Day for 7 days.  Dispense: 30 g; Refill: 1    3. Type 2 diabetes mellitus without complication, without long-term current use of insulin (CMS/[facility])   Discussed the importance of avoiding sugary treats.          Return if symptoms worsen or fail to improve.

## 2021-09-15 ENCOUNTER — HOSPITAL ENCOUNTER (OUTPATIENT)
Dept: MAMMOGRAPHY | Facility: HOSPITAL | Age: 71
Discharge: HOME | End: 2021-09-15
Admitting: INTERNAL MEDICINE

## 2021-09-15 DIAGNOSIS — Z12.31 VISIT FOR SCREENING MAMMOGRAM: ICD-10-CM

## 2021-09-15 PROCEDURE — 77063 BREAST TOMOSYNTHESIS BI: CPT

## 2021-09-15 PROCEDURE — 77063 BREAST TOMOSYNTHESIS BI: CPT | Performed by: RADIOLOGY

## 2021-09-15 PROCEDURE — 77067 SCR MAMMO BI INCL CAD: CPT

## 2021-09-15 PROCEDURE — 77067 SCR MAMMO BI INCL CAD: CPT | Performed by: RADIOLOGY

## 2021-09-27 DIAGNOSIS — E03.9 ACQUIRED HYPOTHYROIDISM: Chronic | ICD-10-CM

## 2021-09-27 RX ORDER — LEVOTHYROXINE SODIUM 0.1 MG/1
TABLET ORAL
Qty: 90 TABLET | Refills: 1 | Status: SHIPPED | OUTPATIENT
Start: 2021-09-27 | End: 2022-09-06

## 2021-09-30 ENCOUNTER — TELEPHONE (OUTPATIENT)
Dept: INTERNAL MEDICINE | Facility: CLINIC | Age: 71
End: 2021-09-30

## 2021-09-30 NOTE — TELEPHONE ENCOUNTER
PATIENT CALLED WANTING TO INFORM HER PCP THAT SHE RECEIVED HER FLU SHOT TODAY 09/30/21 AT [facility]    ANY QUESTIONS AND/OR CONCERNS PLEASE CALL PATIENT [phone]

## 2021-10-11 ENCOUNTER — OFFICE VISIT (OUTPATIENT)
Dept: ORTHOPEDIC SURGERY | Facility: CLINIC | Age: 71
End: 2021-10-11

## 2021-10-11 VITALS
DIASTOLIC BLOOD PRESSURE: 91 MMHG | SYSTOLIC BLOOD PRESSURE: 188 MMHG | BODY MASS INDEX: 36.85 KG/M2 | HEIGHT: 66 IN | HEART RATE: 75 BPM | WEIGHT: 229.28 LBS

## 2021-10-11 DIAGNOSIS — M25.562 CHRONIC PAIN OF LEFT KNEE: Primary | ICD-10-CM

## 2021-10-11 DIAGNOSIS — G89.29 CHRONIC PAIN OF LEFT KNEE: Primary | ICD-10-CM

## 2021-10-11 PROCEDURE — 99203 OFFICE O/P NEW LOW 30 MIN: CPT | Performed by: ORTHOPAEDIC SURGERY

## 2021-10-11 NOTE — PROGRESS NOTES
[facility] – [facility] Note    Subjective     Chief Complaint   Patient presents with   • Left Knee - Pain        HPI    Angelina Pimentel is a 71 y.o. female who presents with new problem of: left knee pain.  Onset: atraumatic and gradual in nature. The issue has been ongoing for 4 month(s). Pain is a 3/10 on the pain scale. Pain is described as aching. Associated symptoms include pain. The pain is worse with walking, standing, sitting, sleeping, leisure and lying on affected side; resting improve the pain. Previous treatments have included: NSAIDS.  Pain is located on the medial side of the knee.  No groin pain.  No mechanical symptoms.    I have reviewed the following portions of the patient's history and agree with: History of Present Illness and Review of Systems    Patient Active Problem List   Diagnosis   • Diabetes mellitus (HCC)   • Benign colonic polyp   • Hypothyroidism   • Osteoarthritis   • Hypertension   • Allergic rhinitis   • Basal cell carcinoma of skin   • Essential hypertriglyceridemia   • Mitral valve prolapse syndrome   • Squamous cell carcinoma of skin   • Obstructive sleep apnea syndrome   • Menopause   • Hypertensive heart disease without heart failure   • Abnormal ECG   • Dyslipidemia     Past Medical History:   Diagnosis Date   • Basal cell carcinoma of skin     Description: x 3 dx 1996-2008-face x3 dx 1996-2008-face   • Benign colonic polyp     Description: precancerous dx 2000 Precancerous Dx 2000   • Diabetes mellitus (HCC) 2010    type II; Metformin daily; checks blood sugars at home 90s-100s   • Essential hypertriglyceridemia      Dx 1/10   • Heart murmur    • History of Papanicolaou smear of cervix 2008    Normal per pt   • History of varicella    • Hyperlipidemia      Dx 1/10   • Hypertension    • Hypothyroidism      Thyroid adenoma; thyroid meds daily    • Measles    • Mitral valve prolapse syndrome    • Obstructive sleep apnea syndrome     wears cpap   •  Osteoarthritis      Mild   • Screening for cardiovascular condition 12/11/2017    Cardiac CT Scan (Calcium Score 0)   • Squamous cell carcinoma of skin     Description: dx 2004-right shoulder    • Wears glasses       Past Surgical History:   Procedure Laterality Date   • BLEPHAROPLASTY, QUAD Bilateral    • BREAST BIOPSY Left 2/6/2018    Procedure: BREAST BIOPSY WITH NEEDLE LOCALIZATION LEFT ARCHITECTURAL DISTORTION;  Surgeon: Barbara Zaldivar MD;  Location: [facility];  Service:    • BREAST EXCISIONAL BIOPSY Left 02/06/2018    Complex sclerosing lesion- no atypia or malignancy   • CATARACT EXTRACTION Bilateral     Rt? date, Left 2/12   • COLONOSCOPY     • MAMMO FINE NEEDLE ASPIRATION UNILATERAL Left 11/2017    Proliferative fibrocystic change with sclerosing adenosis with dystrophic calcifications,   • TONSILLECTOMY  1954   • TOTAL ABDOMINAL HYSTERECTOMY WITH SALPINGO OOPHORECTOMY  1996      Family History   Problem Relation Age of Onset   • Hyperlipidemia Mother    • Colon polyps Mother    • Arthritis Mother    • Cancer Mother    • Hypertension Mother    • Hypertension Other    • Heart disease Other    • Prostate cancer Father    • Heart attack Father    • Diabetes Maternal Grandmother    • Glaucoma Maternal Grandmother    • Diabetes Paternal Grandmother    • Breast cancer Neg Hx    • Ovarian cancer Neg Hx    • BRCA 1/2 Neg Hx    • Aneurysm Neg Hx         AAA     Social History     Socioeconomic History   • Marital status:    • Number of children: 2   Tobacco Use   • Smoking status: Never Smoker   • Smokeless tobacco: Never Used   Vaping Use   • Vaping Use: Never used   Substance and Sexual Activity   • Alcohol use: No     Comment: Never drank alcohol   • Drug use: No   • Sexual activity: Yes     Partners: Male     Birth control/protection: Post-menopausal      Current Outpatient Medications on File Prior to Visit   Medication Sig Dispense Refill   • Cetirizine HCl (ZYRTEC ALLERGY) 10 MG capsule Zyrtec 10  mg tablet     • fexofenadine-pseudoephedrine (ALLEGRA-D 24) 180-240 MG per 24 hr tablet Take 1 tablet by mouth Daily As Needed for Allergies.     • fluticasone (FLONASE) 50 MCG/ACT nasal spray 1 spray into each nostril As Needed for Rhinitis.     • glucose blood test strip Daily.     • ibuprofen (ADVIL,MOTRIN) 100 MG/5ML suspension Take  by mouth Every 6 (Six) Hours As Needed for Mild Pain .     • Lancets (onetouch ultrasoft) lancets USE ONE LANCET TO TEST DAILY 100 each 3   • levothyroxine (SYNTHROID, LEVOTHROID) 100 MCG tablet TAKE ONE TABLET BY MOUTH DAILY 90 tablet 1   • lisinopril (PRINIVIL,ZESTRIL) 20 MG tablet TAKE ONE TABLET BY MOUTH DAILY 90 tablet 2   • metFORMIN (GLUCOPHAGE) 500 MG tablet TAKE ONE TABLET BY MOUTH DAILY WITH BREAKFAST 90 tablet 3   • Multiple Vitamins-Minerals (CENTRUM SILVER ADULT 50+ PO) Take 1 tablet by mouth Daily.     • OneTouch Ultra test strip USE ONE STRIP TO TEST DAILY 100 each 3   • simvastatin (ZOCOR) 20 MG tablet TAKE ONE TABLET BY MOUTH ONCE NIGHTLY 90 tablet 3     No current facility-administered medications on file prior to visit.      Allergies   Allergen Reactions   • Cefdinir Other (See Comments)     Cefdinir SUSR; Adverse Reaction; Abdominal pain   • Penicillins Rash   • Sulfa Antibiotics Rash     Sulfa Drugs        Review of Systems   Constitutional: Negative.  Negative for activity change, appetite change, chills, diaphoresis, fatigue, fever and unexpected weight change.   HENT: Negative.  Negative for congestion, dental problem, drooling, ear discharge, ear pain, facial swelling, hearing loss, mouth sores, nosebleeds, postnasal drip, rhinorrhea, sinus pressure, sneezing, sore throat, tinnitus, trouble swallowing and voice change.    Eyes: Negative.  Negative for photophobia, pain, discharge, redness, itching and visual disturbance.   Respiratory: Positive for apnea. Negative for cough, choking, chest tightness, shortness of breath, wheezing and stridor.   "  Cardiovascular: Negative.  Negative for chest pain, palpitations and leg swelling.   Gastrointestinal: Negative.  Negative for abdominal distention, abdominal pain, anal bleeding, blood in stool, constipation, diarrhea, nausea, rectal pain and vomiting.   Endocrine: Negative.  Negative for cold intolerance, heat intolerance, polydipsia, polyphagia and polyuria.   Genitourinary: Negative.  Negative for decreased urine volume, difficulty urinating, dysuria, enuresis, flank pain, frequency, genital sores, hematuria and urgency.   Musculoskeletal: Positive for arthralgias. Negative for back pain, gait problem, joint swelling, myalgias, neck pain and neck stiffness.   Skin: Negative.  Negative for color change, pallor, rash and wound.   Allergic/Immunologic: Positive for environmental allergies. Negative for food allergies and immunocompromised state.   Neurological: Negative.  Negative for dizziness, tremors, seizures, syncope, facial asymmetry, speech difficulty, weakness, light-headedness, numbness and headaches.   Hematological: Negative.  Negative for adenopathy. Does not bruise/bleed easily.   Psychiatric/Behavioral: Negative.  Negative for agitation, behavioral problems, confusion, decreased concentration, dysphoric mood, hallucinations, self-injury, sleep disturbance and suicidal ideas. The patient is not nervous/anxious and is not hyperactive.         Objective      Physical Exam  BP (!) 188/91   Pulse 75   Ht 167.6 cm (65.98\")   Wt 104 kg (229 lb 4.5 oz)   BMI 37.02 kg/m²     Body mass index is 37.02 kg/m².    General:   Mental Status:  Alert   Appearance: Cooperative, in no acute distress   Build and Nutrition: Obese by BMI female   Orientation: Alert and oriented to person, place and time   Posture: Normal   Gait: Normal    Integument:   Left knee: No skin lesions, no rash, no ecchymosis    Neurologic:   Sensation:    Left foot: Intact to light touch on the dorsal and plantar aspect   Motor:  Left lower " extremity: 5/5 quadriceps, hamstrings, ankle dorsiflexors, and ankle plantar flexors  Vascular:   Left lower extremity: 2+ dorsalis pedis pulse, prompt capillary refill    Lower Extremities:   Left Knee:    Effusion:  1+    Swelling:  None    Crepitus:  None    Atrophy:  None    Range of motion:  Extension: 0°       Flexion: 125°  Deformities:  None      Imaging/Studies      Imaging Results (Last 24 Hours)     Procedure Component Value Units Date/Time    XR Knee 4+ View Left [[id number]] Resulted: 10/11/21 0853     Updated: 10/11/21 0853    Narrative:      Left Knee Radiographs  Indication: left knee pain  Views: Standing AP's and skiers of both knees, with lateral and sunrise   views of the left knee    Comparison: no prior studies available    Findings:   Mild medial joint space narrowing, no acute bony abnormalities.  No   unusual bony features.          Assessment and Plan     Diagnoses and all orders for this visit:    1. Chronic pain of left knee (Primary)  -     XR Knee 4+ View Left  -     MRI Knee Left Without Contrast; Future        1. Chronic pain of left knee        I reviewed my findings with the patient today.  She is experiencing left medial knee pain, and at this point I recommend an MRI.  She has minimal radiographic changes.  I will see her back after the MRI, but sooner for any problems.    Return for After Imaging Study.      Carrington Chanel MD  10/11/21  09:00 EDT

## 2021-11-08 ENCOUNTER — HOSPITAL ENCOUNTER (OUTPATIENT)
Dept: MRI IMAGING | Facility: HOSPITAL | Age: 71
Discharge: HOME | End: 2021-11-08
Admitting: ORTHOPAEDIC SURGERY

## 2021-11-08 DIAGNOSIS — M25.562 CHRONIC PAIN OF LEFT KNEE: ICD-10-CM

## 2021-11-08 DIAGNOSIS — G89.29 CHRONIC PAIN OF LEFT KNEE: ICD-10-CM

## 2021-11-08 PROCEDURE — 73721 MRI JNT OF LWR EXTRE W/O DYE: CPT

## 2021-11-10 ENCOUNTER — OFFICE VISIT (OUTPATIENT)
Dept: ORTHOPEDIC SURGERY | Facility: CLINIC | Age: 71
End: 2021-11-10

## 2021-11-10 VITALS — HEART RATE: 88 BPM | HEIGHT: 66 IN | WEIGHT: 229.28 LBS | BODY MASS INDEX: 36.85 KG/M2 | OXYGEN SATURATION: 97 %

## 2021-11-10 DIAGNOSIS — S83.242A TEAR OF MEDIAL MENISCUS OF LEFT KNEE, UNSPECIFIED TEAR TYPE, UNSPECIFIED WHETHER OLD OR CURRENT TEAR, INITIAL ENCOUNTER: Primary | ICD-10-CM

## 2021-11-10 PROCEDURE — 99214 OFFICE O/P EST MOD 30 MIN: CPT | Performed by: ORTHOPAEDIC SURGERY

## 2021-11-10 PROCEDURE — 20610 DRAIN/INJ JOINT/BURSA W/O US: CPT | Performed by: ORTHOPAEDIC SURGERY

## 2021-11-10 RX ORDER — TRIAMCINOLONE ACETONIDE 40 MG/ML
40 INJECTION, SUSPENSION INTRA-ARTICULAR; INTRAMUSCULAR
Status: COMPLETED | OUTPATIENT
Start: 2021-11-10 | End: 2021-11-10

## 2021-11-10 RX ORDER — ROPIVACAINE HYDROCHLORIDE 5 MG/ML
4 INJECTION, SOLUTION EPIDURAL; INFILTRATION; PERINEURAL
Status: COMPLETED | OUTPATIENT
Start: 2021-11-10 | End: 2021-11-10

## 2021-11-10 RX ADMIN — ROPIVACAINE HYDROCHLORIDE 4 ML: 5 INJECTION, SOLUTION EPIDURAL; INFILTRATION; PERINEURAL at 15:28

## 2021-11-10 RX ADMIN — TRIAMCINOLONE ACETONIDE 40 MG: 40 INJECTION, SUSPENSION INTRA-ARTICULAR; INTRAMUSCULAR at 15:28

## 2021-11-10 NOTE — PROGRESS NOTES
Procedure   Large Joint Arthrocentesis: L knee  Date/Time: 11/10/2021 3:28 PM  Consent given by: patient  Site marked: site marked  Timeout: Immediately prior to procedure a time out was called to verify the correct patient, procedure, equipment, support staff and site/side marked as required   Supporting Documentation  Indications: pain   Procedure Details  Location: knee - L knee  Preparation: Patient was prepped and draped in the usual sterile fashion  Needle size: 22 G  Approach: anterolateral  Medications administered: 4 mL ropivacaine 0.5 %; 40 mg triamcinolone acetonide 40 MG/ML  Patient tolerance: patient tolerated the procedure well with no immediate complications

## 2021-11-10 NOTE — PROGRESS NOTES
[facility] – [facility]    Subjective     Chief Complaint   Patient presents with   • Left Knee - Follow-up     After MRI 11/08/2021        HPI    It has been 1  month(s) since Ms. Pimentel's last visit. She returns to clinic today for follow-up of left knee pain. The issue has been ongoing for 4 month(s). She rates her pain a 5/10 on the pain scale. Previous/current treatments: NSAIDS and weight loss. Current symptoms: pain. The pain is worse with walking, standing, sitting, climbing stairs, sleeping, leisure, lying on affected side, rising from seated position and any movement of the joint; resting, sitting and elevating the extremity improve the pain. Overall, she is doing the same.  Here today to review the MRI results.    I have reviewed the following portions of the patient's history and agree with: History of Present Illness and Review of Systems    Patient Active Problem List   Diagnosis   • Diabetes mellitus (HCC)   • Benign colonic polyp   • Hypothyroidism   • Osteoarthritis   • Hypertension   • Allergic rhinitis   • Basal cell carcinoma of skin   • Essential hypertriglyceridemia   • Mitral valve prolapse syndrome   • Squamous cell carcinoma of skin   • Obstructive sleep apnea syndrome   • Menopause   • Hypertensive heart disease without heart failure   • Abnormal ECG   • Dyslipidemia     Past Medical History:   Diagnosis Date   • Basal cell carcinoma of skin     Description: x 3 dx 1996-2008-face x3 dx 1996-2008-face   • Benign colonic polyp     Description: precancerous dx 2000 Precancerous Dx 2000   • Diabetes mellitus (HCC) 2010    type II; Metformin daily; checks blood sugars at home 90s-100s   • Essential hypertriglyceridemia      Dx 1/10   • Heart murmur    • History of Papanicolaou smear of cervix 2008    Normal per pt   • History of varicella    • Hyperlipidemia      Dx 1/10   • Hypertension    • Hypothyroidism      Thyroid adenoma; thyroid meds daily    • Measles    • Mitral valve  prolapse syndrome    • Obstructive sleep apnea syndrome     wears cpap   • Osteoarthritis      Mild   • Screening for cardiovascular condition 12/11/2017    Cardiac CT Scan (Calcium Score 0)   • Squamous cell carcinoma of skin     Description: dx 2004-right shoulder    • Wears glasses       Past Surgical History:   Procedure Laterality Date   • BLEPHAROPLASTY, QUAD Bilateral    • BREAST BIOPSY Left 2/6/2018    Procedure: BREAST BIOPSY WITH NEEDLE LOCALIZATION LEFT ARCHITECTURAL DISTORTION;  Surgeon: Barbara Zaldivar MD;  Location: [facility];  Service:    • BREAST EXCISIONAL BIOPSY Left 02/06/2018    Complex sclerosing lesion- no atypia or malignancy   • CATARACT EXTRACTION Bilateral     Rt? date, Left 2/12   • COLONOSCOPY     • MAMMO FINE NEEDLE ASPIRATION UNILATERAL Left 11/2017    Proliferative fibrocystic change with sclerosing adenosis with dystrophic calcifications,   • TONSILLECTOMY  1954   • TOTAL ABDOMINAL HYSTERECTOMY WITH SALPINGO OOPHORECTOMY  1996      Family History   Problem Relation Age of Onset   • Hyperlipidemia Mother    • Colon polyps Mother    • Arthritis Mother    • Cancer Mother    • Hypertension Mother    • Hypertension Other    • Heart disease Other    • Prostate cancer Father    • Heart attack Father    • Diabetes Maternal Grandmother    • Glaucoma Maternal Grandmother    • Diabetes Paternal Grandmother    • Breast cancer Neg Hx    • Ovarian cancer Neg Hx    • BRCA 1/2 Neg Hx    • Aneurysm Neg Hx         AAA     Social History     Socioeconomic History   • Marital status:    • Number of children: 2   Tobacco Use   • Smoking status: Never Smoker   • Smokeless tobacco: Never Used   Vaping Use   • Vaping Use: Never used   Substance and Sexual Activity   • Alcohol use: No     Comment: Never drank alcohol   • Drug use: No   • Sexual activity: Yes     Partners: Male     Birth control/protection: Post-menopausal      Current Outpatient Medications on File Prior to Visit   Medication Sig  Dispense Refill   • Cetirizine HCl (ZYRTEC ALLERGY) 10 MG capsule Zyrtec 10 mg tablet     • fluticasone (FLONASE) 50 MCG/ACT nasal spray 1 spray into each nostril As Needed for Rhinitis.     • glucose blood test strip Daily.     • ibuprofen (ADVIL,MOTRIN) 100 MG/5ML suspension Take  by mouth Every 6 (Six) Hours As Needed for Mild Pain .     • Lancets (onetouch ultrasoft) lancets USE ONE LANCET TO TEST DAILY 100 each 3   • levothyroxine (SYNTHROID, LEVOTHROID) 100 MCG tablet TAKE ONE TABLET BY MOUTH DAILY 90 tablet 1   • lisinopril (PRINIVIL,ZESTRIL) 20 MG tablet TAKE ONE TABLET BY MOUTH DAILY 90 tablet 2   • metFORMIN (GLUCOPHAGE) 500 MG tablet TAKE ONE TABLET BY MOUTH DAILY WITH BREAKFAST 90 tablet 3   • Multiple Vitamins-Minerals (CENTRUM SILVER ADULT 50+ PO) Take 1 tablet by mouth Daily.     • OneTouch Ultra test strip USE ONE STRIP TO TEST DAILY 100 each 3   • simvastatin (ZOCOR) 20 MG tablet TAKE ONE TABLET BY MOUTH ONCE NIGHTLY 90 tablet 3   • [DISCONTINUED] fexofenadine-pseudoephedrine (ALLEGRA-D 24) 180-240 MG per 24 hr tablet Take 1 tablet by mouth Daily As Needed for Allergies.       No current facility-administered medications on file prior to visit.      Allergies   Allergen Reactions   • Cefdinir Other (See Comments)     Cefdinir SUSR; Adverse Reaction; Abdominal pain   • Penicillins Rash   • Sulfa Antibiotics Rash     Sulfa Drugs        Review of Systems   Constitutional: Negative for activity change, appetite change, chills, diaphoresis, fatigue, fever and unexpected weight change.   HENT: Negative for congestion, dental problem, drooling, ear discharge, ear pain, facial swelling, hearing loss, mouth sores, nosebleeds, postnasal drip, rhinorrhea, sinus pressure, sneezing, sore throat, tinnitus, trouble swallowing and voice change.    Eyes: Negative for photophobia, pain, discharge, redness, itching and visual disturbance.   Respiratory: Negative for apnea, cough, choking, chest tightness, shortness of  "breath, wheezing and stridor.    Cardiovascular: Negative for chest pain, palpitations and leg swelling.   Gastrointestinal: Negative for abdominal distention, abdominal pain, anal bleeding, blood in stool, constipation, diarrhea, nausea, rectal pain and vomiting.   Endocrine: Negative for cold intolerance, heat intolerance, polydipsia, polyphagia and polyuria.   Genitourinary: Negative for decreased urine volume, difficulty urinating, dysuria, enuresis, flank pain, frequency, genital sores, hematuria and urgency.   Musculoskeletal: Positive for arthralgias. Negative for back pain, gait problem, joint swelling, myalgias, neck pain and neck stiffness.   Skin: Negative for color change, pallor, rash and wound.   Allergic/Immunologic: Negative for environmental allergies, food allergies and immunocompromised state.   Neurological: Negative for dizziness, tremors, seizures, syncope, facial asymmetry, speech difficulty, weakness, light-headedness, numbness and headaches.   Hematological: Negative for adenopathy. Does not bruise/bleed easily.   Psychiatric/Behavioral: Negative for agitation, behavioral problems, confusion, decreased concentration, dysphoric mood, hallucinations, self-injury, sleep disturbance and suicidal ideas. The patient is not nervous/anxious and is not hyperactive.         Objective      Physical Exam  Pulse 88   Ht 167.6 cm (65.98\")   Wt 104 kg (229 lb 4.5 oz)   SpO2 97%   BMI 37.02 kg/m²     Body mass index is 37.02 kg/m².    General:   Mental Status:  Alert   Appearance: Cooperative, in no acute distress   Build and Nutrition: Obese by BMI female   Orientation: Alert and oriented to person, place and time   Posture: Normal   Gait: Nonantalgic    Integument:              Left knee: No skin lesions, no rash, no ecchymosis     Lower Extremities:              Left Knee:                          Effusion:          1+                          Swelling:          None                          " Crepitus:          None                          Atrophy:           None                          Range of motion:        Extension:       0°                                                              Flexion:           125°  Deformities:     None    Imaging/Studies    EXAMINATION: MRI KNEE LEFT  WO CONTRAST-      INDICATION: Internal derangement, left knee; M25.562-Pain in left knee;  G89.29-Other chronic pain     TECHNIQUE: MRI of the left knee was obtained using standard imaging  sequences in three orthogonal imaging planes. No intravenous or  intra-articular contrast was administered.     COMPARISON: Left knee radiographs 10/11/2021     FINDINGS:      Abnormal increased signal at the tibial surface of the posterior horn of  the medial meniscus (series 8 image 8) which may reflect small  longitudinal horizontal tear without displaced flap. Otherwise the  medial meniscus is unremarkable. The medial supporting structures are  intact. No high-grade articular cartilage loss in the medial  femorotibial compartment.     The lateral meniscus is normal. The lateral supporting structures are  normal. The articular cartilage of the lateral femorotibial compartment  is grossly preserved.     The anterior cruciate ligament and posterior cruciate ligament are  normal.     The anterior knee fat pads are unremarkable. The quadriceps tendon and  patellar tendon are normal. High-grade near full-thickness chondral  fissure at the superior patellar median ridge/medial facet with mild  subchondral marrow edema (series 7 image 9 and series 8 image 12).     There is a trace joint effusion. A 1.4 x 2.6 x 4.4 cm popliteal cyst is  noted. The posterior knee neurovasculature is unremarkable. The muscles  and soft tissues are unremarkable. No fracture.        IMPRESSION:     Abnormal increased signal along the tibial surface of the posterior horn  of the medial meniscus which may reflect a small longitudinal horizontal  tear without  displaced flap.     High-grade chondral fissure at the median ridge/medial facet of the  patella.     Popliteal cyst as above.     This report was finalized on 11/8/2021 11:33 AM by Sam Arellano MD.    Personally reviewed the MRI and agree with findings.    Assessment and Plan     Diagnoses and all orders for this visit:    1. Tear of medial meniscus of left knee, unspecified tear type, unspecified whether old or current tear, initial encounter (Primary)  -     Large Joint Arthrocentesis: L knee        1. Tear of medial meniscus of left knee, unspecified tear type, unspecified whether old or current tear, initial encounter        I reviewed my findings with the patient today.  She does have degeneration of the medial meniscus, we discussed treatment options today.  I do not believe that surgical intervention will be particularly helpful.  She would like to try an intra-articular injection, and this was provided.  I will see her back in 3 months, but sooner for any problems.    Procedure Note:  The potential benefits of performing a therapeutic left knee joint injection, as well as potential risks (including, but not limited to infection, swelling, pain, bleeding, bruising, nerve/blood vessel damage, skin color changes, transient elevation in blood glucose levels, and fat atrophy) were discussed with the patient.  After informed consent, timeout procedure was performed, and the skin on the left knee was prepped with chlorhexidine soap and alcohol, after which ethyl chloride was applied to the skin at the injection site. Via the anterolateral approach, 1ml of Kenalog 40mg/ml mixed with 4ml 0.5% ropivacaine plain was injected into the knee joint.  The patient tolerated the procedure well, experiencing 70% improvement a few minutes following the injection. There were no complications.  Band-Aid was applied to the injection site. Post-procedural instructions were given to the patient and/or their  caregiver.        Return in about 3 months (around 2/10/2022).      Carrington Chanel MD  11/10/21  15:47 EST

## 2021-11-17 ENCOUNTER — OFFICE VISIT (OUTPATIENT)
Dept: INTERNAL MEDICINE | Facility: CLINIC | Age: 71
End: 2021-11-17

## 2021-11-17 VITALS
SYSTOLIC BLOOD PRESSURE: 128 MMHG | RESPIRATION RATE: 20 BRPM | TEMPERATURE: 97.7 F | HEART RATE: 72 BPM | OXYGEN SATURATION: 97 % | BODY MASS INDEX: 34.94 KG/M2 | WEIGHT: 216.38 LBS | DIASTOLIC BLOOD PRESSURE: 68 MMHG

## 2021-11-17 DIAGNOSIS — N89.8 VAGINAL SORE: Primary | ICD-10-CM

## 2021-11-17 PROCEDURE — 99212 OFFICE O/P EST SF 10 MIN: CPT | Performed by: INTERNAL MEDICINE

## 2021-11-17 NOTE — PROGRESS NOTES
Subjective       Angelina Pimentel is a 71 y.o. female.     Chief Complaint   Patient presents with   • vaginal issues       History obtained from the patient.      History of Present Illness     The patient states she noticed a sore spot in the vaginal area 2 days ago when she went to wipe after urination.  Next urination, she noticed a drop of blood in her panties.  Symptoms have resolved.  She denies urinary frequency, urgency, dysuria, and hematuria.  There is no nausea, vomiting, or diarrhea.  She denies vaginal discharge, fever, and chills.    The following portions of the patient's history were reviewed and updated as appropriate: allergies, current medications, past family history, past medical history, past social history, past surgical history and problem list.      Review of Systems   Constitutional: Negative for chills and fever.   Respiratory: Negative for cough and shortness of breath.    Cardiovascular: Negative for chest pain.   Gastrointestinal: Negative for abdominal pain, blood in stool, constipation, diarrhea, nausea and vomiting.        Denies melena.   Genitourinary: Negative for dysuria, flank pain, frequency, hematuria, pelvic pain, urgency, vaginal bleeding and vaginal discharge.        Denies vaginal itching.   Skin: Negative for rash.   Hematological: Negative for adenopathy.           Objective     Blood pressure 128/68, pulse 72, temperature 97.7 °F (36.5 °C), temperature source Temporal, resp. rate 20, weight 98.1 kg (216 lb 6 oz), SpO2 97 %, not currently breastfeeding.    Physical Exam  Vitals and nursing note reviewed.   Constitutional:       Appearance: She is obese.   Genitourinary:     Comments: External exam reveals no vaginal lesions.  Skin:     Findings: No rash.   Neurological:      Mental Status: She is alert.   Psychiatric:         Mood and Affect: Mood normal.           Assessment/Plan   Diagnoses and all orders for this visit:    1. Vaginal sore  (Primary)            Return if symptoms worsen or fail to improve.

## 2021-11-19 RX ORDER — LISINOPRIL 20 MG/1
TABLET ORAL
Qty: 90 TABLET | Refills: 2 | Status: SHIPPED | OUTPATIENT
Start: 2021-11-19 | End: 2022-09-06

## 2021-12-11 DIAGNOSIS — E78.1 ESSENTIAL HYPERTRIGLYCERIDEMIA: Chronic | ICD-10-CM

## 2021-12-13 RX ORDER — SIMVASTATIN 20 MG
TABLET ORAL
Qty: 90 TABLET | Refills: 1 | Status: SHIPPED | OUTPATIENT
Start: 2021-12-13 | End: 2022-06-06

## 2021-12-15 ENCOUNTER — OFFICE VISIT (OUTPATIENT)
Dept: INTERNAL MEDICINE | Facility: CLINIC | Age: 71
End: 2021-12-15

## 2021-12-15 ENCOUNTER — LAB (OUTPATIENT)
Dept: LAB | Facility: HOSPITAL | Age: 71
End: 2021-12-15

## 2021-12-15 VITALS
WEIGHT: 218.13 LBS | HEART RATE: 72 BPM | HEIGHT: 66 IN | BODY MASS INDEX: 35.06 KG/M2 | TEMPERATURE: 97.8 F | DIASTOLIC BLOOD PRESSURE: 72 MMHG | SYSTOLIC BLOOD PRESSURE: 120 MMHG | RESPIRATION RATE: 20 BRPM

## 2021-12-15 DIAGNOSIS — E11.9 TYPE 2 DIABETES MELLITUS WITHOUT COMPLICATION, WITHOUT LONG-TERM CURRENT USE OF INSULIN (HCC): ICD-10-CM

## 2021-12-15 DIAGNOSIS — Z00.00 ENCOUNTER FOR HEALTH MAINTENANCE EXAMINATION IN ADULT: ICD-10-CM

## 2021-12-15 DIAGNOSIS — I10 PRIMARY HYPERTENSION: ICD-10-CM

## 2021-12-15 DIAGNOSIS — M15.9 PRIMARY OSTEOARTHRITIS INVOLVING MULTIPLE JOINTS: ICD-10-CM

## 2021-12-15 DIAGNOSIS — K63.5 BENIGN COLONIC POLYP: ICD-10-CM

## 2021-12-15 DIAGNOSIS — E78.1 ESSENTIAL HYPERTRIGLYCERIDEMIA: ICD-10-CM

## 2021-12-15 DIAGNOSIS — Z00.00 MEDICARE ANNUAL WELLNESS VISIT, SUBSEQUENT: Primary | ICD-10-CM

## 2021-12-15 DIAGNOSIS — E03.9 ACQUIRED HYPOTHYROIDISM: ICD-10-CM

## 2021-12-15 LAB
ALBUMIN SERPL-MCNC: 4.1 G/DL (ref 3.5–5.2)
ALBUMIN/GLOB SERPL: 1.2 G/DL
ALP SERPL-CCNC: 132 U/L (ref 39–117)
ALT SERPL W P-5'-P-CCNC: 13 U/L (ref 1–33)
ANION GAP SERPL CALCULATED.3IONS-SCNC: 8.7 MMOL/L (ref 5–15)
AST SERPL-CCNC: 14 U/L (ref 1–32)
BASOPHILS # BLD AUTO: 0.04 10*3/MM3 (ref 0–0.2)
BASOPHILS NFR BLD AUTO: 0.6 % (ref 0–1.5)
BILIRUB SERPL-MCNC: 0.2 MG/DL (ref 0–1.2)
BUN SERPL-MCNC: 14 MG/DL (ref 8–23)
BUN/CREAT SERPL: 19.4 (ref 7–25)
CALCIUM SPEC-SCNC: 9.7 MG/DL (ref 8.6–10.5)
CHLORIDE SERPL-SCNC: 106 MMOL/L (ref 98–107)
CHOLEST SERPL-MCNC: 128 MG/DL (ref 0–200)
CO2 SERPL-SCNC: 26.3 MMOL/L (ref 22–29)
CREAT SERPL-MCNC: 0.72 MG/DL (ref 0.57–1)
DEPRECATED RDW RBC AUTO: 42.5 FL (ref 37–54)
EOSINOPHIL # BLD AUTO: 0.11 10*3/MM3 (ref 0–0.4)
EOSINOPHIL NFR BLD AUTO: 1.5 % (ref 0.3–6.2)
ERYTHROCYTE [DISTWIDTH] IN BLOOD BY AUTOMATED COUNT: 12.3 % (ref 12.3–15.4)
GFR SERPL CREATININE-BSD FRML MDRD: 80 ML/MIN/1.73
GLOBULIN UR ELPH-MCNC: 3.5 GM/DL
GLUCOSE SERPL-MCNC: 98 MG/DL (ref 65–99)
HBA1C MFR BLD: 5.97 % (ref 4.8–5.6)
HCT VFR BLD AUTO: 42.7 % (ref 34–46.6)
HDLC SERPL-MCNC: 45 MG/DL (ref 40–60)
HGB BLD-MCNC: 14.2 G/DL (ref 12–15.9)
LDLC SERPL CALC-MCNC: 67 MG/DL (ref 0–100)
LDLC/HDLC SERPL: 1.49 {RATIO}
LYMPHOCYTES # BLD AUTO: 1.86 10*3/MM3 (ref 0.7–3.1)
LYMPHOCYTES NFR BLD AUTO: 26.2 % (ref 19.6–45.3)
MCH RBC QN AUTO: 31.6 PG (ref 26.6–33)
MCHC RBC AUTO-ENTMCNC: 33.3 G/DL (ref 31.5–35.7)
MCV RBC AUTO: 95.1 FL (ref 79–97)
MONOCYTES # BLD AUTO: 0.44 10*3/MM3 (ref 0.1–0.9)
MONOCYTES NFR BLD AUTO: 6.2 % (ref 5–12)
NEUTROPHILS NFR BLD AUTO: 4.63 10*3/MM3 (ref 1.7–7)
NEUTROPHILS NFR BLD AUTO: 65.1 % (ref 42.7–76)
PLATELET # BLD AUTO: 276 10*3/MM3 (ref 140–450)
PMV BLD AUTO: 10.4 FL (ref 6–12)
POTASSIUM SERPL-SCNC: 4.4 MMOL/L (ref 3.5–5.2)
PROT SERPL-MCNC: 7.6 G/DL (ref 6–8.5)
RBC # BLD AUTO: 4.49 10*6/MM3 (ref 3.77–5.28)
SODIUM SERPL-SCNC: 141 MMOL/L (ref 136–145)
T4 SERPL-MCNC: 6.65 MCG/DL (ref 4.5–11.7)
TRIGL SERPL-MCNC: 79 MG/DL (ref 0–150)
TSH SERPL DL<=0.05 MIU/L-ACNC: 1.71 UIU/ML (ref 0.27–4.2)
VLDLC SERPL-MCNC: 16 MG/DL (ref 5–40)
WBC NRBC COR # BLD: 7.11 10*3/MM3 (ref 3.4–10.8)

## 2021-12-15 PROCEDURE — 84443 ASSAY THYROID STIM HORMONE: CPT | Performed by: INTERNAL MEDICINE

## 2021-12-15 PROCEDURE — 1160F RVW MEDS BY RX/DR IN RCRD: CPT | Performed by: INTERNAL MEDICINE

## 2021-12-15 PROCEDURE — 1126F AMNT PAIN NOTED NONE PRSNT: CPT | Performed by: INTERNAL MEDICINE

## 2021-12-15 PROCEDURE — G0439 PPPS, SUBSEQ VISIT: HCPCS | Performed by: INTERNAL MEDICINE

## 2021-12-15 PROCEDURE — 80061 LIPID PANEL: CPT | Performed by: INTERNAL MEDICINE

## 2021-12-15 PROCEDURE — 83036 HEMOGLOBIN GLYCOSYLATED A1C: CPT | Performed by: INTERNAL MEDICINE

## 2021-12-15 PROCEDURE — 1170F FXNL STATUS ASSESSED: CPT | Performed by: INTERNAL MEDICINE

## 2021-12-15 PROCEDURE — 84436 ASSAY OF TOTAL THYROXINE: CPT | Performed by: INTERNAL MEDICINE

## 2021-12-15 PROCEDURE — 85025 COMPLETE CBC W/AUTO DIFF WBC: CPT | Performed by: INTERNAL MEDICINE

## 2021-12-15 PROCEDURE — 80053 COMPREHEN METABOLIC PANEL: CPT | Performed by: INTERNAL MEDICINE

## 2021-12-15 PROCEDURE — 99397 PER PM REEVAL EST PAT 65+ YR: CPT | Performed by: INTERNAL MEDICINE

## 2021-12-15 NOTE — PATIENT INSTRUCTIONS
Health Maintenance for Postmenopausal Women  Menopause is a normal process in which your ability to get pregnant comes to an end. This process happens slowly over many months or years, usually between the ages of 48 and 55. Menopause is complete when you have missed your menstrual periods for 12 months.  It is important to talk with your health care provider about some of the most common conditions that affect women after menopause (postmenopausal women). These include heart disease, cancer, and bone loss (osteoporosis). Adopting a healthy lifestyle and getting preventive care can help to promote your health and wellness. The actions you take can also lower your chances of developing some of these common conditions.  What should I know about menopause?  During menopause, you may get a number of symptoms, such as:  · Hot flashes. These can be moderate or severe.  · Night sweats.  · Decrease in sex drive.  · Mood swings.  · Headaches.  · Tiredness.  · Irritability.  · Memory problems.  · Insomnia.  Choosing to treat or not to treat these symptoms is a decision that you make with your health care provider.  Do I need hormone replacement therapy?  · Hormone replacement therapy is effective in treating symptoms that are caused by menopause, such as hot flashes and night sweats.  · Hormone replacement carries certain risks, especially as you become older. If you are thinking about using estrogen or estrogen with progestin, discuss the benefits and risks with your health care provider.  What is my risk for heart disease and stroke?  The risk of heart disease, heart attack, and stroke increases as you age. One of the causes may be a change in the body's hormones during menopause. This can affect how your body uses dietary fats, triglycerides, and cholesterol. Heart attack and stroke are medical emergencies. There are many things that you can do to help prevent heart disease and stroke.  Watch your blood pressure  · High  blood pressure causes heart disease and increases the risk of stroke. This is more likely to develop in people who have high blood pressure readings, are of  descent, or are overweight.  · Have your blood pressure checked:  ? Every 3-5 years if you are 18-39 years of age.  ? Every year if you are 40 years old or older.  Eat a healthy diet    · Eat a diet that includes plenty of vegetables, fruits, low-fat dairy products, and lean protein.  · Do not eat a lot of foods that are high in solid fats, added sugars, or sodium.    Get regular exercise  Get regular exercise. This is one of the most important things you can do for your health. Most adults should:  · Try to exercise for at least 150 minutes each week. The exercise should increase your heart rate and make you sweat (moderate-intensity exercise).  · Try to do strengthening exercises at least twice each week. Do these in addition to the moderate-intensity exercise.  · Spend less time sitting. Even light physical activity can be beneficial.  Other tips  · Work with your health care provider to achieve or maintain a healthy weight.  · Do not use any products that contain nicotine or tobacco, such as cigarettes, e-cigarettes, and chewing tobacco. If you need help quitting, ask your health care provider.  · Know your numbers. Ask your health care provider to check your cholesterol and your blood sugar (glucose). Continue to have your blood tested as directed by your health care provider.  Do I need screening for cancer?  Depending on your health history and family history, you may need to have cancer screening at different stages of your life. This may include screening for:  · Breast cancer.  · Cervical cancer.  · Lung cancer.  · Colorectal cancer.  What is my risk for osteoporosis?  After menopause, you may be at increased risk for osteoporosis. Osteoporosis is a condition in which bone destruction happens more quickly than new bone creation. To help prevent  osteoporosis or the bone fractures that can happen because of osteoporosis, you may take the following actions:  · If you are 19-50 years old, get at least 1,000 mg of calcium and at least 600 mg of vitamin D per day.  · If you are older than age 50 but younger than age 70, get at least 1,200 mg of calcium and at least 600 mg of vitamin D per day.  · If you are older than age 70, get at least 1,200 mg of calcium and at least 800 mg of vitamin D per day.  Smoking and drinking excessive alcohol increase the risk of osteoporosis. Eat foods that are rich in calcium and vitamin D, and do weight-bearing exercises several times each week as directed by your health care provider.  How does menopause affect my mental health?  Depression may occur at any age, but it is more common as you become older. Common symptoms of depression include:  · Low or sad mood.  · Changes in sleep patterns.  · Changes in appetite or eating patterns.  · Feeling an overall lack of motivation or enjoyment of activities that you previously enjoyed.  · Frequent crying spells.  Talk with your health care provider if you think that you are experiencing depression.  General instructions  See your health care provider for regular wellness exams and vaccines. This may include:  · Scheduling regular health, dental, and eye exams.  · Getting and maintaining your vaccines. These include:  ? Influenza vaccine. Get this vaccine each year before the flu season begins.  ? Pneumonia vaccine.  ? Shingles vaccine.  ? Tetanus, diphtheria, and pertussis (Tdap) booster vaccine.  Your health care provider may also recommend other immunizations.  Tell your health care provider if you have ever been abused or do not feel safe at home.  Summary  · Menopause is a normal process in which your ability to get pregnant comes to an end.  · This condition causes hot flashes, night sweats, decreased interest in sex, mood swings, headaches, or lack of sleep.  · Treatment for this  condition may include hormone replacement therapy.  · Take actions to keep yourself healthy, including exercising regularly, eating a healthy diet, watching your weight, and checking your blood pressure and blood sugar levels.  · Get screened for cancer and depression. Make sure that you are up to date with all your vaccines.  This information is not intended to replace advice given to you by your health care provider. Make sure you discuss any questions you have with your health care provider.  Document Revised: 12/11/2019 Document Reviewed: 12/11/2019  [employer] Patient Education © 2021 [employer]      MyPlate from [employer]    MyPlate is an outline of a general healthy diet based on the 2010 Dietary Guidelines for Americans, from the U.S. Department of Agriculture (USDA). It sets guidelines for how much food you should eat from each food group based on your age, sex, and level of physical activity.  What are tips for following MyPlate?  To follow MyPlate recommendations:  · Eat a wide variety of fruits and vegetables, grains, and protein foods.  · Serve smaller portions and eat less food throughout the day.  · Limit portion sizes to avoid overeating.  · Enjoy your food.  · Get at least 150 minutes of exercise every week. This is about 30 minutes each day, 5 or more days per week.  It can be difficult to have every meal look like MyPlate. Think about MyPlate as eating guidelines for an entire day, rather than each individual meal.  Fruits and vegetables  · Make half of your plate fruits and vegetables.  · Eat many different colors of fruits and vegetables each day.  · For a 2,000 calorie daily food plan, eat:  ? 2½ cups of vegetables every day.  ? 2 cups of fruit every day.  · 1 cup is equal to:  ? 1 cup raw or cooked vegetables.  ? 1 cup raw fruit.  ? 1 medium-sized orange, apple, or banana.  ? 1 cup 100% fruit or vegetable juice.  ? 2 cups raw leafy greens, such as lettuce, spinach, or kale.  ? ½ cup dried  fruit.  Grains  · One fourth of your plate should be grains.  · Make at least half of the grains you eat each day whole grains.  · For a 2,000 calorie daily food plan, eat 6 oz of grains every day.  · 1 oz is equal to:  ? 1 slice bread.  ? 1 cup cereal.  ? ½ cup cooked rice, cereal, or pasta.  Protein  · One fourth of your plate should be protein.  · Eat a wide variety of protein foods, including meat, poultry, fish, eggs, beans, nuts, and tofu.  · For a 2,000 calorie daily food plan, eat 5½ oz of protein every day.  · 1 oz is equal to:  ? 1 oz meat, poultry, or fish.  ? ¼ cup cooked beans.  ? 1 egg.  ? ½ oz nuts or seeds.  ? 1 Tbsp peanut butter.  Dairy  · Drink fat-free or low-fat (1%) milk.  · Eat or drink dairy as a side to meals.  · For a 2,000 calorie daily food plan, eat or drink 3 cups of dairy every day.  · 1 cup is equal to:  ? 1 cup milk, yogurt, cottage cheese, or soy milk (soy beverage).  ? 2 oz processed cheese.  ? 1½ oz natural cheese.  Fats, oils, salt, and sugars  · Only small amounts of oils are recommended.  · Avoid foods that are high in calories and low in nutritional value (empty calories), like foods high in fat or added sugars.  · Choose foods that are low in salt (sodium). Choose foods that have less than 140 milligrams (mg) of sodium per serving.  · Drink water instead of sugary drinks. Drink enough water each day to keep your urine pale yellow.  Where to find support  · Work with your health care provider or a nutrition specialist (dietitian) to develop a customized eating plan that is right for you.  · Download an moises (mobile application) to help you track your daily food intake.  Where to find more information  · Go to ChooseMyPlate.gov for more information.  Summary  · MyPlate is a general guideline for healthy eating from the USDA. It is based on the 2010 Dietary Guidelines for Americans.  · In general, fruits and vegetables should take up ½ of your plate, grains should take up ¼ of  your plate, and protein should take up ¼ of your plate.  This information is not intended to replace advice given to you by your health care provider. Make sure you discuss any questions you have with your health care provider.  Document Revised: 05/21/2020 Document Reviewed: 03/19/2018  [employer] Patient Education © 2021 [employer]      Exercising to Stay Healthy  To become healthy and stay healthy, it is recommended that you do moderate-intensity and vigorous-intensity exercise. You can tell that you are exercising at a moderate intensity if your heart starts beating faster and you start breathing faster but can still hold a conversation. You can tell that you are exercising at a vigorous intensity if you are breathing much harder and faster and cannot hold a conversation while exercising.  Exercising regularly is important. It has many health benefits, such as:  · Improving overall fitness, flexibility, and endurance.  · Increasing bone density.  · Helping with weight control.  · Decreasing body fat.  · Increasing muscle strength.  · Reducing stress and tension.  · Improving overall health.  How often should I exercise?  Choose an activity that you enjoy, and set realistic goals. Your health care provider can help you make an activity plan that works for you.  Exercise regularly as told by your health care provider. This may include:  · Doing strength training two times a week, such as:  ? Lifting weights.  ? Using resistance bands.  ? Push-ups.  ? Sit-ups.  ? Yoga.  · Doing a certain intensity of exercise for a given amount of time. Choose from these options:  ? A total of 150 minutes of moderate-intensity exercise every week.  ? A total of 75 minutes of vigorous-intensity exercise every week.  ? A mix of moderate-intensity and vigorous-intensity exercise every week.  Children, pregnant women, people who have not exercised regularly, people who are overweight, and older adults may need to talk with a health  care provider about what activities are safe to do. If you have a medical condition, be sure to talk with your health care provider before you start a new exercise program.  What are some exercise ideas?  Moderate-intensity exercise ideas include:  · Walking 1 mile (1.6 km) in about 15 minutes.  · Biking.  · Hiking.  · Golfing.  · Dancing.  · Water aerobics.  Vigorous-intensity exercise ideas include:  · Walking 4.5 miles (7.2 km) or more in about 1 hour.  · Jogging or running 5 miles (8 km) in about 1 hour.  · Biking 10 miles (16.1 km) or more in about 1 hour.  · Lap swimming.  · Roller-skating or in-line skating.  · Cross-country skiing.  · Vigorous competitive sports, such as football, basketball, and soccer.  · Jumping rope.  · Aerobic dancing.  What are some everyday activities that can help me to get exercise?  · Yard work, such as:  ? Pushing a .  ? Raking and bagging leaves.  · Washing your car.  · Pushing a stroller.  · Shoveling snow.  · Gardening.  · Washing windows or floors.  How can I be more active in my day-to-day activities?  · Use stairs instead of an elevator.  · Take a walk during your lunch break.  · If you drive, park your car farther away from your work or school.  · If you take public transportation, get off one stop early and walk the rest of the way.  · Stand up or walk around during all of your indoor phone calls.  · Get up, stretch, and walk around every 30 minutes throughout the day.  · Enjoy exercise with a friend. Support to continue exercising will help you keep a regular routine of activity.  What guidelines can I follow while exercising?  · Before you start a new exercise program, talk with your health care provider.  · Do not exercise so much that you hurt yourself, feel dizzy, or get very short of breath.  · Wear comfortable clothes and wear shoes with good support.  · Drink plenty of water while you exercise to prevent dehydration or heat stroke.  · Work out until your  breathing and your heartbeat get faster.  Where to find more information  · U.[place] Department of Health and Human Services: www.hhs.gov  · Centers for Disease Control and Prevention (CDC): www.cdc.gov  Summary  · Exercising regularly is important. It will improve your overall fitness, flexibility, and endurance.  · Regular exercise also will improve your overall health. It can help you control your weight, reduce stress, and improve your bone density.  · Do not exercise so much that you hurt yourself, feel dizzy, or get very short of breath.  · Before you start a new exercise program, talk with your health care provider.  This information is not intended to replace advice given to you by your health care provider. Make sure you discuss any questions you have with your health care provider.  Document Revised: 11/30/2018 Document Reviewed: 11/08/2018  [employer] Patient Education © 2021 [employer]

## 2021-12-15 NOTE — PROGRESS NOTES
The ABCs of the Annual Wellness Visit  Subsequent Medicare Wellness Visit    Chief Complaint   Patient presents with   • Medicare Wellness-subsequent   • Annual Exam     fasting      Subjective    History of Present Illness:  Angelina Pimentel is a 71 y.o. female who presents for a Subsequent Medicare Wellness Visit.    The following portions of the patient's history were reviewed and   updated as appropriate: allergies, current medications, past family history, past medical history, past social history, past surgical history and problem list.    Compared to one year ago, the patient feels her physical   health is better.    Compared to one year ago, the patient feels her mental   health is better.    Recent Hospitalizations:  She was not admitted to the hospital during the last year.       Current Medical Providers:  Patient Care Team:  Leola Villa MD as PCP - General (Internal Medicine)  Leola Villa MD as PCP - Family Medicine  Kathie Arias MD as Consulting Physician (Dermatology)  Bc Red MD as Consulting Physician (Ophthalmology)  Louis Sebastian MD as Consulting Physician (Cardiology)    Outpatient Medications Prior to Visit   Medication Sig Dispense Refill   • Cetirizine HCl (ZYRTEC ALLERGY) 10 MG capsule Zyrtec 10 mg tablet     • fluticasone (FLONASE) 50 MCG/ACT nasal spray 1 spray into each nostril As Needed for Rhinitis.     • glucose blood test strip Daily.     • ibuprofen (ADVIL,MOTRIN) 100 MG/5ML suspension Take  by mouth Every 6 (Six) Hours As Needed for Mild Pain .     • Lancets (onetouch ultrasoft) lancets USE ONE LANCET TO TEST DAILY 100 each 3   • levothyroxine (SYNTHROID, LEVOTHROID) 100 MCG tablet TAKE ONE TABLET BY MOUTH DAILY 90 tablet 1   • lisinopril (PRINIVIL,ZESTRIL) 20 MG tablet TAKE ONE TABLET BY MOUTH DAILY 90 tablet 2   • metFORMIN (GLUCOPHAGE) 500 MG tablet TAKE ONE TABLET BY MOUTH DAILY WITH BREAKFAST 90 tablet 3   • Multiple Vitamins-Minerals (CENTRUM  "SILVER ADULT 50+ PO) Take 1 tablet by mouth Daily.     • OneTouch Ultra test strip USE ONE STRIP TO TEST DAILY 100 each 3   • simvastatin (ZOCOR) 20 MG tablet TAKE ONE TABLET BY MOUTH ONCE NIGHTLY 90 tablet 1     No facility-administered medications prior to visit.       No opioid medication identified on active medication list. I have reviewed chart for other potential  high risk medication/s and harmful drug interactions in the elderly.          Aspirin is not on active medication list.  Aspirin use is not indicated based on review of current medical condition/s. Risk of harm outweighs potential benefits.  .    Patient Active Problem List   Diagnosis   • Diabetes mellitus (HCC)   • Benign colonic polyp   • Hypothyroidism   • Osteoarthritis   • Hypertension   • Allergic rhinitis   • Basal cell carcinoma of skin   • Essential hypertriglyceridemia   • Mitral valve prolapse syndrome   • Squamous cell carcinoma of skin   • Obstructive sleep apnea syndrome   • Menopause   • Hypertensive heart disease without heart failure   • Abnormal ECG   • Dyslipidemia     Advance Care Planning  Advance Directive is not on file.  ACP discussion was held with the patient during this visit. Patient has an advance directive (not in EMR), copy requested.   ACP information declined on the AVS.          Objective    Vitals:    12/15/21 0840   BP: 120/72   BP Location: Right arm   Pulse: 72   Resp: 20   Temp: 97.8 °F (36.6 °C)   TempSrc: Temporal   Weight: 98.9 kg (218 lb 2 oz)   Height: 168.3 cm (66.25\")   PainSc: 0-No pain     BMI Readings from Last 1 Encounters:   12/15/21 34.94 kg/m²     Patient's Body mass index is 34.94 kg/m². indicating that she is obese (BMI >30). Obesity-related health conditions include the following: obstructive sleep apnea, hypertension, diabetes mellitus, dyslipidemias and osteoarthritis. Obesity is improving with lifestyle modifications. BMI is is above average; BMI management plan is completed. We discussed " portion control and increasing exercise..    Does the patient have evidence of cognitive impairment? No  Finger Rub Hearing{Test (right ear):passed  Finger Rub Hearing{Test (left ear):passed      Physical Exam  Lab Results   Component Value Date    TRIG 79 12/15/2021    HDL 45 12/15/2021    LDL 67 12/15/2021    VLDL 16 12/15/2021    HGBA1C 5.97 (H) 12/15/2021            HEALTH RISK ASSESSMENT    Smoking Status:  Social History     Tobacco Use   Smoking Status Never Smoker   Smokeless Tobacco Never Used     Alcohol Consumption:  Social History     Substance and Sexual Activity   Alcohol Use No    Comment: Never drank alcohol     Fall Risk Screen:    LORI Fall Risk Assessment was completed, and patient is at LOW risk for falls.Assessment completed on:12/15/2021    Depression Screening:  PHQ-2/PHQ-9 Depression Screening 12/15/2021   Little interest or pleasure in doing things 0   Feeling down, depressed, or hopeless 0   Total Score 0       Health Habits and Functional and Cognitive Screening:  Functional & Cognitive Status 12/15/2021   Do you have difficulty preparing food and eating? No   Do you have difficulty bathing yourself, getting dressed or grooming yourself? No   Do you have difficulty using the toilet? No   Do you have difficulty moving around from place to place? No   Do you have trouble with steps or getting out of a bed or a chair? No   Current Diet Low Carb Diet        Current Diet Comment well  balanced    Dental Exam Up to date   Eye Exam Up to date        Eye Exam Comment -   Exercise (times per week) 3 times per week        Exercise Frequency Comment -   Current Exercises Include Walking        Exercise Comment    Current Exercise Activities Include -   Do you need help using the phone?  No   Are you deaf or do you have serious difficulty hearing?  No   Do you need help with transportation? No   Do you need help shopping? No   Do you need help preparing meals?  No   Do you need help with  housework?  No   Do you need help with laundry? No   Do you need help taking your medications? No   Do you need help managing money? No   Do you ever drive or ride in a car without wearing a seat belt? No   Have you felt unusual stress, anger or loneliness in the last month? No   Who do you live with? Spouse   If you need help, do you have trouble finding someone available to you? No   Have you been bothered in the last four weeks by sexual problems? No   Do you have difficulty concentrating, remembering or making decisions? -       Age-appropriate Screening Schedule:  Refer to the list below for future screening recommendations based on patient's age, sex and/or medical conditions. Orders for these recommended tests are listed in the plan section. The patient has been provided with a written plan.    Health Maintenance   Topic Date Due   • DIABETIC EYE EXAM  01/11/2022   • DIABETIC FOOT EXAM  06/10/2022   • URINE MICROALBUMIN  06/10/2022   • HEMOGLOBIN A1C  06/15/2022   • DXA SCAN  10/17/2022   • LIPID PANEL  12/15/2022   • MAMMOGRAM  09/15/2023   • TDAP/TD VACCINES (3 - Td or Tdap) 04/05/2027   • INFLUENZA VACCINE  Completed   • ZOSTER VACCINE  Completed              Assessment/Plan   CMS Preventative Services Quick Reference  Risk Factors Identified During Encounter  Cardiovascular Disease  Obesity/Overweight   The above risks/problems have been discussed with the patient.  Follow up actions/plans if indicated are seen below in the Assessment/Plan Section.  Pertinent information has been shared with the patient in the After Visit Summary.    Diagnoses and all orders for this visit:    1. Medicare annual wellness visit, subsequent (Primary)    2. Encounter for health maintenance examination in adult  -     Lipid Panel; Future  -     Comprehensive Metabolic Panel; Future  -     TSH; Future  -     CBC & Differential; Future  -     Lipid Panel  -     Comprehensive Metabolic Panel  -     TSH  -     CBC &  Differential  -     Cancel: Manual Differential    3. Type 2 diabetes mellitus without complication, without long-term current use of insulin (HCC)  -     Lipid Panel; Future  -     Comprehensive Metabolic Panel; Future  -     TSH; Future  -     CBC & Differential; Future  -     Hemoglobin A1c; Future  -     Lipid Panel  -     Comprehensive Metabolic Panel  -     TSH  -     CBC & Differential  -     Hemoglobin A1c  -     Cancel: Manual Differential    4. Primary hypertension  -     Lipid Panel; Future  -     Comprehensive Metabolic Panel; Future  -     CBC & Differential; Future  -     Lipid Panel  -     Comprehensive Metabolic Panel  -     CBC & Differential  -     Cancel: Manual Differential    5. Acquired hypothyroidism  -     TSH; Future  -     T4; Future  -     TSH  -     T4    6. Essential hypertriglyceridemia  -     Lipid Panel; Future  -     Comprehensive Metabolic Panel; Future  -     TSH; Future  -     CBC & Differential; Future  -     Lipid Panel  -     Comprehensive Metabolic Panel  -     TSH  -     CBC & Differential  -     Cancel: Manual Differential    7. Benign colonic polyp    8. Primary osteoarthritis involving multiple joints        Follow Up:   Return in about 6 months (around 6/15/2022) for Recheck HTN, fasting.     An After Visit Summary and PPPS were made available to the patient.

## 2021-12-15 NOTE — PROGRESS NOTES
Subjective     Chief Complaint:  Physical Exam.    History of Present Illness    History obtained from the patient.      Primary [facility]: The patient is here today for a 6 month follow-up visit.       Her Diabetes Mellitus type 2 has been stable.   Medication(s): Metformin, Simvastatin, and Lisinopril.   Her Hypertension has been stable.   Medication(s): Lisinopril.   Her Hyperlipidemia has been stable.   Her LDL goal is < 70 and last LDL was 84, .   Medication(s): Simvastiatin.   The patient is adherent with her medication regimen. She denies medication side effects.       Interval Events: Last Hemoglobin A1c on  6/10/2021 was 5.9.  Blood sugar at home has been   fasting.  She does not check postprandial levels.  She denies episodes of low blood sugar.  She has not been checking blood pressure at home.  Last Ophthalmology visit was 1/11/21, no retinopathy ([facility] Dr[facility] Red), and she reports she has an appointment scheduled in January 2022.   She states she checks her feet daily.  She saw Dr. Sebastian on 3/3/2021 for yearly follow-up.  There were no medication changes.      Symptoms: Denies chest pain, dyspnea, FLORES, orthopnea, PND, palpitations, syncope, lower extremity edema, claudication, lightheadedness, and dizziness.   Associated Symptoms: Weight decreased 12 pounds in the past 6 months.  No fatigue, headache, polydipsia, polyuria, myalgias, arthralgias, visual impairment, memory loss, concentration problems, or focal neurologic deficits.  Has new onset bilateral foot pain.  Denies numbness / tingling of the feet,  and a foot ulcer,      Lifestyle and Disease Management: Diet: The patient has been consuming a diverse and half healthy diet (Weight Watchers). Weight Issues: She has weight concerns. Exercise:  She goes to the gym twice per week (cardio and weights).  Tobacco Use: Never a smoker.      Hypothyroidism Follow-Up: The patient is being seen for follow-up of  Hypothyroidism, which is stable.  Interval Events:  TSH and T4 were normal 12/10/20.  Symptoms:  Weight decreased 12 pounds in the past 6 months.  Denies diarrhea, constipation, heat/cold intolerance, fatigue, weakness, memory loss,  trouble concentrating, hair loss, and dry skin.   Associated Symptoms: no arthralgias, myalgias, or paresthesias.   Medications:  Levothyroxine (Synthroid).       Colonic Polyp Follow-up: The patient is being seen for a routine clinic follow-up of Colon Polyp(s), which is stable.   Interval Events:  Current diagnosis was determined by Colonoscopy and last 1/4/18- no polyps  Symptoms: no abdominal pain, diarrhea, constipation, hematochezia, melena, decreased stool caliber, or change in bowel habits.  Associated Symptoms: no rectal prolapse.   Medication:  None.         Osteoarthritis Follow-Up: The patient is being seen for a routine clinic follow-up of Osteoarthritis, which has been stable.   She has no complications from Osteoarthritis.   The patient's Osteoarthritis has not resulted in physical disability.   Interval Events: The patient states she has been seeing Dr. Chanel for a left meniscal tear.  She had a cortisone injection in November 2021, which helped for approximately 1 month.  She has a follow-up appointment in February 2022.  Symptoms: Reports left knee pain.  No arthralgias, back pain, neck pain, hip pain, shoulder pain, or hand pain.  Associated Symptoms: No joint swelling or joint stiffness.   Medications:  Ibuprofen prn.       Angelina Pimentel is a 71 y.o. female who presents for an Annual Physical.      PMH, PSH, SocHx, FamHx, Allergies, and Medications: Reviewed and updated.    Outpatient Medications Prior to Visit   Medication Sig Dispense Refill   • Cetirizine HCl (ZYRTEC ALLERGY) 10 MG capsule Zyrtec 10 mg tablet     • fluticasone (FLONASE) 50 MCG/ACT nasal spray 1 spray into each nostril As Needed for Rhinitis.     • glucose blood test strip Daily.     •  ibuprofen (ADVIL,MOTRIN) 100 MG/5ML suspension Take  by mouth Every 6 (Six) Hours As Needed for Mild Pain .     • Lancets (onetouch ultrasoft) lancets USE ONE LANCET TO TEST DAILY 100 each 3   • levothyroxine (SYNTHROID, LEVOTHROID) 100 MCG tablet TAKE ONE TABLET BY MOUTH DAILY 90 tablet 1   • lisinopril (PRINIVIL,ZESTRIL) 20 MG tablet TAKE ONE TABLET BY MOUTH DAILY 90 tablet 2   • metFORMIN (GLUCOPHAGE) 500 MG tablet TAKE ONE TABLET BY MOUTH DAILY WITH BREAKFAST 90 tablet 3   • Multiple Vitamins-Minerals (CENTRUM SILVER ADULT 50+ PO) Take 1 tablet by mouth Daily.     • OneTouch Ultra test strip USE ONE STRIP TO TEST DAILY 100 each 3   • simvastatin (ZOCOR) 20 MG tablet TAKE ONE TABLET BY MOUTH ONCE NIGHTLY 90 tablet 1     No facility-administered medications prior to visit.       Immunization History   Administered Date(s) Administered   • COVID-19 (MODERNA) 1st, 2nd, 3rd Dose Only 01/09/2021, 02/06/2021, 10/25/2021   • FLUAD TRI 65YR+ 10/11/2019   • Fluad Quad 65+ 08/31/2020   • Fluzone High Dose =>65 Years ([employer] ONLY) 10/14/2015, 10/10/2016, 10/11/2017, 08/30/2018, 09/30/2021   • Hepatitis A 06/01/1998, 01/01/1999   • Pneumococcal Conjugate 13-Valent (PCV13) 11/24/2014   • Pneumococcal Polysaccharide (PPSV23) 09/20/2010, 03/25/2016   • Shingrix 05/30/2018, 08/30/2018   • Td 04/05/2017   • Tdap 02/25/2009   • Zostavax 05/21/2010         Patient Active Problem List   Diagnosis   • Diabetes mellitus (HCC)   • Benign colonic polyp   • Hypothyroidism   • Osteoarthritis   • Hypertension   • Allergic rhinitis   • Basal cell carcinoma of skin   • Essential hypertriglyceridemia   • Mitral valve prolapse syndrome   • Squamous cell carcinoma of skin   • Obstructive sleep apnea syndrome   • Menopause   • Hypertensive heart disease without heart failure   • Abnormal ECG   • Dyslipidemia       Health Habits:  Dental Exam. up to date  Eye Exam. up to date  Hearing Loss:  No  Exercise: 3 times/week.  Current exercise  activities include: walking and works out with a personal training  Diet: Healthy  Multivitamin: Yes    Safe Driving:  Yes  Seat Belt:  Yes  Bike Helmet:  N/A  Skin Screening:  Yes  Sunscreen: Yes  SBE / VIGNESH: No  Sexual Activity:  Yes  Birth Control:  Menopause  STD Prevention:  N/A    Last Pap: 2008, normal per patient report.  Last Mammogram: 9/15/2021, category 1.  Last DEXA Scan: 10/17/2017, normal.  Last Colonoscopy: 1/14/2018, diverticulosis.  Last PSA: N/A    Social:    Social History     Socioeconomic History   • Marital status:    • Number of children: 2   Tobacco Use   • Smoking status: Never Smoker   • Smokeless tobacco: Never Used   Vaping Use   • Vaping Use: Never used   Substance and Sexual Activity   • Alcohol use: No     Comment: Never drank alcohol   • Drug use: No   • Sexual activity: Yes     Partners: Male     Birth control/protection: Post-menopausal         Current Medical Providers:    Leola Villa MD (Internal Medicine / Pediatrics)    The [facility] providers who are involved in the care of this patient are listed above.         Review of Systems   Constitutional: Negative for appetite change, chills, fatigue, fever and unexpected weight change.        No night sweats.    HENT: Negative for congestion, ear pain, hearing loss, nosebleeds, postnasal drip, rhinorrhea, sinus pressure, sinus pain, sneezing, sore throat, tinnitus and voice change.         Denies snoring.   Eyes: Negative for photophobia, pain, discharge, redness, itching and visual disturbance.   Respiratory: Negative for cough, chest tightness, shortness of breath, wheezing and stridor.         No chest congestion.  No hemoptysis.   Cardiovascular: Negative for chest pain, palpitations and leg swelling.        No orthopnea, FLORES, or PND.  No claudication or syncope.   Gastrointestinal: Negative for abdominal pain, blood in stool, constipation, diarrhea, nausea, rectal pain and vomiting.        No melena.  No  "hematemesis.  No heartburn, dysphagia or odynophagia.  No early satiety, belching, or bloating.    Endocrine: Negative for cold intolerance, heat intolerance, polydipsia, polyphagia and polyuria.        No hair loss or dry skin.  No hot flashes.     Genitourinary: Negative for difficulty urinating, dyspareunia, dysuria, flank pain, frequency, hematuria, pelvic pain, urgency, vaginal bleeding and vaginal discharge.        No nocturia, incomplete emptying, or incontinence.   Musculoskeletal: Negative for arthralgias, back pain, gait problem, joint swelling, myalgias, neck pain and neck stiffness.        No joint stiffness.   Skin: Negative for rash.        No new skin lesions or changes in skin lesions. No breast pain or masses.  No nipple discharge or nipple inversion.   Neurological: Negative for dizziness, tremors, syncope, speech difficulty, weakness, light-headedness, numbness and headaches.        No tingling.  No memory loss.  No decreased concentration.   Hematological: Negative for adenopathy. Does not bruise/bleed easily.   Psychiatric/Behavioral: Negative for confusion, self-injury, sleep disturbance and suicidal ideas. The patient is not nervous/anxious.         No depression.           Objective     Vitals:    12/15/21 0840   BP: 120/72   BP Location: Right arm   Pulse: 72   Resp: 20   Temp: 97.8 °F (36.6 °C)   TempSrc: Temporal   Weight: 98.9 kg (218 lb 2 oz)   Height: 168.3 cm (66.25\")   PainSc: 0-No pain       Body mass index is 34.94 kg/m².    Physical Exam  Vitals and nursing note reviewed.   Constitutional:       Appearance: Normal appearance. She is well-developed. She is obese.   HENT:      Head: Normocephalic and atraumatic.      Right Ear: Tympanic membrane, ear canal and external ear normal.      Left Ear: Tympanic membrane, ear canal and external ear normal.      Mouth/Throat:      Mouth: Mucous membranes are moist. No oral lesions.      Pharynx: Oropharynx is clear.      Comments: Tonsils " absent.  Eyes:      Extraocular Movements: Extraocular movements intact.      Conjunctiva/sclera: Conjunctivae normal.      Pupils: Pupils are equal, round, and reactive to light.      Comments: Fundi normal bilaterally.   Neck:      Thyroid: No thyromegaly.      Vascular: No carotid bruit.   Cardiovascular:      Rate and Rhythm: Normal rate and regular rhythm.      Pulses: Normal pulses.      Heart sounds: Normal heart sounds. No murmur heard.  No friction rub. No gallop.    Pulmonary:      Effort: Pulmonary effort is normal.      Breath sounds: Normal breath sounds.   Chest:   Breasts:      Right: No inverted nipple, mass, nipple discharge, skin change, tenderness, axillary adenopathy or supraclavicular adenopathy.      Left: No inverted nipple, mass, nipple discharge, skin change, tenderness, axillary adenopathy or supraclavicular adenopathy.       Abdominal:      General: Bowel sounds are normal. There is no distension or abdominal bruit.      Palpations: Abdomen is soft. There is no hepatomegaly, splenomegaly or mass.      Tenderness: There is no abdominal tenderness.   Genitourinary:     Comments:  and rectal exam deferred.  Musculoskeletal:         General: Normal range of motion.      Cervical back: Normal range of motion and neck supple.      Right lower leg: No edema.      Left lower leg: No edema.   Lymphadenopathy:      Cervical: No cervical adenopathy.      Upper Body:      Right upper body: No supraclavicular or axillary adenopathy.      Left upper body: No supraclavicular or axillary adenopathy.   Skin:     Findings: No rash.      Comments: No atypical skin lesions.   Neurological:      Mental Status: She is alert.      Cranial Nerves: Cranial nerves are intact.      Motor: Motor function is intact. No abnormal muscle tone.      Coordination: Coordination is intact.      Gait: Gait is intact.      Deep Tendon Reflexes: Reflexes are normal and symmetric.   Psychiatric:         Mood and Affect: Mood  normal.         PHQ-2 Depression Screening  Little interest or pleasure in doing things? 0   Feeling down, depressed, or hopeless? 0   PHQ-2 Total Score 0         Counseling was given to patient for the following topics:  appropriate exercise, healthy eating habits, disease prevention, risk factors for cancer, importance of self breast exam and breast health, bone health, sun safety, seatbelt use and safe driving. Also discussed the importance of regular dental and vision care, as well recommendation for a yearly screening skin exam after age 40.  Written information provided to patient on these topics and other health maintenance issues.      Assessment/Plan       Diagnoses and all orders for this visit:    1. Medicare annual wellness visit, subsequent (Primary)    2. Encounter for health maintenance examination in adult  -     Lipid Panel  -     Comprehensive Metabolic Panel  -     TSH  -     CBC & Differential   Continue current medication(s) as noted in the history of present illness.    3. Type 2 diabetes mellitus without complication, without long-term current use of insulin (HCC)  -     Lipid Panel  -     Comprehensive Metabolic Panel  -     TSH  -     CBC & Differential  -     Hemoglobin A1c   Continue current medication(s) as noted in the history of present illness.    4. Primary hypertension  -     Lipid Panel  -     Comprehensive Metabolic Panel  -     CBC & Differential   Continue current medication(s) as noted in the history of present illness.      5. Acquired hypothyroidism  -     TSH  -     T4   Continue current medication(s) as noted in the history of present illness.    6. Essential hypertriglyceridemia  -     Lipid Panel  -     Comprehensive Metabolic Panel  -     TSH  -     CBC & Differential   Continue current medication(s) as noted in the history of present illness.    7. Benign colonic polyp   Colonoscopy up-to-date.    8. Primary osteoarthritis involving multiple joints   Continue current  medication(s) as noted in the history of present illness.   Follow up per Dr. Chanel.      Patient's Body mass index is 34.94 kg/m². indicating that she is obese (BMI >30). Obesity-related health conditions include the following: obstructive sleep apnea, hypertension, diabetes mellitus, dyslipidemias and osteoarthritis. Obesity is improving with lifestyle modifications. BMI is is above average; BMI management plan is completed. We discussed portion control and increasing exercise..            Return in about 6 months (around 6/15/2022) for Recheck HTN, fasting.

## 2022-02-14 ENCOUNTER — OFFICE VISIT (OUTPATIENT)
Dept: ORTHOPEDIC SURGERY | Facility: CLINIC | Age: 72
End: 2022-02-14

## 2022-02-14 VITALS
DIASTOLIC BLOOD PRESSURE: 82 MMHG | WEIGHT: 210 LBS | HEIGHT: 66 IN | SYSTOLIC BLOOD PRESSURE: 120 MMHG | BODY MASS INDEX: 33.75 KG/M2

## 2022-02-14 DIAGNOSIS — S83.242A TEAR OF MEDIAL MENISCUS OF LEFT KNEE, UNSPECIFIED TEAR TYPE, UNSPECIFIED WHETHER OLD OR CURRENT TEAR, INITIAL ENCOUNTER: Primary | ICD-10-CM

## 2022-02-14 PROCEDURE — 99212 OFFICE O/P EST SF 10 MIN: CPT | Performed by: ORTHOPAEDIC SURGERY

## 2022-02-14 NOTE — PROGRESS NOTES
[facility] Note    Subjective     Chief Complaint   Patient presents with   • Follow-up     3 months- Tear of medial meniscus of left knee        HPI    It has been 3  month(s) since Ms. Pimentel's last visit. She returns to clinic today for follow-up of left knee pain. The issue has been ongoing for 7 month(s). She rates her pain a 6/10 on the pain scale. Previous/current treatments: NSAIDS and weight loss. Current symptoms: same as prior visit. The pain is worse with walking, sitting, climbing stairs, sleeping and lying on affected side; pain medication and/or NSAID improve the pain. Overall, she is doing better.  The injection helped for about a month.  The pain is intermittent.  She takes Advil intermittently at night with some relief.    I have reviewed the following portions of the patient's history and agree with: History of Present Illness and Review of Systems    Patient Active Problem List   Diagnosis   • Diabetes mellitus (HCC)   • Benign colonic polyp   • Hypothyroidism   • Osteoarthritis   • Hypertension   • Allergic rhinitis   • Basal cell carcinoma of skin   • Essential hypertriglyceridemia   • Mitral valve prolapse syndrome   • Squamous cell carcinoma of skin   • Obstructive sleep apnea syndrome   • Menopause   • Hypertensive heart disease without heart failure   • Abnormal ECG   • Dyslipidemia     Past Medical History:   Diagnosis Date   • Basal cell carcinoma of skin     Description: x 3 dx 1996-2008-face x3 dx 1996-2008-face   • Benign colonic polyp     Description: precancerous dx 2000 Precancerous Dx 2000   • Diabetes mellitus (HCC) 2010    type II; Metformin daily; checks blood sugars at home 90s-100s   • Essential hypertriglyceridemia      Dx 1/10   • Heart murmur    • History of Papanicolaou smear of cervix 2008    Normal per pt   • History of varicella    • Hyperlipidemia      Dx 1/10   • Hypertension    • Hypothyroidism      Thyroid adenoma; thyroid meds daily    •  Measles    • Mitral valve prolapse syndrome    • Obstructive sleep apnea syndrome     wears cpap   • Osteoarthritis      Mild   • Screening for cardiovascular condition 12/11/2017    Cardiac CT Scan (Calcium Score 0)   • Squamous cell carcinoma of skin     Description: dx 2004-right shoulder    • Wears glasses       Past Surgical History:   Procedure Laterality Date   • BLEPHAROPLASTY, QUAD Bilateral    • BREAST BIOPSY Left 2/6/2018    Procedure: BREAST BIOPSY WITH NEEDLE LOCALIZATION LEFT ARCHITECTURAL DISTORTION;  Surgeon: Barbara Zaldivar MD;  Location: [facility];  Service:    • BREAST EXCISIONAL BIOPSY Left 02/06/2018    Complex sclerosing lesion- no atypia or malignancy   • CATARACT EXTRACTION Bilateral     Rt? date, Left 2/12   • COLONOSCOPY     • MAMMO FINE NEEDLE ASPIRATION UNILATERAL Left 11/2017    Proliferative fibrocystic change with sclerosing adenosis with dystrophic calcifications,   • TONSILLECTOMY  1954   • TOTAL ABDOMINAL HYSTERECTOMY WITH SALPINGO OOPHORECTOMY  1996      Family History   Problem Relation Age of Onset   • Hyperlipidemia Mother    • Colon polyps Mother    • Arthritis Mother    • Cancer Mother    • Hypertension Mother    • Hypertension Other    • Heart disease Other    • Prostate cancer Father    • Heart attack Father    • Heart disease Father    • Diabetes Maternal Grandmother    • Glaucoma Maternal Grandmother    • Diabetes Paternal Grandmother    • Breast cancer Neg Hx    • Ovarian cancer Neg Hx    • BRCA 1/2 Neg Hx    • Aneurysm Neg Hx         AAA     Social History     Socioeconomic History   • Marital status:    • Number of children: 2   Tobacco Use   • Smoking status: Never Smoker   • Smokeless tobacco: Never Used   Vaping Use   • Vaping Use: Never used   Substance and Sexual Activity   • Alcohol use: No     Comment: Never drank alcohol   • Drug use: No   • Sexual activity: Yes     Partners: Male     Birth control/protection: Post-menopausal      Current Outpatient  "Medications on File Prior to Visit   Medication Sig Dispense Refill   • Cetirizine HCl (ZYRTEC ALLERGY) 10 MG capsule Zyrtec 10 mg tablet     • fluticasone (FLONASE) 50 MCG/ACT nasal spray 1 spray into each nostril As Needed for Rhinitis.     • glucose blood test strip Daily.     • ibuprofen (ADVIL,MOTRIN) 100 MG/5ML suspension Take  by mouth Every 6 (Six) Hours As Needed for Mild Pain .     • Lancets (onetouch ultrasoft) lancets USE ONE LANCET TO TEST DAILY 100 each 3   • levothyroxine (SYNTHROID, LEVOTHROID) 100 MCG tablet TAKE ONE TABLET BY MOUTH DAILY 90 tablet 1   • lisinopril (PRINIVIL,ZESTRIL) 20 MG tablet TAKE ONE TABLET BY MOUTH DAILY 90 tablet 2   • metFORMIN (GLUCOPHAGE) 500 MG tablet TAKE ONE TABLET BY MOUTH DAILY WITH BREAKFAST 90 tablet 3   • Multiple Vitamins-Minerals (CENTRUM SILVER ADULT 50+ PO) Take 1 tablet by mouth Daily.     • OneTouch Ultra test strip USE ONE STRIP TO TEST DAILY 100 each 3   • simvastatin (ZOCOR) 20 MG tablet TAKE ONE TABLET BY MOUTH ONCE NIGHTLY 90 tablet 1     No current facility-administered medications on file prior to visit.      Allergies   Allergen Reactions   • Cefdinir Other (See Comments)     Cefdinir SUSR; Adverse Reaction; Abdominal pain   • Penicillins Rash   • Sulfa Antibiotics Rash     Sulfa Drugs        Review of Systems     Objective      Physical Exam  /82   Ht 168.3 cm (66.26\")   Wt 95.3 kg (210 lb)   BMI 33.63 kg/m²     Body mass index is 33.63 kg/m².    General:   Mental Status:  Alert   Appearance: Cooperative, in no acute distress   Build and Nutrition: Overweight by BMI female   Orientation: Alert and oriented to person, place and time   Posture: Normal   Gait: Nonantalgic      Integument:              Left knee: No skin lesions, no rash, no ecchymosis     Lower Extremities:              Left Knee:    Tenderness: Medial joint line tenderness                          Effusion:          None                          Swelling: "          None                          Crepitus:          None                          Atrophy:           None                          Range of motion:        Extension:       0°                                                              Flexion:           125°  Deformities:     None    Imaging/Studies  Imaging Results (Last 24 Hours)     ** No results found for the last 24 hours. **        No new imaging    Assessment and Plan     Diagnoses and all orders for this visit:    1. Tear of medial meniscus of left knee, unspecified tear type, unspecified whether old or current tear, initial encounter (Primary)        1. Tear of medial meniscus of left knee, unspecified tear type, unspecified whether old or current tear, initial encounter        I reviewed my findings with the patient.  She did respond briefly to the intra-articular knee injection.  She has been using Advil intermittently at night with some relief.  We will continue with conservative treatment.  I will see her back in 6 months, but sooner for any problems.  She has a trip to [facility] planned for November.    Return in about 6 months (around 8/14/2022).      Carrington Chanel MD  02/14/22  08:34 EST

## 2022-02-20 DIAGNOSIS — E11.9 TYPE 2 DIABETES MELLITUS WITHOUT COMPLICATION, WITHOUT LONG-TERM CURRENT USE OF INSULIN: ICD-10-CM

## 2022-03-04 DIAGNOSIS — E11.9 TYPE 2 DIABETES MELLITUS WITHOUT COMPLICATION, WITHOUT LONG-TERM CURRENT USE OF INSULIN: ICD-10-CM

## 2022-03-04 RX ORDER — BLOOD SUGAR DIAGNOSTIC
STRIP MISCELLANEOUS
Qty: 50 EACH | Refills: 3 | Status: SHIPPED | OUTPATIENT
Start: 2022-03-04 | End: 2022-09-06

## 2022-06-05 DIAGNOSIS — E78.1 ESSENTIAL HYPERTRIGLYCERIDEMIA: Chronic | ICD-10-CM

## 2022-06-06 ENCOUNTER — OFFICE VISIT (OUTPATIENT)
Dept: INTERNAL MEDICINE | Facility: CLINIC | Age: 72
End: 2022-06-06

## 2022-06-06 VITALS
BODY MASS INDEX: 35.13 KG/M2 | HEART RATE: 72 BPM | TEMPERATURE: 97.5 F | SYSTOLIC BLOOD PRESSURE: 144 MMHG | DIASTOLIC BLOOD PRESSURE: 80 MMHG | WEIGHT: 219.38 LBS

## 2022-06-06 DIAGNOSIS — M79.672 PAIN OF LEFT HEEL: Primary | ICD-10-CM

## 2022-06-06 PROCEDURE — 99213 OFFICE O/P EST LOW 20 MIN: CPT | Performed by: INTERNAL MEDICINE

## 2022-06-06 RX ORDER — MELOXICAM 15 MG/1
15 TABLET ORAL DAILY PRN
Qty: 30 TABLET | Refills: 2 | Status: SHIPPED | OUTPATIENT
Start: 2022-06-06 | End: 2022-09-06

## 2022-06-06 RX ORDER — SIMVASTATIN 20 MG
TABLET ORAL
Qty: 90 TABLET | Refills: 1 | Status: SHIPPED | OUTPATIENT
Start: 2022-06-06 | End: 2022-12-15

## 2022-06-06 RX ORDER — METHYLPREDNISOLONE 4 MG/1
TABLET ORAL
Qty: 1 EACH | Refills: 0 | Status: SHIPPED | OUTPATIENT
Start: 2022-06-06 | End: 2022-07-05 | Stop reason: SDUPTHER

## 2022-06-06 NOTE — PROGRESS NOTES
Subjective       Angelina Pimentel is a 71 y.o. female.     Chief Complaint   Patient presents with   • Foot Pain     Left heal        History obtained from the patient.      Foot Pain  This is a new problem. Episode onset: 4/24/22, pain in left heel. The problem occurs intermittently. The problem has been waxing and waning. Pertinent negatives include no arthralgias, chills, fever, joint swelling, myalgias, neck pain or rash. The symptoms are aggravated by walking and standing (No lnown injury or overuse activity). She has tried NSAIDs for the symptoms. The treatment provided no relief.        The following portions of the patient's history were reviewed and updated as appropriate: allergies, current medications, past family history, past medical history, past social history, past surgical history and problem list.      Review of Systems   Constitutional: Negative for chills and fever.   Musculoskeletal: Negative for arthralgias, back pain, joint swelling, myalgias and neck pain.   Skin: Negative for rash.           Objective     Blood pressure 144/80, pulse 72, temperature 97.5 °F (36.4 °C), temperature source Temporal, weight 99.5 kg (219 lb 6 oz), not currently breastfeeding.    Physical Exam  Vitals and nursing note reviewed.   Constitutional:       Appearance: She is obese.   Cardiovascular:      Pulses:           Dorsalis pedis pulses are 2+ on the right side and 2+ on the left side.   Musculoskeletal:      Comments: There is mild tenderness to palpation of the left heel, plantar surface.  No erythema, edema, or warmth.  There is no pain with left ankle flexion and extension, nor with left foot inversion and eversion.   Skin:     Findings: No rash.   Neurological:      Mental Status: She is alert.   Psychiatric:         Mood and Affect: Mood normal.           Assessment & Plan   Diagnoses and all orders for this visit:    1. Pain of left heel (Primary)  -     meloxicam (MOBIC) 15 MG tablet; Take 1 tablet  by mouth Daily As Needed for Moderate Pain .  Dispense: 30 tablet; Refill: 2  -     methylPREDNISolone (MEDROL) 4 MG dose pack; Take as directed on package instructions.  Dispense: 1 each; Refill: 0     The patient was instructed to continue NSAIDS (Meloxicam) for 2 full weeks, then as needed.     Recommended ice, 2-3 times daily.     Rest and elevate the left foot.     She agrees to call if no better in 2 weeks.      Return if symptoms worsen or fail to improve.

## 2022-06-06 NOTE — PATIENT INSTRUCTIONS
Continue NSAIDS (Meloxicam) for 2 full weeks, then as needed.  Recommend ice, 2-3 times daily.  Rest and elevate the left foot.  Please call if no better in 2 weeks.

## 2022-06-10 PROBLEM — H02.834 DERMATOCHALASIS OF BOTH UPPER EYELIDS: Status: ACTIVE | Noted: 2017-07-17

## 2022-06-10 PROBLEM — D31.32 CHOROIDAL NEVUS OF LEFT EYE: Status: ACTIVE | Noted: 2017-07-17

## 2022-06-10 PROBLEM — H02.831 DERMATOCHALASIS OF BOTH UPPER EYELIDS: Status: ACTIVE | Noted: 2017-07-17

## 2022-06-10 PROBLEM — H26.491 PCO (POSTERIOR CAPSULAR OPACIFICATION), RIGHT: Status: ACTIVE | Noted: 2017-07-17

## 2022-06-10 PROBLEM — H40.003 GLAUCOMA SUSPECT OF BOTH EYES: Status: ACTIVE | Noted: 2019-07-29

## 2022-06-10 PROBLEM — H02.889 MEIBOMIAN GLAND DYSFUNCTION (MGD): Status: ACTIVE | Noted: 2017-07-17

## 2022-06-15 ENCOUNTER — OFFICE VISIT (OUTPATIENT)
Dept: INTERNAL MEDICINE | Facility: CLINIC | Age: 72
End: 2022-06-15

## 2022-06-15 ENCOUNTER — LAB (OUTPATIENT)
Dept: LAB | Facility: HOSPITAL | Age: 72
End: 2022-06-15

## 2022-06-15 VITALS
WEIGHT: 218.2 LBS | DIASTOLIC BLOOD PRESSURE: 78 MMHG | HEIGHT: 66 IN | RESPIRATION RATE: 18 BRPM | BODY MASS INDEX: 35.07 KG/M2 | TEMPERATURE: 98.2 F | SYSTOLIC BLOOD PRESSURE: 122 MMHG | OXYGEN SATURATION: 99 % | HEART RATE: 60 BPM

## 2022-06-15 DIAGNOSIS — I10 PRIMARY HYPERTENSION: ICD-10-CM

## 2022-06-15 DIAGNOSIS — E78.1 ESSENTIAL HYPERTRIGLYCERIDEMIA: ICD-10-CM

## 2022-06-15 DIAGNOSIS — E03.9 ACQUIRED HYPOTHYROIDISM: ICD-10-CM

## 2022-06-15 DIAGNOSIS — R31.29 MICROSCOPIC HEMATURIA: ICD-10-CM

## 2022-06-15 DIAGNOSIS — E11.9 TYPE 2 DIABETES MELLITUS WITHOUT COMPLICATION, WITHOUT LONG-TERM CURRENT USE OF INSULIN: Primary | ICD-10-CM

## 2022-06-15 LAB
ALBUMIN SERPL-MCNC: 4.5 G/DL (ref 3.5–5.2)
ALBUMIN/CREATININE RATIO, URINE: <30
ALBUMIN/GLOB SERPL: 1.9 G/DL
ALP SERPL-CCNC: 123 U/L (ref 39–117)
ALT SERPL W P-5'-P-CCNC: 12 U/L (ref 1–33)
ANION GAP SERPL CALCULATED.3IONS-SCNC: 12 MMOL/L (ref 5–15)
AST SERPL-CCNC: 17 U/L (ref 1–32)
BACTERIA UR QL AUTO: ABNORMAL /HPF
BASOPHILS # BLD AUTO: 0.05 10*3/MM3 (ref 0–0.2)
BASOPHILS NFR BLD AUTO: 0.6 % (ref 0–1.5)
BILIRUB BLD-MCNC: NEGATIVE MG/DL
BILIRUB SERPL-MCNC: 0.3 MG/DL (ref 0–1.2)
BUN SERPL-MCNC: 22 MG/DL (ref 8–23)
BUN/CREAT SERPL: 27.5 (ref 7–25)
CALCIUM SPEC-SCNC: 9 MG/DL (ref 8.6–10.5)
CHLORIDE SERPL-SCNC: 104 MMOL/L (ref 98–107)
CHOLEST SERPL-MCNC: 148 MG/DL (ref 0–200)
CLARITY, POC: ABNORMAL
CO2 SERPL-SCNC: 25 MMOL/L (ref 22–29)
COLOR UR: YELLOW
CREAT SERPL-MCNC: 0.8 MG/DL (ref 0.57–1)
DEPRECATED RDW RBC AUTO: 40.8 FL (ref 37–54)
EGFRCR SERPLBLD CKD-EPI 2021: 78.4 ML/MIN/1.73
EOSINOPHIL # BLD AUTO: 0.17 10*3/MM3 (ref 0–0.4)
EOSINOPHIL NFR BLD AUTO: 2.1 % (ref 0.3–6.2)
ERYTHROCYTE [DISTWIDTH] IN BLOOD BY AUTOMATED COUNT: 12.3 % (ref 12.3–15.4)
EXPIRATION DATE: ABNORMAL
EXPIRATION DATE: NORMAL
EXPIRATION DATE: NORMAL
GLOBULIN UR ELPH-MCNC: 2.4 GM/DL
GLUCOSE SERPL-MCNC: 87 MG/DL (ref 65–99)
GLUCOSE UR STRIP-MCNC: NEGATIVE MG/DL
HBA1C MFR BLD: 5.9 %
HCT VFR BLD AUTO: 43.8 % (ref 34–46.6)
HDLC SERPL-MCNC: 49 MG/DL (ref 40–60)
HGB BLD-MCNC: 14.8 G/DL (ref 12–15.9)
HYALINE CASTS UR QL AUTO: ABNORMAL /LPF
IMM GRANULOCYTES # BLD AUTO: 0.07 10*3/MM3 (ref 0–0.05)
IMM GRANULOCYTES NFR BLD AUTO: 0.9 % (ref 0–0.5)
KETONES UR QL: NEGATIVE
LDLC SERPL CALC-MCNC: 86 MG/DL (ref 0–100)
LDLC/HDLC SERPL: 1.76 {RATIO}
LEUKOCYTE EST, POC: ABNORMAL
LYMPHOCYTES # BLD AUTO: 2.17 10*3/MM3 (ref 0.7–3.1)
LYMPHOCYTES NFR BLD AUTO: 27.1 % (ref 19.6–45.3)
Lab: ABNORMAL
Lab: NORMAL
Lab: NORMAL
MCH RBC QN AUTO: 31 PG (ref 26.6–33)
MCHC RBC AUTO-ENTMCNC: 33.8 G/DL (ref 31.5–35.7)
MCV RBC AUTO: 91.8 FL (ref 79–97)
MONOCYTES # BLD AUTO: 0.56 10*3/MM3 (ref 0.1–0.9)
MONOCYTES NFR BLD AUTO: 7 % (ref 5–12)
NEUTROPHILS NFR BLD AUTO: 4.99 10*3/MM3 (ref 1.7–7)
NEUTROPHILS NFR BLD AUTO: 62.3 % (ref 42.7–76)
NITRITE UR-MCNC: NEGATIVE MG/ML
NRBC BLD AUTO-RTO: 0 /100 WBC (ref 0–0.2)
PH UR: ABNORMAL [PH]
PLATELET # BLD AUTO: 287 10*3/MM3 (ref 140–450)
PMV BLD AUTO: 9.8 FL (ref 6–12)
POC CREATININE URINE: 200
POC MICROALBUMIN URINE: 10
POTASSIUM SERPL-SCNC: 4.5 MMOL/L (ref 3.5–5.2)
PROT SERPL-MCNC: 6.9 G/DL (ref 6–8.5)
PROT UR STRIP-MCNC: NEGATIVE MG/DL
RBC # BLD AUTO: 4.77 10*6/MM3 (ref 3.77–5.28)
RBC # UR STRIP: ABNORMAL /HPF
RBC # UR STRIP: ABNORMAL /UL
REF LAB TEST METHOD: ABNORMAL
SODIUM SERPL-SCNC: 141 MMOL/L (ref 136–145)
SP GR UR: 1.01 (ref 1–1.03)
SQUAMOUS #/AREA URNS HPF: ABNORMAL /HPF
TRIGL SERPL-MCNC: 64 MG/DL (ref 0–150)
TSH SERPL DL<=0.05 MIU/L-ACNC: 2.17 UIU/ML (ref 0.27–4.2)
UROBILINOGEN UR QL: NORMAL
VLDLC SERPL-MCNC: 13 MG/DL (ref 5–40)
WBC # UR STRIP: ABNORMAL /HPF
WBC NRBC COR # BLD: 8.01 10*3/MM3 (ref 3.4–10.8)

## 2022-06-15 PROCEDURE — 81015 MICROSCOPIC EXAM OF URINE: CPT | Performed by: INTERNAL MEDICINE

## 2022-06-15 PROCEDURE — 81003 URINALYSIS AUTO W/O SCOPE: CPT | Performed by: INTERNAL MEDICINE

## 2022-06-15 PROCEDURE — 85025 COMPLETE CBC W/AUTO DIFF WBC: CPT | Performed by: INTERNAL MEDICINE

## 2022-06-15 PROCEDURE — 87086 URINE CULTURE/COLONY COUNT: CPT | Performed by: INTERNAL MEDICINE

## 2022-06-15 PROCEDURE — 3044F HG A1C LEVEL LT 7.0%: CPT | Performed by: INTERNAL MEDICINE

## 2022-06-15 PROCEDURE — 84443 ASSAY THYROID STIM HORMONE: CPT | Performed by: INTERNAL MEDICINE

## 2022-06-15 PROCEDURE — 83036 HEMOGLOBIN GLYCOSYLATED A1C: CPT | Performed by: INTERNAL MEDICINE

## 2022-06-15 PROCEDURE — 99214 OFFICE O/P EST MOD 30 MIN: CPT | Performed by: INTERNAL MEDICINE

## 2022-06-15 PROCEDURE — 82044 UR ALBUMIN SEMIQUANTITATIVE: CPT | Performed by: INTERNAL MEDICINE

## 2022-06-15 PROCEDURE — 80061 LIPID PANEL: CPT | Performed by: INTERNAL MEDICINE

## 2022-06-15 PROCEDURE — 3062F POS MACROALBUMINURIA REV: CPT | Performed by: INTERNAL MEDICINE

## 2022-06-15 PROCEDURE — 80053 COMPREHEN METABOLIC PANEL: CPT | Performed by: INTERNAL MEDICINE

## 2022-06-15 NOTE — PROGRESS NOTES
Subjective       Angelina Pimentel is a 71 y.o. female.     Chief Complaint   Patient presents with   • Hypertension     6m recheck   • Diabetes     6m f/up       History obtained from the patient.      History of Present Illness     The patient was seen in 6/6/2020 for left Plantar Fasciitis.  Meloxicam and a Medrol Dosepak was prescribed, and she states her pain is resolved.    [facility]: The patient is here today for a 6 month follow-up visit.       Her Diabetes Mellitus type 2 has been stable.   Medication(s): Metformin, Simvastatin, and Lisinopril.   Her Hypertension has been stable.   Medication(s): Lisinopril.   Her Hyperlipidemia has been stable.   Her LDL goal is < 70 and last LDL was 67, TG 79.   Medication(s): Simvastiatin.   The patient is adherent with her medication regimen. She denies medication side effects.       Interval Events: Last Hemoglobin A1c on 12/15/21 was 5.97.  Blood sugar at home has been 90's  fasting.  She does not check postprandial levels.  She denies episodes of low blood sugar.  She has not been checking blood pressure at home.  Last Ophthalmology visit was 1/22/22, no retinopathy (- Dr. Red).  She saw Dr. Sebastian on 3/3/2021.      Symptoms: Denies chest pain, dyspnea, FLORES, orthopnea, PND, palpitations, syncope, lower extremity edema, claudication, lightheadedness, and dizziness.   Associated Symptoms: No weight change in the past 6 months.  No fatigue, headache, polydipsia, polyuria, myalgias, arthralgias, visual impairment, memory loss, concentration problems, or focal neurologic deficits.  Has new onset bilateral foot pain.  Denies numbness / tingling of the feet,  and a foot ulcer,      Lifestyle: The patient has been consuming a diverse and half healthy diet (Weight Watchers), but is eating more overall due to being home with an injured .   She usually goes to the gym twice per week (cardio and weights) and walks twice per week.   This is decreased over the past 2 to 3 months due to caring for her injured .  Tobacco Use: Never a smoker.      Hypothyroidism Follow-Up: The patient is being seen for follow-up of Hypothyroidism, which is stable.  Interval Events:  TSH and T4 were normal 12/15/21.  Symptoms:  No weight change in the past 6 months.  Denies diarrhea, constipation, heat/cold intolerance, fatigue,  memory loss,  trouble concentrating, hair loss, and dry skin.   Associated Symptoms: no arthralgias, myalgias, or paresthesias.   Medications:  Levothyroxine (Synthroid).       Colonic Polyp Follow-up: The patient is being seen for a routine clinic follow-up of Colon Polyp(s), which is stable.   Interval Events:  Current diagnosis was determined by Colonoscopy and last 1/4/18- no polyps  Symptoms: no abdominal pain, diarrhea, constipation, hematochezia, melena, decreased stool caliber, or change in bowel habits.  Associated Symptoms: no rectal prolapse.   Medication:  None.         Osteoarthritis Follow-Up: The patient is being seen for a routine clinic follow-up of Osteoarthritis, which has been stable.   She has no complications from Osteoarthritis.   The patient's Osteoarthritis has not resulted in physical disability.   Interval Events: The patient states she has been seeing Dr. Chanel for a left meniscal tear.  She had a cortisone injection in November 2021, which helped.  Last appointment was February 14, 2022.  Conservative treatment was continued.  Symptoms: Reports stable left knee pain.  No arthralgias, back pain, neck pain, hip pain, shoulder pain, or hand pain.  Associated Symptoms: No joint swelling or joint stiffness.   Medications:  Ibuprofen prn.       Current Outpatient Medications on File Prior to Visit   Medication Sig Dispense Refill   • Cetirizine HCl 10 MG capsule Zyrtec 10 mg tablet     • fluticasone (FLONASE) 50 MCG/ACT nasal spray 1 spray into each nostril As Needed for Rhinitis.     • glucose blood test strip  Daily.     • ibuprofen (ADVIL,MOTRIN) 100 MG/5ML suspension Take  by mouth Every 6 (Six) Hours As Needed for Mild Pain .     • Lancets (onetouch ultrasoft) lancets USE ONE LANCET TO TEST DAILY 100 each 3   • levothyroxine (SYNTHROID, LEVOTHROID) 100 MCG tablet TAKE ONE TABLET BY MOUTH DAILY 90 tablet 1   • lisinopril (PRINIVIL,ZESTRIL) 20 MG tablet TAKE ONE TABLET BY MOUTH DAILY 90 tablet 2   • meloxicam (MOBIC) 15 MG tablet Take 1 tablet by mouth Daily As Needed for Moderate Pain . 30 tablet 2   • metFORMIN (GLUCOPHAGE) 500 MG tablet TAKE ONE TABLET BY MOUTH DAILY WITH BREAKFAST 90 tablet 3   • methylPREDNISolone (MEDROL) 4 MG dose pack Take as directed on package instructions. 1 each 0   • Multiple Vitamins-Minerals (CENTRUM SILVER ADULT 50+ PO) Take 1 tablet by mouth Daily.     • OneTouch Ultra test strip USE ONE STRIP TO TEST BLOOD GLUCOSE DAILY 50 each 3   • simvastatin (ZOCOR) 20 MG tablet TAKE ONE TABLET BY MOUTH ONCE NIGHTLY 90 tablet 1     No current facility-administered medications on file prior to visit.       Current outpatient and discharge medications have been reconciled for the patient.  Reviewed by: Leola Villa MD        The following portions of the patient's history were reviewed and updated as appropriate: allergies, current medications, past family history, past medical history, past social history, past surgical history and problem list.    Review of Systems   Constitutional: Negative for fatigue and unexpected weight change.   Eyes: Negative for visual disturbance.   Respiratory: Negative for cough, shortness of breath and wheezing.    Cardiovascular: Negative for chest pain, palpitations and leg swelling.        No FLORES, orthopnea, or claudication.   Gastrointestinal: Negative for abdominal pain, blood in stool, constipation, diarrhea, nausea and vomiting.        Denies melena.   Endocrine: Negative for polydipsia and polyuria.   Musculoskeletal: Negative for arthralgias and myalgias.  "  Neurological: Negative for dizziness, syncope, light-headedness and headaches.        No memory issues.   Psychiatric/Behavioral: Negative for decreased concentration.         Objective       Blood pressure 122/78, pulse 60, temperature 98.2 °F (36.8 °C), resp. rate 18, height 168.3 cm (66.26\"), weight 99 kg (218 lb 3.2 oz), SpO2 99 %, not currently breastfeeding.  Body mass index is 34.94 kg/m².      Physical Exam  Vitals and nursing note reviewed.   Constitutional:       Appearance: She is well-developed. She is obese.   Neck:      Thyroid: No thyroid mass or thyromegaly.      Vascular: No carotid bruit.   Cardiovascular:      Rate and Rhythm: Normal rate and regular rhythm.      Pulses: Normal pulses.      Heart sounds: Normal heart sounds. No murmur heard.    No friction rub. No gallop.   Pulmonary:      Effort: Pulmonary effort is normal.      Breath sounds: Normal breath sounds.   Musculoskeletal:      Right lower leg: No edema.      Left lower leg: No edema.   Neurological:      Mental Status: She is alert.   Psychiatric:         Mood and Affect: Mood normal.       Results for orders placed or performed in visit on 06/15/22   POC Glycosylated Hemoglobin (Hb A1C)    Specimen: Blood   Result Value Ref Range    Hemoglobin A1C 5.9 %    Lot Number [id number]     Expiration Date 2-18-24    POC Microalbumin    Specimen: Urine   Result Value Ref Range    Microalbumin, Urine 10     Creatinine, Urine 200     Albumin/Creatinine Ratio, Urine <30     Lot Number [id number]     Expiration Date 3-31-23    POC Urinalysis Dipstick, Automated    Specimen: Urine   Result Value Ref Range    Color Yellow Yellow, Straw, Dark Yellow, Eileen    Clarity, UA Hazy (A) Clear    Specific Gravity  1.015 1.005 - 1.030    pH, Urine      Leukocytes 75 Jacoby/ul (A) Negative    Nitrite, UA Negative Negative    Protein, POC Negative Negative mg/dL    Glucose, UA Negative Negative, 1000 mg/dL (3+) mg/dL    Ketones, UA Negative Negative    " Urobilinogen, UA Normal Normal    Bilirubin Negative Negative    Blood, UA 50 Sudhir/ul (A) Negative    Lot Number [id number]     Expiration Date 2-28-23        Assessment / Plan:  Diagnoses and all orders for this visit:    1. Type 2 diabetes mellitus without complication, without long-term current use of insulin (HCC) (Primary)  -     POC Glycosylated Hemoglobin (Hb A1C)  -     POC Microalbumin  -     POC Urinalysis Dipstick, Automated  -     Lipid Panel  -     Comprehensive Metabolic Panel  -     TSH  -     CBC & Differential   Continue current medication(s) as noted in the history of present illness.    2. Primary hypertension  -     Lipid Panel  -     Comprehensive Metabolic Panel  -     TSH  -     CBC & Differential   Continue current medication(s) as noted in the history of present illness.    3. Essential hypertriglyceridemia  -     Lipid Panel  -     Comprehensive Metabolic Panel  -     TSH  -     CBC & Differential   Continue current medication(s) as noted in the history of present illness.    4. Acquired hypothyroidism  -     TSH   Continue current medication(s) as noted in the history of present illness.    5. Microscopic hematuria  -     Urine Culture - Urine, Urine, Clean Catch  -     Urinalysis, Microscopic Only - Urine, Clean Catch                     Return in about 6 months (around 12/15/2022) for Annual physical, fasting, and schedule subseq Medicare Wellness Exam (same appt) after 12/15/22.

## 2022-06-16 LAB — BACTERIA SPEC AEROBE CULT: NO GROWTH

## 2022-06-23 ENCOUNTER — TRANSCRIBE ORDERS (OUTPATIENT)
Dept: ADMINISTRATIVE | Facility: HOSPITAL | Age: 72
End: 2022-06-23

## 2022-06-23 DIAGNOSIS — Z12.31 VISIT FOR SCREENING MAMMOGRAM: Primary | ICD-10-CM

## 2022-07-05 DIAGNOSIS — M79.672 PAIN OF LEFT HEEL: ICD-10-CM

## 2022-07-05 RX ORDER — METHYLPREDNISOLONE 4 MG/1
TABLET ORAL
Qty: 1 EACH | Refills: 0 | Status: SHIPPED | OUTPATIENT
Start: 2022-07-05 | End: 2022-10-12

## 2022-09-03 DIAGNOSIS — E11.9 TYPE 2 DIABETES MELLITUS WITHOUT COMPLICATION, WITHOUT LONG-TERM CURRENT USE OF INSULIN: ICD-10-CM

## 2022-09-03 DIAGNOSIS — M79.672 PAIN OF LEFT HEEL: ICD-10-CM

## 2022-09-03 DIAGNOSIS — E03.9 ACQUIRED HYPOTHYROIDISM: Chronic | ICD-10-CM

## 2022-09-06 RX ORDER — BLOOD SUGAR DIAGNOSTIC
STRIP MISCELLANEOUS
Qty: 100 EACH | Refills: 11 | Status: SHIPPED | OUTPATIENT
Start: 2022-09-06

## 2022-09-06 RX ORDER — LISINOPRIL 20 MG/1
20 TABLET ORAL DAILY
Qty: 30 TABLET | Refills: 0 | Status: SHIPPED | OUTPATIENT
Start: 2022-09-06 | End: 2022-10-12 | Stop reason: SDUPTHER

## 2022-09-06 RX ORDER — MELOXICAM 15 MG/1
TABLET ORAL
Qty: 90 TABLET | Refills: 1 | Status: SHIPPED | OUTPATIENT
Start: 2022-09-06 | End: 2022-12-30

## 2022-09-06 RX ORDER — LEVOTHYROXINE SODIUM 0.1 MG/1
TABLET ORAL
Qty: 90 TABLET | Refills: 1 | Status: SHIPPED | OUTPATIENT
Start: 2022-09-06 | End: 2023-03-27

## 2022-09-06 RX ORDER — LANCETS
EACH MISCELLANEOUS
Qty: 100 EACH | Refills: 3 | Status: SHIPPED | OUTPATIENT
Start: 2022-09-06

## 2022-09-19 ENCOUNTER — APPOINTMENT (OUTPATIENT)
Dept: MAMMOGRAPHY | Facility: HOSPITAL | Age: 72
End: 2022-09-19

## 2022-10-05 RX ORDER — LISINOPRIL 20 MG/1
TABLET ORAL
Qty: 30 TABLET | Refills: 0 | OUTPATIENT
Start: 2022-10-05

## 2022-10-12 ENCOUNTER — TELEPHONE (OUTPATIENT)
Dept: INTERNAL MEDICINE | Facility: CLINIC | Age: 72
End: 2022-10-12

## 2022-10-12 DIAGNOSIS — I10 PRIMARY HYPERTENSION: Primary | Chronic | ICD-10-CM

## 2022-10-12 RX ORDER — LISINOPRIL 20 MG/1
20 TABLET ORAL DAILY
Qty: 90 TABLET | Refills: 3 | Status: SHIPPED | OUTPATIENT
Start: 2022-10-12

## 2022-10-12 NOTE — TELEPHONE ENCOUNTER
The patient was on the schedule today for an appointment, although not due until December.  Her pharmacy had apparently called her Cardiology office for refill on Lisinopril and they refused it stating she needed an appointment, which she thought was supposed to be here.  The patient is not having any problems and therefore the appointment was canceled and medication will be refilled.

## 2022-10-19 ENCOUNTER — HOSPITAL ENCOUNTER (OUTPATIENT)
Dept: MAMMOGRAPHY | Facility: HOSPITAL | Age: 72
Discharge: HOME | End: 2022-10-19
Admitting: INTERNAL MEDICINE

## 2022-10-19 DIAGNOSIS — Z12.31 VISIT FOR SCREENING MAMMOGRAM: ICD-10-CM

## 2022-10-19 PROCEDURE — 77067 SCR MAMMO BI INCL CAD: CPT | Performed by: RADIOLOGY

## 2022-10-19 PROCEDURE — 77067 SCR MAMMO BI INCL CAD: CPT

## 2022-10-19 PROCEDURE — 77063 BREAST TOMOSYNTHESIS BI: CPT

## 2022-10-19 PROCEDURE — 77063 BREAST TOMOSYNTHESIS BI: CPT | Performed by: RADIOLOGY

## 2022-11-30 DIAGNOSIS — N76.0 ACUTE VAGINITIS: Primary | ICD-10-CM

## 2022-11-30 RX ORDER — FLUCONAZOLE 150 MG/1
150 TABLET ORAL ONCE
Qty: 2 TABLET | Refills: 1 | Status: SHIPPED | OUTPATIENT
Start: 2022-11-30 | End: 2022-11-30

## 2022-12-06 DIAGNOSIS — B00.1 COLD SORE: Primary | ICD-10-CM

## 2022-12-06 RX ORDER — VALACYCLOVIR HYDROCHLORIDE 1 G/1
2000 TABLET, FILM COATED ORAL 2 TIMES DAILY
Qty: 4 TABLET | Refills: 5 | Status: SHIPPED | OUTPATIENT
Start: 2022-12-06 | End: 2022-12-07

## 2022-12-07 ENCOUNTER — TELEPHONE (OUTPATIENT)
Dept: INTERNAL MEDICINE | Facility: CLINIC | Age: 72
End: 2022-12-07

## 2022-12-07 NOTE — TELEPHONE ENCOUNTER
Medication was approved-[employer]    Notified pharmacy but they said the patient picked up the medication using a coupon.  They are going to put the PA on file for future refills.

## 2022-12-15 DIAGNOSIS — E78.1 ESSENTIAL HYPERTRIGLYCERIDEMIA: Chronic | ICD-10-CM

## 2022-12-15 RX ORDER — SIMVASTATIN 20 MG
TABLET ORAL
Qty: 90 TABLET | Refills: 1 | Status: SHIPPED | OUTPATIENT
Start: 2022-12-15

## 2022-12-30 ENCOUNTER — OFFICE VISIT (OUTPATIENT)
Dept: INTERNAL MEDICINE | Facility: CLINIC | Age: 72
End: 2022-12-30
Payer: MEDICARE

## 2022-12-30 VITALS
BODY MASS INDEX: 36.41 KG/M2 | WEIGHT: 232 LBS | HEIGHT: 67 IN | OXYGEN SATURATION: 98 % | HEART RATE: 78 BPM | SYSTOLIC BLOOD PRESSURE: 148 MMHG | DIASTOLIC BLOOD PRESSURE: 86 MMHG | TEMPERATURE: 97.7 F

## 2022-12-30 DIAGNOSIS — E03.9 ACQUIRED HYPOTHYROIDISM: Chronic | ICD-10-CM

## 2022-12-30 DIAGNOSIS — E78.49 OTHER HYPERLIPIDEMIA: ICD-10-CM

## 2022-12-30 DIAGNOSIS — Z00.00 ENCOUNTER FOR HEALTH MAINTENANCE EXAMINATION IN ADULT: ICD-10-CM

## 2022-12-30 DIAGNOSIS — Z23 NEED FOR VACCINATION FOR PNEUMOCOCCUS: ICD-10-CM

## 2022-12-30 DIAGNOSIS — E11.9 TYPE 2 DIABETES MELLITUS WITHOUT COMPLICATION, WITHOUT LONG-TERM CURRENT USE OF INSULIN: ICD-10-CM

## 2022-12-30 DIAGNOSIS — I10 PRIMARY HYPERTENSION: Chronic | ICD-10-CM

## 2022-12-30 DIAGNOSIS — M15.9 PRIMARY OSTEOARTHRITIS INVOLVING MULTIPLE JOINTS: Chronic | ICD-10-CM

## 2022-12-30 DIAGNOSIS — K63.5 BENIGN COLONIC POLYP: ICD-10-CM

## 2022-12-30 DIAGNOSIS — Z78.0 MENOPAUSE: ICD-10-CM

## 2022-12-30 DIAGNOSIS — Z00.00 MEDICARE ANNUAL WELLNESS VISIT, SUBSEQUENT: Primary | ICD-10-CM

## 2022-12-30 PROCEDURE — 3077F SYST BP >= 140 MM HG: CPT | Performed by: INTERNAL MEDICINE

## 2022-12-30 PROCEDURE — G0439 PPPS, SUBSEQ VISIT: HCPCS | Performed by: INTERNAL MEDICINE

## 2022-12-30 PROCEDURE — 3079F DIAST BP 80-89 MM HG: CPT | Performed by: INTERNAL MEDICINE

## 2022-12-30 PROCEDURE — 90677 PCV20 VACCINE IM: CPT | Performed by: INTERNAL MEDICINE

## 2022-12-30 PROCEDURE — 1160F RVW MEDS BY RX/DR IN RCRD: CPT | Performed by: INTERNAL MEDICINE

## 2022-12-30 PROCEDURE — 99397 PER PM REEVAL EST PAT 65+ YR: CPT | Performed by: INTERNAL MEDICINE

## 2022-12-30 PROCEDURE — G0009 ADMIN PNEUMOCOCCAL VACCINE: HCPCS | Performed by: INTERNAL MEDICINE

## 2022-12-30 PROCEDURE — 1126F AMNT PAIN NOTED NONE PRSNT: CPT | Performed by: INTERNAL MEDICINE

## 2022-12-30 PROCEDURE — 1170F FXNL STATUS ASSESSED: CPT | Performed by: INTERNAL MEDICINE

## 2022-12-30 NOTE — PATIENT INSTRUCTIONS
Please drop off a copy of your living will/advanced directive, as we discussed.    Please return for fasting labs.    Health Maintenance for Postmenopausal Women  Menopause is a normal process in which your ability to get pregnant comes to an end. This process happens slowly over many months or years, usually between the ages of 48 and 55. Menopause is complete when you have missed your menstrual period for 12 months.  It is important to talk with your health care provider about some of the most common conditions that affect women after menopause (postmenopausal women). These include heart disease, cancer, and bone loss (osteoporosis). Adopting a healthy lifestyle and getting preventive care can help to promote your health and wellness. The actions you take can also lower your chances of developing some of these common conditions.  What are the signs and symptoms of menopause?  During menopause, you may have the following symptoms:  Hot flashes. These can be moderate or severe.  Night sweats.  Decrease in sex drive.  Mood swings.  Headaches.  Tiredness (fatigue).  Irritability.  Memory problems.  Problems falling asleep or staying asleep.  Talk with your health care provider about treatment options for your symptoms.  Do I need hormone replacement therapy?  Hormone replacement therapy is effective in treating symptoms that are caused by menopause, such as hot flashes and night sweats.  Hormone replacement carries certain risks, especially as you become older. If you are thinking about using estrogen or estrogen with progestin, discuss the benefits and risks with your health care provider.  How can I reduce my risk for heart disease and stroke?  The risk of heart disease, heart attack, and stroke increases as you age. One of the causes may be a change in the body's hormones during menopause. This can affect how your body uses dietary fats, triglycerides, and cholesterol. Heart attack and stroke are medical  emergencies. There are many things that you can do to help prevent heart disease and stroke.  Watch your blood pressure  High blood pressure causes heart disease and increases the risk of stroke. This is more likely to develop in people who have high blood pressure readings or are overweight.  Have your blood pressure checked:  Every 3-5 years if you are 18-39 years of age.  Every year if you are 40 years old or older.  Eat a healthy diet    Eat a diet that includes plenty of vegetables, fruits, low-fat dairy products, and lean protein.  Do not eat a lot of foods that are high in solid fats, added sugars, or sodium.  Get regular exercise  Get regular exercise. This is one of the most important things you can do for your health. Most adults should:  Try to exercise for at least 150 minutes each week. The exercise should increase your heart rate and make you sweat (moderate-intensity exercise).  Try to do strengthening exercises at least twice each week. Do these in addition to the moderate-intensity exercise.  Spend less time sitting. Even light physical activity can be beneficial.  Other tips  Work with your health care provider to achieve or maintain a healthy weight.  Do not use any products that contain nicotine or tobacco. These products include cigarettes, chewing tobacco, and vaping devices, such as e-cigarettes. If you need help quitting, ask your health care provider.  Know your numbers. Ask your health care provider to check your cholesterol and your blood sugar (glucose). Continue to have your blood tested as directed by your health care provider.  Do I need screening for cancer?  Depending on your health history and family history, you may need to have cancer screenings at different stages of your life. This may include screening for:  Breast cancer.  Cervical cancer.  Lung cancer.  Colorectal cancer.  What is my risk for osteoporosis?  After menopause, you may be at increased risk for osteoporosis.  Osteoporosis is a condition in which bone destruction happens more quickly than new bone creation. To help prevent osteoporosis or the bone fractures that can happen because of osteoporosis, you may take the following actions:  If you are 19-50 years old, get at least 1,000 mg of calcium and at least 600 international units (IU) of vitamin D per day.  If you are older than age 50 but younger than age 70, get at least 1,200 mg of calcium and at least 600 international units (IU) of vitamin D per day.  If you are older than age 70, get at least 1,200 mg of calcium and at least 800 international units (IU) of vitamin D per day.  Smoking and drinking excessive alcohol increase the risk of osteoporosis. Eat foods that are rich in calcium and vitamin D, and do weight-bearing exercises several times each week as directed by your health care provider.  How does menopause affect my mental health?  Depression may occur at any age, but it is more common as you become older. Common symptoms of depression include:  Feeling depressed.  Changes in sleep patterns.  Changes in appetite or eating patterns.  Feeling an overall lack of motivation or enjoyment of activities that you previously enjoyed.  Frequent crying spells.  Talk with your health care provider if you think that you are experiencing any of these symptoms.  General instructions  See your health care provider for regular wellness exams and vaccines. This may include:  Scheduling regular health, dental, and eye exams.  Getting and maintaining your vaccines. These include:  Influenza vaccine. Get this vaccine each year before the flu season begins.  Pneumonia vaccine.  Shingles vaccine.  Tetanus, diphtheria, and pertussis (Tdap) booster vaccine.  Your health care provider may also recommend other immunizations.  Tell your health care provider if you have ever been abused or do not feel safe at home.  Summary  Menopause is a normal process in which your ability to get  pregnant comes to an end.  This condition causes hot flashes, night sweats, decreased interest in sex, mood swings, headaches, or lack of sleep.  Treatment for this condition may include hormone replacement therapy.  Take actions to keep yourself healthy, including exercising regularly, eating a healthy diet, watching your weight, and checking your blood pressure and blood sugar levels.  Get screened for cancer and depression. Make sure that you are up to date with all your vaccines.  This information is not intended to replace advice given to you by your health care provider. Make sure you discuss any questions you have with your health care provider.  Document Revised: 05/09/2022 Document Reviewed: 05/09/2022  [employer] Patient Education © 2022 [employer]  MyPlate from [employer]  MyPlate is an outline of a general healthy diet based on the Dietary Guidelines for Americans, 2549-8288, from the U.[place]. Department of Agriculture (USDA). It sets guidelines for how much food you should eat from each food group based on your age, sex, and level of physical activity.  What are tips for following MyPlate?  To follow MyPlate recommendations:  Eat a wide variety of fruits and vegetables, grains, and protein foods.  Serve smaller portions and eat less food throughout the day.  Limit portion sizes to avoid overeating.  Enjoy your food.  Get at least 150 minutes of exercise every week. This is about 30 minutes each day, 5 or more days per week.  It can be difficult to have every meal look like MyPlate. Think about MyPlate as eating guidelines for an entire day, rather than each individual meal.  Fruits and vegetables  Make one half of your plate fruits and vegetables.  Eat many different colors of fruits and vegetables each day.  For a 2,000-calorie daily food plan, eat:  2½ cups of vegetables every day.  2 cups of fruit every day.  1 cup is equal to:  1 cup raw or cooked vegetables.  1 cup raw fruit.  1 medium-sized orange, apple,  or banana.  1 cup 100% fruit or vegetable juice.  2 cups raw leafy greens, such as lettuce, spinach, or kale.  ½ cup dried fruit.  Grains  One fourth of your plate should be grains.  Make at least half of the grains you eat each day whole grains.  For a 2,000-calorie daily food plan, eat 6 oz of grains every day.  1 oz is equal to:  1 slice bread.  1 cup cereal.  ½ cup cooked rice, cereal, or pasta.  Protein  One fourth of your plate should be protein.  Eat a wide variety of protein foods, including meat, poultry, fish, eggs, beans, nuts, and tofu.  For a 2,000-calorie daily food plan, eat 5½ oz of protein every day.  1 oz is equal to:  1 oz meat, poultry, or fish.  ¼ cup cooked beans.  1 egg.  ½ oz nuts or seeds.  1 Tbsp peanut butter.  Dairy  Drink fat-free or low-fat (1%) milk.  Eat or drink dairy as a side to meals.  For a 2,000-calorie daily food plan, eat or drink 3 cups of dairy every day.  1 cup is equal to:  1 cup milk, yogurt, cottage cheese, or soy milk (soy beverage).  2 oz processed cheese.  1½ oz natural cheese.  Fats, oils, salt, and sugars  Only small amounts of oils are recommended.  Avoid foods that are high in calories and low in nutritional value (empty calories), like foods high in fat or added sugars.  Choose foods that are low in salt (sodium). Choose foods that have less than 140 milligrams (mg) of sodium per serving.  Drink water instead of sugary drinks. Drink enough fluid to keep your urine pale yellow.  Where to find support  Work with your health care provider or a dietitian to develop a customized eating plan that is right for you.  Download an moises (mobile application) to help you track your daily food intake.  Where to find more information  USDA: ChooseMyPlate.gov  Summary  MyPlate is a general guideline for healthy eating from the USDA. It is based on the Dietary Guidelines for Americans, 8663-9573.  In general, fruits and vegetables should take up one half of your plate, grains  should take up one fourth of your plate, and protein should take up one fourth of your plate.  This information is not intended to replace advice given to you by your health care provider. Make sure you discuss any questions you have with your health care provider.  Document Revised: 11/08/2021 Document Reviewed: 11/08/2021  Elsevier Patient Education © 2022 [employer]  Calorie Counting for Weight Loss  Calories are units of energy. Your body needs a certain number of calories from food to keep going throughout the day. When you eat or drink more calories than your body needs, your body stores the extra calories mostly as fat. When you eat or drink fewer calories than your body needs, your body burns fat to get the energy it needs.  Calorie counting means keeping track of how many calories you eat and drink each day. Calorie counting can be helpful if you need to lose weight. If you eat fewer calories than your body needs, you should lose weight. Ask your health care provider what a healthy weight is for you.  For calorie counting to work, you will need to eat the right number of calories each day to lose a healthy amount of weight per week. A dietitian can help you figure out how many calories you need in a day and will suggest ways to reach your calorie goal.  A healthy amount of weight to lose each week is usually 1-2 lb (0.5-0.9 kg). This usually means that your daily calorie intake should be reduced by 500-750 calories.  Eating 1,200-1,500 calories a day can help most women lose weight.  Eating 1,500-1,800 calories a day can help most men lose weight.  What do I need to know about calorie counting?  Work with your health care provider or dietitian to determine how many calories you should get each day. To meet your daily calorie goal, you will need to:  Find out how many calories are in each food that you would like to eat. Try to do this before you eat.  Decide how much of the food you plan to eat.  Keep a  food log. Do this by writing down what you ate and how many calories it had.  To successfully lose weight, it is important to balance calorie counting with a healthy lifestyle that includes regular activity.  Where do I find calorie information?  The number of calories in a food can be found on a Nutrition Facts label. If a food does not have a Nutrition Facts label, try to look up the calories online or ask your dietitian for help.  Remember that calories are listed per serving. If you choose to have more than one serving of a food, you will have to multiply the calories per serving by the number of servings you plan to eat. For example, the label on a package of bread might say that a serving size is 1 slice and that there are 90 calories in a serving. If you eat 1 slice, you will have eaten 90 calories. If you eat 2 slices, you will have eaten 180 calories.  How do I keep a food log?  After each time that you eat, record the following in your food log as soon as possible:  What you ate. Be sure to include toppings, sauces, and other extras on the food.  How much you ate. This can be measured in cups, ounces, or number of items.  How many calories were in each food and drink.  The total number of calories in the food you ate.  Keep your food log near you, such as in a pocket-sized notebook or on an moises or website on your mobile phone. Some programs will calculate calories for you and show you how many calories you have left to meet your daily goal.  What are some portion-control tips?  Know how many calories are in a serving. This will help you know how many servings you can have of a certain food.  Use a measuring cup to measure serving sizes. You could also try weighing out portions on a kitchen scale. With time, you will be able to estimate serving sizes for some foods.  Take time to put servings of different foods on your favorite plates or in your favorite bowls and cups so you know what a serving looks  like.  Try not to eat straight from a food's packaging, such as from a bag or box. Eating straight from the package makes it hard to see how much you are eating and can lead to overeating. Put the amount you would like to eat in a cup or on a plate to make sure you are eating the right portion.  Use smaller plates, glasses, and bowls for smaller portions and to prevent overeating.  Try not to multitask. For example, avoid watching TV or using your computer while eating. If it is time to eat, sit down at a table and enjoy your food. This will help you recognize when you are full. It will also help you be more mindful of what and how much you are eating.  What are tips for following this plan?  Reading food labels  Check the calorie count compared with the serving size. The serving size may be smaller than what you are used to eating.  Check the source of the calories. Try to choose foods that are high in protein, fiber, and vitamins, and low in saturated fat, trans fat, and sodium.  Shopping  Read nutrition labels while you shop. This will help you make healthy decisions about which foods to buy.  Pay attention to nutrition labels for low-fat or fat-free foods. These foods sometimes have the same number of calories or more calories than the full-fat versions. They also often have added sugar, starch, or salt to make up for flavor that was removed with the fat.  Make a grocery list of lower-calorie foods and stick to it.  Cooking  Try to cook your favorite foods in a healthier way. For example, try baking instead of frying.  Use low-fat dairy products.  Meal planning  Use more fruits and vegetables. One-half of your plate should be fruits and vegetables.  Include lean proteins, such as chicken, turkey, and fish.  Lifestyle  Each week, aim to do one of the followin minutes of moderate exercise, such as walking.  75 minutes of vigorous exercise, such as running.  General information  Know how many calories are in  the foods you eat most often. This will help you calculate calorie counts faster.  Find a way of tracking calories that works for you. Get creative. Try different apps or programs if writing down calories does not work for you.  What foods should I eat?    Eat nutritious foods. It is better to have a nutritious, high-calorie food, such as an avocado, than a food with few nutrients, such as a bag of potato chips.  Use your calories on foods and drinks that will fill you up and will not leave you hungry soon after eating.  Examples of foods that fill you up are nuts and nut butters, vegetables, lean proteins, and high-fiber foods such as whole grains. High-fiber foods are foods with more than 5 g of fiber per serving.  Pay attention to calories in drinks. Low-calorie drinks include water and unsweetened drinks.  The items listed above may not be a complete list of foods and beverages you can eat. Contact a dietitian for more information.  What foods should I limit?  Limit foods or drinks that are not good sources of vitamins, minerals, or protein or that are high in unhealthy fats. These include:  Candy.  Other sweets.  Sodas, specialty coffee drinks, alcohol, and juice.  The items listed above may not be a complete list of foods and beverages you should avoid. Contact a dietitian for more information.  How do I count calories when eating out?  Pay attention to portions. Often, portions are much larger when eating out. Try these tips to keep portions smaller:  Consider sharing a meal instead of getting your own.  If you get your own meal, eat only half of it. Before you start eating, ask for a container and put half of your meal into it.  When available, consider ordering smaller portions from the menu instead of full portions.  Pay attention to your food and drink choices. Knowing the way food is cooked and what is included with the meal can help you eat fewer calories.  If calories are listed on the menu, choose the  lower-calorie options.  Choose dishes that include vegetables, fruits, whole grains, low-fat dairy products, and lean proteins.  Choose items that are boiled, broiled, grilled, or steamed. Avoid items that are buttered, battered, fried, or served with cream sauce. Items labeled as crispy are usually fried, unless stated otherwise.  Choose water, low-fat milk, unsweetened iced tea, or other drinks without added sugar. If you want an alcoholic beverage, choose a lower-calorie option, such as a glass of wine or light beer.  Ask for dressings, sauces, and syrups on the side. These are usually high in calories, so you should limit the amount you eat.  If you want a salad, choose a garden salad and ask for grilled meats. Avoid extra toppings such as parisi, cheese, or fried items. Ask for the dressing on the side, or ask for olive oil and vinegar or lemon to use as dressing.  Estimate how many servings of a food you are given. Knowing serving sizes will help you be aware of how much food you are eating at restaurants.  Where to find more information  Centers for Disease Control and Prevention: www.cdc.gov  U.[place]. Department of Agriculture: myplate.gov  Summary  Calorie counting means keeping track of how many calories you eat and drink each day. If you eat fewer calories than your body needs, you should lose weight.  A healthy amount of weight to lose per week is usually 1-2 lb (0.5-0.9 kg). This usually means reducing your daily calorie intake by 500-750 calories.  The number of calories in a food can be found on a Nutrition Facts label. If a food does not have a Nutrition Facts label, try to look up the calories online or ask your dietitian for help.  Use smaller plates, glasses, and bowls for smaller portions and to prevent overeating.  Use your calories on foods and drinks that will fill you up and not leave you hungry shortly after a meal.  This information is not intended to replace advice given to you by your health  care provider. Make sure you discuss any questions you have with your health care provider.  Document Revised: 01/28/2021 Document Reviewed: 01/28/2021  [employer] Patient Education © 2022 [employer]  Exercising to Lose Weight  Getting regular exercise is important for everyone. It is especially important if you are overweight. Being overweight increases your risk of heart disease, stroke, diabetes, high blood pressure, and several types of cancer. Exercising, and reducing the calories you consume, can help you lose weight and improve fitness and health.  Exercise can be moderate or vigorous intensity. To lose weight, most people need to do a certain amount of moderate or vigorous-intensity exercise each week.  How can exercise affect me?  You lose weight when you exercise enough to burn more calories than you eat. Exercise also reduces body fat and builds muscle. The more muscle you have, the more calories you burn. Exercise also:  Improves mood.  Reduces stress and tension.  Improves your overall fitness, flexibility, and endurance.  Increases bone strength.  Moderate-intensity exercise  Moderate-intensity exercise is any activity that gets you moving enough to burn at least three times more energy (calories) than if you were sitting.  Examples of moderate exercise include:  Walking a mile in 15 minutes.  Doing light yard work.  Biking at an easy pace.  Most people should get at least 150 minutes of moderate-intensity exercise a week to maintain their body weight.  Vigorous-intensity exercise  Vigorous-intensity exercise is any activity that gets you moving enough to burn at least six times more calories than if you were sitting. When you exercise at this intensity, you should be working hard enough that you are not able to carry on a conversation.  Examples of vigorous exercise include:  Running.  Playing a team sport, such as football, basketball, and soccer.  Jumping rope.  Most people should get at least 75  minutes a week of vigorous exercise to maintain their body weight.  What actions can I take to lose weight?  The amount of exercise you need to lose weight depends on:  Your age.  The type of exercise.  Any health conditions you have.  Your overall physical ability.  Talk to your health care provider about how much exercise you need and what types of activities are safe for you.  Nutrition    Make changes to your diet as told by your health care provider or diet and nutrition specialist (dietitian). This may include:  Eating fewer calories.  Eating more protein.  Eating less unhealthy fats.  Eating a diet that includes fresh fruits and vegetables, whole grains, low-fat dairy products, and lean protein.  Avoiding foods with added fat, salt, and sugar.  Drink plenty of water while you exercise to prevent dehydration or heat stroke.  Activity  Choose an activity that you enjoy and set realistic goals. Your health care provider can help you make an exercise plan that works for you.  Exercise at a moderate or vigorous intensity most days of the week.  The intensity of exercise may vary from person to person. You can tell how intense a workout is for you by paying attention to your breathing and heartbeat. Most people will notice their breathing and heartbeat get faster with more intense exercise.  Do resistance training twice each week, such as:  Push-ups.  Sit-ups.  Lifting weights.  Using resistance bands.  Getting short amounts of exercise can be just as helpful as long, structured periods of exercise. If you have trouble finding time to exercise, try doing these things as part of your daily routine:  Get up, stretch, and walk around every 30 minutes throughout the day.  Go for a walk during your lunch break.  Park your car farther away from your destination.  If you take public transportation, get off one stop early and walk the rest of the way.  Make phone calls while standing up and walking around.  Take the stairs  instead of elevators or escalators.  Wear comfortable clothes and shoes with good support.  Do not exercise so much that you hurt yourself, feel dizzy, or get very short of breath.  Where to find more information  [place] Department of Health and Human Services: www.hhs.gov  Centers for Disease Control and Prevention: www.cdc.gov  Contact a health care provider:  Before starting a new exercise program.  If you have questions or concerns about your weight.  If you have a medical problem that keeps you from exercising.  Get help right away if:  You have any of the following while exercising:  Injury.  Dizziness.  Difficulty breathing or shortness of breath that does not go away when you stop exercising.  Chest pain.  Rapid heartbeat.  These symptoms may represent a serious problem that is an emergency. Do not wait to see if the symptoms will go away. Get medical help right away. Call your local emergency services (911 in the [place] Do not drive yourself to the hospital.  Summary  Getting regular exercise is especially important if you are overweight.  Being overweight increases your risk of heart disease, stroke, diabetes, high blood pressure, and several types of cancer.  Losing weight happens when you burn more calories than you eat.  Reducing the amount of calories you eat, and getting regular moderate or vigorous exercise each week, helps you lose weight.  This information is not intended to replace advice given to you by your health care provider. Make sure you discuss any questions you have with your health care provider.  Document Revised: 02/13/2022 Document Reviewed: 02/13/2022  Elsevier Patient Education © 2022 Elsevier Inc.

## 2022-12-30 NOTE — LETTER
VACCINE CONSENT FORM      Patient Name:  Angelina Pimentel    Patient :  1950      I/We have read, or have been explained, the information about the diseases and the vaccines listed below.  There was an opportunity to ask questions and any questions were answered satisfactorily.  I/We believe that I/we understand the benefits and risks of the vaccines(s) cited, and ask the vaccine(s) listed below be given to me/us or the person named above (for which i have authorized to make the request).      Vaccine(s) give:    Orders Placed This Encounter   Procedures   • Pneumococcal Conjugate Vaccine 20-Valent All         Medicare patients:    The only vaccine covered under your medical benefit is flu/pneumonia and hepatitis B.  All other may be covered under your \"Part D\" prescription plan and requires you to go to a pharmacy with a Physician orders for administration.  If you still prefer to have it administered at our office, you will receive a bill for the vaccine and administration cost.               Patient Initials                     Patient or Parent/Guardian Signature                    Date        A copy of the appropriate Centers for Disease Control and Prevention Vaccine Information Statements has been provided.

## 2022-12-30 NOTE — PROGRESS NOTES
Subjective     Chief Complaint:  Physical Exam.    History of Present Illness    History obtained from the patient.    The patient was seen in 6/6/2022 for left Plantar Fasciitis.  Meloxicam and a Medrol Dosepak was prescribed, and her pain was resolved at her visit on 6/15/22.  However, she states it recurred 2 weeks later.  Since then, she has been to a podiatrist and had to steroid injections.  Her pain is currently resolved.    Primary Care [facility]: The patient is here today for a 6 month follow-up visit.  She is non-fasting.      Her Diabetes Mellitus type 2 has been stable.   Medication(s): Metformin, Simvastatin, and Lisinopril.   Her Hypertension has been stable.   Medication(s): Lisinopril.   Her Hyperlipidemia has been stable.   Her LDL goal is < 70 and last LDL was 86, TG 64.   Medication(s): Simvastiatin.   Side Effects: None.      Interval Events: Last Hemoglobin A1c on 6/15/2022 was 5.9.  Blood sugar at home has been 115-120,  fasting.  She does not check postprandial levels.  She denies episodes of low blood sugar.  She has not been checking blood pressure at home.  Last Ophthalmology visit was 1/22/22, no retinopathy ([facility] Dr. Red).  She states she has an appointment scheduled for next month.  She does check her feet daily. She saw Dr. Sebastian on 3/3/2021.       Symptoms: Denies chest pain, dyspnea, FLORES, orthopnea, PND, palpitations, syncope, lower extremity edema, claudication, lightheadedness, and dizziness.   Associated Symptoms: Weight up 14 pounds in the past 6 months.  No fatigue, headache, polydipsia, polyuria, myalgias, arthralgias, visual impairment, memory loss, concentration problems, or focal neurologic deficits.  Has new onset bilateral foot pain.  Denies numbness / tingling of the feet, and a foot ulcer,      Lifestyle: The patient has not  been consuming a diverse and healthy diet, due to her 's prolonged hospitalization.  She usually goes to  the gym twice per week (cardio and weights) and walks twice per week, but had not been going on until just recently. Tobacco Use: Never a smoker.      Hypothyroidism Follow-Up: The patient is being seen for follow-up of Hypothyroidism, which is stable.  Interval Events:  T4 was normal 12/15/21.  TSH was normal on 6/15/2022.  Symptoms:  Weight up 14 pounds in the past 6 months.  Reports dry skin.  Denies diarrhea, constipation, heat/cold intolerance, fatigue, memory loss, trouble concentrating, and hair loss.   Associated Symptoms: no arthralgias, myalgias, or paresthesias.   Medications:  Levothyroxine (Synthroid).       Colonic Polyp Follow-up: The patient is being seen for a routine clinic follow-up of Colon Polyp(s), which is stable.   Interval Events: Last Colonoscopy 1/4/18- no polyps  Symptoms: no abdominal pain, diarrhea, constipation, hematochezia, melena, decreased stool caliber, or change in bowel habits.  Associated Symptoms: no rectal prolapse.   Medication:  None.         Osteoarthritis Follow-Up: The patient is being seen for a routine clinic follow-up of Osteoarthritis, which has been stable.   Osteoarthritis, no complications.    Interval Events: None.  Symptoms: Reports stable left knee pain.  No arthralgias, back pain, neck pain, hip pain, shoulder pain, or hand pain.  Associated Symptoms: No joint swelling or joint stiffness.   Medications:  Ibuprofen prn.      Angelina Pimentel is a 72 y.o. female who presents for an Annual Physical.      PMH, PSH, SocHx, FamHx, Allergies, and Medications: Reviewed and updated.    Outpatient Medications Prior to Visit   Medication Sig Dispense Refill   • Cetirizine HCl 10 MG capsule Zyrtec 10 mg tablet     • glucose blood test strip Daily.     • ibuprofen (ADVIL,MOTRIN) 100 MG/5ML suspension Take  by mouth Every 6 (Six) Hours As Needed for Mild Pain .     • Lancets (onetouch ultrasoft) lancets USE ONE LANCET TO TEST DAILY 100 each 3   • levothyroxine  (SYNTHROID, LEVOTHROID) 100 MCG tablet TAKE ONE TABLET BY MOUTH DAILY 90 tablet 1   • lisinopril (PRINIVIL,ZESTRIL) 20 MG tablet Take 1 tablet by mouth Daily. 90 tablet 3   • metFORMIN (GLUCOPHAGE) 500 MG tablet TAKE ONE TABLET BY MOUTH DAILY WITH BREAKFAST 90 tablet 3   • Multiple Vitamins-Minerals (CENTRUM SILVER ADULT 50+ PO) Take 1 tablet by mouth Daily.     • OneTouch Ultra test strip USE ONE STRIP TO TEST BLOOD GLUCOSE DAILY 100 each 11   • simvastatin (ZOCOR) 20 MG tablet TAKE ONE TABLET BY MOUTH ONCE NIGHTLY 90 tablet 1   • meloxicam (MOBIC) 15 MG tablet TAKE ONE TABLET BY MOUTH DAILY AS NEEDED FOR MODERATE PAIN 90 tablet 1     No facility-administered medications prior to visit.       Immunization History   Administered Date(s) Administered   • COVID-19 (MODERNA) 1st, 2nd, 3rd Dose Only 01/09/2021, 02/06/2021, 10/25/2021, 06/10/2022   • COVID-19 (MODERNA) BIVALENT BOOSTER 12+YRS 09/16/2022   • FLUAD TRI 65YR+ 10/11/2019   • Fluad Quad 65+ 08/31/2020   • Fluzone High Dose =>65 Years (Vaxcare ONLY) 10/14/2015, 10/10/2016, 10/11/2017, 08/30/2018, 09/30/2021   • Fluzone High-Dose 65+yrs 10/01/2021, 09/16/2022   • Hepatitis A 06/01/1998, 01/01/1999   • Pneumococcal Conjugate 13-Valent (PCV13) 11/24/2014   • Pneumococcal Polysaccharide (PPSV23) 09/20/2010, 03/25/2016   • Shingrix 05/30/2018, 08/30/2018   • Td 04/05/2017   • Tdap 02/25/2009   • Zostavax 05/21/2010         Patient Active Problem List   Diagnosis   • Diabetes mellitus (HCC)   • Benign colonic polyp   • Hypothyroidism   • Osteoarthritis   • Hypertension   • Allergic rhinitis   • Basal cell carcinoma of skin   • Essential hypertriglyceridemia   • Mitral valve prolapse syndrome   • Squamous cell carcinoma of skin   • Obstructive sleep apnea syndrome   • Menopause   • Hypertensive heart disease without heart failure   • Abnormal ECG   • Dyslipidemia   • Choroidal nevus of left eye   • Dermatochalasis of both upper eyelids   • Glaucoma suspect of both  eyes   • PCO (posterior capsular opacification), right   • Meibomian gland dysfunction (MGD)       Health Habits:  Dental Exam. up to date  Eye Exam. up to date  Hearing Loss:  No  Exercise: 2 times/week.  Current exercise activities include:   Diet: Unhealthy  Multivitamin: Yes    Safe Driving:  Yes  Seat Belt:  Yes  Bike Helmet:  N/A  Skin Screening:  Yes  Sunscreen: Yes  SBE / VIGNESH: No  Sexual Activity:  Yes  Birth Control:  Menopause  STD Prevention:  N/A    Last Pap: N/A (MONICA/BSO)  Last Mammogram: 10/19/2022, category 1.  Last DEXA Scan: 10/17/2017, normal.  Last Colonoscopy: 1/4/2018, diverticulosis but no polyps.  Last PSA: N/A    Social:    Social History     Socioeconomic History   • Marital status:    • Number of children: 2   Tobacco Use   • Smoking status: Never   • Smokeless tobacco: Never   Vaping Use   • Vaping Use: Never used   Substance and Sexual Activity   • Alcohol use: No     Comment: Never drank alcohol   • Drug use: No   • Sexual activity: Yes     Partners: Male     Birth control/protection: Post-menopausal         Current Medical Providers:    Leola Villa MD (Internal Medicine / Pediatrics)    The [facility] providers who are involved in the care of this patient are listed above.         Review of Systems   Constitutional: Negative for appetite change, chills, fatigue, fever and unexpected weight change.        No night sweats.    HENT: Positive for tinnitus (bilateral, chronic). Negative for congestion, ear discharge, ear pain, hearing loss, nosebleeds, postnasal drip, rhinorrhea, sinus pressure, sinus pain, sneezing, sore throat and voice change.         Denies snoring.   Eyes: Positive for itching. Negative for photophobia, pain, discharge, redness and visual disturbance.   Respiratory: Negative for cough, chest tightness, shortness of breath and wheezing.         No chest congestion.  No hemoptysis.   Cardiovascular: Negative for chest pain, palpitations and  leg swelling.        No orthopnea, FLORES, or PND.  No claudication or syncope.   Gastrointestinal: Negative for abdominal pain, blood in stool, constipation, diarrhea, nausea, rectal pain and vomiting.        No melena.  No hematemesis.  No heartburn, dysphagia or odynophagia.  No early satiety, belching, or bloating.    Endocrine: Negative for cold intolerance, heat intolerance, polydipsia, polyphagia and polyuria.        No hair loss, but has dry skin.  No hot flashes.     Genitourinary: Negative for difficulty urinating, dyspareunia, dysuria, flank pain, frequency, hematuria, pelvic pain, urgency, vaginal bleeding and vaginal discharge.        No nocturia, incomplete emptying, or incontinence.   Musculoskeletal: Negative for arthralgias, back pain, gait problem, joint swelling, myalgias, neck pain and neck stiffness.        No joint stiffness.   Skin: Negative for rash.        No new skin lesions or changes in skin lesions per patient, but had 2 lesions biopsied yesterday. No breast pain or masses.  No nipple discharge or nipple inversion.   Neurological: Negative for dizziness, tremors, syncope, speech difficulty, weakness, light-headedness, numbness and headaches.        No tingling.  No memory loss.  No decreased concentration.   Hematological: Negative for adenopathy. Does not bruise/bleed easily.   Psychiatric/Behavioral: Negative for confusion, self-injury, sleep disturbance and suicidal ideas. The patient is not nervous/anxious.         No depression.           Objective     Vitals:    12/30/22 0955   BP: 148/86   BP Location: Right arm   Pulse: 78   Temp: 97.7 °F (36.5 °C)   TempSrc: Temporal   SpO2: 98%   Weight: 105 kg (232 lb)   Height: 168.9 cm (66.5\")   PainSc: 0-No pain       Body mass index is 36.88 kg/m².    Physical Exam  Vitals and nursing note reviewed.   Constitutional:       Appearance: Normal appearance. She is well-developed.      Comments: BMI greater than 35.   HENT:      Head:  Normocephalic and atraumatic.      Right Ear: Tympanic membrane, ear canal and external ear normal.      Left Ear: Tympanic membrane, ear canal and external ear normal.      Mouth/Throat:      Mouth: Mucous membranes are moist. No oral lesions.      Pharynx: Oropharynx is clear.      Comments: Tonsils absent.  Eyes:      Extraocular Movements: Extraocular movements intact.      Conjunctiva/sclera: Conjunctivae normal.      Pupils: Pupils are equal, round, and reactive to light.      Comments: Fundi normal bilaterally.   Neck:      Thyroid: No thyromegaly.      Vascular: No carotid bruit.   Cardiovascular:      Rate and Rhythm: Normal rate and regular rhythm.      Pulses: Normal pulses.      Heart sounds: Normal heart sounds. No murmur heard.    No friction rub. No gallop.   Pulmonary:      Effort: Pulmonary effort is normal.      Breath sounds: Normal breath sounds.   Chest:   Breasts:     Right: No inverted nipple, mass, nipple discharge, skin change or tenderness.      Left: No inverted nipple, mass, nipple discharge, skin change or tenderness.   Abdominal:      General: Bowel sounds are normal. There is no distension or abdominal bruit.      Palpations: Abdomen is soft. There is no hepatomegaly, splenomegaly or mass.      Tenderness: There is no abdominal tenderness.   Genitourinary:     Comments:  and rectal exam deferred.  Musculoskeletal:         General: Normal range of motion.      Cervical back: Normal range of motion and neck supple.      Right lower leg: Edema (trace) present.      Left lower leg: Edema (trace) present.   Lymphadenopathy:      Cervical: No cervical adenopathy.      Upper Body:      Right upper body: No supraclavicular or axillary adenopathy.      Left upper body: No supraclavicular or axillary adenopathy.   Skin:     Findings: No rash.      Comments: No atypical skin lesions.   Neurological:      Mental Status: She is alert.      Motor: Motor function is intact. No abnormal muscle tone.       Coordination: Coordination is intact.      Gait: Gait is intact.      Deep Tendon Reflexes: Reflexes are normal and symmetric.   Psychiatric:         Mood and Affect: Mood normal.         PHQ-2 Depression Screening  Little interest or pleasure in doing things? 0-->not at all   Feeling down, depressed, or hopeless? 0-->not at all   PHQ-2 Total Score 0         Counseling was given to patient for the following topics: appropriate exercise, healthy eating habits, disease prevention, risk factors for cancer, importance of self breast exam and breast health, sun safety, seatbelt use and safe driving. Also discussed the importance of regular dental and vision care, as well recommendation for a yearly screening skin exam after age 40.  Written information provided to patient on these topics and other health maintenance issues.    Diagnoses and all orders for this visit:    1. Medicare annual wellness visit, subsequent (Primary)    2. Encounter for health maintenance examination in adult  -     Lipid Panel; Future  -     Comprehensive Metabolic Panel; Future  -     TSH; Future  -     CBC & Differential; Future    3. Type 2 diabetes mellitus without complication, without long-term current use of insulin (HCC)  -     Lipid Panel; Future  -     Comprehensive Metabolic Panel; Future  -     TSH; Future  -     CBC & Differential; Future  -     POC Glycosylated Hemoglobin (Hb A1C)   Continue current medication(s) as noted in the history of present illness.    4. Primary hypertension  -     Lipid Panel; Future  -     Comprehensive Metabolic Panel; Future  -     TSH; Future  -     CBC & Differential; Future   Continue current medication(s) as noted in the history of present illness.    5. Other hyperlipidemia  -     Lipid Panel; Future  -     Comprehensive Metabolic Panel; Future  -     TSH; Future  -     CBC & Differential; Future   Continue current medication(s) as noted in the history of present illness.    6. Acquired  hypothyroidism  -     TSH; Future  -     T4, Free; Future   Continue current medication(s) as noted in the history of present illness.    7. Benign colonic polyp   Colonoscopy up-to-date.    8. Primary osteoarthritis involving multiple joints   Continue current medication(s) as noted in the history of present illness.    9. Menopause  -     DEXA Bone Density Axial; Future    10. Need for vaccination for pneumococcus  -     Pneumococcal Conjugate Vaccine 20-Valent All          The patient agrees to return for fasting labs.                 Return in about 6 months (around 6/30/2023) for Recheck Diabetes, fasting.

## 2023-01-03 ENCOUNTER — LAB (OUTPATIENT)
Dept: INTERNAL MEDICINE | Facility: CLINIC | Age: 73
End: 2023-01-03
Payer: MEDICARE

## 2023-01-03 DIAGNOSIS — I10 PRIMARY HYPERTENSION: ICD-10-CM

## 2023-01-03 DIAGNOSIS — Z00.00 ENCOUNTER FOR HEALTH MAINTENANCE EXAMINATION IN ADULT: ICD-10-CM

## 2023-01-03 DIAGNOSIS — E11.9 TYPE 2 DIABETES MELLITUS WITHOUT COMPLICATION, WITHOUT LONG-TERM CURRENT USE OF INSULIN: ICD-10-CM

## 2023-01-03 DIAGNOSIS — E03.9 ACQUIRED HYPOTHYROIDISM: Chronic | ICD-10-CM

## 2023-01-03 DIAGNOSIS — E78.49 OTHER HYPERLIPIDEMIA: ICD-10-CM

## 2023-01-03 LAB
ALBUMIN SERPL-MCNC: 4.2 G/DL (ref 3.5–5.2)
ALBUMIN/GLOB SERPL: 1.3 G/DL
ALP SERPL-CCNC: 136 U/L (ref 39–117)
ALT SERPL W P-5'-P-CCNC: 22 U/L (ref 1–33)
ANION GAP SERPL CALCULATED.3IONS-SCNC: 9 MMOL/L (ref 5–15)
AST SERPL-CCNC: 21 U/L (ref 1–32)
BASOPHILS # BLD AUTO: 0.05 10*3/MM3 (ref 0–0.2)
BASOPHILS NFR BLD AUTO: 0.6 % (ref 0–1.5)
BILIRUB SERPL-MCNC: 0.3 MG/DL (ref 0–1.2)
BUN SERPL-MCNC: 16 MG/DL (ref 8–23)
BUN/CREAT SERPL: 19.8 (ref 7–25)
CALCIUM SPEC-SCNC: 9.4 MG/DL (ref 8.6–10.5)
CHLORIDE SERPL-SCNC: 107 MMOL/L (ref 98–107)
CHOLEST SERPL-MCNC: 154 MG/DL (ref 0–200)
CO2 SERPL-SCNC: 27 MMOL/L (ref 22–29)
CREAT SERPL-MCNC: 0.81 MG/DL (ref 0.57–1)
DEPRECATED RDW RBC AUTO: 43.1 FL (ref 37–54)
EGFRCR SERPLBLD CKD-EPI 2021: 76.8 ML/MIN/1.73
EOSINOPHIL # BLD AUTO: 0.29 10*3/MM3 (ref 0–0.4)
EOSINOPHIL NFR BLD AUTO: 3.5 % (ref 0.3–6.2)
ERYTHROCYTE [DISTWIDTH] IN BLOOD BY AUTOMATED COUNT: 12.7 % (ref 12.3–15.4)
GLOBULIN UR ELPH-MCNC: 3.3 GM/DL
GLUCOSE SERPL-MCNC: 117 MG/DL (ref 65–99)
HCT VFR BLD AUTO: 43.1 % (ref 34–46.6)
HDLC SERPL-MCNC: 39 MG/DL (ref 40–60)
HGB BLD-MCNC: 14.3 G/DL (ref 12–15.9)
IMM GRANULOCYTES # BLD AUTO: 0.1 10*3/MM3 (ref 0–0.05)
IMM GRANULOCYTES NFR BLD AUTO: 1.2 % (ref 0–0.5)
LDLC SERPL CALC-MCNC: 95 MG/DL (ref 0–100)
LDLC/HDLC SERPL: 2.4 {RATIO}
LYMPHOCYTES # BLD AUTO: 2.24 10*3/MM3 (ref 0.7–3.1)
LYMPHOCYTES NFR BLD AUTO: 27.3 % (ref 19.6–45.3)
MCH RBC QN AUTO: 31.1 PG (ref 26.6–33)
MCHC RBC AUTO-ENTMCNC: 33.2 G/DL (ref 31.5–35.7)
MCV RBC AUTO: 93.7 FL (ref 79–97)
MONOCYTES # BLD AUTO: 0.51 10*3/MM3 (ref 0.1–0.9)
MONOCYTES NFR BLD AUTO: 6.2 % (ref 5–12)
NEUTROPHILS NFR BLD AUTO: 5.02 10*3/MM3 (ref 1.7–7)
NEUTROPHILS NFR BLD AUTO: 61.2 % (ref 42.7–76)
NRBC BLD AUTO-RTO: 0 /100 WBC (ref 0–0.2)
PLATELET # BLD AUTO: 284 10*3/MM3 (ref 140–450)
PMV BLD AUTO: 9.8 FL (ref 6–12)
POTASSIUM SERPL-SCNC: 4.8 MMOL/L (ref 3.5–5.2)
PROT SERPL-MCNC: 7.5 G/DL (ref 6–8.5)
RBC # BLD AUTO: 4.6 10*6/MM3 (ref 3.77–5.28)
SODIUM SERPL-SCNC: 143 MMOL/L (ref 136–145)
T4 FREE SERPL-MCNC: 1.04 NG/DL (ref 0.93–1.7)
TRIGL SERPL-MCNC: 107 MG/DL (ref 0–150)
TSH SERPL DL<=0.05 MIU/L-ACNC: 3.04 UIU/ML (ref 0.27–4.2)
VLDLC SERPL-MCNC: 20 MG/DL (ref 5–40)
WBC NRBC COR # BLD: 8.21 10*3/MM3 (ref 3.4–10.8)

## 2023-01-03 PROCEDURE — 36415 COLL VENOUS BLD VENIPUNCTURE: CPT | Performed by: INTERNAL MEDICINE

## 2023-01-03 PROCEDURE — 80053 COMPREHEN METABOLIC PANEL: CPT | Performed by: INTERNAL MEDICINE

## 2023-01-03 PROCEDURE — 84439 ASSAY OF FREE THYROXINE: CPT | Performed by: INTERNAL MEDICINE

## 2023-01-03 PROCEDURE — 80061 LIPID PANEL: CPT | Performed by: INTERNAL MEDICINE

## 2023-01-03 PROCEDURE — 85025 COMPLETE CBC W/AUTO DIFF WBC: CPT | Performed by: INTERNAL MEDICINE

## 2023-01-03 PROCEDURE — 84443 ASSAY THYROID STIM HORMONE: CPT | Performed by: INTERNAL MEDICINE

## 2023-01-13 ENCOUNTER — LAB (OUTPATIENT)
Dept: INTERNAL MEDICINE | Facility: CLINIC | Age: 73
End: 2023-01-13
Payer: MEDICARE

## 2023-01-13 DIAGNOSIS — E11.9 TYPE 2 DIABETES MELLITUS WITHOUT COMPLICATION, WITHOUT LONG-TERM CURRENT USE OF INSULIN: ICD-10-CM

## 2023-01-13 LAB
EXPIRATION DATE: NORMAL
HBA1C MFR BLD: 6 %
Lab: NORMAL

## 2023-01-13 PROCEDURE — 83036 HEMOGLOBIN GLYCOSYLATED A1C: CPT | Performed by: INTERNAL MEDICINE

## 2023-01-13 PROCEDURE — 3044F HG A1C LEVEL LT 7.0%: CPT | Performed by: INTERNAL MEDICINE

## 2023-02-06 ENCOUNTER — OFFICE VISIT (OUTPATIENT)
Dept: INTERNAL MEDICINE | Facility: CLINIC | Age: 73
End: 2023-02-06
Payer: MEDICARE

## 2023-02-06 VITALS
HEART RATE: 65 BPM | WEIGHT: 234.2 LBS | SYSTOLIC BLOOD PRESSURE: 148 MMHG | OXYGEN SATURATION: 97 % | DIASTOLIC BLOOD PRESSURE: 78 MMHG | BODY MASS INDEX: 37.23 KG/M2 | TEMPERATURE: 96.6 F

## 2023-02-06 DIAGNOSIS — R09.81 NASAL CONGESTION: ICD-10-CM

## 2023-02-06 DIAGNOSIS — R09.82 POST-NASAL DRIP: Primary | ICD-10-CM

## 2023-02-06 PROCEDURE — 3044F HG A1C LEVEL LT 7.0%: CPT | Performed by: PHYSICIAN ASSISTANT

## 2023-02-06 PROCEDURE — 99212 OFFICE O/P EST SF 10 MIN: CPT | Performed by: PHYSICIAN ASSISTANT

## 2023-02-06 NOTE — PROGRESS NOTES
Office Note     Name: Angelina Pimentel    : 1950     MRN: [id number]     Chief Complaint  Cough and Nasal Congestion (Drainage and nausea induced by all the mucus drainage. Has CPAP machine. )    Subjective     History of Present Illness:  Angelina Pimentel is a 72 y.o. female who presents today for persistent cough and nasal congestion. She reports she believes the cough may be due from post nasal drainage. She reports she tested positive for COVID on a home test on 2023.  She reports she has noticed slow improvement and in fact today has had very few symptoms. Patient denies any associated symptoms.  Patient denies any home treatment.     Review of Systems:   Review of Systems   All other systems reviewed and are negative.      Past Medical History:   Past Medical History:   Diagnosis Date   • Basal cell carcinoma of skin     Description: x 3 dx --face x3 dx --face   • Benign colonic polyp     Description: precancerous dx  Precancerous Dx    • Diabetes mellitus (HCC)     type II; Metformin daily; checks blood sugars at home 90s-100s   • Essential hypertriglyceridemia      Dx 1/10   • Heart murmur    • History of Papanicolaou smear of cervix     Normal per pt   • History of varicella    • Hyperlipidemia      Dx 1/10   • Hypertension    • Hypothyroidism      Thyroid adenoma; thyroid meds daily    • Measles    • Mitral valve prolapse syndrome    • Obstructive sleep apnea syndrome     wears cpap   • Osteoarthritis      Mild   • Screening for cardiovascular condition 2017    Cardiac CT Scan (Calcium Score 0)   • Squamous cell carcinoma of skin     Description: dx -right shoulder    • Wears glasses        Past Surgical History:   Past Surgical History:   Procedure Laterality Date   • BLEPHAROPLASTY, QUAD Bilateral    • BREAST BIOPSY Left 2018    Procedure: BREAST BIOPSY WITH NEEDLE LOCALIZATION LEFT ARCHITECTURAL DISTORTION;  Surgeon: Barbara RAMIRES  MD Zen;  Location: [facility] [place];  Service:    • BREAST EXCISIONAL BIOPSY Left 02/06/2018    Complex sclerosing lesion- no atypia or malignancy   • CATARACT EXTRACTION Bilateral     Rt? date, Left 2/12   • COLONOSCOPY     • MAMMO FINE NEEDLE ASPIRATION UNILATERAL Left 11/2017    Proliferative fibrocystic change with sclerosing adenosis with dystrophic calcifications,   • TONSILLECTOMY  1954   • TOTAL ABDOMINAL HYSTERECTOMY WITH SALPINGO OOPHORECTOMY  1996       Immunizations:   Immunization History   Administered Date(s) Administered   • COVID-19 (MODERNA) 1st, 2nd, 3rd Dose Only 01/09/2021, 02/06/2021, 10/25/2021, 06/10/2022   • COVID-19 (MODERNA) BIVALENT BOOSTER 12+YRS 09/16/2022   • FLUAD TRI 65YR+ 10/11/2019   • Fluad Quad 65+ 08/31/2020   • Fluzone High Dose =>65 Years (Vaxcare ONLY) 10/14/2015, 10/10/2016, 10/11/2017, 08/30/2018, 09/30/2021   • Fluzone High-Dose 65+yrs 10/01/2021, 09/16/2022   • Hepatitis A 06/01/1998, 01/01/1999   • Pneumococcal Conjugate 13-Valent (PCV13) 11/24/2014   • Pneumococcal Conjugate 20-Valent (PCV20) 12/30/2022   • Pneumococcal Polysaccharide (PPSV23) 09/20/2010, 03/25/2016   • Shingrix 05/30/2018, 08/30/2018   • Td 04/05/2017   • Tdap 02/25/2009   • Zostavax 05/21/2010        Medications:     Current Outpatient Medications:   •  Cetirizine HCl 10 MG capsule, Zyrtec 10 mg tablet, Disp: , Rfl:   •  glucose blood test strip, Daily., Disp: , Rfl:   •  ibuprofen (ADVIL,MOTRIN) 100 MG/5ML suspension, Take  by mouth Every 6 (Six) Hours As Needed for Mild Pain ., Disp: , Rfl:   •  Lancets (onetouch ultrasoft) lancets, USE ONE LANCET TO TEST DAILY, Disp: 100 each, Rfl: 3  •  levothyroxine (SYNTHROID, LEVOTHROID) 100 MCG tablet, TAKE ONE TABLET BY MOUTH DAILY, Disp: 90 tablet, Rfl: 1  •  lisinopril (PRINIVIL,ZESTRIL) 20 MG tablet, Take 1 tablet by mouth Daily., Disp: 90 tablet, Rfl: 3  •  metFORMIN (GLUCOPHAGE) 500 MG tablet, TAKE ONE TABLET BY MOUTH DAILY WITH BREAKFAST, Disp: 90  "tablet, Rfl: 3  •  Multiple Vitamins-Minerals (CENTRUM SILVER ADULT 50+ PO), Take 1 tablet by mouth Daily., Disp: , Rfl:   •  OneTouch Ultra test strip, USE ONE STRIP TO TEST BLOOD GLUCOSE DAILY, Disp: 100 each, Rfl: 11  •  simvastatin (ZOCOR) 20 MG tablet, TAKE ONE TABLET BY MOUTH ONCE NIGHTLY, Disp: 90 tablet, Rfl: 1    Allergies:   Allergies   Allergen Reactions   • Cefdinir GI Intolerance      Abdominal pain   • Penicillins Rash   • Sulfa Antibiotics Rash     Sulfa Drugs       Family History:   Family History   Problem Relation Age of Onset   • Hyperlipidemia Mother    • Colon polyps Mother    • Arthritis Mother    • Cancer Mother    • Hypertension Mother    • Hypertension Other    • Heart disease Other    • Prostate cancer Father    • Heart attack Father    • Heart disease Father    • Diabetes Maternal Grandmother    • Glaucoma Maternal Grandmother    • Diabetes Paternal Grandmother    • Breast cancer Neg Hx    • Ovarian cancer Neg Hx    • BRCA 1/2 Neg Hx    • Aneurysm Neg Hx         AAA       Social History:   Social History     Socioeconomic History   • Marital status:    • Number of children: 2   Tobacco Use   • Smoking status: Never   • Smokeless tobacco: Never   Vaping Use   • Vaping Use: Never used   Substance and Sexual Activity   • Alcohol use: No     Comment: Never drank alcohol   • Drug use: No   • Sexual activity: Yes     Partners: Male     Birth control/protection: Post-menopausal         Objective     Vital Signs  /78   Pulse 65   Temp 96.6 °F (35.9 °C) (Temporal)   Wt 106 kg (234 lb 3.2 oz)   SpO2 97%   BMI 37.23 kg/m²   Estimated body mass index is 37.23 kg/m² as calculated from the following:    Height as of 12/30/22: 168.9 cm (66.5\").    Weight as of this encounter: 106 kg (234 lb 3.2 oz).          Physical Exam  Vitals and nursing note reviewed.   Constitutional:       Appearance: Normal appearance.   HENT:      Right Ear: Tympanic membrane normal.      Left Ear: Tympanic " membrane normal.      Nose: Nose normal.      Mouth/Throat:      Mouth: Mucous membranes are moist.      Pharynx: Oropharynx is clear.   Cardiovascular:      Rate and Rhythm: Normal rate and regular rhythm.      Pulses: Normal pulses.      Heart sounds: Normal heart sounds.   Pulmonary:      Effort: Pulmonary effort is normal.      Breath sounds: Normal breath sounds.   Musculoskeletal:         General: Normal range of motion.      Cervical back: Normal range of motion.   Skin:     General: Skin is warm.   Neurological:      General: No focal deficit present.      Mental Status: She is alert.   Psychiatric:         Mood and Affect: Mood normal.         Behavior: Behavior normal.         Thought Content: Thought content normal.         Judgment: Judgment normal.          Assessment and Plan     1. Post-nasal drip  We discussed oral antihistamine zyrtec she was already taking and adding additionally Flonase she was agreeable and reports she has flonase at home    2. Nasal congestion         Follow Up  Return if symptoms worsen or fail to improve.    RHEA Lozano [facility]  [address]  [facility] [address] [phone]

## 2023-02-07 ENCOUNTER — HOSPITAL ENCOUNTER (OUTPATIENT)
Dept: BONE DENSITY | Facility: HOSPITAL | Age: 73
Discharge: HOME | End: 2023-02-07
Admitting: INTERNAL MEDICINE
Payer: MEDICARE

## 2023-02-07 DIAGNOSIS — Z78.0 MENOPAUSE: ICD-10-CM

## 2023-02-07 PROCEDURE — 77080 DXA BONE DENSITY AXIAL: CPT

## 2023-02-09 DIAGNOSIS — E11.9 TYPE 2 DIABETES MELLITUS WITHOUT COMPLICATION, WITHOUT LONG-TERM CURRENT USE OF INSULIN: ICD-10-CM

## 2023-03-25 DIAGNOSIS — E03.9 ACQUIRED HYPOTHYROIDISM: Chronic | ICD-10-CM

## 2023-03-27 RX ORDER — LEVOTHYROXINE SODIUM 0.1 MG/1
TABLET ORAL
Qty: 90 TABLET | Refills: 1 | Status: SHIPPED | OUTPATIENT
Start: 2023-03-27

## 2023-06-07 DIAGNOSIS — E78.1 ESSENTIAL HYPERTRIGLYCERIDEMIA: Chronic | ICD-10-CM

## 2023-06-07 RX ORDER — SIMVASTATIN 20 MG
TABLET ORAL
Qty: 90 TABLET | Refills: 1 | Status: SHIPPED | OUTPATIENT
Start: 2023-06-07

## 2023-06-26 PROBLEM — R94.31 ABNORMAL ECG: Status: RESOLVED | Noted: 2018-02-28 | Resolved: 2023-06-26

## 2023-09-05 ENCOUNTER — TRANSCRIBE ORDERS (OUTPATIENT)
Dept: ADMINISTRATIVE | Facility: HOSPITAL | Age: 73
End: 2023-09-05
Payer: MEDICARE

## 2023-09-05 DIAGNOSIS — Z12.31 VISIT FOR SCREENING MAMMOGRAM: Primary | ICD-10-CM

## 2023-09-13 ENCOUNTER — TELEPHONE (OUTPATIENT)
Dept: INTERNAL MEDICINE | Facility: CLINIC | Age: 73
End: 2023-09-13
Payer: MEDICARE

## 2023-09-13 NOTE — TELEPHONE ENCOUNTER
Caller: Angelina Pimentel    Relationship: Self    Best call back number: [phone]     What is the best time to reach you: ANY    Who are you requesting to speak with (clinical staff, provider,  specific staff member): DR. LEI OR HER NURSE    What was the call regarding: THE PATIENT JUST WANTED TO LET DR. LEI KNOW THAT SHE GOT THE 65 AND OLDER FLU SHOT TODAY 09.13.23 AND WOULD LIKE IT ADDED TO THE CHART. PLEASE BURAK IF THERE ARE ANY QUESTIONS.     Is it okay if the provider responds through MyChart: NO

## 2023-09-13 NOTE — TELEPHONE ENCOUNTER
Called patient and had this completed at the [facility]  Called pharmacy and confirmed. Updated the chart with the information that the pharmacy relayed.

## 2023-09-15 DIAGNOSIS — E11.9 TYPE 2 DIABETES MELLITUS WITHOUT COMPLICATION, WITHOUT LONG-TERM CURRENT USE OF INSULIN: ICD-10-CM

## 2023-09-15 RX ORDER — LANCETS
EACH MISCELLANEOUS
Qty: 100 EACH | Refills: 3 | Status: SHIPPED | OUTPATIENT
Start: 2023-09-15

## 2023-09-20 DIAGNOSIS — E03.9 ACQUIRED HYPOTHYROIDISM: Chronic | ICD-10-CM

## 2023-09-20 RX ORDER — LEVOTHYROXINE SODIUM 0.1 MG/1
TABLET ORAL
Qty: 90 TABLET | Refills: 1 | Status: SHIPPED | OUTPATIENT
Start: 2023-09-20

## 2023-09-21 ENCOUNTER — TELEPHONE (OUTPATIENT)
Dept: INTERNAL MEDICINE | Facility: CLINIC | Age: 73
End: 2023-09-21
Payer: MEDICARE

## 2023-09-21 NOTE — TELEPHONE ENCOUNTER
Caller: Angelina Pimentel    Relationship: Self    Best call back number: [phone]     SHE IS AT [facility] GETTING HER VACCINES AND THEY WANT TO KNOW IF SHE HAS RECEIVED HER TETANUS SHOT.    PLEASE CALL AND ADVISE

## 2023-09-22 ENCOUNTER — TELEPHONE (OUTPATIENT)
Dept: INTERNAL MEDICINE | Facility: CLINIC | Age: 73
End: 2023-09-22
Payer: MEDICARE

## 2023-09-22 NOTE — TELEPHONE ENCOUNTER
Caller: Angelina Pimentel    Relationship to patient: Self    Best call back number: [phone]     Patient is needing: PATIENT WANTED PCP TO KNOW SHE GOT HER RSV SHOT AT [facility] ON [address] TODAY. 9/22

## 2023-09-22 NOTE — TELEPHONE ENCOUNTER
Spoke to patient and advised her that we are currently unable to add the rsv vaccine in chart but I have added her name to the list and we will upload that as soon as epic fixes it to where we can add the vaccine in the chart.

## 2023-09-29 ENCOUNTER — PRE-ADMISSION TESTING (OUTPATIENT)
Dept: PREADMISSION TESTING | Facility: HOSPITAL | Age: 73
End: 2023-09-29
Payer: MEDICARE

## 2023-09-29 ENCOUNTER — HOSPITAL ENCOUNTER (OUTPATIENT)
Dept: GENERAL RADIOLOGY | Facility: HOSPITAL | Age: 73
Discharge: HOME | End: 2023-09-29
Payer: MEDICARE

## 2023-09-29 ENCOUNTER — TELEPHONE (OUTPATIENT)
Dept: CARDIOLOGY | Facility: CLINIC | Age: 73
End: 2023-09-29

## 2023-09-29 ENCOUNTER — TRANSCRIBE ORDERS (OUTPATIENT)
Dept: ADMINISTRATIVE | Facility: HOSPITAL | Age: 73
End: 2023-09-29
Payer: MEDICARE

## 2023-09-29 VITALS — BODY MASS INDEX: 35.22 KG/M2 | WEIGHT: 224.43 LBS | HEIGHT: 67 IN

## 2023-09-29 DIAGNOSIS — D09.9 CARCINOMA IN SITU IN A POLYP: Primary | ICD-10-CM

## 2023-09-29 LAB
ABO GROUP BLD: NORMAL
ANION GAP SERPL CALCULATED.3IONS-SCNC: 9 MMOL/L (ref 5–15)
BUN SERPL-MCNC: 14 MG/DL (ref 8–23)
BUN/CREAT SERPL: 18.9 (ref 7–25)
CALCIUM SPEC-SCNC: 9.5 MG/DL (ref 8.6–10.5)
CHLORIDE SERPL-SCNC: 107 MMOL/L (ref 98–107)
CO2 SERPL-SCNC: 25 MMOL/L (ref 22–29)
CREAT SERPL-MCNC: 0.74 MG/DL (ref 0.57–1)
DEPRECATED RDW RBC AUTO: 45.1 FL (ref 37–54)
EGFRCR SERPLBLD CKD-EPI 2021: 85 ML/MIN/1.73
ERYTHROCYTE [DISTWIDTH] IN BLOOD BY AUTOMATED COUNT: 12.7 % (ref 12.3–15.4)
GLUCOSE SERPL-MCNC: 91 MG/DL (ref 65–99)
HBA1C MFR BLD: 6.3 % (ref 4.8–5.6)
HCT VFR BLD AUTO: 43.9 % (ref 34–46.6)
HGB BLD-MCNC: 14.1 G/DL (ref 12–15.9)
MCH RBC QN AUTO: 30.9 PG (ref 26.6–33)
MCHC RBC AUTO-ENTMCNC: 32.1 G/DL (ref 31.5–35.7)
MCV RBC AUTO: 96.1 FL (ref 79–97)
PLATELET # BLD AUTO: 266 10*3/MM3 (ref 140–450)
PMV BLD AUTO: 9.2 FL (ref 6–12)
POTASSIUM SERPL-SCNC: 4.1 MMOL/L (ref 3.5–5.2)
QT INTERVAL: 418 MS
QTC INTERVAL: 427 MS
RBC # BLD AUTO: 4.57 10*6/MM3 (ref 3.77–5.28)
RH BLD: NEGATIVE
SODIUM SERPL-SCNC: 141 MMOL/L (ref 136–145)
WBC NRBC COR # BLD: 7.5 10*3/MM3 (ref 3.4–10.8)

## 2023-09-29 PROCEDURE — 83036 HEMOGLOBIN GLYCOSYLATED A1C: CPT

## 2023-09-29 PROCEDURE — 85027 COMPLETE CBC AUTOMATED: CPT

## 2023-09-29 PROCEDURE — 86900 BLOOD TYPING SEROLOGIC ABO: CPT

## 2023-09-29 PROCEDURE — 80048 BASIC METABOLIC PNL TOTAL CA: CPT

## 2023-09-29 PROCEDURE — 93005 ELECTROCARDIOGRAM TRACING: CPT

## 2023-09-29 PROCEDURE — 36415 COLL VENOUS BLD VENIPUNCTURE: CPT

## 2023-09-29 PROCEDURE — 71046 X-RAY EXAM CHEST 2 VIEWS: CPT

## 2023-09-29 PROCEDURE — 86901 BLOOD TYPING SEROLOGIC RH(D): CPT

## 2023-09-29 RX ORDER — NEOMYCIN SULFATE 500 MG/1
1000 TABLET ORAL
COMMUNITY
Start: 2023-09-19

## 2023-09-29 RX ORDER — METRONIDAZOLE 500 MG/1
TABLET ORAL
COMMUNITY
Start: 2023-09-20

## 2023-09-29 NOTE — TELEPHONE ENCOUNTER
REQUEST FOR CARDIAC CLEARANCE    Name of person calling: MAYA - SURGICAL COORDINATOR    Surgeon's name: DR BARRY PAUL    Type of planned surgery: LAPAROSCOPIC RIGHT HEMICOLECTOMY    Date of planned surgery:10-13-23    Type of anesthesia: GENERAL    Have you been experiencing chest pain or shortness of breath? NOT NOTED IN VISIT    Is your doctor requesting for you to stop any of your medications prior to your surgery? NO    Where should we fax the clearance to? [phone]

## 2023-09-29 NOTE — PAT
An arrival time for procedure was not provided during PAT visit. If patient had any questions or concerns about their arrival time, they were instructed to contact their surgeon/physician.  Additionally, if the patient referred to an arrival time that was acquired from their my chart account, patient was encouraged to verify that time with their surgeon/physician. Arrival times are NOT provided in Pre Admission Testing Department.    Patient viewed general PAT education video as instructed in their preoperative information received from their surgeon.  Patient stated the general PAT education video was viewed in its entirety and survey completed.  Copies of PAT general education handouts (Incentive Spirometry, Meds to Beds Program, Patient Belongings, Pre-op skin preparation instructions, Blood Glucose testing, Visitor policy, Surgery FAQ, Code H) distributed to patient if not printed. Education related to the PAT pass and skin preparation for surgery (if applicable) completed in PAT as a reinforcement to PAT education video. Patient instructed to return PAT pass provided today as well as completed skin preparation sheet (if applicable) on the day of procedure.     Additionally if patient had not viewed video yet but intended to view it at home or in our waiting area, then referred them to the handout with QR code/link provided during PAT visit.  Instructed patient to complete survey after viewing the video in its entirety.  Encouraged patient/family to read PAT general education handouts thoroughly and notify PAT staff with any questions or concerns. Patient verbalized understanding of all information and priority content.    Patient denies any current skin issues.     Patient to apply PURPLE wipes to surgical area (as instructed) the night before procedure and the AM of procedure. Wipes provided. PT REQUESTED PURPLE WIPES D/T SENSITIVE SKIN.     Patient instructed to drink 20 ounces of Gatorade or Gatorlyte (if  "diabetic) and it needs to be completed 1 hour (for Main OR patients) or 2 hours (scheduled  section & [facility] patients) before given arrival time for procedure (NO RED Gatorade and NO Gatorade Zero).    Patient verbalized understanding.    Ten (8 ounce) Impact Advanced Recovery Nutritional Drinks distributed to patient from Pre Admission Testing Department.  Verbal and written instructions given that patient must consume two (8 ounce) Impact drinks a day for five days before surgery.  Flavoring tip sheet provided as well the brochure \"Go in Stronger. Get Home Sooner\" that explains benefits of nutritional drinks to enhance recovery. Patient/family verbalized understanding.     Per Anesthesia Request, patient instructed not to take their ACE/ARB medications on the AM of surgery.    Patient directed to Radiology Department for CXR after Pre Admission Testing Appointment.     LM WITH CEE DHALIWAL, GI NURSE NAVIGATOR, REGARDING PT'S UPCOMING PROCEDURE.     PER DR. NARANJO, PT NEEDS CARDIAC RISK ASSESSMENT FROM CARDIOLOGIST PRIOR TO PROCEDURE. PT VERBALIZED UNDERSTANDING. LM WITH MAYA GAVIRIA,  FOR DR. PAUL, REGARDING CARDIAC CLEARANCE.   "

## 2023-09-29 NOTE — TELEPHONE ENCOUNTER
I have not seen this patient since March 2021.  I cannot give her clearance since I have not seen her in 2-1/2 years. I will reach out to Charisse to see about getting her worked in.

## 2023-10-05 NOTE — PROGRESS NOTES
Subjective:     Encounter Date:10/06/2023    Patient ID: Angelina Pimentel is a 73 y.o.  white female from [place], [place], a retired  at [employer].     INTERNIST: Leola Villa MD  GENERAL SURGEON: Barbara Zaldivar MD  COLORECTAL SURGEON:  Don Ramos MD  NEUROLOGIST/SLEEP PHYSICIAN: Almas Henao MD.    Chief Complaint:   Chief Complaint   Patient presents with    Cardiac Clearance     Problem List:  Abnormal EKG/hypertensive cardiovascular disease:  Remote stress test for left arm numbness before 2008; negative per patient-data deficit  CT cardiac calcium score was 0 on 10/17/2017  Residual class I symptoms with echocardiogram demonstrating mild LVH and diastolic relaxation abnormality, no PE, valvulopathy, or pulmonary artery hypertension, April 2018  Residual class I symptoms, February 2020, March 2021, October 2023 with new LBBB, asymptomatic  Hypertension  Hyperlipidemia  Type 2 diabetes mellitus, diagnosed in 2010; last hemoglobin A1c was 5.5%, March 2017, 5.6% December 2020, 6.3% September 2023  LBBB  History of mitral valve prolapse  Hypothyroidism after radiation for thyroid nodule/goiter  Moderate obesity: BMI 35.43  Benign positional vertigo, December 2019, with right CRP  Transient global amnesia with acceptable brain MRI, May 2019  Surgical history:  Left lumpectomy: Benign  Colonoscopy  [facility]  Cataracts  Tonsillectomy  Eyelid reduction  Upcoming right hemicolectomy on 10/13/2023 with Dr. Ramos    Allergies   Allergen Reactions    Cefdinir GI Intolerance      Abdominal pain    Penicillins Rash    Sulfa Antibiotics Rash         Current Outpatient Medications:     Cetirizine HCl 10 MG capsule, Take 10 mg by mouth Every Night., Disp: , Rfl:     glucose blood test strip, Daily., Disp: , Rfl:     Lancets (onetouch ultrasoft) lancets, USE ONE LANCET TO TEST DAILY, Disp: 100 each, Rfl: 3    levothyroxine (SYNTHROID, LEVOTHROID) 100 MCG tablet, TAKE ONE  TABLET BY MOUTH DAILY, Disp: 90 tablet, Rfl: 1    lisinopril (PRINIVIL,ZESTRIL) 30 MG tablet, Take 1 tablet by mouth Daily., Disp: 90 tablet, Rfl: 3    metFORMIN (GLUCOPHAGE) 500 MG tablet, TAKE ONE TABLET BY MOUTH DAILY WITH BREAKFAST, Disp: 90 tablet, Rfl: 3    metroNIDAZOLE (FLAGYL) 500 MG tablet, 1 TABLET AT 1PM, 3PM, AND 7PM DAY BEFORE SURGERY, Disp: , Rfl:     Multiple Vitamins-Minerals (CENTRUM SILVER ADULT 50+ PO), Take 1 tablet by mouth Daily., Disp: , Rfl:     neomycin (MYCIFRADIN) 500 MG tablet, Take 2 tablets by mouth. 2 TABLETS AT 1PM, 3PM, AND 7PM DAY BEFORE SURGERY, Disp: , Rfl:     OneTouch Ultra test strip, USE ONE STRIP TO TEST BLOOD GLUCOSE DAILY, Disp: 100 each, Rfl: 11    simvastatin (ZOCOR) 20 MG tablet, TAKE ONE TABLET BY MOUTH ONCE NIGHTLY, Disp: 90 tablet, Rfl: 1    History of Present Illness  The patient is here to reestablish care after a 31-month hiatus.  She has not had any hospitalizations or surgeries since her last appointment other than an abnormal colonoscopy/polypectomy with suspicion for malignancy of colon August 2023.  She has an upcoming right hemicolectomy on 10/13/2023 with Dr. Ramos and needs cardiac clearance.  On ECG 9/29/2023 she had a new left bundle branch block.  The patient says that she works out with a  frequently and she denies any chest pain, shortness of breath, palpitations, dizziness, presyncope, syncope, or edema.  She does not often check her blood pressure at home but does have a blood pressure monitor that she will start doing this with.  Remotely she was told that she has MVP but on last echocardiogram in 2018 she just had mild MR.    Cardiovascular Disease Risk Factors  Hypertension, hyperlipidemia, diabetes mellitus, obesity, increased age    Social History     Socioeconomic History    Marital status:     Number of children: 2   Tobacco Use    Smoking status: Never     Passive exposure: Past    Smokeless tobacco: Never   Vaping Use    Vaping  Use: Never used   Substance and Sexual Activity    Alcohol use: Never     Comment: Never drank alcohol    Drug use: Never    Sexual activity: Defer       Family History   Problem Relation Age of Onset    Hyperlipidemia Mother     Colon polyps Mother     Arthritis Mother     Cancer Mother     Hypertension Mother     Hypertension Other     Heart disease Other     Prostate cancer Father     Heart attack Father     Heart disease Father     Diabetes Maternal Grandmother     Glaucoma Maternal Grandmother     Diabetes Paternal Grandmother     Breast cancer Neg Hx     Ovarian cancer Neg Hx     BRCA 1/2 Neg Hx     Aneurysm Neg Hx         AAA       Review of Systems   Constitutional: Negative.   HENT: Negative.     Eyes: Negative.    Cardiovascular:  Negative for chest pain, dyspnea on exertion, irregular heartbeat, leg swelling, near-syncope, palpitations and syncope.   Respiratory:  Negative for shortness of breath.    Endocrine:        Type 2 diabetes mellitus   Hematologic/Lymphatic: Negative.    Skin: Negative.    Musculoskeletal: Negative.    Gastrointestinal:         Upcoming hemicolectomy   Genitourinary: Negative.    Neurological: Negative.    Psychiatric/Behavioral: Negative.      Obtained and negative except as outlined in problem list and HPI.      ECG 12 Lead    Date/Time: 10/6/2023 2:40 PM  Performed by: Raisa Bobo APRN  Authorized by: Rasia Bobo APRN   Rhythm comments: Normal sinus rhythm, left axis deviation, LBBB, cannot rule out septal infarct, age undetermined, abnormal ECG, 73 bpm,  ms,  ms, QTc 429 ms, LBBB now present compared to ECG in 2018           Objective:       Vitals:    10/06/23 1426 10/06/23 1432 10/06/23 1433 10/06/23 1436   BP: 148/90 166/96 142/98 140/92   BP Location: Right arm Right arm Left arm Left arm   Patient Position: Sitting Standing Standing Sitting   Cuff Size: Large Adult Large Adult Large Adult Adult   Pulse: 89  75    SpO2: 99% 99% 97% 97%   Weight:  "103 kg (226 lb 3.2 oz)      Height: 170.2 cm (67\")        Body mass index is 35.43 kg/m².    Constitutional:       Appearance: Healthy appearance. Not in distress.   Neck:      Vascular: No JVR. JVD normal.   Pulmonary:      Effort: Pulmonary effort is normal.      Breath sounds: Normal breath sounds. No wheezing. No rhonchi. No rales.   Chest:      Chest wall: Not tender to palpatation.   Cardiovascular:      PMI at left midclavicular line. Normal rate. Regular rhythm. Normal S1. Normal S2.       Murmurs: There is a grade 2/6 systolic murmur at the URSB and LLSB, radiating to the neck.      No gallop.  No click. No rub.   Pulses:     Intact distal pulses.   Edema:     Peripheral edema absent.   Abdominal:      General: Bowel sounds are normal.      Palpations: Abdomen is soft.      Tenderness: There is no abdominal tenderness.   Musculoskeletal: Normal range of motion.         General: No tenderness. Skin:     General: Skin is warm and dry.   Neurological:      General: No focal deficit present.      Mental Status: Alert and oriented to person, place and time.       Lab Review:   Results for orders placed or performed in visit on 09/29/23   CBC (No Diff)    Specimen: Blood   Result Value Ref Range    WBC 7.50 3.40 - 10.80 10*3/mm3    RBC 4.57 3.77 - 5.28 10*6/mm3    Hemoglobin 14.1 12.0 - 15.9 g/dL    Hematocrit 43.9 34.0 - 46.6 %    MCV 96.1 79.0 - 97.0 fL    MCH 30.9 26.6 - 33.0 pg    MCHC 32.1 31.5 - 35.7 g/dL    RDW 12.7 12.3 - 15.4 %    RDW-SD 45.1 37.0 - 54.0 fl    MPV 9.2 6.0 - 12.0 fL    Platelets 266 140 - 450 10*3/mm3   Basic Metabolic Panel    Specimen: Blood   Result Value Ref Range    Glucose 91 65 - 99 mg/dL    BUN 14 8 - 23 mg/dL    Creatinine 0.74 0.57 - 1.00 mg/dL    Sodium 141 136 - 145 mmol/L    Potassium 4.1 3.5 - 5.2 mmol/L    Chloride 107 98 - 107 mmol/L    CO2 25.0 22.0 - 29.0 mmol/L    Calcium 9.5 8.6 - 10.5 mg/dL    BUN/Creatinine Ratio 18.9 7.0 - 25.0    Anion Gap 9.0 5.0 - 15.0 mmol/L    " eGFR 85.0 >60.0 mL/min/1.73   Hemoglobin A1c    Specimen: Blood   Result Value Ref Range    Hemoglobin A1C 6.30 (H) 4.80 - 5.60 %   ECG 12 Lead   Result Value Ref Range    QT Interval 418 ms    QTC Interval 427 ms   ABO / Rh    Specimen: Blood   Result Value Ref Range    ABO Type A     RH type Negative    Lipid panel: Cholesterol 139, triglycerides 91, HDL 40, LDL 82    Advance Care Planning   ACP discussion was held with the patient during this visit. Patient does not have an advance directive, declines further assistance.          Assessment:     Overall continued acceptable course with no new interim cardiopulmonary complaints with acceptable functional status .  The patient has a new LBBB compared to ECG in 2018.  I will order an echocardiogram to assess heart structure/function,  to be scheduled on 10/9/2023.  Patient asymptomatic.  Hypertension uncontrolled and I will increase her lisinopril to 40 mg daily.  The patient will call back in 1.5-2 weeks with blood pressure readings.  If the patient develops any cardiopulmonary symptoms, will order a stress test and have her wear a Holter monitor.       Diagnosis Plan   1. Hypertensive heart disease without heart failure  Hypertension uncontrolled and I will increase her lisinopril to 40 mg daily.  The patient will call back in 1.5-2 weeks with blood pressure readings.  Echocardiogram 10/9/2023      2. Dyslipidemia  Acceptable lipid panel June 2023, continue simvastatin      3. Mitral valve prolapse syndrome  Mild MR on last echocardiogram in 2018, no MVP, echocardiogram 10/9/2023   4. LBBB : Echocardiogram 10/9/2023   5.  Hypertension: Hypertension uncontrolled and I will increase her lisinopril to 40 mg daily.  The patient will call back in 1.5-2 weeks with blood pressure readings.       Plan:     Patient to continue current medications and close follow up with the above providers.  Tentative cardiology follow up in April 2024 or patient may return sooner PRN.    Increase lisinopril to 40 mg daily  Patient will call back in 1.5-2 weeks with blood pressure and heart rate readings  Echocardiogram on 10/9/2023    Electronically signed by SHREE Staples, 10/06/23, 3:13 PM EDT.

## 2023-10-06 ENCOUNTER — OFFICE VISIT (OUTPATIENT)
Dept: CARDIOLOGY | Facility: CLINIC | Age: 73
End: 2023-10-06
Payer: MEDICARE

## 2023-10-06 VITALS
WEIGHT: 226.2 LBS | HEART RATE: 75 BPM | HEIGHT: 67 IN | OXYGEN SATURATION: 97 % | SYSTOLIC BLOOD PRESSURE: 140 MMHG | BODY MASS INDEX: 35.5 KG/M2 | DIASTOLIC BLOOD PRESSURE: 92 MMHG

## 2023-10-06 DIAGNOSIS — I44.7 LBBB (LEFT BUNDLE BRANCH BLOCK): ICD-10-CM

## 2023-10-06 DIAGNOSIS — I10 PRIMARY HYPERTENSION: Chronic | ICD-10-CM

## 2023-10-06 DIAGNOSIS — I34.1 MITRAL VALVE PROLAPSE SYNDROME: Chronic | ICD-10-CM

## 2023-10-06 DIAGNOSIS — I11.9 HYPERTENSIVE HEART DISEASE WITHOUT HEART FAILURE: Primary | ICD-10-CM

## 2023-10-06 DIAGNOSIS — E78.5 DYSLIPIDEMIA: ICD-10-CM

## 2023-10-06 RX ORDER — LISINOPRIL 40 MG/1
40 TABLET ORAL DAILY
Qty: 90 TABLET | Refills: 3 | Status: SHIPPED | OUTPATIENT
Start: 2023-10-06

## 2023-10-09 ENCOUNTER — HOSPITAL ENCOUNTER (OUTPATIENT)
Dept: CARDIOLOGY | Facility: HOSPITAL | Age: 73
Discharge: HOME | End: 2023-10-09
Admitting: NURSE PRACTITIONER
Payer: MEDICARE

## 2023-10-09 ENCOUNTER — DOCUMENTATION (OUTPATIENT)
Dept: CARDIOLOGY | Facility: CLINIC | Age: 73
End: 2023-10-09
Payer: MEDICARE

## 2023-10-09 VITALS — WEIGHT: 227.07 LBS | BODY MASS INDEX: 35.64 KG/M2 | HEIGHT: 67 IN

## 2023-10-09 LAB
BH CV ECHO MEAS - AO MAX PG: 7.2 MMHG
BH CV ECHO MEAS - AO MEAN PG: 4.5 MMHG
BH CV ECHO MEAS - AO ROOT DIAM: 2.8 CM
BH CV ECHO MEAS - AO V2 MAX: 134.3 CM/SEC
BH CV ECHO MEAS - AO V2 VTI: 34 CM
BH CV ECHO MEAS - AVA(I,D): 1.91 CM2
BH CV ECHO MEAS - EDV(CUBED): 115.5 ML
BH CV ECHO MEAS - EDV(MOD-SP2): 65 ML
BH CV ECHO MEAS - EDV(MOD-SP4): 63 ML
BH CV ECHO MEAS - EF(MOD-BP): 63 %
BH CV ECHO MEAS - EF(MOD-SP2): 63.1 %
BH CV ECHO MEAS - EF(MOD-SP4): 63.5 %
BH CV ECHO MEAS - ESV(CUBED): 35.2 ML
BH CV ECHO MEAS - ESV(MOD-SP2): 24 ML
BH CV ECHO MEAS - ESV(MOD-SP4): 23 ML
BH CV ECHO MEAS - FS: 32.7 %
BH CV ECHO MEAS - IVS/LVPW: 0.91 CM
BH CV ECHO MEAS - IVSD: 0.92 CM
BH CV ECHO MEAS - LA DIMENSION: 3.5 CM
BH CV ECHO MEAS - LAT PEAK E' VEL: 8.6 CM/SEC
BH CV ECHO MEAS - LV DIASTOLIC VOL/BSA (35-75): 29.6 CM2
BH CV ECHO MEAS - LV MASS(C)D: 165.6 GRAMS
BH CV ECHO MEAS - LV MAX PG: 2.8 MMHG
BH CV ECHO MEAS - LV MEAN PG: 1.61 MMHG
BH CV ECHO MEAS - LV SYSTOLIC VOL/BSA (12-30): 10.8 CM2
BH CV ECHO MEAS - LV V1 MAX: 83.3 CM/SEC
BH CV ECHO MEAS - LV V1 VTI: 21.7 CM
BH CV ECHO MEAS - LVIDD: 4.9 CM
BH CV ECHO MEAS - LVIDS: 3.3 CM
BH CV ECHO MEAS - LVOT AREA: 3 CM2
BH CV ECHO MEAS - LVOT DIAM: 1.95 CM
BH CV ECHO MEAS - LVPWD: 1.01 CM
BH CV ECHO MEAS - MED PEAK E' VEL: 7.79 CM/SEC
BH CV ECHO MEAS - MV A MAX VEL: 108 CM/SEC
BH CV ECHO MEAS - MV DEC SLOPE: 406.6 CM/SEC2
BH CV ECHO MEAS - MV DEC TIME: 0.23 SEC
BH CV ECHO MEAS - MV E MAX VEL: 113.5 CM/SEC
BH CV ECHO MEAS - MV E/A: 1.05
BH CV ECHO MEAS - MV MAX PG: 5.3 MMHG
BH CV ECHO MEAS - MV MEAN PG: 2.5 MMHG
BH CV ECHO MEAS - MV P1/2T: 83.2 MSEC
BH CV ECHO MEAS - MV V2 VTI: 45.2 CM
BH CV ECHO MEAS - MVA(P1/2T): 2.6 CM2
BH CV ECHO MEAS - MVA(VTI): 1.44 CM2
BH CV ECHO MEAS - RAP SYSTOLE: 3 MMHG
BH CV ECHO MEAS - RVSP: 25.4 MMHG
BH CV ECHO MEAS - SI(MOD-SP2): 19.2 ML/M2
BH CV ECHO MEAS - SI(MOD-SP4): 18.8 ML/M2
BH CV ECHO MEAS - SV(LVOT): 65 ML
BH CV ECHO MEAS - SV(MOD-SP2): 41 ML
BH CV ECHO MEAS - SV(MOD-SP4): 40 ML
BH CV ECHO MEAS - TAPSE (>1.6): 2.1 CM
BH CV ECHO MEAS - TR MAX PG: 22.4 MMHG
BH CV ECHO MEAS - TR MAX VEL: 236.7 CM/SEC
BH CV ECHO MEASUREMENTS AVERAGE E/E' RATIO: 13.85
BH CV VAS BP LEFT ARM: NORMAL MMHG
BH CV XLRA - RV BASE: 3.6 CM
BH CV XLRA - RV LENGTH: 7.8 CM
BH CV XLRA - RV MID: 2.5 CM
BH CV XLRA - TDI S': 10.2 CM/SEC
IVRT: 95 MS
LEFT ATRIUM VOLUME INDEX: 22.1 ML/M2
LEFT ATRIUM VOLUME: 47 ML

## 2023-10-09 PROCEDURE — 93306 TTE W/DOPPLER COMPLETE: CPT

## 2023-10-09 PROCEDURE — 93306 TTE W/DOPPLER COMPLETE: CPT | Performed by: INTERNAL MEDICINE

## 2023-10-09 NOTE — PROGRESS NOTES
This patient has cardiac clearance and would be considered mild risk for her upcoming right hemicolectomy surgery this week. She has known LBBB and echocardiogram was acceptable today.     Electronically signed by SHREE Staples, 10/09/23, 2:58 PM EDT.

## 2023-10-11 ENCOUNTER — OFFICE VISIT (OUTPATIENT)
Dept: INTERNAL MEDICINE | Facility: CLINIC | Age: 73
End: 2023-10-11
Payer: MEDICARE

## 2023-10-11 VITALS
DIASTOLIC BLOOD PRESSURE: 92 MMHG | SYSTOLIC BLOOD PRESSURE: 180 MMHG | TEMPERATURE: 98.2 F | BODY MASS INDEX: 35.7 KG/M2 | WEIGHT: 228 LBS | RESPIRATION RATE: 16 BRPM | HEART RATE: 76 BPM

## 2023-10-11 DIAGNOSIS — I10 PRIMARY HYPERTENSION: Primary | Chronic | ICD-10-CM

## 2023-10-11 PROCEDURE — 3077F SYST BP >= 140 MM HG: CPT | Performed by: INTERNAL MEDICINE

## 2023-10-11 PROCEDURE — 1160F RVW MEDS BY RX/DR IN RCRD: CPT | Performed by: INTERNAL MEDICINE

## 2023-10-11 PROCEDURE — 99214 OFFICE O/P EST MOD 30 MIN: CPT | Performed by: INTERNAL MEDICINE

## 2023-10-11 PROCEDURE — 3080F DIAST BP >= 90 MM HG: CPT | Performed by: INTERNAL MEDICINE

## 2023-10-11 PROCEDURE — 3044F HG A1C LEVEL LT 7.0%: CPT | Performed by: INTERNAL MEDICINE

## 2023-10-11 PROCEDURE — 1159F MED LIST DOCD IN RCRD: CPT | Performed by: INTERNAL MEDICINE

## 2023-10-11 RX ORDER — NEBIVOLOL 5 MG/1
5 TABLET ORAL DAILY
Qty: 30 TABLET | Refills: 5 | Status: SHIPPED | OUTPATIENT
Start: 2023-10-11

## 2023-10-11 NOTE — PROGRESS NOTES
Subjective       Angelina Pimentel is a 73 y.o. female.     Chief Complaint   Patient presents with    Hypertension     Uncontrolled- scheduled for surgery 10/13/23       History obtained from the patient.      History of Present Illness       Primary Care Cardiac Diagnostic Constellation: The patient is here today for an acute visit.        Her Hypertension has been unstable.   Medication: Lisinopril.   Side Effects: None.    Procedures: Preoperative EKG on 9/29/2023 showed a LBBB.    Of note, preop labs were significant for a Hemoglobin A1c of 6.3, as well as a normal CBC and BMP.      Interval Events: She had not been checking her blood pressure at home.  On 6/26/2023, her blood pressure was 140/80 in our office.  Her lisinopril was increased to from 20 mg daily to 30 mg daily.  The patient has surgery scheduled in 2 days, and her blood pressure was noted to be elevated during her preop evaluation.  She saw Kourtney Murillo, cardiology APRN in 10/6/2023 for preoperative clearance and her blood pressure was 140/92.  The patient states it was also elevated when she went for her echocardiogram on 10/9/2023.  Lisinopril dose was increased to 40 mg daily 2 days ago.  She does have some mild increased stress.  The patient has checked her blood pressure the past 2 days and it has been running 174-194/      Symptoms: Denies chest pain, dyspnea, FLORES, orthopnea, PND, palpitations, syncope, lower extremity edema, claudication, lightheadedness, and dizziness.   Associated Symptoms: Weight up 7 pounds in the past 3-4 months.  No fatigue, headache, polydipsia, polyuria, myalgias, arthralgias, visual impairment, memory loss, or concentration problems.       Lifestyle: The patient has been consuming a diverse and healthy diet.  However she does drink 2 diet Cokes per day and has been drinking high sodium nutrient drinks, 2/day (560 mg of sodium each) in preparation for her surgery, and will be done tomorrow.  She has been  going to the gym once per week (cardio and weights with a ).  She also takes Zyrtec nightly, but that is.  Tobacco Use: Never a smoker.     Current Outpatient Medications on File Prior to Visit   Medication Sig Dispense Refill    Cetirizine HCl 10 MG capsule Take 10 mg by mouth Every Night.      glucose blood test strip Daily.      Lancets (onetouch ultrasoft) lancets USE ONE LANCET TO TEST DAILY 100 each 3    levothyroxine (SYNTHROID, LEVOTHROID) 100 MCG tablet TAKE ONE TABLET BY MOUTH DAILY 90 tablet 1    lisinopril (PRINIVIL,ZESTRIL) 40 MG tablet Take 1 tablet by mouth Daily. 90 tablet 3    metFORMIN (GLUCOPHAGE) 500 MG tablet TAKE ONE TABLET BY MOUTH DAILY WITH BREAKFAST 90 tablet 3    Multiple Vitamins-Minerals (CENTRUM SILVER ADULT 50+ PO) Take 1 tablet by mouth Daily.      neomycin (MYCIFRADIN) 500 MG tablet Take 2 tablets by mouth. 2 TABLETS AT 1PM, 3PM, AND 7PM DAY BEFORE SURGERY (Patient not taking: Reported on 10/23/2023)      OneTouch Ultra test strip USE ONE STRIP TO TEST BLOOD GLUCOSE DAILY 100 each 11    simvastatin (ZOCOR) 20 MG tablet TAKE ONE TABLET BY MOUTH ONCE NIGHTLY 90 tablet 1    HYDROcodone-acetaminophen (Norco) 7.5-325 MG per tablet Take 1 tablet by mouth Every 6 (Six) Hours As Needed for Moderate Pain. 10 tablet 0    ondansetron (ZOFRAN) 4 MG tablet Take 1 tablet by mouth Every 8 (Eight) Hours As Needed for Nausea or Vomiting.       No current facility-administered medications on file prior to visit.       Current outpatient and discharge medications have been reconciled for the patient.  Reviewed by: Leola Villa MD        The following portions of the patient's history were reviewed and updated as appropriate: allergies, current medications, past family history, past medical history, past social history, past surgical history, and problem list.    Review of Systems   Constitutional:  Negative for fatigue and unexpected weight change.   Eyes:  Negative for visual disturbance.    Respiratory:  Negative for cough, shortness of breath and wheezing.    Cardiovascular:  Negative for chest pain, palpitations and leg swelling.        No FLORES, orthopnea, or claudication.   Gastrointestinal:  Negative for abdominal pain, blood in stool, constipation, diarrhea, nausea and vomiting.        Denies melena.   Endocrine: Negative for polydipsia and polyuria.   Musculoskeletal:  Negative for arthralgias and myalgias.   Neurological:  Negative for dizziness, syncope, light-headedness and headaches.        No memory issues.   Psychiatric/Behavioral:  Negative for decreased concentration.          Objective       Blood pressure 180/92, pulse 76, temperature 98.2 °F (36.8 °C), temperature source Infrared, resp. rate 16, weight 103 kg (228 lb), not currently breastfeeding.  Body mass index is 35.7 kg/m².      Physical Exam  Vitals and nursing note reviewed.   Constitutional:       Comments: BMI greater than 30   Neck:      Vascular: No carotid bruit.   Cardiovascular:      Rate and Rhythm: Normal rate and regular rhythm.      Heart sounds: Normal heart sounds. No murmur heard.  Pulmonary:      Effort: Pulmonary effort is normal.      Breath sounds: Normal breath sounds.   Neurological:      Mental Status: She is alert.         Assessment / Plan:  Diagnoses and all orders for this visit:    1. Primary hypertension (Primary)  -     nebivolol (Bystolic) 5 MG tablet; Take 1 tablet by mouth Daily.  Dispense: 30 tablet; Refill: 5- NEW   Continue current medication(s) as noted in the history of present illness.   Discussed the importance of heart healthy eating, including decreased caffeine intake, and regular exercise.          Return for Next scheduled follow up.

## 2023-10-12 ENCOUNTER — ANESTHESIA EVENT (OUTPATIENT)
Dept: PERIOP | Facility: HOSPITAL | Age: 73
DRG: 331 | End: 2023-10-12
Payer: MEDICARE

## 2023-10-12 RX ORDER — SODIUM CHLORIDE 9 MG/ML
40 INJECTION, SOLUTION INTRAVENOUS AS NEEDED
Status: CANCELLED | OUTPATIENT
Start: 2023-10-12

## 2023-10-12 RX ORDER — SODIUM CHLORIDE 0.9 % (FLUSH) 0.9 %
10 SYRINGE (ML) INJECTION EVERY 12 HOURS SCHEDULED
Status: CANCELLED | OUTPATIENT
Start: 2023-10-12

## 2023-10-13 ENCOUNTER — ANESTHESIA (OUTPATIENT)
Dept: PERIOP | Facility: HOSPITAL | Age: 73
DRG: 331 | End: 2023-10-13
Payer: MEDICARE

## 2023-10-13 ENCOUNTER — HOSPITAL ENCOUNTER (INPATIENT)
Facility: HOSPITAL | Age: 73
LOS: 2 days | Discharge: HOME | DRG: 331 | End: 2023-10-15
Attending: COLON & RECTAL SURGERY | Admitting: COLON & RECTAL SURGERY
Payer: MEDICARE

## 2023-10-13 ENCOUNTER — ANESTHESIA EVENT CONVERTED (OUTPATIENT)
Dept: ANESTHESIOLOGY | Facility: HOSPITAL | Age: 73
DRG: 331 | End: 2023-10-13
Payer: MEDICARE

## 2023-10-13 DIAGNOSIS — Z90.49 STATUS POST COLON RESECTION: Primary | ICD-10-CM

## 2023-10-13 DIAGNOSIS — K63.5 COLON POLYPS: ICD-10-CM

## 2023-10-13 PROBLEM — C18.9 COLON CANCER: Status: ACTIVE | Noted: 2023-10-13

## 2023-10-13 PROBLEM — D01.0: Status: ACTIVE | Noted: 2023-10-13

## 2023-10-13 LAB
ABO GROUP BLD: NORMAL
BLD GP AB SCN SERPL QL: NEGATIVE
GLUCOSE BLDC GLUCOMTR-MCNC: 116 MG/DL (ref 70–130)
GLUCOSE BLDC GLUCOMTR-MCNC: 123 MG/DL (ref 70–130)
GLUCOSE BLDC GLUCOMTR-MCNC: 152 MG/DL (ref 70–130)
HCT VFR BLD AUTO: 41.4 % (ref 34–46.6)
HGB BLD-MCNC: 13.6 G/DL (ref 12–15.9)
RH BLD: NEGATIVE
T&S EXPIRATION DATE: NORMAL

## 2023-10-13 PROCEDURE — 25010000002 SUGAMMADEX 200 MG/2ML SOLUTION: Performed by: NURSE ANESTHETIST, CERTIFIED REGISTERED

## 2023-10-13 PROCEDURE — 25010000002 MAGNESIUM SULFATE PER 500 MG OF MAGNESIUM: Performed by: NURSE ANESTHETIST, CERTIFIED REGISTERED

## 2023-10-13 PROCEDURE — 25810000003 LACTATED RINGERS PER 1000 ML: Performed by: ANESTHESIOLOGY

## 2023-10-13 PROCEDURE — 63710000001 INSULIN LISPRO (HUMAN) PER 5 UNITS: Performed by: NURSE PRACTITIONER

## 2023-10-13 PROCEDURE — 25010000002 ONDANSETRON PER 1 MG: Performed by: NURSE ANESTHETIST, CERTIFIED REGISTERED

## 2023-10-13 PROCEDURE — 25010000002 ONDANSETRON PER 1 MG: Performed by: COLON & RECTAL SURGERY

## 2023-10-13 PROCEDURE — 25010000002 BUPIVACAINE (PF) 0.25 % SOLUTION: Performed by: ANESTHESIOLOGY

## 2023-10-13 PROCEDURE — 25010000002 POTASSIUM CHLORIDE PER 2 MEQ: Performed by: COLON & RECTAL SURGERY

## 2023-10-13 PROCEDURE — 25010000002 DROPERIDOL PER 5 MG: Performed by: NURSE ANESTHETIST, CERTIFIED REGISTERED

## 2023-10-13 PROCEDURE — 82948 REAGENT STRIP/BLOOD GLUCOSE: CPT

## 2023-10-13 PROCEDURE — 0DTF4ZZ RESECTION OF RIGHT LARGE INTESTINE, PERCUTANEOUS ENDOSCOPIC APPROACH: ICD-10-PCS | Performed by: COLON & RECTAL SURGERY

## 2023-10-13 PROCEDURE — 85018 HEMOGLOBIN: CPT | Performed by: COLON & RECTAL SURGERY

## 2023-10-13 PROCEDURE — 85014 HEMATOCRIT: CPT | Performed by: COLON & RECTAL SURGERY

## 2023-10-13 PROCEDURE — 25010000002 HEPARIN (PORCINE) PER 1000 UNITS: Performed by: COLON & RECTAL SURGERY

## 2023-10-13 PROCEDURE — 88309 TISSUE EXAM BY PATHOLOGIST: CPT | Performed by: COLON & RECTAL SURGERY

## 2023-10-13 PROCEDURE — 25010000002 FENTANYL CITRATE (PF) 50 MCG/ML SOLUTION

## 2023-10-13 PROCEDURE — 88305 TISSUE EXAM BY PATHOLOGIST: CPT | Performed by: COLON & RECTAL SURGERY

## 2023-10-13 PROCEDURE — 25010000002 PROPOFOL 10 MG/ML EMULSION: Performed by: NURSE ANESTHETIST, CERTIFIED REGISTERED

## 2023-10-13 PROCEDURE — 86850 RBC ANTIBODY SCREEN: CPT | Performed by: COLON & RECTAL SURGERY

## 2023-10-13 PROCEDURE — 88331 PATH CONSLTJ SURG 1 BLK 1SPC: CPT | Performed by: PATHOLOGY

## 2023-10-13 PROCEDURE — 25010000002 HYDROMORPHONE PER 4 MG: Performed by: COLON & RECTAL SURGERY

## 2023-10-13 PROCEDURE — 86900 BLOOD TYPING SEROLOGIC ABO: CPT | Performed by: COLON & RECTAL SURGERY

## 2023-10-13 PROCEDURE — 25010000002 FENTANYL CITRATE (PF) 100 MCG/2ML SOLUTION: Performed by: NURSE ANESTHETIST, CERTIFIED REGISTERED

## 2023-10-13 PROCEDURE — 25010000002 DEXAMETHASONE SODIUM PHOSPHATE 10 MG/ML SOLUTION: Performed by: ANESTHESIOLOGY

## 2023-10-13 PROCEDURE — 25810000003 SODIUM CHLORIDE PER 500 ML: Performed by: COLON & RECTAL SURGERY

## 2023-10-13 PROCEDURE — 25010000002 ERTAPENEM PER 500 MG: Performed by: COLON & RECTAL SURGERY

## 2023-10-13 PROCEDURE — 86901 BLOOD TYPING SEROLOGIC RH(D): CPT | Performed by: COLON & RECTAL SURGERY

## 2023-10-13 DEVICE — PROXIMATE RELOADABLE LINEAR STAPLER, 60MM
Type: IMPLANTABLE DEVICE | Site: ABDOMEN | Status: FUNCTIONAL
Brand: PROXIMATE

## 2023-10-13 DEVICE — PROXIMATE RELOADABLE LINEAR CUTTER WITH SAFETY LOCK-OUT.  75MM LINEAR CUTTER.
Type: IMPLANTABLE DEVICE | Site: ABDOMEN | Status: FUNCTIONAL
Brand: PROXIMATE

## 2023-10-13 RX ORDER — FAMOTIDINE 20 MG/1
20 TABLET, FILM COATED ORAL ONCE
Status: COMPLETED | OUTPATIENT
Start: 2023-10-13 | End: 2023-10-13

## 2023-10-13 RX ORDER — PROMETHAZINE HYDROCHLORIDE 25 MG/1
25 TABLET ORAL ONCE AS NEEDED
Status: DISCONTINUED | OUTPATIENT
Start: 2023-10-13 | End: 2023-10-13 | Stop reason: HOSPADM

## 2023-10-13 RX ORDER — SODIUM CHLORIDE AND POTASSIUM CHLORIDE 150; 450 MG/100ML; MG/100ML
75 INJECTION, SOLUTION INTRAVENOUS CONTINUOUS
Status: DISCONTINUED | OUTPATIENT
Start: 2023-10-13 | End: 2023-10-14

## 2023-10-13 RX ORDER — INSULIN LISPRO 100 [IU]/ML
2-7 INJECTION, SOLUTION INTRAVENOUS; SUBCUTANEOUS
Status: DISCONTINUED | OUTPATIENT
Start: 2023-10-13 | End: 2023-10-15 | Stop reason: HOSPADM

## 2023-10-13 RX ORDER — ONDANSETRON 2 MG/ML
INJECTION INTRAMUSCULAR; INTRAVENOUS AS NEEDED
Status: DISCONTINUED | OUTPATIENT
Start: 2023-10-13 | End: 2023-10-13 | Stop reason: SURG

## 2023-10-13 RX ORDER — ROCURONIUM BROMIDE 10 MG/ML
INJECTION, SOLUTION INTRAVENOUS AS NEEDED
Status: DISCONTINUED | OUTPATIENT
Start: 2023-10-13 | End: 2023-10-13 | Stop reason: SURG

## 2023-10-13 RX ORDER — FENTANYL CITRATE 50 UG/ML
50 INJECTION, SOLUTION INTRAMUSCULAR; INTRAVENOUS
Status: DISCONTINUED | OUTPATIENT
Start: 2023-10-13 | End: 2023-10-13 | Stop reason: HOSPADM

## 2023-10-13 RX ORDER — ALVIMOPAN 12 MG/1
12 CAPSULE ORAL 2 TIMES DAILY
Status: DISCONTINUED | OUTPATIENT
Start: 2023-10-14 | End: 2023-10-15 | Stop reason: HOSPADM

## 2023-10-13 RX ORDER — LABETALOL HYDROCHLORIDE 5 MG/ML
5 INJECTION, SOLUTION INTRAVENOUS
Status: DISCONTINUED | OUTPATIENT
Start: 2023-10-13 | End: 2023-10-13 | Stop reason: HOSPADM

## 2023-10-13 RX ORDER — SODIUM CHLORIDE 9 MG/ML
40 INJECTION, SOLUTION INTRAVENOUS AS NEEDED
Status: DISCONTINUED | OUTPATIENT
Start: 2023-10-13 | End: 2023-10-13 | Stop reason: HOSPADM

## 2023-10-13 RX ORDER — BUPIVACAINE HYDROCHLORIDE 2.5 MG/ML
INJECTION, SOLUTION EPIDURAL; INFILTRATION; INTRACAUDAL
Status: COMPLETED | OUTPATIENT
Start: 2023-10-13 | End: 2023-10-13

## 2023-10-13 RX ORDER — DROPERIDOL 2.5 MG/ML
0.62 INJECTION, SOLUTION INTRAMUSCULAR; INTRAVENOUS
Status: DISCONTINUED | OUTPATIENT
Start: 2023-10-13 | End: 2023-10-13 | Stop reason: HOSPADM

## 2023-10-13 RX ORDER — PREGABALIN 75 MG/1
75 CAPSULE ORAL ONCE
Status: COMPLETED | OUTPATIENT
Start: 2023-10-13 | End: 2023-10-13

## 2023-10-13 RX ORDER — IBUPROFEN 600 MG/1
1 TABLET ORAL
Status: DISCONTINUED | OUTPATIENT
Start: 2023-10-13 | End: 2023-10-15 | Stop reason: HOSPADM

## 2023-10-13 RX ORDER — HYDROMORPHONE HYDROCHLORIDE 1 MG/ML
0.5 INJECTION, SOLUTION INTRAMUSCULAR; INTRAVENOUS; SUBCUTANEOUS
Status: DISCONTINUED | OUTPATIENT
Start: 2023-10-13 | End: 2023-10-15 | Stop reason: HOSPADM

## 2023-10-13 RX ORDER — DEXAMETHASONE SODIUM PHOSPHATE 10 MG/ML
INJECTION, SOLUTION INTRAMUSCULAR; INTRAVENOUS
Status: COMPLETED | OUTPATIENT
Start: 2023-10-13 | End: 2023-10-13

## 2023-10-13 RX ORDER — ENOXAPARIN SODIUM 100 MG/ML
40 INJECTION SUBCUTANEOUS DAILY
Status: DISCONTINUED | OUTPATIENT
Start: 2023-10-14 | End: 2023-10-15 | Stop reason: HOSPADM

## 2023-10-13 RX ORDER — CETIRIZINE HYDROCHLORIDE 10 MG/1
10 TABLET ORAL NIGHTLY
Status: DISCONTINUED | OUTPATIENT
Start: 2023-10-13 | End: 2023-10-15 | Stop reason: HOSPADM

## 2023-10-13 RX ORDER — OXYCODONE HYDROCHLORIDE AND ACETAMINOPHEN 5; 325 MG/1; MG/1
1 TABLET ORAL EVERY 4 HOURS PRN
Status: DISCONTINUED | OUTPATIENT
Start: 2023-10-13 | End: 2023-10-15 | Stop reason: HOSPADM

## 2023-10-13 RX ORDER — NALOXONE HCL 0.4 MG/ML
0.4 VIAL (ML) INJECTION
Status: DISCONTINUED | OUTPATIENT
Start: 2023-10-13 | End: 2023-10-15 | Stop reason: HOSPADM

## 2023-10-13 RX ORDER — MAGNESIUM HYDROXIDE 1200 MG/15ML
LIQUID ORAL AS NEEDED
Status: DISCONTINUED | OUTPATIENT
Start: 2023-10-13 | End: 2023-10-13 | Stop reason: HOSPADM

## 2023-10-13 RX ORDER — HEPARIN SODIUM 5000 [USP'U]/ML
5000 INJECTION, SOLUTION INTRAVENOUS; SUBCUTANEOUS ONCE
Status: COMPLETED | OUTPATIENT
Start: 2023-10-13 | End: 2023-10-13

## 2023-10-13 RX ORDER — MIDAZOLAM HYDROCHLORIDE 1 MG/ML
0.5 INJECTION INTRAMUSCULAR; INTRAVENOUS
Status: DISCONTINUED | OUTPATIENT
Start: 2023-10-13 | End: 2023-10-13 | Stop reason: HOSPADM

## 2023-10-13 RX ORDER — NICOTINE POLACRILEX 4 MG
15 LOZENGE BUCCAL
Status: DISCONTINUED | OUTPATIENT
Start: 2023-10-13 | End: 2023-10-15 | Stop reason: HOSPADM

## 2023-10-13 RX ORDER — HYDRALAZINE HYDROCHLORIDE 20 MG/ML
5 INJECTION INTRAMUSCULAR; INTRAVENOUS
Status: DISCONTINUED | OUTPATIENT
Start: 2023-10-13 | End: 2023-10-13 | Stop reason: HOSPADM

## 2023-10-13 RX ORDER — LEVOTHYROXINE SODIUM 0.1 MG/1
100 TABLET ORAL
Status: DISCONTINUED | OUTPATIENT
Start: 2023-10-14 | End: 2023-10-15 | Stop reason: HOSPADM

## 2023-10-13 RX ORDER — FENTANYL CITRATE 50 UG/ML
INJECTION, SOLUTION INTRAMUSCULAR; INTRAVENOUS
Status: COMPLETED
Start: 2023-10-13 | End: 2023-10-13

## 2023-10-13 RX ORDER — NALOXONE HCL 0.4 MG/ML
0.4 VIAL (ML) INJECTION AS NEEDED
Status: DISCONTINUED | OUTPATIENT
Start: 2023-10-13 | End: 2023-10-13 | Stop reason: HOSPADM

## 2023-10-13 RX ORDER — IPRATROPIUM BROMIDE AND ALBUTEROL SULFATE 2.5; .5 MG/3ML; MG/3ML
3 SOLUTION RESPIRATORY (INHALATION) ONCE AS NEEDED
Status: DISCONTINUED | OUTPATIENT
Start: 2023-10-13 | End: 2023-10-13 | Stop reason: HOSPADM

## 2023-10-13 RX ORDER — ONDANSETRON 2 MG/ML
4 INJECTION INTRAMUSCULAR; INTRAVENOUS EVERY 6 HOURS PRN
Status: DISCONTINUED | OUTPATIENT
Start: 2023-10-13 | End: 2023-10-15 | Stop reason: HOSPADM

## 2023-10-13 RX ORDER — LISINOPRIL 40 MG/1
40 TABLET ORAL NIGHTLY
Status: DISCONTINUED | OUTPATIENT
Start: 2023-10-13 | End: 2023-10-15 | Stop reason: HOSPADM

## 2023-10-13 RX ORDER — SODIUM CHLORIDE 0.9 % (FLUSH) 0.9 %
3 SYRINGE (ML) INJECTION EVERY 12 HOURS SCHEDULED
Status: DISCONTINUED | OUTPATIENT
Start: 2023-10-13 | End: 2023-10-13 | Stop reason: HOSPADM

## 2023-10-13 RX ORDER — LIDOCAINE HYDROCHLORIDE 10 MG/ML
INJECTION, SOLUTION EPIDURAL; INFILTRATION; INTRACAUDAL; PERINEURAL AS NEEDED
Status: DISCONTINUED | OUTPATIENT
Start: 2023-10-13 | End: 2023-10-13 | Stop reason: SURG

## 2023-10-13 RX ORDER — SODIUM CHLORIDE 9 MG/ML
INJECTION, SOLUTION INTRAVENOUS AS NEEDED
Status: DISCONTINUED | OUTPATIENT
Start: 2023-10-13 | End: 2023-10-13 | Stop reason: HOSPADM

## 2023-10-13 RX ORDER — DROPERIDOL 2.5 MG/ML
0.62 INJECTION, SOLUTION INTRAMUSCULAR; INTRAVENOUS ONCE AS NEEDED
Status: DISCONTINUED | OUTPATIENT
Start: 2023-10-13 | End: 2023-10-13 | Stop reason: HOSPADM

## 2023-10-13 RX ORDER — LIDOCAINE HYDROCHLORIDE 10 MG/ML
0.5 INJECTION, SOLUTION EPIDURAL; INFILTRATION; INTRACAUDAL; PERINEURAL ONCE AS NEEDED
Status: COMPLETED | OUTPATIENT
Start: 2023-10-13 | End: 2023-10-13

## 2023-10-13 RX ORDER — SODIUM CHLORIDE, SODIUM LACTATE, POTASSIUM CHLORIDE, CALCIUM CHLORIDE 600; 310; 30; 20 MG/100ML; MG/100ML; MG/100ML; MG/100ML
9 INJECTION, SOLUTION INTRAVENOUS CONTINUOUS
Status: DISCONTINUED | OUTPATIENT
Start: 2023-10-13 | End: 2023-10-13

## 2023-10-13 RX ORDER — SODIUM CHLORIDE 0.9 % (FLUSH) 0.9 %
10 SYRINGE (ML) INJECTION AS NEEDED
Status: DISCONTINUED | OUTPATIENT
Start: 2023-10-13 | End: 2023-10-13 | Stop reason: HOSPADM

## 2023-10-13 RX ORDER — MEPERIDINE HYDROCHLORIDE 25 MG/ML
12.5 INJECTION INTRAMUSCULAR; INTRAVENOUS; SUBCUTANEOUS
Status: DISCONTINUED | OUTPATIENT
Start: 2023-10-13 | End: 2023-10-13 | Stop reason: HOSPADM

## 2023-10-13 RX ORDER — ACETAMINOPHEN 325 MG/1
650 TABLET ORAL EVERY 4 HOURS PRN
Status: DISCONTINUED | OUTPATIENT
Start: 2023-10-13 | End: 2023-10-15 | Stop reason: HOSPADM

## 2023-10-13 RX ORDER — DEXTROSE MONOHYDRATE 25 G/50ML
25 INJECTION, SOLUTION INTRAVENOUS
Status: DISCONTINUED | OUTPATIENT
Start: 2023-10-13 | End: 2023-10-15 | Stop reason: HOSPADM

## 2023-10-13 RX ORDER — HYDROMORPHONE HYDROCHLORIDE 1 MG/ML
0.5 INJECTION, SOLUTION INTRAMUSCULAR; INTRAVENOUS; SUBCUTANEOUS
Status: DISCONTINUED | OUTPATIENT
Start: 2023-10-13 | End: 2023-10-13 | Stop reason: HOSPADM

## 2023-10-13 RX ORDER — FENTANYL CITRATE 50 UG/ML
INJECTION, SOLUTION INTRAMUSCULAR; INTRAVENOUS AS NEEDED
Status: DISCONTINUED | OUTPATIENT
Start: 2023-10-13 | End: 2023-10-13 | Stop reason: SURG

## 2023-10-13 RX ORDER — ACETAMINOPHEN 500 MG
1000 TABLET ORAL ONCE
Status: COMPLETED | OUTPATIENT
Start: 2023-10-13 | End: 2023-10-13

## 2023-10-13 RX ORDER — SODIUM CHLORIDE 0.9 % (FLUSH) 0.9 %
3-10 SYRINGE (ML) INJECTION AS NEEDED
Status: DISCONTINUED | OUTPATIENT
Start: 2023-10-13 | End: 2023-10-13 | Stop reason: HOSPADM

## 2023-10-13 RX ORDER — HYDROCODONE BITARTRATE AND ACETAMINOPHEN 5; 325 MG/1; MG/1
1 TABLET ORAL ONCE AS NEEDED
Status: DISCONTINUED | OUTPATIENT
Start: 2023-10-13 | End: 2023-10-13 | Stop reason: HOSPADM

## 2023-10-13 RX ORDER — FAMOTIDINE 10 MG/ML
20 INJECTION, SOLUTION INTRAVENOUS ONCE
Status: DISCONTINUED | OUTPATIENT
Start: 2023-10-13 | End: 2023-10-13 | Stop reason: HOSPADM

## 2023-10-13 RX ORDER — ONDANSETRON 4 MG/1
4 TABLET, FILM COATED ORAL EVERY 8 HOURS PRN
COMMUNITY

## 2023-10-13 RX ORDER — PROPOFOL 10 MG/ML
VIAL (ML) INTRAVENOUS AS NEEDED
Status: DISCONTINUED | OUTPATIENT
Start: 2023-10-13 | End: 2023-10-13 | Stop reason: SURG

## 2023-10-13 RX ORDER — GLYCOPYRROLATE 0.2 MG/ML
INJECTION INTRAMUSCULAR; INTRAVENOUS AS NEEDED
Status: DISCONTINUED | OUTPATIENT
Start: 2023-10-13 | End: 2023-10-13 | Stop reason: SURG

## 2023-10-13 RX ORDER — ONDANSETRON 2 MG/ML
4 INJECTION INTRAMUSCULAR; INTRAVENOUS ONCE AS NEEDED
Status: DISCONTINUED | OUTPATIENT
Start: 2023-10-13 | End: 2023-10-13 | Stop reason: HOSPADM

## 2023-10-13 RX ORDER — PROMETHAZINE HYDROCHLORIDE 25 MG/1
25 SUPPOSITORY RECTAL ONCE AS NEEDED
Status: DISCONTINUED | OUTPATIENT
Start: 2023-10-13 | End: 2023-10-13 | Stop reason: HOSPADM

## 2023-10-13 RX ORDER — ALVIMOPAN 12 MG/1
12 CAPSULE ORAL ONCE
Status: COMPLETED | OUTPATIENT
Start: 2023-10-13 | End: 2023-10-13

## 2023-10-13 RX ORDER — MAGNESIUM SULFATE HEPTAHYDRATE 500 MG/ML
INJECTION, SOLUTION INTRAMUSCULAR; INTRAVENOUS AS NEEDED
Status: DISCONTINUED | OUTPATIENT
Start: 2023-10-13 | End: 2023-10-13 | Stop reason: SURG

## 2023-10-13 RX ORDER — NEBIVOLOL 5 MG/1
5 TABLET ORAL NIGHTLY
Status: DISCONTINUED | OUTPATIENT
Start: 2023-10-13 | End: 2023-10-15 | Stop reason: HOSPADM

## 2023-10-13 RX ADMIN — INSULIN LISPRO 2 UNITS: 100 INJECTION, SOLUTION INTRAVENOUS; SUBCUTANEOUS at 20:01

## 2023-10-13 RX ADMIN — SODIUM CHLORIDE, POTASSIUM CHLORIDE, SODIUM LACTATE AND CALCIUM CHLORIDE: 600; 310; 30; 20 INJECTION, SOLUTION INTRAVENOUS at 14:13

## 2023-10-13 RX ADMIN — BUPIVACAINE HYDROCHLORIDE 60 ML: 2.5 INJECTION, SOLUTION EPIDURAL; INFILTRATION; INTRACAUDAL; PERINEURAL at 13:12

## 2023-10-13 RX ADMIN — GLYCOPYRROLATE 0.2 MG: 0.4 INJECTION INTRAMUSCULAR; INTRAVENOUS at 13:25

## 2023-10-13 RX ADMIN — FENTANYL CITRATE 50 MCG: 50 INJECTION, SOLUTION INTRAMUSCULAR; INTRAVENOUS at 13:15

## 2023-10-13 RX ADMIN — OXYCODONE HYDROCHLORIDE AND ACETAMINOPHEN 1 TABLET: 5; 325 TABLET ORAL at 22:23

## 2023-10-13 RX ADMIN — POTASSIUM CHLORIDE AND SODIUM CHLORIDE 75 ML/HR: 450; 150 INJECTION, SOLUTION INTRAVENOUS at 18:09

## 2023-10-13 RX ADMIN — LIDOCAINE HYDROCHLORIDE 0.5 ML: 10 INJECTION, SOLUTION EPIDURAL; INFILTRATION; INTRACAUDAL; PERINEURAL at 11:15

## 2023-10-13 RX ADMIN — DEXAMETHASONE SODIUM PHOSPHATE 4 MG: 10 INJECTION, SOLUTION INTRAMUSCULAR; INTRAVENOUS at 13:12

## 2023-10-13 RX ADMIN — ROCURONIUM BROMIDE 50 MG: 10 SOLUTION INTRAVENOUS at 13:05

## 2023-10-13 RX ADMIN — FAMOTIDINE 20 MG: 20 TABLET, FILM COATED ORAL at 11:02

## 2023-10-13 RX ADMIN — ROCURONIUM BROMIDE 20 MG: 10 SOLUTION INTRAVENOUS at 14:23

## 2023-10-13 RX ADMIN — MAGNESIUM SULFATE HEPTAHYDRATE 1 G: 500 INJECTION, SOLUTION INTRAMUSCULAR; INTRAVENOUS at 14:08

## 2023-10-13 RX ADMIN — ROCURONIUM BROMIDE 20 MG: 10 SOLUTION INTRAVENOUS at 13:47

## 2023-10-13 RX ADMIN — DROPERIDOL 0.62 MG: 2.5 INJECTION, SOLUTION INTRAMUSCULAR; INTRAVENOUS at 16:03

## 2023-10-13 RX ADMIN — ACETAMINOPHEN 1000 MG: 500 TABLET ORAL at 11:01

## 2023-10-13 RX ADMIN — LISINOPRIL 40 MG: 40 TABLET ORAL at 20:01

## 2023-10-13 RX ADMIN — PREGABALIN 75 MG: 75 CAPSULE ORAL at 11:02

## 2023-10-13 RX ADMIN — FENTANYL CITRATE 50 MCG: 50 INJECTION, SOLUTION INTRAMUSCULAR; INTRAVENOUS at 16:03

## 2023-10-13 RX ADMIN — NEBIVOLOL 5 MG: 5 TABLET ORAL at 20:02

## 2023-10-13 RX ADMIN — FENTANYL CITRATE 50 MCG: 50 INJECTION, SOLUTION INTRAMUSCULAR; INTRAVENOUS at 13:05

## 2023-10-13 RX ADMIN — PROPOFOL 25 MCG/KG/MIN: 10 INJECTION, EMULSION INTRAVENOUS at 13:12

## 2023-10-13 RX ADMIN — ONDANSETRON 4 MG: 2 INJECTION INTRAMUSCULAR; INTRAVENOUS at 14:39

## 2023-10-13 RX ADMIN — HEPARIN SODIUM 5000 UNITS: 5000 INJECTION INTRAVENOUS; SUBCUTANEOUS at 11:06

## 2023-10-13 RX ADMIN — PROPOFOL 200 MG: 10 INJECTION, EMULSION INTRAVENOUS at 13:04

## 2023-10-13 RX ADMIN — SODIUM CHLORIDE, POTASSIUM CHLORIDE, SODIUM LACTATE AND CALCIUM CHLORIDE 9 ML/HR: 600; 310; 30; 20 INJECTION, SOLUTION INTRAVENOUS at 10:50

## 2023-10-13 RX ADMIN — ONDANSETRON 4 MG: 2 INJECTION INTRAMUSCULAR; INTRAVENOUS at 18:03

## 2023-10-13 RX ADMIN — HYDROMORPHONE HYDROCHLORIDE 0.5 MG: 1 INJECTION, SOLUTION INTRAMUSCULAR; INTRAVENOUS; SUBCUTANEOUS at 20:01

## 2023-10-13 RX ADMIN — ALVIMOPAN 12 MG: 12 CAPSULE ORAL at 11:02

## 2023-10-13 RX ADMIN — SUGAMMADEX 200 MG: 100 INJECTION, SOLUTION INTRAVENOUS at 14:53

## 2023-10-13 RX ADMIN — CETIRIZINE HYDROCHLORIDE 10 MG: 10 TABLET, FILM COATED ORAL at 20:01

## 2023-10-13 RX ADMIN — MAGNESIUM SULFATE HEPTAHYDRATE 1 G: 500 INJECTION, SOLUTION INTRAMUSCULAR; INTRAVENOUS at 13:42

## 2023-10-13 RX ADMIN — ERTAPENEM 1000 MG: 1 INJECTION INTRAMUSCULAR; INTRAVENOUS at 13:15

## 2023-10-13 RX ADMIN — LIDOCAINE HYDROCHLORIDE 50 MG: 10 INJECTION, SOLUTION EPIDURAL; INFILTRATION; INTRACAUDAL; PERINEURAL at 13:04

## 2023-10-13 RX ADMIN — ACETAMINOPHEN 650 MG: 325 TABLET ORAL at 18:02

## 2023-10-13 NOTE — OP NOTE
COLON RESECTION RIGHT OR TRANSVERSE LAPAROSCOPIC  Procedure Report    Patient Name:  Angelina Pimentel  YOB: 1950    Date of Surgery:  10/13/2023     Indications: Malignant cecal polyp    Pre-op Diagnosis:   Malignant cecal polyp  Post-op Diagnosis:     No visible residual cancer  Calcified adipose tissue  Colon Resection  Operation performed with curative intent Yes   Tumor Location (select all that apply) Cecum   Extent of colon and vascular resection  (select all that apply) Right hemicolectomy - ileocolic, right colic (if present)      Frozen section: Biopsy of right pelvis-benign    Procedure:   Laparoscopic right hemicolectomy   Biopsy of right pelvis  Staff:  Surgeon(s):  Don Ramos MD    Assistant: Lisy Arce RNFA    Assistant: Lisy Arce RNFA  was responsible for performing the following activities: Retraction, Suction, Irrigation, and Suturing and their skilled assistance was necessary for the success of this case.    Anesthesia: General with Block    Estimated Blood Loss:  30 ml    Specimen:    Specimens       ID Source Type Tests Collected By Collected At Frozen?    A Pelvis Tissue TISSUE PATHOLOGY EXAM   Don Ramos MD 10/13/23 1403 No    Description: right pelvis nodule    B Abdomen, Lower Tissue TISSUE PATHOLOGY EXAM   Don Ramos MD 10/13/23 1435     Description: Right Colon Stitch marks Malignant Polyp Site    This specimen was not marked as sent.              Findings: Puckered scar in the cecum.  Calcified 3 to 5 mm nodules underneath the peritoneum in the right pelvis just below the cecum          Complications: None          Description of Procedure:   After the patient was properly identified she was taken the operating room and placed in comfortable supine position.  After anesthesia was obtained a tap block was placed.  A Wright catheter was sterilely inserted.  The arms were tucked and the abdomen was prepped and sterilely draped.  Timeout was  performed.  A 12 mm port was placed through a 1-1/2 cm incision in the midline just above the umbilicus.  This was done with the camera inserted at the trocar.  The abdomen was insufflated to 15 mmHg.  The abdomen was explored and there were no lesions noted in the liver.  The gallbladder was zara's egg blue without fibrosis or inflammation.  The small bowel serosa appeared healthy.  Following this to 5 mm ports were placed through 5 mm incisions in the left lower quadrant and left mid abdomen along the anterior axillary line.  The transverse colon was elevated and the omentum was divided with the [place] LigaSure device beginning at the mid transverse colon and continuing through the peritoneum around the hepatic flexure.  The dissection was continued along the right gutter.  The peritoneum along the distal ileum was incised.  The right colon was then mobilized.  There were small  white granulomatous nodules noted in the right upper pelvis.  One of the nodules was excised and sent for pathology.  Frozen section was benign.  Once the colon had been well mobilized the trocars were removed and the midline incision was enlarged to about 6 cm.  A medium Tay was placed.  The colon was mobilized through the Tay.  The ileocolic vessels were isolated clamped and doubly ligated close to the origin.  The mesentery from the distal ileum to the mid transverse colon was divided with the LigaSure device.  An enterotomy was made in this distal small bowel and mid transverse colon.  An anastomosis was created using the 75 mm thick tissue linear stapler.  The apex of the staple line was secured with 2 interrupted 3-0 Vicryl's.  The staple line was inspected and noted to be hemostatic.  The anastomosis was completed with a TX 60 mm thick tissue stapler.  2 small bleeding vessels were controlled with figure-of-eight 3-0 Vicryl's.  The anastomosis was compressed and noted to be without leak.  It was widely patent, with good  blood supply, and hemostatic.  The anastomosis was irrigated and replaced in the abdominal cavity.  The small bowel was run and no focal or diffuse lesions were noted.  It was replaced in an orderly fashion.  The abdomen was irrigated with  500 mL of saline.  Good hemostasis was noted.  There are no residual laparotomy pads or instruments noted.  The specimen was opened on the back table.  The polypectomy site in the cecum was marked with a suture.  Surgeon changed gloves and returned to the operating field.  The fascia was closed using running #1 PDS.  The subcutaneous tissue was irrigated and approximated with interrupted 3-0 Vicryl.  The trocar incisions in the midline incision was closed with running 4-0 subcuticular Monocryl.  Skin affix was used for dressing.  The patient tolerated the procedure well.  The sponge needle count was correct and she was taken the recovery room in good condition.                        Don Ramos MD     Date: 10/13/2023  Time: 14:54 EDT

## 2023-10-13 NOTE — H&P
Pre-Op H&P  Angelina Pimentel  [id number]  1950      Chief complaint: Colon cancer      Subjective:  Patient is a 73 y.o.female presents for scheduled surgery by Dr. Ramos. She anticipates a LAPAROSCOPIC RIGHT HEMICOLECTOMY  today. She had routine colonoscopy in Aug 2023 and was found to have polyps. Pathology showed tubular adenoma with focal carcinoma in situ. She denies problems with abd pain, constipation, diarrhea or bloody stools.       Review of Systems:  Constitutional-- No fever, chills or sweats. No fatigue.  CV-- No chest pain, palpitation or syncope. +HTN, HLD  Resp-- No SOB, cough, hemoptysis  Skin--No rashes or lesions      Allergies:   Allergies   Allergen Reactions    Cefdinir GI Intolerance      Abdominal pain    Penicillins Rash    Sulfa Antibiotics Rash         Home Meds:  Medications Prior to Admission   Medication Sig Dispense Refill Last Dose    Cetirizine HCl 10 MG capsule Take 10 mg by mouth Every Night.       glucose blood test strip Daily.       Lancets (onetouch ultrasoft) lancets USE ONE LANCET TO TEST DAILY 100 each 3     levothyroxine (SYNTHROID, LEVOTHROID) 100 MCG tablet TAKE ONE TABLET BY MOUTH DAILY 90 tablet 1     lisinopril (PRINIVIL,ZESTRIL) 40 MG tablet Take 1 tablet by mouth Daily. 90 tablet 3     metFORMIN (GLUCOPHAGE) 500 MG tablet TAKE ONE TABLET BY MOUTH DAILY WITH BREAKFAST 90 tablet 3     metroNIDAZOLE (FLAGYL) 500 MG tablet 1 TABLET AT 1PM, 3PM, AND 7PM DAY BEFORE SURGERY       Multiple Vitamins-Minerals (CENTRUM SILVER ADULT 50+ PO) Take 1 tablet by mouth Daily.       nebivolol (Bystolic) 5 MG tablet Take 1 tablet by mouth Daily. 30 tablet 5     neomycin (MYCIFRADIN) 500 MG tablet Take 2 tablets by mouth. 2 TABLETS AT 1PM, 3PM, AND 7PM DAY BEFORE SURGERY       OneTouch Ultra test strip USE ONE STRIP TO TEST BLOOD GLUCOSE DAILY 100 each 11     simvastatin (ZOCOR) 20 MG tablet TAKE ONE TABLET BY MOUTH ONCE NIGHTLY 90 tablet 1          PMH:   Past Medical History:    Diagnosis Date    Basal cell carcinoma of skin     Description: x 3 dx 1996-2008-face x3 dx 1996-2008-face    Benign colonic polyp     Description: precancerous dx 2000 Precancerous Dx 2000    Cataract     Removed    Choroidal nevus of left eye     COVID-19 virus infection 01/2023    Dermatochalasis of both upper eyelids     Diabetes mellitus 2010    type II; Metformin daily; checks blood sugars at home 90s-100s    Elevated cholesterol     Essential hypertriglyceridemia      Dx 1/10    Glaucoma suspect of both eyes     Heart murmur     History of Papanicolaou smear of cervix 2008    Normal per pt    History of varicella     Hyperlipidemia      Dx 1/10    Hypertension     Hypothyroidism      Thyroid adenoma; thyroid meds daily     Measles     Meibomian gland dysfunction (MGD)     Mitral valve prolapse syndrome     Obstructive sleep apnea syndrome     WEARS CPAP, MIN PRESSURE 8 cmH2O, MAX PRESSURE 16 cmH2O    Osteoarthritis      Mild    PCO (posterior capsular opacification), right     Screening for cardiovascular condition 12/11/2017    Cardiac CT Scan (Calcium Score 0)    Squamous cell carcinoma of skin     Description: dx 2004-right shoulder     Wears glasses      PSH:    Past Surgical History:   Procedure Laterality Date    BLEPHAROPLASTY, QUAD Bilateral     BREAST BIOPSY Left 02/06/2018    Procedure: BREAST BIOPSY WITH NEEDLE LOCALIZATION LEFT ARCHITECTURAL DISTORTION;  Surgeon: Barbara Zaldivar MD;  Location: [facility];  Service:     BREAST EXCISIONAL BIOPSY Left 02/06/2018    Complex sclerosing lesion- no atypia or malignancy    CATARACT EXTRACTION Bilateral     Rt? date, Left 2/12    COLONOSCOPY      MAMMO FINE NEEDLE ASPIRATION UNILATERAL Left 11/2017    Proliferative fibrocystic change with sclerosing adenosis with dystrophic calcifications,    SKIN CANCER EXCISION      X3 BASAL CELL, X1 SQUAMOUS CELL    TONSILLECTOMY  1954    TOTAL ABDOMINAL HYSTERECTOMY WITH SALPINGO OOPHORECTOMY  1996        Immunization History:  Influenza: UTD  Pneumococcal: UTD  Tetanus: UTD  Covid : x4    Social History:   Tobacco:   Social History     Tobacco Use   Smoking Status Never    Passive exposure: Past   Smokeless Tobacco Never      Alcohol:     Social History     Substance and Sexual Activity   Alcohol Use Never    Comment: Never drank alcohol         Physical Exam:VS: /76  HR 58  RR 16  T 98.4  Sat 97%RA      General Appearance:    Alert, cooperative, no distress, appears stated age   Head:    Normocephalic, without obvious abnormality, atraumatic   Lungs:     Clear to auscultation bilaterally, respirations unlabored    Heart:   Regular rate and rhythm, S1 and S2 normal    Abdomen:    Soft without tenderness   Extremities:   Extremities normal, atraumatic, no cyanosis or edema   Skin:   Skin color, texture, turgor normal, no rashes or lesions   Neurologic:   Grossly intact     Results Review:     LABS:  Lab Results   Component Value Date    WBC 7.50 09/29/2023    HGB 14.1 09/29/2023    HCT 43.9 09/29/2023    MCV 96.1 09/29/2023     09/29/2023    NEUTROABS 3.91 06/26/2023    GLUCOSE 91 09/29/2023    BUN 14 09/29/2023    CREATININE 0.74 09/29/2023    EGFRIFNONA 80 12/15/2021    EGFRIFAFRI 95 09/10/2015     09/29/2023    K 4.1 09/29/2023     09/29/2023    CO2 25.0 09/29/2023    CALCIUM 9.5 09/29/2023    ALBUMIN 4.3 06/26/2023    AST 19 06/26/2023    ALT 14 06/26/2023    BILITOT 0.3 06/26/2023       I reviewed the patient's new clinical results.    Cancer Staging (if applicable)  Cancer Patient: __ yes __no __unknown; If yes, clinical stage T:TIS N:_0_M:_0_, stage 0    Impression: Colon cancer TIS      Plan: LAPAROSCOPIC RIGHT HEMICOLECTOMY       Brittnee Jones, SHREE   10/13/2023   10:47 EDT

## 2023-10-13 NOTE — ANESTHESIA PREPROCEDURE EVALUATION
Anesthesia Evaluation     Patient summary reviewed and Nursing notes reviewed   no history of anesthetic complications:   NPO Solid Status: > 8 hours  NPO Liquid Status: > 2 hours           Airway   Mallampati: II  TM distance: <3 FB  Neck ROM: full  Possible difficult intubation  Dental - normal exam     Pulmonary - normal exam   (+) ,sleep apnea on CPAP  Cardiovascular - normal exam    ECG reviewed    (+) hypertension, valvular problems/murmurs MVP, dysrhythmias (LBBB), hyperlipidemia  (-) angina, FLORES    ROS comment: Interpretation Summary 10/9/23       ú  Left ventricular ejection fraction appears to be 61 - 65%.  ú  Left ventricular wall thickness is consistent with borderline concentric hypertrophy.  ú  Trace mitral valve regurgitation is present.  ú  Mild tricuspid valve regurgitation is present. Estimated right ventricular systolic pressure from tricuspid regurgitation is normal (<35 mmHg).      Neuro/Psych- negative ROS  GI/Hepatic/Renal/Endo    (+) diabetes mellitus, thyroid problem hypothyroidism  (-) GERD, liver disease, no renal disease    Musculoskeletal     Abdominal    Substance History      OB/GYN          Other   arthritis,   history of cancer (skin)    ROS/Med Hx Other: glaucoma              Anesthesia Plan    ASA 3     general with block     intravenous induction     Anesthetic plan, risks, benefits, and alternatives have been provided, discussed and informed consent has been obtained with: patient.    Use of blood products discussed with patient .    Plan discussed with CRNA.    CODE STATUS:

## 2023-10-13 NOTE — ANESTHESIA POSTPROCEDURE EVALUATION
Patient: Angelina Pimentel    Procedure Summary       Date: 10/13/23 Room / Location:  [facility] /  [facility]    Anesthesia Start: 1258 Anesthesia Stop: 1538    Procedure: LAPAROSCOPIC RIGHT HEMICOLECTOMY (Right: Abdomen) Diagnosis:     Surgeons: Don Ramos MD Provider: Zane Valerio MD    Anesthesia Type: general with block ASA Status: 3            Anesthesia Type: general with block    Vitals  Vitals Value Taken Time   /68 10/13/23 1535   Temp     Pulse 62 10/13/23 1537   Resp     SpO2 94 % 10/13/23 1537   Vitals shown include unfiled device data.        Post Anesthesia Care and Evaluation    Patient location during evaluation: PACU  Patient participation: complete - patient participated  Level of consciousness: sleepy but conscious  Pain management: adequate    Airway patency: patent  Anesthetic complications: No anesthetic complications  PONV Status: none  Cardiovascular status: hemodynamically stable and acceptable  Respiratory status: nonlabored ventilation, acceptable and nasal cannula  Hydration status: acceptable

## 2023-10-13 NOTE — ANESTHESIA PROCEDURE NOTES
"Peripheral Block      Patient reassessed immediately prior to procedure    Patient location during procedure: OR  Start time: 10/13/2023 1:12 PM  Stop time: 10/13/2023 1:16 PM  Reason for block: at surgeon's request and post-op pain management  Performed by  Anesthesiologist: Zane Valerio MD  Preanesthetic Checklist  Completed: patient identified, IV checked, site marked, risks and benefits discussed, surgical consent, monitors and equipment checked, pre-op evaluation and timeout performed  Prep:  Pt Position: supine  Sterile barriers:cap, gloves, mask and washed/disinfected hands  Prep: ChloraPrep  Patient monitoring: blood pressure monitoring, continuous pulse oximetry and EKG  Procedure    Sedation: yes  Performed under: general  Guidance:ultrasound guided  Images:still images obtained, printed/placed on chart    Laterality:Bilateral  Block Type:TAP  Injection Technique:single-shot  Needle Type:short-bevel and echogenic  Needle Gauge:20 G  Resistance on Injection: none    Medications Used: dexamethasone sodium phosphate injection - Injection   4 mg - 10/13/2023 1:12:00 PM  bupivacaine PF (MARCAINE) 0.25 % injection - Injection   60 mL - 10/13/2023 1:12:00 PM      Medications  Comment:Block Injection:  LA dose divided between Right and Left block        Post Assessment  Injection Assessment: negative aspiration for heme, incremental injection and no paresthesia on injection  Patient Tolerance:comfortable throughout block  Complications:no  Additional Notes    Subcostal TAPs    A high-frequency linear transducer, with sterile cover, was placed sub-xiphoid to identify [place], right and left Rectus Abdominus Muscles (ROGELIO). The transducer was moved either right or left subcostally to identify the ROGELIO and the Transverse Abdominus Muscle (DUFF). The insertion site was prepped in sterile fashion and then localized with 2-5 ml of 1% Lidocaine. Using ultrasound-guidance, a 20-gauge B-Carlos 4\" Ultraplex 360 " "non-stimulating echogenic needle was advanced in plane, from medial to lateral, until the tip of the needle was in the fascial plane between the ROGELIO and DUFF. 1-3ml of preservative free normal saline was used to hydro-dissect the fascial planes. After the fascial plane was verified, the local anesthetic (LA) was injected. The procedure was repeated on the opposite side for bilateral coverage. Aspiration every 5 ml to prevent intravascular injection. Injection was completed with negative aspiration of blood and negative intravascular injection. Injection pressures were normal with minimal resistance. The subcostal approach to the TAP nerve block ideally anesthetizes the intercostal nerves T6-T9.     Mid-Axillary/Lateral TAPs    A high-frequency linear transducer, with sterile cover, was placed in the midaxillary line between the ASIS and costal margin. The External Oblique Muscle (EOM), Internal Oblique Muscle (IOM), Transverse Abdominus Muscle (DUFF), and Peritoneum were identified. The insertion site was prepped in sterile fashion and then localized with 2-5 ml of 1% Lidocaine. Using ultrasound-guidance, a 20-gauge B-Carlos 4\" Ultraplex 360 non-stimulating echogenic needle was advanced in plane, from medial to lateral, until the tip of the needle was in the fascial plane between the IOM and DUFF. 1-3ml of preservative free normal saline was used to hydro-dissect the fascial planes. After the fascial plane was verified, the local anesthetic (LA) was injected. The procedure was repeated on the opposite side for bilateral coverage. Aspiration every 5 ml to prevent intravascular injection. Injection was completed with negative aspiration of blood and negative intravascular injection. Injection pressures were normal with minimal resistance. Midaxillary TAPs should reach intercostal nerves T10- T11 and the subcostal nerve T12.              "

## 2023-10-13 NOTE — CONSULTS
[facility] Medicine Services  CONSULT NOTE      Patient Name: Angelina Pimentel  : 1950  MRN: [id number]    Primary Care Physician: Leola Villa MD  Provider requesting consultation: Don Ramos MD    Subjective   Subjective     Reason for Consultation:  Medical management     HPI:  Angelina Pimentel is a 73 y.o. female with PMH of basal cell carcinoma, COVID-19, DM, HLD, HTN, hypothyroidism, MVP, RUPAL, cataract extraction. Patient was admitted by Dr Ramos she had a malignant cecal polyp. She had a right hemicolectomy on 10/13 and we were consulted for medical management.     Patient is sitting up in bed in NAD with family at bedside. She is awake and alert. She denies any pain or N/V.       Review of Systems   Constitutional:  Negative for fever.   Respiratory:  Negative for shortness of breath.    Cardiovascular:  Negative for chest pain.   Gastrointestinal:  Negative for abdominal pain.       All other systems reviewed and are negative.     Personal History     Past Medical History:   Diagnosis Date    Basal cell carcinoma of skin     Description: x 3 dx --face x3 dx --face    Benign colonic polyp     Description: precancerous dx  Precancerous Dx     Cataract     Removed    Choroidal nevus of left eye     COVID-19 virus infection 2023    Dermatochalasis of both upper eyelids     Diabetes mellitus 2010    type II; Metformin daily; checks blood sugars at home 90s-100s    Elevated cholesterol     Essential hypertriglyceridemia      Dx 1/10    Glaucoma suspect of both eyes     Heart murmur     History of Papanicolaou smear of cervix     Normal per pt    History of varicella     Hyperlipidemia      Dx 1/10    Hypertension     Hypothyroidism      Thyroid adenoma; thyroid meds daily     Measles     Meibomian gland dysfunction (MGD)     Mitral valve prolapse syndrome     Obstructive sleep apnea syndrome     WEARS CPAP, MIN PRESSURE 8 cmH2O, MAX  PRESSURE 16 cmH2O    Osteoarthritis      Mild    PCO (posterior capsular opacification), right     Screening for cardiovascular condition 12/11/2017    Cardiac CT Scan (Calcium Score 0)    Squamous cell carcinoma of skin     Description: dx 2004-right shoulder     Wears glasses        Past Surgical History:   Procedure Laterality Date    BLEPHAROPLASTY, QUAD Bilateral     BREAST BIOPSY Left 02/06/2018    Procedure: BREAST BIOPSY WITH NEEDLE LOCALIZATION LEFT ARCHITECTURAL DISTORTION;  Surgeon: Barbara Zaldivar MD;  Location: [facility];  Service:     BREAST EXCISIONAL BIOPSY Left 02/06/2018    Complex sclerosing lesion- no atypia or malignancy    CATARACT EXTRACTION Bilateral     Rt? date, Left 2/12    COLONOSCOPY      MAMMO FINE NEEDLE ASPIRATION UNILATERAL Left 11/2017    Proliferative fibrocystic change with sclerosing adenosis with dystrophic calcifications,    SKIN CANCER EXCISION      X3 BASAL CELL, X1 SQUAMOUS CELL    TONSILLECTOMY  1954    TOTAL ABDOMINAL HYSTERECTOMY WITH SALPINGO OOPHORECTOMY  1996       Family History:  family history includes Arthritis in her mother; Cancer in her mother; Colon polyps in her mother; Diabetes in her maternal grandmother and paternal grandmother; Glaucoma in her maternal grandmother; Heart attack in her father; Heart disease in her father and another family member; Hyperlipidemia in her mother; Hypertension in her mother and another family member; Prostate cancer in her father. Otherwise pertinent FHx was reviewed and unremarkable.     Social History:  reports that she has never smoked. She has been exposed to tobacco smoke. She has never used smokeless tobacco. She reports that she does not drink alcohol and does not use drugs.    Medications:  Cetirizine HCl, glucose blood, levothyroxine, lisinopril, metFORMIN, metroNIDAZOLE, multivitamin with minerals, nebivolol, neomycin, ondansetron, onetouch ultrasoft, and simvastatin    Scheduled Meds:[START ON 10/14/2023]  alvimopan, 12 mg, Oral, BID  cetirizine, 10 mg, Oral, Nightly  [START ON 10/14/2023] enoxaparin, 40 mg, Subcutaneous, Daily  insulin lispro, 2-7 Units, Subcutaneous, 4x Daily AC & at Bedtime  [START ON 10/14/2023] levothyroxine, 100 mcg, Oral, Q AM  lisinopril, 40 mg, Oral, Nightly  nebivolol, 5 mg, Oral, Nightly      Continuous Infusions:sodium chloride 0.45 % with KCl 20 mEq, 75 mL/hr, Last Rate: 75 mL/hr (10/13/23 1809)      PRN Meds:.  acetaminophen    dextrose    dextrose    glucagon (human recombinant)    HYDROmorphone **AND** naloxone    ondansetron    Allergies   Allergen Reactions    Cefdinir GI Intolerance      Abdominal pain    Penicillins Rash    Sulfa Antibiotics Rash       Objective   Objective     Vital Signs:   Temp:  [97.1 °F (36.2 °C)-98.4 °F (36.9 °C)] 98.2 °F (36.8 °C)  Heart Rate:  [54-71] 70  Resp:  [16] 16  BP: (141-180)/(67-76) 162/70  Flow (L/min):  [2] 2    Physical Exam  Constitutional: No acute distress, awake, alert  HENT: NCAT, mucous membranes moist  Respiratory: Clear to auscultation bilaterally, respiratory effort normal room air 92%  Cardiovascular: RRR, no murmurs, rubs, or gallops  Gastrointestinal: hypoactive bowel sounds, soft, generalized tender,ness non distended, vertical incision parish, small amt blood noted   Musculoskeletal: No bilateral ankle edema  Psychiatric: Appropriate affect, cooperative  Neurologic: Oriented x 3, strength symmetric in all extremities, Cranial Nerves grossly intact to confrontation, speech clear  Skin: No rashes         Result Review:  I have personally reviewed the results from the time of admission and agree with these findings:  [x]  Laboratory  [x]  Radiology  []  EKG/Telemetry   []  Pathology  []  Old records  []  Other:  Most notable findings include:     LAB RESULTS:                          Lab 10/13/23  1050   ABO TYPING A   RH TYPING Negative   ANTIBODY SCREEN Negative         Brief Urine Lab Results  (Last result in the past 365 days)         Color   Clarity   Blood   Leuk Est   Nitrite   Protein   CREAT   Urine HCG        06/26/23 1013             100               Microbiology Results (last 10 days)       ** No results found for the last 240 hours. **            Peripheral Block    Result Date: 10/13/2023  Lisy Diaz CRNA     10/13/2023  1:26 PM Peripheral Block Patient reassessed immediately prior to procedure Patient location during procedure: OR Start time: 10/13/2023 1:12 PM Stop time: 10/13/2023 1:16 PM Reason for block: at surgeon's request and post-op pain management Performed by Anesthesiologist: Zane Valerio MD Preanesthetic Checklist Completed: patient identified, IV checked, site marked, risks and benefits discussed, surgical consent, monitors and equipment checked, pre-op evaluation and timeout performed Prep: Pt Position: supine Sterile barriers:cap, gloves, mask and washed/disinfected hands Prep: ChloraPrep Patient monitoring: blood pressure monitoring, continuous pulse oximetry and EKG Procedure Sedation: yes Performed under: general Guidance:ultrasound guided Images:still images obtained, printed/placed on chart Laterality:Bilateral Block Type:TAP Injection Technique:single-shot Needle Type:short-bevel and echogenic Needle Gauge:20 G Resistance on Injection: none Medications Used: dexamethasone sodium phosphate injection - Injection  4 mg - 10/13/2023 1:12:00 PM bupivacaine PF (MARCAINE) 0.25 % injection - Injection  60 mL - 10/13/2023 1:12:00 PM Medications Comment:Block Injection:  LA dose divided between Right and Left block Post Assessment Injection Assessment: negative aspiration for heme, incremental injection and no paresthesia on injection Patient Tolerance:comfortable throughout block Complications:no Additional Notes Subcostal TAPs A high-frequency linear transducer, with sterile cover, was placed sub-xiphoid to identify Linea Alba, right and left Rectus Abdominus Muscles (ROGELIO). The transducer was moved  "either right or left subcostally to identify the ROGELIO and the Transverse Abdominus Muscle (DUFF). The insertion site was prepped in sterile fashion and then localized with 2-5 ml of 1% Lidocaine. Using ultrasound-guidance, a 20-gauge B-Carlos 4\" Ultraplex 360 non-stimulating echogenic needle was advanced in plane, from medial to lateral, until the tip of the needle was in the fascial plane between the ROGELIO and DUFF. 1-3ml of preservative free normal saline was used to hydro-dissect the fascial planes. After the fascial plane was verified, the local anesthetic (LA) was injected. The procedure was repeated on the opposite side for bilateral coverage. Aspiration every 5 ml to prevent intravascular injection. Injection was completed with negative aspiration of blood and negative intravascular injection. Injection pressures were normal with minimal resistance. The subcostal approach to the TAP nerve block ideally anesthetizes the intercostal nerves T6-T9. Mid-Axillary/Lateral TAPs A high-frequency linear transducer, with sterile cover, was placed in the midaxillary line between the ASIS and costal margin. The External Oblique Muscle (EOM), Internal Oblique Muscle (IOM), Transverse Abdominus Muscle (DUFF), and Peritoneum were identified. The insertion site was prepped in sterile fashion and then localized with 2-5 ml of 1% Lidocaine. Using ultrasound-guidance, a 20-gauge B-Carlos 4\" Ultraplex 360 non-stimulating echogenic needle was advanced in plane, from medial to lateral, until the tip of the needle was in the fascial plane between the IOM and DUFF. 1-3ml of preservative free normal saline was used to hydro-dissect the fascial planes. After the fascial plane was verified, the local anesthetic (LA) was injected. The procedure was repeated on the opposite side for bilateral coverage. Aspiration every 5 ml to prevent intravascular injection. Injection was completed with negative aspiration of blood and negative intravascular " injection. Injection pressures were normal with minimal resistance. Midaxillary TAPs should reach intercostal nerves T10- T11 and the subcostal nerve T12.       Results for orders placed in visit on 10/06/23    Adult Transthoracic Echo Complete W/ Cont if Necessary Per Protocol    Interpretation Summary    Left ventricular ejection fraction appears to be 61 - 65%.    Left ventricular wall thickness is consistent with borderline concentric hypertrophy.    Trace mitral valve regurgitation is present.    Mild tricuspid valve regurgitation is present. Estimated right ventricular systolic pressure from tricuspid regurgitation is normal (<35 mmHg).      Assessment & Plan   Assessment & Plan     Active Hospital Problems    Diagnosis  POA    **Colon cancer [C18.9]  Yes    Colon neoplasm, Tis [D01.0]  Unknown    Status post colon resection [Z90.49]  Not Applicable    Obstructive sleep apnea syndrome [G47.33]  Yes    Diabetes mellitus [E11.9]  Yes    Hypothyroidism [E03.9]  Yes    Hypertension [I10]  Yes    Essential hypertriglyceridemia [E78.1]  Yes      Resolved Hospital Problems   No resolved problems to display.     Angelina Pimentel is a 73 y.o. female with PMH of basal cell carcinoma, COVID-19, DM, HLD, HTN, hypothyroidism, MVP, RUPAL, cataract extraction.  Patient underwent routine colonoscopy in August this year and was found to have questionable malignant polyp and was eventually admitted by Dr Ramos she had a malignant cecal polyp. She had a right hemicolectomy on 10/13 and we were consulted for medical management.       Malignant Cecal polyp  -- right hemicolectomy by Dr Ramos on 10/13  -- labs in am   -- diet per surgeon  - pain control  -- ambulate    DM A1c 6.3%  -- hold home meds  -- LDSSI      Hypothyroidism  -- cont home meds     HTN  HLD  CAD  -- cont lisinopril and bystolic       Thank you for allowing [facility] [place], operated by [facility] to provide consultative care for your patient, we will continue to follow  while clinically appropriate.    Irma Zavala, APRN  10/13/23

## 2023-10-13 NOTE — ANESTHESIA PROCEDURE NOTES
Airway  Urgency: elective    Date/Time: 10/13/2023 1:09 PM  Difficult airway    General Information and Staff    Patient location during procedure: OR  Anesthesiologist: Zane Valerio MD  CRNA/CAA: Lisy Diaz CRNA    Indications and Patient Condition  Indications for airway management: airway protection    Preoxygenated: yes  MILS not maintained throughout  Mask difficulty assessment: 2 - vent by mask + OA or adjuvant +/- NMBA    Final Airway Details  Final airway type: endotracheal airway      Successful airway: ETT  Cuffed: yes   Successful intubation technique: video laryngoscopy  Facilitating devices/methods: Bougie and cricoid pressure  Endotracheal tube insertion site: oral  Blade: Gao  Blade size: 3  ETT size (mm): 7.0  Cormack-Lehane Classification: grade IIa - partial view of glottis  Placement verified by: chest auscultation and capnometry   Measured from: lips  ETT/EBT  to lips (cm): 22  Number of attempts at approach: 2  Assessment: lips, teeth, and gum same as pre-op and atraumatic intubation    Additional Comments  Negative epigastric sounds, Breath sound equal bilaterally with symmetric chest rise and fall

## 2023-10-14 LAB
ANION GAP SERPL CALCULATED.3IONS-SCNC: 9 MMOL/L (ref 5–15)
BASOPHILS # BLD AUTO: 0.02 10*3/MM3 (ref 0–0.2)
BASOPHILS NFR BLD AUTO: 0.2 % (ref 0–1.5)
BUN SERPL-MCNC: 13 MG/DL (ref 8–23)
BUN/CREAT SERPL: 17.3 (ref 7–25)
CALCIUM SPEC-SCNC: 8.1 MG/DL (ref 8.6–10.5)
CHLORIDE SERPL-SCNC: 105 MMOL/L (ref 98–107)
CO2 SERPL-SCNC: 22 MMOL/L (ref 22–29)
CREAT SERPL-MCNC: 0.75 MG/DL (ref 0.57–1)
DEPRECATED RDW RBC AUTO: 46.1 FL (ref 37–54)
EGFRCR SERPLBLD CKD-EPI 2021: 83.7 ML/MIN/1.73
EOSINOPHIL # BLD AUTO: 0 10*3/MM3 (ref 0–0.4)
EOSINOPHIL NFR BLD AUTO: 0 % (ref 0.3–6.2)
ERYTHROCYTE [DISTWIDTH] IN BLOOD BY AUTOMATED COUNT: 13 % (ref 12.3–15.4)
GLUCOSE BLDC GLUCOMTR-MCNC: 104 MG/DL (ref 70–130)
GLUCOSE BLDC GLUCOMTR-MCNC: 110 MG/DL (ref 70–130)
GLUCOSE BLDC GLUCOMTR-MCNC: 117 MG/DL (ref 70–130)
GLUCOSE BLDC GLUCOMTR-MCNC: 99 MG/DL (ref 70–130)
GLUCOSE SERPL-MCNC: 150 MG/DL (ref 65–99)
HCT VFR BLD AUTO: 37.1 % (ref 34–46.6)
HGB BLD-MCNC: 12.2 G/DL (ref 12–15.9)
IMM GRANULOCYTES # BLD AUTO: 0.05 10*3/MM3 (ref 0–0.05)
IMM GRANULOCYTES NFR BLD AUTO: 0.4 % (ref 0–0.5)
LYMPHOCYTES # BLD AUTO: 1.17 10*3/MM3 (ref 0.7–3.1)
LYMPHOCYTES NFR BLD AUTO: 8.8 % (ref 19.6–45.3)
MCH RBC QN AUTO: 31.5 PG (ref 26.6–33)
MCHC RBC AUTO-ENTMCNC: 32.9 G/DL (ref 31.5–35.7)
MCV RBC AUTO: 95.9 FL (ref 79–97)
MONOCYTES # BLD AUTO: 0.86 10*3/MM3 (ref 0.1–0.9)
MONOCYTES NFR BLD AUTO: 6.5 % (ref 5–12)
NEUTROPHILS NFR BLD AUTO: 11.18 10*3/MM3 (ref 1.7–7)
NEUTROPHILS NFR BLD AUTO: 84.1 % (ref 42.7–76)
NRBC BLD AUTO-RTO: 0 /100 WBC (ref 0–0.2)
PLATELET # BLD AUTO: 235 10*3/MM3 (ref 140–450)
PMV BLD AUTO: 10 FL (ref 6–12)
POTASSIUM SERPL-SCNC: 3.8 MMOL/L (ref 3.5–5.2)
RBC # BLD AUTO: 3.87 10*6/MM3 (ref 3.77–5.28)
SODIUM SERPL-SCNC: 136 MMOL/L (ref 136–145)
WBC NRBC COR # BLD: 13.28 10*3/MM3 (ref 3.4–10.8)

## 2023-10-14 PROCEDURE — 82948 REAGENT STRIP/BLOOD GLUCOSE: CPT

## 2023-10-14 PROCEDURE — 25010000002 POTASSIUM CHLORIDE PER 2 MEQ: Performed by: COLON & RECTAL SURGERY

## 2023-10-14 PROCEDURE — 25010000002 HYDROMORPHONE PER 4 MG: Performed by: COLON & RECTAL SURGERY

## 2023-10-14 PROCEDURE — 85025 COMPLETE CBC W/AUTO DIFF WBC: CPT | Performed by: COLON & RECTAL SURGERY

## 2023-10-14 PROCEDURE — 25010000002 ENOXAPARIN PER 10 MG: Performed by: COLON & RECTAL SURGERY

## 2023-10-14 PROCEDURE — 80048 BASIC METABOLIC PNL TOTAL CA: CPT | Performed by: COLON & RECTAL SURGERY

## 2023-10-14 RX ORDER — HYDRALAZINE HYDROCHLORIDE 20 MG/ML
10 INJECTION INTRAMUSCULAR; INTRAVENOUS EVERY 6 HOURS PRN
Status: DISCONTINUED | OUTPATIENT
Start: 2023-10-14 | End: 2023-10-15 | Stop reason: HOSPADM

## 2023-10-14 RX ADMIN — POTASSIUM CHLORIDE AND SODIUM CHLORIDE 75 ML/HR: 450; 150 INJECTION, SOLUTION INTRAVENOUS at 03:52

## 2023-10-14 RX ADMIN — CETIRIZINE HYDROCHLORIDE 10 MG: 10 TABLET, FILM COATED ORAL at 20:59

## 2023-10-14 RX ADMIN — LISINOPRIL 40 MG: 40 TABLET ORAL at 20:59

## 2023-10-14 RX ADMIN — NEBIVOLOL 5 MG: 5 TABLET ORAL at 21:00

## 2023-10-14 RX ADMIN — ENOXAPARIN SODIUM 40 MG: 100 INJECTION SUBCUTANEOUS at 09:26

## 2023-10-14 RX ADMIN — LEVOTHYROXINE SODIUM 100 MCG: 0.1 TABLET ORAL at 06:11

## 2023-10-14 RX ADMIN — HYDROMORPHONE HYDROCHLORIDE 0.5 MG: 1 INJECTION, SOLUTION INTRAMUSCULAR; INTRAVENOUS; SUBCUTANEOUS at 18:46

## 2023-10-14 RX ADMIN — HYDROMORPHONE HYDROCHLORIDE 0.5 MG: 1 INJECTION, SOLUTION INTRAMUSCULAR; INTRAVENOUS; SUBCUTANEOUS at 03:49

## 2023-10-14 RX ADMIN — OXYCODONE HYDROCHLORIDE AND ACETAMINOPHEN 1 TABLET: 5; 325 TABLET ORAL at 20:59

## 2023-10-14 RX ADMIN — OXYCODONE HYDROCHLORIDE AND ACETAMINOPHEN 1 TABLET: 5; 325 TABLET ORAL at 09:24

## 2023-10-14 RX ADMIN — OXYCODONE HYDROCHLORIDE AND ACETAMINOPHEN 1 TABLET: 5; 325 TABLET ORAL at 14:54

## 2023-10-14 RX ADMIN — HYDROMORPHONE HYDROCHLORIDE 0.5 MG: 1 INJECTION, SOLUTION INTRAMUSCULAR; INTRAVENOUS; SUBCUTANEOUS at 06:41

## 2023-10-14 RX ADMIN — ALVIMOPAN 12 MG: 12 CAPSULE ORAL at 20:59

## 2023-10-14 RX ADMIN — ALVIMOPAN 12 MG: 12 CAPSULE ORAL at 09:24

## 2023-10-14 NOTE — PROGRESS NOTES
"Angelina Pimentel     LOS: 1 day     Subjective     Patient feels well.  Has been up and ambulating.  Tolerating diet.      Objective     Vital Signs  Vitals:    10/14/23 1155   BP: 127/61   Pulse: 63   Resp: 17   Temp: 98.5 °F (36.9 °C)   SpO2: 94%       I/O  Intake & Output (last day)         10/13 0701  10/14 0700 10/14 0701  10/15 0700    I.V. (mL/kg) 1700 (16.5)     Total Intake(mL/kg) 1700 (16.5)     Urine (mL/kg/hr) 1110 175 (0.2)    Total Output 1110 175    Net +590 -175                  Physical Exam:    Abdomen soft and nontender.  Wound healing well.         Results Review:       WBC WBC   Date Value Ref Range Status   10/14/2023 13.28 (H) 3.40 - 10.80 10*3/mm3 Final      HGB Hemoglobin   Date Value Ref Range Status   10/14/2023 12.2 12.0 - 15.9 g/dL Final   10/13/2023 13.6 12.0 - 15.9 g/dL Final      HCT Hematocrit   Date Value Ref Range Status   10/14/2023 37.1 34.0 - 46.6 % Final   10/13/2023 41.4 34.0 - 46.6 % Final      PLT        Results from last 7 days   Lab Units 10/14/23  0339   PLATELETS 10*3/mm3 235            Sodium Sodium   Date Value Ref Range Status   10/14/2023 136 136 - 145 mmol/L Final      Potassium Potassium   Date Value Ref Range Status   10/14/2023 3.8 3.5 - 5.2 mmol/L Final      Chloride Chloride   Date Value Ref Range Status   10/14/2023 105 98 - 107 mmol/L Final      Bicarbonate No results found for: \"PLASMABICARB\"   BUN BUN   Date Value Ref Range Status   10/14/2023 13 8 - 23 mg/dL Final      Creatinine Creatinine   Date Value Ref Range Status   10/14/2023 0.75 0.57 - 1.00 mg/dL Final      Calcium Calcium   Date Value Ref Range Status   10/14/2023 8.1 (L) 8.6 - 10.5 mg/dL Final      Magnesium No results found for: \"MG\"       Assessment & Plan       Colon cancer    Diabetes mellitus    Hypothyroidism    Hypertension    Essential hypertriglyceridemia    Obstructive sleep apnea syndrome    Colon neoplasm, Tis    Status post colon resection      Patient with good postoperative day " 1 status post laparoscopic right hemicolectomy for cancer.  We will remove Wright catheter today.  Patient to continue advancing diet.  Awaiting bowel function.      Don Ramos MD  10/14/23  15:14 EDT

## 2023-10-15 ENCOUNTER — READMISSION MANAGEMENT (OUTPATIENT)
Dept: CALL CENTER | Facility: HOSPITAL | Age: 73
End: 2023-10-15
Payer: MEDICARE

## 2023-10-15 VITALS
SYSTOLIC BLOOD PRESSURE: 159 MMHG | HEART RATE: 68 BPM | TEMPERATURE: 98 F | OXYGEN SATURATION: 93 % | DIASTOLIC BLOOD PRESSURE: 69 MMHG | RESPIRATION RATE: 18 BRPM | HEIGHT: 67 IN | BODY MASS INDEX: 35.79 KG/M2 | WEIGHT: 228 LBS

## 2023-10-15 LAB
GLUCOSE BLDC GLUCOMTR-MCNC: 125 MG/DL (ref 70–130)
GLUCOSE BLDC GLUCOMTR-MCNC: 78 MG/DL (ref 70–130)

## 2023-10-15 PROCEDURE — 25010000002 HYDROMORPHONE PER 4 MG: Performed by: COLON & RECTAL SURGERY

## 2023-10-15 PROCEDURE — 82948 REAGENT STRIP/BLOOD GLUCOSE: CPT

## 2023-10-15 PROCEDURE — 97161 PT EVAL LOW COMPLEX 20 MIN: CPT

## 2023-10-15 PROCEDURE — 25010000002 ENOXAPARIN PER 10 MG: Performed by: COLON & RECTAL SURGERY

## 2023-10-15 RX ORDER — HYDROCODONE BITARTRATE AND ACETAMINOPHEN 7.5; 325 MG/1; MG/1
1 TABLET ORAL EVERY 6 HOURS PRN
Qty: 10 TABLET | Refills: 0 | Status: SHIPPED | OUTPATIENT
Start: 2023-10-15

## 2023-10-15 RX ADMIN — ALVIMOPAN 12 MG: 12 CAPSULE ORAL at 07:25

## 2023-10-15 RX ADMIN — OXYCODONE HYDROCHLORIDE AND ACETAMINOPHEN 1 TABLET: 5; 325 TABLET ORAL at 07:25

## 2023-10-15 RX ADMIN — LEVOTHYROXINE SODIUM 100 MCG: 0.1 TABLET ORAL at 05:39

## 2023-10-15 RX ADMIN — HYDROMORPHONE HYDROCHLORIDE 0.5 MG: 1 INJECTION, SOLUTION INTRAMUSCULAR; INTRAVENOUS; SUBCUTANEOUS at 02:35

## 2023-10-15 RX ADMIN — ENOXAPARIN SODIUM 40 MG: 100 INJECTION SUBCUTANEOUS at 07:24

## 2023-10-15 NOTE — THERAPY EVALUATION
Patient Name: Angelina Pimentel  : 1950    MRN: [id number]                              Today's Date: 10/15/2023       Admit Date: 10/13/2023    Visit Dx:     ICD-10-CM ICD-9-CM   1. Status post colon resection  Z90.49 V45.89   2. Colon polyps  K63.5 211.3     Patient Active Problem List   Diagnosis    Diabetes mellitus    Benign colonic polyp    Hypothyroidism    Osteoarthritis    Hypertension    Allergic rhinitis    Basal cell carcinoma of skin    Essential hypertriglyceridemia    Mitral valve prolapse syndrome    Squamous cell carcinoma of skin    Obstructive sleep apnea syndrome    Menopause    Hypertensive heart disease without heart failure    Dyslipidemia    Choroidal nevus of left eye    Dermatochalasis of both upper eyelids    Glaucoma suspect of both eyes    PCO (posterior capsular opacification), right    Meibomian gland dysfunction (MGD)    LBBB (left bundle branch block)    Colon cancer    Colon neoplasm, Tis    Status post colon resection     Past Medical History:   Diagnosis Date    Basal cell carcinoma of skin     Description: x 3 dx --face x3 dx --face    Benign colonic polyp     Description: precancerous dx  Precancerous Dx     Cataract     Removed    Choroidal nevus of left eye     COVID-19 virus infection 2023    Dermatochalasis of both upper eyelids     Diabetes mellitus 2010    type II; Metformin daily; checks blood sugars at home 90s-100s    Elevated cholesterol     Essential hypertriglyceridemia      Dx 1/10    Glaucoma suspect of both eyes     Heart murmur     History of Papanicolaou smear of cervix     Normal per pt    History of varicella     Hyperlipidemia      Dx 1/10    Hypertension     Hypothyroidism      Thyroid adenoma; thyroid meds daily     Measles     Meibomian gland dysfunction (MGD)     Mitral valve prolapse syndrome     Obstructive sleep apnea syndrome     WEARS CPAP, MIN PRESSURE 8 cmH2O, MAX PRESSURE 16 cmH2O    Osteoarthritis       Mild    PCO (posterior capsular opacification), right     Screening for cardiovascular condition 12/11/2017    Cardiac CT Scan (Calcium Score 0)    Squamous cell carcinoma of skin     Description: dx 2004-right shoulder     Wears glasses      Past Surgical History:   Procedure Laterality Date    BLEPHAROPLASTY, QUAD Bilateral     BREAST BIOPSY Left 02/06/2018    Procedure: BREAST BIOPSY WITH NEEDLE LOCALIZATION LEFT ARCHITECTURAL DISTORTION;  Surgeon: Barbara Zaldivar MD;  Location: [facility] OR;  Service:     BREAST EXCISIONAL BIOPSY Left 02/06/2018    Complex sclerosing lesion- no atypia or malignancy    CATARACT EXTRACTION Bilateral     Rt? date, Left 2/12    COLONOSCOPY      MAMMO FINE NEEDLE ASPIRATION UNILATERAL Left 11/2017    Proliferative fibrocystic change with sclerosing adenosis with dystrophic calcifications,    SKIN CANCER EXCISION      X3 BASAL CELL, X1 SQUAMOUS CELL    TONSILLECTOMY  1954    TOTAL ABDOMINAL HYSTERECTOMY WITH SALPINGO OOPHORECTOMY  1996      General Information       Row Name 10/15/23 1056          Physical Therapy Time and Intention    Document Type evaluation  -KW     Mode of Treatment individual therapy;physical therapy  -KW       Row Name 10/15/23 1056          General Information    Patient Profile Reviewed yes  -KW     Prior Level of Function independent:;all household mobility;community mobility;gait;transfer;bed mobility  -KW     Existing Precautions/Restrictions fall  -KW     Barriers to Rehab medically complex  -KW       Row Name 10/15/23 1056          Living Environment    People in Home spouse  -KW       Row Name 10/15/23 1056          Home Main Entrance    Number of Stairs, Main Entrance none  ramp  -KW       Row Name 10/15/23 1056          Cognition    Orientation Status (Cognition) oriented x 3  -KW       Row Name 10/15/23 1056          Safety Issues, Functional Mobility    Impairments Affecting Function (Mobility) balance;endurance/activity tolerance;pain;shortness of  breath;strength  -KW               User Key  (r) = Recorded By, (t) = Taken By, (c) = Cosigned By      Initials Name Provider Type    Zahira Tucker PT Physical Therapist                   Mobility       Row Name 10/15/23 1056          Sit-Stand Transfer    Sit-Stand [place] (Transfers) supervision  -KW       Row Name 10/15/23 1056          Gait/Stairs (Locomotion)    [place] Level (Gait) supervision  -KW     Assistive Device (Gait) walker, front-wheeled  -KW     Distance in Feet (Gait) 120  -KW     Deviations/Abnormal Patterns (Gait) gait speed decreased;junito decreased;base of support, narrow;stride length decreased  -KW     Bilateral Gait Deviations forward flexed posture;heel strike decreased  -KW               User Key  (r) = Recorded By, (t) = Taken By, (c) = Cosigned By      Initials Name Provider Type    Zahira Tucker PT Physical Therapist                   Obj/Interventions       Row Name 10/15/23 0948          Strength Comprehensive (MMT)    General Manual Muscle Testing (MMT) Assessment lower extremity strength deficits identified  -KW       Row Name 10/15/23 0948          Balance    Balance Assessment sitting static balance;sitting dynamic balance;sit to stand dynamic balance;standing static balance;standing dynamic balance  -KW     Static Sitting Balance independent  -KW     Dynamic Sitting Balance independent  -KW     Position, Sitting Balance unsupported;sitting in chair  -KW     Sit to Stand Dynamic Balance supervision  -KW     Static Standing Balance supervision  -KW     Dynamic Standing Balance supervision  -KW     Position/Device Used, Standing Balance supported;walker, front-wheeled  -KW     Balance Interventions sitting;standing;sit to stand;supported  -KW               User Key  (r) = Recorded By, (t) = Taken By, (c) = Cosigned By      Initials Name Provider Type    Zahira Tucker PT Physical Therapist                   Goals/Plan       Row Name  10/15/23 0948          Bed Mobility Goal 1 (PT)    Activity/Assistive Device (Bed Mobility Goal 1, PT) sit to supine;supine to sit  -KW     [place] Level/Cues Needed (Bed Mobility Goal 1, PT) independent  -KW     Time Frame (Bed Mobility Goal 1, PT) 10 days  -KW       Row Name 10/15/23 0948          Transfer Goal 1 (PT)    Activity/Assistive Device (Transfer Goal 1, PT) sit-to-stand/stand-to-sit;bed-to-chair/chair-to-bed  -KW     [place] Level/Cues Needed (Transfer Goal 1, PT) modified independence  -KW     Time Frame (Transfer Goal 1, PT) 10 days  -KW       Row Name 10/15/23 0948          Gait Training Goal 1 (PT)    Activity/Assistive Device (Gait Training Goal 1, PT) assistive device use;decrease fall risk;gait (walking locomotion);diminish gait deviation;improve balance and speed;increase endurance/gait distance;walker, rolling  -KW     [place] Level (Gait Training Goal 1, PT) modified independence  -KW     Distance (Gait Training Goal 1, PT) 300  -KW     Time Frame (Gait Training Goal 1, PT) 10 days  -KW               User Key  (r) = Recorded By, (t) = Taken By, (c) = Cosigned By      Initials Name Provider Type    Zahira Tucker, PT Physical Therapist                   Clinical Impression       Row Name 10/15/23 0948          Pain    Pretreatment Pain Rating 0/10 - no pain  -KW       Row Name 10/15/23 0948          Plan of Care Review    Plan of Care Reviewed With patient  -KW     Progress no change  -KW     Outcome Evaluation PT eval completed. Patient presents below baseline for functional mobility. Patient demonstrates decreased activity tolerance, deconditioning and reduced dynamic balance. Patient would continue to benefit from skilled PT services to improve dynamic balance with gait, transfers strength, and activity tolerance training in order to build endurance and reduce risk of falls.  -KW       Row Name 10/15/23 0948          Therapy Assessment/Plan (PT)    Rehab Potential  (PT) good, to achieve stated therapy goals  -KW     Criteria for Skilled Interventions Met (PT) yes;skilled treatment is necessary  -KW     Therapy Frequency (PT) daily  -KW       Row Name 10/15/23 0948          Vital Signs    Pre Systolic BP Rehab 151  -KW     Pre Treatment Diastolic BP 67  -KW     Pretreatment Heart Rate (beats/min) 69  -KW     Pre SpO2 (%) 94  -KW       Row Name 10/15/23 0948          Positioning and Restraints    Pre-Treatment Position sitting in chair/recliner  -KW     Post Treatment Position chair  -KW     In Chair reclined;call light within reach;encouraged to call for assist;exit alarm on;legs elevated  -KW               User Key  (r) = Recorded By, (t) = Taken By, (c) = Cosigned By      Initials Name Provider Type    Zahira Tucker PT Physical Therapist                   Outcome Measures       Row Name 10/15/23 1056 10/15/23 0815       How much help from another person do you currently need...    Turning from your back to your side while in flat bed without using bedrails? 4  -KW 4  -KF    Moving from lying on back to sitting on the side of a flat bed without bedrails? 4  -KW 4  -KF    Moving to and from a bed to a chair (including a wheelchair)? 3  -KW 4  -KF    Standing up from a chair using your arms (e.g., wheelchair, bedside chair)? 3  -KW 4  -KF    Climbing 3-5 steps with a railing? 3  -KW 3  -KF    To walk in hospital room? 3  -KW 3  -KF    AM-PAC 6 Clicks Score (PT) 20  -KW 22  -KF    Highest level of mobility 6 --> Walked 10 steps or more  -KW 7 --> Walked 25 feet or more  -KF      Row Name 10/15/23 1056          Functional Assessment    Outcome Measure Options AM-PAC 6 Clicks Basic Mobility (PT)  -KW               User Key  (r) = Recorded By, (t) = Taken By, (c) = Cosigned By      Initials Name Provider Type    Maximiliano Salcedo RN Registered Nurse    Zahira Tucker PT Physical Therapist                                   PT Recommendation and Plan     Plan  of Care Reviewed With: patient  Progress: no change  Outcome Evaluation: PT eval completed. Patient presents below baseline for functional mobility. Patient demonstrates decreased activity tolerance, deconditioning and reduced dynamic balance. Patient would continue to benefit from skilled PT services to improve dynamic balance with gait, transfers strength, and activity tolerance training in order to build endurance and reduce risk of falls.     Time Calculation:   PT Evaluation Complexity  History, PT Evaluation Complexity: 3 or more personal factors and/or comorbidities  Examination of Body Systems (PT Eval Complexity): total of 3 or more elements  Clinical Presentation (PT Evaluation Complexity): stable  Clinical Decision Making (PT Evaluation Complexity): low complexity  Overall Complexity (PT Evaluation Complexity): low complexity     PT Charges       Row Name 10/15/23 1056             Time Calculation    Start Time 1056  -KW      PT Received On 10/15/23  -KW      PT Goal Re-Cert Due Date 10/25/23  -KW         Untimed Charges    PT Eval/Re-eval Minutes 45  -KW         Total Minutes    Untimed Charges Total Minutes 45  -KW       Total Minutes 45  -KW                User Key  (r) = Recorded By, (t) = Taken By, (c) = Cosigned By      Initials Name Provider Type    Zahira Tucker PT Physical Therapist                  Therapy Charges for Today       Code Description Service Date Service Provider Modifiers Qty    64297939824 HC PT EVAL LOW COMPLEXITY 4 10/15/2023 Zahira Kennedy PT GP 1            PT G-Codes  Outcome Measure Options: AM-PAC 6 Clicks Basic Mobility (PT)  AM-PAC 6 Clicks Score (PT): 20  PT Discharge Summary  Anticipated Discharge Disposition (PT): home with home health    Zahira Kennedy PT  10/15/2023

## 2023-10-15 NOTE — OUTREACH NOTE
Prep Survey      Flowsheet Row Responses   [facility] patient discharged from? [place]   Is LACE score < 7 ? No   Eligibility [facility]   Date of Admission 10/13/23   Date of Discharge 10/15/23   Discharge Disposition Home or Self Care   Discharge diagnosis Colon cancer colon resection   Does the patient have one of the following disease processes/diagnoses(primary or secondary)? General Surgery   Does the patient have Home health ordered? No   Is there a DME ordered? No   Prep survey completed? Yes            RENETTA ORANTES - Registered Nurse

## 2023-10-15 NOTE — PROGRESS NOTES
"Angelina Garcia Margarito     LOS: 2 days     Subjective   Patient feels well.  Tolerating regular diet.  Has moved her bowels.  Up and ambulating.  Heart rate 73 with blood pressure 151/67 and temperature 98.6.  Passing urine without Wright catheter.      Objective     Vital Signs  Vitals:    10/15/23 0727   BP: 151/67   Pulse: 73   Resp: 16   Temp: 98.6 °F (37 °C)   SpO2: 93%       I/O  Intake & Output (last day)         10/14 0701  10/15 0700 10/15 0701  10/16 0700    I.V. (mL/kg)      Total Intake(mL/kg)      Urine (mL/kg/hr) 300 (0.1)     Total Output 300     Net -300           Urine Unmeasured Occurrence 4 x 2 x            Physical Exam:    Abdomen soft and very minimally tender.  Wound healing well.         Results Review:       WBC WBC   Date Value Ref Range Status   10/14/2023 13.28 (H) 3.40 - 10.80 10*3/mm3 Final      HGB Hemoglobin   Date Value Ref Range Status   10/14/2023 12.2 12.0 - 15.9 g/dL Final   10/13/2023 13.6 12.0 - 15.9 g/dL Final      HCT Hematocrit   Date Value Ref Range Status   10/14/2023 37.1 34.0 - 46.6 % Final   10/13/2023 41.4 34.0 - 46.6 % Final      PLT        Results from last 7 days   Lab Units 10/14/23  0339   PLATELETS 10*3/mm3 235            Sodium Sodium   Date Value Ref Range Status   10/14/2023 136 136 - 145 mmol/L Final      Potassium Potassium   Date Value Ref Range Status   10/14/2023 3.8 3.5 - 5.2 mmol/L Final      Chloride Chloride   Date Value Ref Range Status   10/14/2023 105 98 - 107 mmol/L Final      Bicarbonate No results found for: \"PLASMABICARB\"   BUN BUN   Date Value Ref Range Status   10/14/2023 13 8 - 23 mg/dL Final      Creatinine Creatinine   Date Value Ref Range Status   10/14/2023 0.75 0.57 - 1.00 mg/dL Final      Calcium Calcium   Date Value Ref Range Status   10/14/2023 8.1 (L) 8.6 - 10.5 mg/dL Final      Magnesium No results found for: \"MG\"       Assessment & Plan       Colon cancer    Diabetes mellitus    Hypothyroidism    Hypertension    Essential " hypertriglyceridemia    Obstructive sleep apnea syndrome    Colon neoplasm, Tis    Status post colon resection      Patient postop day 2 status post laparoscopic right hemicolectomy.  She is making good progress.  She is up and ambulating, tolerating regular diet and moving her bowels.  She is okay to go home from my standpoint.  We will call in prescription for Norco 5 with instructions not to drive if taking medication.  She is to follow-up with me in 3 weeks.  She is to call for pain, fever, bleeding, nausea or vomiting.  She may continue her regular diet.      Don Ramos MD  10/15/23  11:33 EDT

## 2023-10-15 NOTE — DISCHARGE SUMMARY
[facility] Medicine Services  DISCHARGE SUMMARY    Patient Name: Angelina Pimentel  : 1950  MRN: [id number]    Date of Admission: 10/13/2023  9:58 AM  Date of Discharge:  10/15/23  Primary Care Physician: Leola Villa MD    Consults       Date and Time Order Name Status Description    10/13/2023  4:26 PM Inpatient Hospitalist Consult              Hospital Course     Presenting Problem: Abdomina pain.    Active Hospital Problems    Diagnosis  POA    **Colon cancer [C18.9]  Yes    Colon neoplasm, Tis [D01.0]  Unknown    Status post colon resection [Z90.49]  Not Applicable    Obstructive sleep apnea syndrome [G47.33]  Yes    Diabetes mellitus [E11.9]  Yes    Hypothyroidism [E03.9]  Yes    Hypertension [I10]  Yes    Essential hypertriglyceridemia [E78.1]  Yes      Resolved Hospital Problems   No resolved problems to display.          Hospital Course:  Angelina Pimentel is a 73 y.o. female with PMH of basal cell carcinoma, COVID-19, DM, HLD, HTN, hypothyroidism, MVP, RUPAL, cataract extraction.  Patient underwent routine colonoscopy in August this year and was found to have questionable malignant polyp and was eventually admitted by Dr Ramos she had a malignant cecal polyp. She had a right hemicolectomy on 10/13 and we were consulted for medical management.         Malignant Cecal polyp  -- Right hemicolectomy by Dr Ramos on 10/13  -- Tolerated well. Has BM. Tolerated diet well.  -- Ok to discharge per surgeon. Ok for regular diet.  -- Follow up with  in 3 weeks.      Discharge Follow Up Recommendations for outpatient labs/diagnostics:  Follow up with pcp in 1 week.  Follow up with Dr. Ramos in 3 weeks.     Day of Discharge     HPI:   Seen and examined today.    Review of Systems  No fever. Chills. Cp or SOB.    Vital Signs:   Temp:  [98 °F (36.7 °C)-98.6 °F (37 °C)] 98 °F (36.7 °C)  Heart Rate:  [63-73] 68  Resp:  [16-18] 18  BP: (142-164)/(60-81) 159/69      Physical  Exam:  Constitutional: No acute distress, awake, alert  HENT: NCAT, mucous membranes moist  Respiratory: Clear to auscultation bilaterally, respiratory effort normal room air 92%  Gastrointestinal: hypoactive bowel sounds, soft, generalized tender,ness non distended, vertical incision parish, small amt blood noted   Musculoskeletal: No bilateral ankle edema  Psychiatric: Appropriate affect, cooperative  Neurologic: Oriented x 3, strength symmetric in all extremities, Cranial Nerves grossly intact to confrontation, speech clear  Skin: No rashes    Pertinent  and/or Most Recent Results     LAB RESULTS:      Lab 10/14/23  0339 10/13/23  1801   WBC 13.28*  --    HEMOGLOBIN 12.2 13.6   HEMATOCRIT 37.1 41.4   PLATELETS 235  --    NEUTROS ABS 11.18*  --    IMMATURE GRANS (ABS) 0.05  --    LYMPHS ABS 1.17  --    MONOS ABS 0.86  --    EOS ABS 0.00  --    MCV 95.9  --          Lab 10/14/23  0339   SODIUM 136   POTASSIUM 3.8   CHLORIDE 105   CO2 22.0   ANION GAP 9.0   BUN 13   CREATININE 0.75   EGFR 83.7   GLUCOSE 150*   CALCIUM 8.1*                     Lab 10/13/23  1050   ABO TYPING A   RH TYPING Negative   ANTIBODY SCREEN Negative         Brief Urine Lab Results  (Last result in the past 365 days)        Color   Clarity   Blood   Leuk Est   Nitrite   Protein   CREAT   Urine HCG        06/26/23 1013             100               Microbiology Results (last 10 days)       ** No results found for the last 240 hours. **            Peripheral Block    Result Date: 10/13/2023  Lisy Diaz, CRNA     10/13/2023  1:26 PM Peripheral Block Patient reassessed immediately prior to procedure Patient location during procedure: OR Start time: 10/13/2023 1:12 PM Stop time: 10/13/2023 1:16 PM Reason for block: at surgeon's request and post-op pain management Performed by Anesthesiologist: Zane Valerio MD Preanesthetic Checklist Completed: patient identified, IV checked, site marked, risks and benefits discussed, surgical  "consent, monitors and equipment checked, pre-op evaluation and timeout performed Prep: Pt Position: supine Sterile barriers:cap, gloves, mask and washed/disinfected hands Prep: ChloraPrep Patient monitoring: blood pressure monitoring, continuous pulse oximetry and EKG Procedure Sedation: yes Performed under: general Guidance:ultrasound guided Images:still images obtained, printed/placed on chart Laterality:Bilateral Block Type:TAP Injection Technique:single-shot Needle Type:short-bevel and echogenic Needle Gauge:20 G Resistance on Injection: none Medications Used: dexamethasone sodium phosphate injection - Injection  4 mg - 10/13/2023 1:12:00 PM bupivacaine PF (MARCAINE) 0.25 % injection - Injection  60 mL - 10/13/2023 1:12:00 PM Medications Comment:Block Injection:  LA dose divided between Right and Left block Post Assessment Injection Assessment: negative aspiration for heme, incremental injection and no paresthesia on injection Patient Tolerance:comfortable throughout block Complications:no Additional Notes Subcostal TAPs A high-frequency linear transducer, with sterile cover, was placed sub-xiphoid to identify [place], right and left Rectus Abdominus Muscles (ROGELIO). The transducer was moved either right or left subcostally to identify the ROGELIO and the Transverse Abdominus Muscle (DUFF). The insertion site was prepped in sterile fashion and then localized with 2-5 ml of 1% Lidocaine. Using ultrasound-guidance, a 20-gauge B-Carlos 4\" Ultraplex 360 non-stimulating echogenic needle was advanced in plane, from medial to lateral, until the tip of the needle was in the fascial plane between the ROGELIO and DUFF. 1-3ml of preservative free normal saline was used to hydro-dissect the fascial planes. After the fascial plane was verified, the local anesthetic (LA) was injected. The procedure was repeated on the opposite side for bilateral coverage. Aspiration every 5 ml to prevent intravascular injection. Injection was completed " "with negative aspiration of blood and negative intravascular injection. Injection pressures were normal with minimal resistance. The subcostal approach to the TAP nerve block ideally anesthetizes the intercostal nerves T6-T9. Mid-Axillary/Lateral TAPs A high-frequency linear transducer, with sterile cover, was placed in the midaxillary line between the ASIS and costal margin. The External Oblique Muscle (EOM), Internal Oblique Muscle (IOM), Transverse Abdominus Muscle (DUFF), and Peritoneum were identified. The insertion site was prepped in sterile fashion and then localized with 2-5 ml of 1% Lidocaine. Using ultrasound-guidance, a 20-gauge B-Carlos 4\" Ultraplex 360 non-stimulating echogenic needle was advanced in plane, from medial to lateral, until the tip of the needle was in the fascial plane between the IOM and DUFF. 1-3ml of preservative free normal saline was used to hydro-dissect the fascial planes. After the fascial plane was verified, the local anesthetic (LA) was injected. The procedure was repeated on the opposite side for bilateral coverage. Aspiration every 5 ml to prevent intravascular injection. Injection was completed with negative aspiration of blood and negative intravascular injection. Injection pressures were normal with minimal resistance. Midaxillary TAPs should reach intercostal nerves T10- T11 and the subcostal nerve T12.      Adult Transthoracic Echo Complete W/ Cont if Necessary Per Protocol    Result Date: 10/9/2023    Left ventricular ejection fraction appears to be 61 - 65%.   Left ventricular wall thickness is consistent with borderline concentric hypertrophy.   Trace mitral valve regurgitation is present.   Mild tricuspid valve regurgitation is present. Estimated right ventricular systolic pressure from tricuspid regurgitation is normal (<35 mmHg).     CT Abdomen Pelvis With Contrast    Result Date: 10/5/2023  CT SCAN OF THE ABDOMEN AND PELVIS WITH CONTRAST;  10/5/2023 11:23 AM HISTORY: " Colon cancer, restaging. COMPARISON: None. PROCEDURE: The patient was injected with IV contrast. Oral contrast was administered.  Axial images were obtained from the lung bases to the pubic symphysis by computed tomography. This study was performed with techniques to keep radiation doses as low as reasonably achievable, (ALARA). Individualized dose reduction techniques using automated exposure control or adjustment of mA and/or kV according to the patient size were employed. FINDINGS: ABDOMEN: Tiny hiatal hernia. Clear lung bases. Liver and spleen are unremarkable. Gallbladder, pancreas, and adrenals are unremarkable. 17 mm cyst in the upper right renal pole. 7 mm cyst of the left kidney. No solid renal lesions. No urinary tract stone or obstruction. Mild plaque of the abdominal aorta without aneurysm. Moderate colonic stool volume. History describes recent diagnosis of colon cancer. However, the primary malignancy is not identified on this exam. No bowel obstruction. No adenopathy. No mesenteric or retroperitoneal adenopathy. No peritoneal carcinomatosis is seen. PELVIS: Normal appendix. Urinary bladder is unremarkable. Hysterectomy. No free fluid or adenopathy. No bone lesions. Very mild degenerative changes.    No primary malignancy is identified. No metastatic disease. Images reviewed, interpreted, and dictated by Zane Xiong DO             Results for orders placed in visit on 10/06/23    Adult Transthoracic Echo Complete W/ Cont if Necessary Per Protocol    Interpretation Summary    Left ventricular ejection fraction appears to be 61 - 65%.    Left ventricular wall thickness is consistent with borderline concentric hypertrophy.    Trace mitral valve regurgitation is present.    Mild tricuspid valve regurgitation is present. Estimated right ventricular systolic pressure from tricuspid regurgitation is normal (<35 mmHg).      Plan for Follow-up of Pending Labs/Results:   Pending Labs       Order Current Status     Tissue Pathology Exam Preliminary result          Discharge Details        Discharge Medications        New Medications        Instructions Start Date   HYDROcodone-acetaminophen 7.5-325 MG per tablet  Commonly known as: Norco   1 tablet, Oral, Every 6 Hours PRN             Continue These Medications        Instructions Start Date   Cetirizine HCl 10 MG capsule   Take 10 mg by mouth Every Night.      glucose blood test strip   Daily      OneTouch Ultra test strip  Generic drug: glucose blood   USE ONE STRIP TO TEST BLOOD GLUCOSE DAILY      levothyroxine 100 MCG tablet  Commonly known as: SYNTHROID, LEVOTHROID   TAKE ONE TABLET BY MOUTH DAILY      lisinopril 40 MG tablet  Commonly known as: PRINIVIL,ZESTRIL   40 mg, Oral, Daily      metFORMIN 500 MG tablet  Commonly known as: GLUCOPHAGE   TAKE ONE TABLET BY MOUTH DAILY WITH BREAKFAST      multivitamin with minerals tablet tablet   1 tablet, Oral, Daily      nebivolol 5 MG tablet  Commonly known as: Bystolic   5 mg, Oral, Daily      neomycin 500 MG tablet  Commonly known as: MYCIFRADIN   Take 2 tablets by mouth. 2 TABLETS AT 1PM, 3PM, AND 7PM DAY BEFORE SURGERY      ondansetron 4 MG tablet  Commonly known as: ZOFRAN   4 mg, Oral, Every 8 Hours PRN      onetouch ultrasoft lancets   USE ONE LANCET TO TEST DAILY      simvastatin 20 MG tablet  Commonly known as: ZOCOR   TAKE ONE TABLET BY MOUTH ONCE NIGHTLY             Stop These Medications      metroNIDAZOLE 500 MG tablet  Commonly known as: FLAGYL              Allergies   Allergen Reactions    Cefdinir GI Intolerance      Abdominal pain    Penicillins Rash    Sulfa Antibiotics Rash         Discharge Disposition:  Home or Self Care    Diet:  Hospital:  Diet Order   Procedures    Diet: Diabetic Diets; Consistent Carbohydrate; Texture: Regular Texture (IDDSI 7); Fluid Consistency: Thin (IDDSI 0)            Activity:      Restrictions or Other Recommendations: Advance diet as [id number].        CODE STATUS:    Code Status  and Medical Interventions:   Ordered at: 10/13/23 1626     Level Of Support Discussed With:    Patient     Code Status (Patient has no pulse and is not breathing):    CPR (Attempt to Resuscitate)     Medical Interventions (Patient has pulse or is breathing):    Full Support       Future Appointments   Date Time Provider Department Center   11/13/2023 11:20 AM [facility]   1/11/2024  8:30 AM Leola Villa MD [facility]   4/10/2024  1:20 PM Raisa Bobo APRN [facility]                 Giorgi Quinones MD  10/15/23      Time Spent on Discharge:  I spent  35 minutes on this discharge activity which included: face-to-face encounter with the patient, reviewing the data in the system, coordination of the care with the nursing staff as well as consultants, documentation, and entering orders.

## 2023-10-16 ENCOUNTER — TRANSITIONAL CARE MANAGEMENT TELEPHONE ENCOUNTER (OUTPATIENT)
Dept: CALL CENTER | Facility: HOSPITAL | Age: 73
End: 2023-10-16
Payer: MEDICARE

## 2023-10-16 NOTE — OUTREACH NOTE
Call Center TCM Note      Flowsheet Row Responses   [facility] patient discharged from? [place]   Does the patient have one of the following disease processes/diagnoses(primary or secondary)? General Surgery   TCM attempt successful? Yes   Call start time 0903   Call end time 0934   Discharge diagnosis Colon cancer colon resection   Person spoke with today (if not patient) and relationship    Meds reviewed with patient/caregiver? Yes   Is the patient having any side effects they believe may be caused by any medication additions or changes? No   Does the patient have all medications related to this admission filled (includes all antibiotics, pain medications, etc.) Yes   Is the patient taking all medications as directed (includes completed medication regime)? Yes   Comments Scheduled a hospital followup with Dr Villa on Jan 23 2023.   Does the patient have an appointment with their PCP within 7-14 days of discharge? Yes   Psychosocial issues? No   Did the patient receive a copy of their discharge instructions? Yes   Nursing interventions Reviewed instructions with patient   What is the patient's perception of their health status since discharge? Improving   Nursing interventions Nurse provided patient education   Is the patient /caregiver able to teach back basic post-op care? Keep incision areas clean,dry and protected   Is the patient/caregiver able to teach back signs and symptoms of incisional infection? Increased redness, swelling or pain at the incisonal site, Increased drainage or bleeding, Incisional warmth, Pus or odor from incision, Fever   Is the patient/caregiver able to teach back steps to recovery at home? Set small, achievable goals for return to baseline health, Rest and rebuild strength, gradually increase activity   If the patient is a current smoker, are they able to teach back resources for cessation? Not a smoker   Is the patient/caregiver able to teach back the hierarchy of who to  call/visit for symptoms/problems? PCP, Specialist, Home health nurse, Urgent Care, ED, 911 Yes   TCM call completed? Yes   Wrap up additional comments Doing well. No needs at this time.   Call end time 0934   Would this patient benefit from a Referral to [facility] Social Work? No   Is the patient interested in additional calls from an ambulatory ? No            Feroz Maria RN    10/16/2023, 09:34 EDT

## 2023-10-18 LAB
CYTO UR: NORMAL
LAB AP CASE REPORT: NORMAL
LAB AP CLINICAL INFORMATION: NORMAL
Lab: NORMAL
PATH REPORT.FINAL DX SPEC: NORMAL
PATH REPORT.GROSS SPEC: NORMAL

## 2023-10-23 ENCOUNTER — OFFICE VISIT (OUTPATIENT)
Dept: INTERNAL MEDICINE | Facility: CLINIC | Age: 73
End: 2023-10-23
Payer: MEDICARE

## 2023-10-23 VITALS
WEIGHT: 223.38 LBS | TEMPERATURE: 97.8 F | DIASTOLIC BLOOD PRESSURE: 72 MMHG | RESPIRATION RATE: 16 BRPM | BODY MASS INDEX: 34.98 KG/M2 | SYSTOLIC BLOOD PRESSURE: 148 MMHG | HEART RATE: 72 BPM

## 2023-10-23 DIAGNOSIS — Z90.49 S/P RIGHT HEMICOLECTOMY: Primary | ICD-10-CM

## 2023-10-23 DIAGNOSIS — K63.5 CECAL POLYP: ICD-10-CM

## 2023-10-23 DIAGNOSIS — E83.51 HYPOCALCEMIA: ICD-10-CM

## 2023-10-23 DIAGNOSIS — D72.829 LEUKOCYTOSIS, UNSPECIFIED TYPE: ICD-10-CM

## 2023-10-23 LAB
ANION GAP SERPL CALCULATED.3IONS-SCNC: 13.9 MMOL/L (ref 5–15)
BASOPHILS # BLD AUTO: 0.05 10*3/MM3 (ref 0–0.2)
BASOPHILS NFR BLD AUTO: 0.5 % (ref 0–1.5)
BUN SERPL-MCNC: 12 MG/DL (ref 8–23)
BUN/CREAT SERPL: 15.8 (ref 7–25)
CALCIUM SPEC-SCNC: 9 MG/DL (ref 8.6–10.5)
CHLORIDE SERPL-SCNC: 105 MMOL/L (ref 98–107)
CO2 SERPL-SCNC: 23.1 MMOL/L (ref 22–29)
CREAT SERPL-MCNC: 0.76 MG/DL (ref 0.57–1)
DEPRECATED RDW RBC AUTO: 39.9 FL (ref 37–54)
EGFRCR SERPLBLD CKD-EPI 2021: 82.3 ML/MIN/1.73
EOSINOPHIL # BLD AUTO: 0.36 10*3/MM3 (ref 0–0.4)
EOSINOPHIL NFR BLD AUTO: 3.8 % (ref 0.3–6.2)
ERYTHROCYTE [DISTWIDTH] IN BLOOD BY AUTOMATED COUNT: 12 % (ref 12.3–15.4)
GLUCOSE SERPL-MCNC: 107 MG/DL (ref 65–99)
HCT VFR BLD AUTO: 36.5 % (ref 34–46.6)
HGB BLD-MCNC: 12.3 G/DL (ref 12–15.9)
IMM GRANULOCYTES # BLD AUTO: 0.1 10*3/MM3 (ref 0–0.05)
IMM GRANULOCYTES NFR BLD AUTO: 1.1 % (ref 0–0.5)
LYMPHOCYTES # BLD AUTO: 2.48 10*3/MM3 (ref 0.7–3.1)
LYMPHOCYTES NFR BLD AUTO: 26.1 % (ref 19.6–45.3)
MCH RBC QN AUTO: 31 PG (ref 26.6–33)
MCHC RBC AUTO-ENTMCNC: 33.7 G/DL (ref 31.5–35.7)
MCV RBC AUTO: 91.9 FL (ref 79–97)
MONOCYTES # BLD AUTO: 0.63 10*3/MM3 (ref 0.1–0.9)
MONOCYTES NFR BLD AUTO: 6.6 % (ref 5–12)
NEUTROPHILS NFR BLD AUTO: 5.88 10*3/MM3 (ref 1.7–7)
NEUTROPHILS NFR BLD AUTO: 61.9 % (ref 42.7–76)
NRBC BLD AUTO-RTO: 0 /100 WBC (ref 0–0.2)
PLATELET # BLD AUTO: 417 10*3/MM3 (ref 140–450)
PMV BLD AUTO: 9.8 FL (ref 6–12)
POTASSIUM SERPL-SCNC: 4.1 MMOL/L (ref 3.5–5.2)
RBC # BLD AUTO: 3.97 10*6/MM3 (ref 3.77–5.28)
SODIUM SERPL-SCNC: 142 MMOL/L (ref 136–145)
WBC NRBC COR # BLD: 9.5 10*3/MM3 (ref 3.4–10.8)

## 2023-10-23 PROCEDURE — 85025 COMPLETE CBC W/AUTO DIFF WBC: CPT | Performed by: INTERNAL MEDICINE

## 2023-10-23 PROCEDURE — 80048 BASIC METABOLIC PNL TOTAL CA: CPT | Performed by: INTERNAL MEDICINE

## 2023-10-23 NOTE — PROGRESS NOTES
Transitional Care Follow Up Visit    Subjective     Chief Complaint   Patient presents with    Hospital Follow Up Visit     Surgery       Angelina Pimentel is a 73 y.o. female who presents for a transitional care management visit.    Within 48 business hours after discharge our office contacted her via telephone to coordinate her care and needs.      I reviewed and discussed the details of that call along with the discharge summary, hospital problems, inpatient lab results, inpatient diagnostic studies, and consultation reports with Angelina.     Current outpatient and discharge medications have been reconciled for the patient.  Reviewed by: Leola Villa MD          10/15/2023     4:59 PM   Date of TCM Phone Call   [facility]   Date of Admission 10/13/2023   Date of Discharge 10/15/2023   Discharge Disposition Home or Self Care     Risk for Readmission (LACE) Score: 8 (10/15/2023  6:00 AM)      History of Present Illness     Course During Hospital Stay: The patient is here for a hospital follow-up.  She was admitted to [facility] on 10/13/2023 and discharged on 10/15/2023.  Admitting diagnosis was malignant cecal polyp.  Records have been received and reviewed.  Prior to the hospitalization, 2D echocardiogram done on 10/6/2023 showed concentric LVH, EF 61% - 65%, and mild TR.  During the hospitalization, she underwent a Right Hemicolectomy without complication.  She was treated with Metronidazole during the hospitalization, but this was discontinued on discharge.  She was discharged on Norco and Tylenol, but no medication changes were made.    Labs: On 9/29/2023, hemoglobin A1c was 6.3.  On 10/13/2023 H/H was 13.6/41.5.  On 10/14/2023, BMP was normal with the exception of an elevated glucose (150) and a low calcium (8.1).  CBC was significant for an elevated WBC (13.28).  H/H was normal (12.2/37.1).    Imaging: CT abdomen and pelvis on 10/5/2023 showed:    FINDINGS:    ABDOMEN: Tiny hiatal hernia. Clear lung bases. Liver and spleen are   unremarkable. Gallbladder, pancreas, and adrenals are unremarkable.   17 mm cyst in the upper right renal pole. 7 mm cyst of the left kidney.   No solid renal lesions. No urinary tract stone or obstruction. Mild   plaque of the abdominal aorta without aneurysm.   Moderate colonic stool volume. History describes recent diagnosis of   colon cancer. However, the primary malignancy is not identified on this   exam. No bowel obstruction. No adenopathy. No mesenteric or   retroperitoneal adenopathy. No peritoneal carcinomatosis is seen.   PELVIS: Normal appendix. Urinary bladder is unremarkable. Hysterectomy.   No free fluid or adenopathy. No bone lesions. Very mild degenerative   changes.     Pathology:    1.  RIGHT PELVIS NODULE:  Benign calcified nodule.     2.  RIGHT COLON, RIGHT HEMICOLECTOMY:  Prior polypectomy site with limited residual adenoma with no high-grade dysplasia or malignancy identified.  14 lymph nodes negative for carcinoma (0/14).  Surgical margins negative for adenoma.   Electronically signed by Cal Hilliard MD on 10/18/2023 at 1155   Intraoperative Consultation    Dystrophic calcification with surrounding fibromuscular tissue.  Negative for malignancy.  Dr. Barrera to Dr. Ramos on 10- at 3:17 PM.  Patient identification verified.  Stains acceptable. (1blk)        Since discharge, the patient is complaining of some mild wound pain, but no drainage.  There is no fever or chills.  She is having daily bowel movements, but is starting to get slightly constipated due to the narcotics.  She denies any urinary symptoms.  There is no chest pain, shortness of breath, lower extremity edema, or cough.    She states she has a follow-up appoint with Dr. Ramos 2 weeks from today.      The following portions of the patient's history were reviewed and updated as appropriate: allergies, current medications, past family history, past  medical history, past social history, past surgical history, and problem list.    Review of Systems   Constitutional:  Negative for chills and fever.   Respiratory:  Negative for cough and shortness of breath.    Cardiovascular:  Negative for chest pain.   Gastrointestinal:  Positive for constipation. Negative for abdominal pain, blood in stool, diarrhea, nausea and vomiting.   Genitourinary:  Negative for dysuria, frequency, hematuria and urgency.   Skin:  Positive for wound.       Objective   Physical Exam  Vitals and nursing note reviewed.   Constitutional:       Appearance: She is well-developed.      Comments: BMI greater than 30.   Cardiovascular:      Rate and Rhythm: Normal rate and regular rhythm.      Heart sounds: Normal heart sounds. No murmur heard.  Pulmonary:      Effort: Pulmonary effort is normal.      Breath sounds: Normal breath sounds.   Abdominal:      General: Bowel sounds are normal. There is no distension.      Palpations: Abdomen is soft. There is no hepatomegaly, splenomegaly or mass.      Tenderness: There is no abdominal tenderness. There is no right CVA tenderness or left CVA tenderness.      Comments: There is a vertical incision above the umbilicus and 2 right-sided laparoscopy incisions.  No wound drainage or other sign of infection.  There is some bruising in the right lower abdomen.   Skin:     Findings: No rash.   Neurological:      Mental Status: She is alert.   Psychiatric:         Mood and Affect: Mood normal.         Assessment & Plan   Diagnoses and all orders for this visit:    1. S/P right hemicolectomy (Primary)   Healing well.  Follow-up with Colorectal Surgery as scheduled.   Continue current medication.    2. Cecal polyp    3. Leukocytosis, unspecified type  -     CBC & Differential    4. Hypocalcemia  -     Basic Metabolic Panel        Transitional Care Management Certification  I certify that the following are true:  Communication was made within 2 business days of  discharge.  Complexity of Medical Decision Making is moderate.  Face to face visit occurred within 8 days.    *Note: 89027 is for high complexity patients with a face to face visit within 7 days of discharge.  23653 is for high complexity patients with a face to face on days 8-14 post discharge or medium complexity with face to face visit within 14 days post discharge.        Return for Next scheduled follow up.

## 2023-10-24 ENCOUNTER — READMISSION MANAGEMENT (OUTPATIENT)
Dept: CALL CENTER | Facility: HOSPITAL | Age: 73
End: 2023-10-24
Payer: MEDICARE

## 2023-10-24 NOTE — OUTREACH NOTE
General Surgery Week 2 Survey      Flowsheet Row Responses   [facility] patient discharged from? [place]   Does the patient have one of the following disease processes/diagnoses(primary or secondary)? General Surgery   Week 2 attempt successful? Yes   Call start time 1422   Call end time 1423   Discharge diagnosis Colon cancer colon resection   Meds reviewed with patient/caregiver? Yes   Is the patient having any side effects they believe may be caused by any medication additions or changes? No   Does the patient have all medications related to this admission filled (includes all antibiotics, pain medications, etc.) Yes   Is the patient taking all medications as directed (includes completed medication regime)? Yes   Does the patient have a follow up appointment scheduled with their surgeon? Yes   Has the patient kept scheduled appointments due by today? Yes   Has home health visited the patient within 72 hours of discharge? N/A   Psychosocial issues? No   What is the patient's perception of their health status since discharge? Improving   Week 2 call completed? Yes   Graduated Yes   Call end time 1423            Johanne HANLEY - Registered Nurse

## 2023-11-13 ENCOUNTER — HOSPITAL ENCOUNTER (OUTPATIENT)
Dept: MAMMOGRAPHY | Facility: HOSPITAL | Age: 73
Discharge: HOME | End: 2023-11-13
Admitting: INTERNAL MEDICINE
Payer: MEDICARE

## 2023-11-13 DIAGNOSIS — Z12.31 VISIT FOR SCREENING MAMMOGRAM: ICD-10-CM

## 2023-11-13 PROCEDURE — 77067 SCR MAMMO BI INCL CAD: CPT

## 2023-11-13 PROCEDURE — 77063 BREAST TOMOSYNTHESIS BI: CPT

## 2023-11-14 PROCEDURE — 77063 BREAST TOMOSYNTHESIS BI: CPT | Performed by: RADIOLOGY

## 2023-11-14 PROCEDURE — 77067 SCR MAMMO BI INCL CAD: CPT | Performed by: RADIOLOGY

## 2023-12-01 DIAGNOSIS — E78.1 ESSENTIAL HYPERTRIGLYCERIDEMIA: Chronic | ICD-10-CM

## 2023-12-01 RX ORDER — SIMVASTATIN 20 MG
TABLET ORAL
Qty: 90 TABLET | Refills: 1 | Status: SHIPPED | OUTPATIENT
Start: 2023-12-01

## 2023-12-23 DIAGNOSIS — E11.9 TYPE 2 DIABETES MELLITUS WITHOUT COMPLICATION, WITHOUT LONG-TERM CURRENT USE OF INSULIN: ICD-10-CM

## 2023-12-26 RX ORDER — BLOOD SUGAR DIAGNOSTIC
STRIP MISCELLANEOUS
Qty: 100 EACH | Refills: 3 | Status: SHIPPED | OUTPATIENT
Start: 2023-12-26

## 2024-01-11 ENCOUNTER — OFFICE VISIT (OUTPATIENT)
Dept: INTERNAL MEDICINE | Facility: CLINIC | Age: 74
End: 2024-01-11
Payer: MEDICARE

## 2024-01-11 VITALS
DIASTOLIC BLOOD PRESSURE: 72 MMHG | RESPIRATION RATE: 16 BRPM | WEIGHT: 227 LBS | TEMPERATURE: 97.8 F | SYSTOLIC BLOOD PRESSURE: 128 MMHG | BODY MASS INDEX: 36.48 KG/M2 | HEIGHT: 66 IN | HEART RATE: 56 BPM

## 2024-01-11 DIAGNOSIS — Z78.0 MENOPAUSE: ICD-10-CM

## 2024-01-11 DIAGNOSIS — M15.9 PRIMARY OSTEOARTHRITIS INVOLVING MULTIPLE JOINTS: Chronic | ICD-10-CM

## 2024-01-11 DIAGNOSIS — Z00.00 MEDICARE ANNUAL WELLNESS VISIT, SUBSEQUENT: Primary | ICD-10-CM

## 2024-01-11 DIAGNOSIS — E03.9 ACQUIRED HYPOTHYROIDISM: Chronic | ICD-10-CM

## 2024-01-11 DIAGNOSIS — K63.5 BENIGN COLONIC POLYP: ICD-10-CM

## 2024-01-11 DIAGNOSIS — E11.9 TYPE 2 DIABETES MELLITUS WITHOUT COMPLICATION, WITHOUT LONG-TERM CURRENT USE OF INSULIN: ICD-10-CM

## 2024-01-11 DIAGNOSIS — Z00.00 ENCOUNTER FOR HEALTH MAINTENANCE EXAMINATION IN ADULT: ICD-10-CM

## 2024-01-11 DIAGNOSIS — D01.0 COLON NEOPLASM, TIS: ICD-10-CM

## 2024-01-11 DIAGNOSIS — E78.5 DYSLIPIDEMIA: ICD-10-CM

## 2024-01-11 DIAGNOSIS — I10 PRIMARY HYPERTENSION: ICD-10-CM

## 2024-01-11 PROBLEM — C18.9 COLON CANCER: Status: RESOLVED | Noted: 2023-10-13 | Resolved: 2024-01-11

## 2024-01-11 LAB
ALBUMIN SERPL-MCNC: 4.2 G/DL (ref 3.5–5.2)
ALBUMIN/GLOB SERPL: 1.3 G/DL
ALP SERPL-CCNC: 134 U/L (ref 39–117)
ALT SERPL W P-5'-P-CCNC: 15 U/L (ref 1–33)
ANION GAP SERPL CALCULATED.3IONS-SCNC: 11.6 MMOL/L (ref 5–15)
AST SERPL-CCNC: 23 U/L (ref 1–32)
BASOPHILS # BLD AUTO: 0.05 10*3/MM3 (ref 0–0.2)
BASOPHILS NFR BLD AUTO: 0.7 % (ref 0–1.5)
BILIRUB SERPL-MCNC: 0.3 MG/DL (ref 0–1.2)
BUN SERPL-MCNC: 17 MG/DL (ref 8–23)
BUN/CREAT SERPL: 19.3 (ref 7–25)
CALCIUM SPEC-SCNC: 9.7 MG/DL (ref 8.6–10.5)
CHLORIDE SERPL-SCNC: 107 MMOL/L (ref 98–107)
CHOLEST SERPL-MCNC: 143 MG/DL (ref 0–200)
CO2 SERPL-SCNC: 25.4 MMOL/L (ref 22–29)
CREAT SERPL-MCNC: 0.88 MG/DL (ref 0.57–1)
DEPRECATED RDW RBC AUTO: 42.9 FL (ref 37–54)
EGFRCR SERPLBLD CKD-EPI 2021: 69.1 ML/MIN/1.73
EOSINOPHIL # BLD AUTO: 0.19 10*3/MM3 (ref 0–0.4)
EOSINOPHIL NFR BLD AUTO: 2.5 % (ref 0.3–6.2)
ERYTHROCYTE [DISTWIDTH] IN BLOOD BY AUTOMATED COUNT: 12.8 % (ref 12.3–15.4)
EXPIRATION DATE: ABNORMAL
GLOBULIN UR ELPH-MCNC: 3.3 GM/DL
GLUCOSE SERPL-MCNC: 98 MG/DL (ref 65–99)
HBA1C MFR BLD: 5.9 % (ref 4.5–5.7)
HCT VFR BLD AUTO: 42.4 % (ref 34–46.6)
HDLC SERPL-MCNC: 40 MG/DL (ref 40–60)
HGB BLD-MCNC: 14.3 G/DL (ref 12–15.9)
IMM GRANULOCYTES # BLD AUTO: 0.04 10*3/MM3 (ref 0–0.05)
IMM GRANULOCYTES NFR BLD AUTO: 0.5 % (ref 0–0.5)
LDLC SERPL CALC-MCNC: 86 MG/DL (ref 0–100)
LDLC/HDLC SERPL: 2.12 {RATIO}
LYMPHOCYTES # BLD AUTO: 2.34 10*3/MM3 (ref 0.7–3.1)
LYMPHOCYTES NFR BLD AUTO: 31.3 % (ref 19.6–45.3)
Lab: ABNORMAL
MCH RBC QN AUTO: 31 PG (ref 26.6–33)
MCHC RBC AUTO-ENTMCNC: 33.7 G/DL (ref 31.5–35.7)
MCV RBC AUTO: 92 FL (ref 79–97)
MONOCYTES # BLD AUTO: 0.43 10*3/MM3 (ref 0.1–0.9)
MONOCYTES NFR BLD AUTO: 5.7 % (ref 5–12)
NEUTROPHILS NFR BLD AUTO: 4.43 10*3/MM3 (ref 1.7–7)
NEUTROPHILS NFR BLD AUTO: 59.3 % (ref 42.7–76)
NRBC BLD AUTO-RTO: 0 /100 WBC (ref 0–0.2)
PLATELET # BLD AUTO: 298 10*3/MM3 (ref 140–450)
PMV BLD AUTO: 10.1 FL (ref 6–12)
POTASSIUM SERPL-SCNC: 4.8 MMOL/L (ref 3.5–5.2)
PROT SERPL-MCNC: 7.5 G/DL (ref 6–8.5)
RBC # BLD AUTO: 4.61 10*6/MM3 (ref 3.77–5.28)
SODIUM SERPL-SCNC: 144 MMOL/L (ref 136–145)
T4 FREE SERPL-MCNC: 1.32 NG/DL (ref 0.93–1.7)
TRIGL SERPL-MCNC: 91 MG/DL (ref 0–150)
TSH SERPL DL<=0.05 MIU/L-ACNC: 1.66 UIU/ML (ref 0.27–4.2)
VLDLC SERPL-MCNC: 17 MG/DL (ref 5–40)
WBC NRBC COR # BLD AUTO: 7.48 10*3/MM3 (ref 3.4–10.8)

## 2024-01-11 PROCEDURE — 84439 ASSAY OF FREE THYROXINE: CPT | Performed by: INTERNAL MEDICINE

## 2024-01-11 PROCEDURE — 80061 LIPID PANEL: CPT | Performed by: INTERNAL MEDICINE

## 2024-01-11 PROCEDURE — 84443 ASSAY THYROID STIM HORMONE: CPT | Performed by: INTERNAL MEDICINE

## 2024-01-11 PROCEDURE — 85025 COMPLETE CBC W/AUTO DIFF WBC: CPT | Performed by: INTERNAL MEDICINE

## 2024-01-11 PROCEDURE — 80053 COMPREHEN METABOLIC PANEL: CPT | Performed by: INTERNAL MEDICINE

## 2024-01-11 RX ORDER — ESTRADIOL 0.1 MG/G
2 CREAM VAGINAL 2 TIMES WEEKLY
Qty: 42.5 G | Refills: 11 | Status: SHIPPED | OUTPATIENT
Start: 2024-01-11

## 2024-01-11 NOTE — PROGRESS NOTES
The ABCs of the Annual Wellness Visit  Subsequent Medicare Wellness Visit    Subjective      Angelina Pimentel is a 73 y.o. female who presents for a Subsequent Medicare Wellness Visit.    The following portions of the patient's history were reviewed and   updated as appropriate: allergies, current medications, past family history, past medical history, past social history, past surgical history, and problem list.    Compared to one year ago, the patient feels her physical   health is the same.    Compared to one year ago, the patient feels her mental   health is the same.    Recent Hospitalizations:  This patient has had a [facility] admission record on file within the last 365 days.    Current Medical Providers:  Patient Care Team:  Leola Villa MD as PCP - General (Internal Medicine)  Leola Villa MD as PCP - Family Medicine  Kathie Arias MD as Consulting Physician (Dermatology)  Bc Red MD as Consulting Physician (Ophthalmology)  Louis Sebastian MD as Consulting Physician (Cardiology)    Outpatient Medications Prior to Visit   Medication Sig Dispense Refill    Cetirizine HCl 10 MG capsule Take 10 mg by mouth Every Night.      glucose blood (OneTouch Ultra) test strip USE ONE STRIP TO TEST BLOOD GLUCOSE DAILY 100 each 3    glucose blood test strip Daily.      Lancets (onetouch ultrasoft) lancets USE ONE LANCET TO TEST DAILY 100 each 3    levothyroxine (SYNTHROID, LEVOTHROID) 100 MCG tablet TAKE ONE TABLET BY MOUTH DAILY 90 tablet 1    lisinopril (PRINIVIL,ZESTRIL) 40 MG tablet Take 1 tablet by mouth Daily. 90 tablet 3    metFORMIN (GLUCOPHAGE) 500 MG tablet TAKE ONE TABLET BY MOUTH DAILY WITH BREAKFAST 90 tablet 3    Multiple Vitamins-Minerals (CENTRUM SILVER ADULT 50+ PO) Take 1 tablet by mouth Daily.      nebivolol (Bystolic) 5 MG tablet Take 1 tablet by mouth Daily. 30 tablet 5    simvastatin (ZOCOR) 20 MG tablet TAKE ONE TABLET BY MOUTH ONCE NIGHTLY 90 tablet 1     HYDROcodone-acetaminophen (Norco) 7.5-325 MG per tablet Take 1 tablet by mouth Every 6 (Six) Hours As Needed for Moderate Pain. 10 tablet 0    neomycin (MYCIFRADIN) 500 MG tablet Take 2 tablets by mouth. 2 TABLETS AT 1PM, 3PM, AND 7PM DAY BEFORE SURGERY (Patient not taking: Reported on 10/23/2023)      ondansetron (ZOFRAN) 4 MG tablet Take 1 tablet by mouth Every 8 (Eight) Hours As Needed for Nausea or Vomiting.       No facility-administered medications prior to visit.       No opioid medication identified on active medication list. I have reviewed chart for other potential  high risk medication/s and harmful drug interactions in the elderly.        Aspirin is not on active medication list.  Aspirin use is not indicated based on review of current medical condition/s. Risk of harm outweighs potential benefits.  .    Patient Active Problem List   Diagnosis    Diabetes mellitus    Benign colonic polyp    Hypothyroidism    Osteoarthritis    Hypertension    Allergic rhinitis    Basal cell carcinoma of skin    Hypertriglyceridemia    Mitral valve prolapse syndrome    Squamous cell carcinoma of skin    Obstructive sleep apnea syndrome    Menopause    Hypertensive heart disease without heart failure    Dyslipidemia    Choroidal nevus of left eye    Dermatochalasis of both upper eyelids    Glaucoma suspect of both eyes    PCO (posterior capsular opacification), right    Meibomian gland dysfunction (MGD)    LBBB (left bundle branch block)    Colon neoplasm, Tis    Status post colon resection     Advance Care Planning   Advance Care Planning     Advance Directive is on file.  ACP discussion was held with the patient during this visit. Patient has an advance directive in EMR which is still valid.   ACP information on the AVS declined by the patient.       Objective    Vitals:    01/11/24 0843   BP: 128/72   BP Location: Right arm   Patient Position: Sitting   Cuff Size: Adult   Pulse: 56   Resp: 16   Temp: 97.8 °F (36.6 °C)  "  TempSrc: Infrared   Weight: 103 kg (227 lb)   Height: 168.3 cm (66.25\")   PainSc: 0-No pain     Estimated body mass index is 36.36 kg/m² as calculated from the following:    Height as of this encounter: 168.3 cm (66.25\").    Weight as of this encounter: 103 kg (227 lb).    Class 2 Severe Obesity (BMI >=35 and <=39.9). Obesity-related health conditions include the following: obstructive sleep apnea, hypertension, diabetes mellitus, dyslipidemias, and osteoarthritis. Obesity is worsening. BMI is is above average; BMI management plan is completed. We discussed portion control and increasing exercise.    Finger Rub Hearing{Test (right ear):passed  Finger Rub Hearing{Test (left ear):passed    Does the patient have evidence of cognitive impairment?   No    Lab Results   Component Value Date    HGBA1C 5.9 (A) 2024          HEALTH RISK ASSESSMENT    Smoking Status:  Social History     Tobacco Use   Smoking Status Never    Passive exposure: Past (childhood)   Smokeless Tobacco Never     Alcohol Consumption:  Social History     Substance and Sexual Activity   Alcohol Use Never    Comment: Never drank alcohol     Fall Risk Screen:    STEADI Fall Risk Assessment was completed, and patient is at HIGH risk for falls. Assessment completed on:2024    Depression Screenin/11/2024     8:48 AM   PHQ-2/PHQ-9 Depression Screening   Little Interest or Pleasure in Doing Things 0-->not at all   Feeling Down, Depressed or Hopeless 0-->not at all   PHQ-9: Brief Depression Severity Measure Score 0       Health Habits and Functional and Cognitive Screenin/11/2024     8:47 AM   Functional & Cognitive Status   Do you have difficulty preparing food and eating? No   Do you have difficulty bathing yourself, getting dressed or grooming yourself? No   Do you have difficulty using the toilet? No   Do you have difficulty moving around from place to place? No   Do you have trouble with steps or getting out of a bed or a " chair? No   Current Diet Unhealthy Diet   Dental Exam Up to date   Eye Exam Up to date   Exercise (times per week) 1 times per week   Current Exercises Include Walking;Light Weights   Do you need help using the phone?  No   Are you deaf or do you have serious difficulty hearing?  No   Do you need help to go to places out of walking distance? No   Do you need help shopping? No   Do you need help preparing meals?  No   Do you need help with housework?  No   Do you need help with laundry? No   Do you need help taking your medications? No   Do you need help managing money? No   Do you ever drive or ride in a car without wearing a seat belt? No   Have you felt unusual stress, anger or loneliness in the last month? No   Who do you live with? Spouse   If you need help, do you have trouble finding someone available to you? No   Have you been bothered in the last four weeks by sexual problems? No   Do you have difficulty concentrating, remembering or making decisions? No       Age-appropriate Screening Schedule:  Refer to the list below for future screening recommendations based on patient's age, sex and/or medical conditions. Orders for these recommended tests are listed in the plan section. The patient has been provided with a written plan.    Health Maintenance   Topic Date Due    DIABETIC EYE EXAM  04/10/2024    LIPID PANEL  06/26/2024    URINE MICROALBUMIN  06/26/2024    HEMOGLOBIN A1C  07/11/2024    ANNUAL WELLNESS VISIT  01/11/2025    DIABETIC FOOT EXAM  01/11/2025    BMI FOLLOWUP  01/11/2025    MAMMOGRAM  11/13/2025    TDAP/TD VACCINES (3 - Td or Tdap) 04/05/2027    DXA SCAN  02/07/2028    COLONOSCOPY  08/01/2028    HEPATITIS C SCREENING  Completed    COVID-19 Vaccine  Completed    INFLUENZA VACCINE  Completed    Pneumococcal Vaccine 65+  Completed    ZOSTER VACCINE  Completed                  CMS Preventative Services Quick Reference  Risk Factors Identified During Encounter:    Fall Risk-High or Moderate:  Information on Fall Prevention Shared in After Visit Summary and Sit to Stand Exercise Information Shared in After Visit Summary  Immunizations Discussed/Encouraged:  IUTD  Dental Screening Recommended  Vision Screening Recommended    The above risks/problems have been discussed with the patient.  Pertinent information has been shared with the patient in the After Visit Summary.    Diagnoses and all orders for this visit:    1. Medicare annual wellness visit, subsequent (Primary)    2. Encounter for health maintenance examination in adult  -     Lipid Panel; Future  -     Comprehensive Metabolic Panel; Future  -     TSH; Future  -     CBC & Differential; Future  -     T4, Free; Future  -     Lipid Panel  -     Comprehensive Metabolic Panel  -     TSH  -     CBC & Differential  -     T4, Free    3. Type 2 diabetes mellitus without complication, without long-term current use of insulin  -     POC Glycosylated Hemoglobin (Hb A1C)  -     Lipid Panel; Future  -     Comprehensive Metabolic Panel; Future  -     TSH; Future  -     CBC & Differential; Future  -     Ambulatory Referral to Diabetes Counseling  -     Lipid Panel  -     Comprehensive Metabolic Panel  -     TSH  -     CBC & Differential    4. Primary hypertension  -     Lipid Panel; Future  -     Comprehensive Metabolic Panel; Future  -     TSH; Future  -     CBC & Differential; Future  -     Lipid Panel  -     Comprehensive Metabolic Panel  -     TSH  -     CBC & Differential    5. Dyslipidemia  -     Lipid Panel; Future  -     Comprehensive Metabolic Panel; Future  -     TSH; Future  -     CBC & Differential; Future  -     Lipid Panel  -     Comprehensive Metabolic Panel  -     TSH  -     CBC & Differential    6. Acquired hypothyroidism  -     TSH; Future  -     T4, Free; Future  -     TSH  -     T4, Free    7. Colon neoplasm, Tis    8. Benign colonic polyp    9. Primary osteoarthritis involving multiple joints    10. Menopause  -     estradiol (ESTRACE  VAGINAL) 0.1 MG/GM vaginal cream; Insert 2 applicators into the vagina 2 (Two) Times a Week.  Dispense: 42.5 g; Refill: 11        Follow Up:   Next Medicare Wellness visit to be scheduled in 1 year.      An After Visit Summary and PPPS were made available to the patient.

## 2024-01-11 NOTE — PROGRESS NOTES
Subjective     Chief Complaint:  Physical Exam.    History of Present Illness    History obtained from the patient.      Cardiac Follow-up: The patient is here today for a 3 month follow-up.        Her Diabetes has been stable.   Medication: Metformin, Simvastatin, and Lisinopril.   Her Hypertension has been stable.   Medication: Lisinopril and Bystolic.   Her Hyperlipidemia has been stable.   Her LDL goal is < 70 and last LDL was 82, TG 91.   Medication: Simvastiatin.   Side Effects: None.      Interval Events: Last Hemoglobin A1c on 9/29/2023 was 6.3.  She states her blood sugar at home has been 110-120, fasting.  She does not check postprandial levels.  She denies episodes of low blood sugar.  She has not been checking blood pressure at home.  Last Ophthalmology visit was 4/10/23, no retinopathy ([facility] Dr. Red). She does check her feet daily. She last saw Dr. Sebastian on 3/3/2021.  She re-established care with Cardiology, Raisa SWANN, on 10/6/2023.  Lisinopril was increased to 40 mg daily.  There were no other medication changes made.  She is requesting a referral for a Nutrition consult.  She does check her feet daily.      Symptoms: Denies chest pain, dyspnea, FLORES, orthopnea, PND, palpitations, syncope, lower extremity edema, claudication, lightheadedness, and dizziness.   Associated Symptoms: Weight up 6 pounds  in the past 6-7 months.  No fatigue, headache, polydipsia, polyuria, myalgias, arthralgias, visual impairment, memory loss, or concentration problems.   Denies foot pain, numbness / tingling of the feet, and a foot ulcer.     Lifestyle: The patient has been consuming a half healthy diet.  Since her surgery, she has only been walking  twice per week.  Tobacco Use: Never a smoker.      Hypothyroidism Follow-Up: The patient is being seen for follow-up of Hypothyroidism, which is stable.  Interval Events:  TSH was normal on 6/26/2023..  Symptoms:  Weight up 6 pounds  in the past 6-7 months.   Reports dry skin.  Denies diarrhea, constipation, heat/cold intolerance, fatigue, memory loss, trouble concentrating, and hair loss.   Associated Symptoms: no arthralgias, myalgias, or paresthesias.   Medications:  Levothyroxine (Synthroid).       Colonic Polyp Follow-up: The patient is being seen for a routine clinic follow-up of Colon Polyp(s), which is stable.   Interval Events: Last Colonoscopy 8/1/2023 showed a transverse colon tubular adenoma, and 2 ascending colon tubular adenomas.  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  She had a partial colon resection on 10/13/2023.  Pathology was c/w limited residual adenoma without high-grade dysplasia or malignancy, and clear surgical margins.  In addition, there was a benign calcified right pelvic nodule.    Symptoms: She reports chronic flatulence, unchanged.  No abdominal pain, diarrhea, constipation, hematochezia, melena, decreased stool caliber, or change in bowel habits.   Medication:  None.         Osteoarthritis Follow-Up: The patient is being seen for a routine clinic follow-up of Osteoarthritis, which has been stable.   Osteoarthritis, no complications.    Interval Events: None.  Symptoms: Reports stable left knee pain.  No arthralgias, back pain, neck pain, hip pain, shoulder pain, or hand pain.  Associated Symptoms: No joint swelling or stiffness.   Medications:  Ibuprofen prn.       Angelina Pimentel is a 73 y.o. female who presents for an Annual Physical.      PMH, PSH, SocHx, FamHx, Allergies, and Medications: Reviewed and updated.    Outpatient Medications Prior to Visit   Medication Sig Dispense Refill    Cetirizine HCl 10 MG capsule Take 10 mg by mouth Every Night.      glucose blood (OneTouch Ultra) test strip USE ONE STRIP TO TEST BLOOD GLUCOSE DAILY 100 each 3    glucose blood test strip Daily.      Lancets (onetouch  ultrasoft) lancets USE ONE LANCET TO TEST DAILY 100 each 3    levothyroxine (SYNTHROID, LEVOTHROID) 100 MCG tablet TAKE ONE TABLET BY MOUTH DAILY 90 tablet 1    lisinopril (PRINIVIL,ZESTRIL) 40 MG tablet Take 1 tablet by mouth Daily. 90 tablet 3    metFORMIN (GLUCOPHAGE) 500 MG tablet TAKE ONE TABLET BY MOUTH DAILY WITH BREAKFAST 90 tablet 3    Multiple Vitamins-Minerals (CENTRUM SILVER ADULT 50+ PO) Take 1 tablet by mouth Daily.      nebivolol (Bystolic) 5 MG tablet Take 1 tablet by mouth Daily. 30 tablet 5    simvastatin (ZOCOR) 20 MG tablet TAKE ONE TABLET BY MOUTH ONCE NIGHTLY 90 tablet 1    HYDROcodone-acetaminophen (Norco) 7.5-325 MG per tablet Take 1 tablet by mouth Every 6 (Six) Hours As Needed for Moderate Pain. 10 tablet 0    neomycin (MYCIFRADIN) 500 MG tablet Take 2 tablets by mouth. 2 TABLETS AT 1PM, 3PM, AND 7PM DAY BEFORE SURGERY (Patient not taking: Reported on 10/23/2023)      ondansetron (ZOFRAN) 4 MG tablet Take 1 tablet by mouth Every 8 (Eight) Hours As Needed for Nausea or Vomiting.       No facility-administered medications prior to visit.       Immunization History   Administered Date(s) Administered    Arexvy (RSV, Adults 60+ yrs) 09/22/2023    COVID-19 (MODERNA) 1st,2nd,3rd Dose Monovalent 01/09/2021, 02/06/2021, 10/25/2021, 06/10/2022    COVID-19 (MODERNA) BIVALENT 12+YRS 09/16/2022    COVID-19 (PFIZER) BIVALENT 12+YRS 06/26/2023    COVID-19 F23 (PFIZER) 12YRS+ (COMIRNATY) 09/25/2023    FLUAD TRI 65YR+ 10/11/2019    Fluad Quad 65+ 08/31/2020, 09/13/2023    Fluzone High Dose =>65 Years ([employer] ONLY) 10/14/2015, 10/10/2016, 10/11/2017, 08/30/2018, 09/30/2021    Fluzone High-Dose 65+yrs 10/01/2021, 09/16/2022, 09/13/2023    Hepatitis A 06/01/1998, 01/01/1999    Pneumococcal Conjugate 13-Valent (PCV13) 11/24/2014    Pneumococcal Conjugate 20-Valent (PCV20) 12/30/2022    Pneumococcal Polysaccharide (PPSV23) 09/20/2010, 03/25/2016    Shingrix 05/30/2018, 08/30/2018    Td (TDVAX) 04/05/2017     Tdap 02/25/2009    Zostavax 05/21/2010         Patient Active Problem List   Diagnosis    Diabetes mellitus    Benign colonic polyp    Hypothyroidism    Osteoarthritis    Hypertension    Allergic rhinitis    Basal cell carcinoma of skin    Hypertriglyceridemia    Mitral valve prolapse syndrome    Squamous cell carcinoma of skin    Obstructive sleep apnea syndrome    Menopause    Hypertensive heart disease without heart failure    Dyslipidemia    Choroidal nevus of left eye    Dermatochalasis of both upper eyelids    Glaucoma suspect of both eyes    PCO (posterior capsular opacification), right    Meibomian gland dysfunction (MGD)    LBBB (left bundle branch block)    Colon neoplasm, Tis    Status post colon resection       Health Habits:  Dental Exam. up to date  Eye Exam. up to date  Hearing Loss:  No  Exercise: 2 times/week.  Current exercise activities include: walking  Diet: 1/2 Healthy  Multivitamin: Yes    Safe Driving:  Yes  Seat Belt:  Yes  Bike Helmet:  N/A  Skin Screening:  Yes  Sunscreen: No  SBE / VIGNESH: No  Sexual Activity:  Yes  Birth Control:  Menopause  STD Prevention:  N/A    Last Pap: N/A (MONICA/BSO)  Last Mammogram: 11/13/2023, category 1.  Last DEXA Scan:/7/23, normal.  Last Colonoscopy: 8/1/2023, as above.  Last PSA: N/A    Social:    Social History     Socioeconomic History    Marital status:     Number of children: 2   Tobacco Use    Smoking status: Never     Passive exposure: Past (childhood)    Smokeless tobacco: Never   Vaping Use    Vaping Use: Never used   Substance and Sexual Activity    Alcohol use: Never     Comment: Never drank alcohol    Drug use: Never    Sexual activity: Yes     Partners: Male     Birth control/protection: Post-menopausal         Current Medical Providers:    Leola Villa MD (Internal Medicine / Pediatrics)    The [facility] providers who are involved in the care of this patient are listed above.         Review of Systems   Constitutional:  Positive for  unexpected weight change. Negative for chills, fatigue and fever.        No night sweats.    HENT:  Positive for tinnitus (bilateral, not new). Negative for congestion, ear discharge, ear pain, facial swelling, hearing loss, nosebleeds, postnasal drip, rhinorrhea, sinus pressure, sinus pain, sneezing, sore throat and voice change.         Denies snoring.   Eyes:  Positive for itching (intermittent). Negative for photophobia, pain, discharge, redness and visual disturbance.   Respiratory:  Negative for cough, chest tightness, shortness of breath and wheezing.         No chest congestion.  No hemoptysis.   Cardiovascular:  Negative for chest pain, palpitations and leg swelling.        No orthopnea, FLORES, or PND.  No claudication or syncope.   Gastrointestinal:  Negative for abdominal pain, blood in stool, constipation, diarrhea, nausea, rectal pain and vomiting.        No melena.  No hematemesis.  No heartburn, dysphagia or odynophagia.  No early satiety, belching, or bloating.    Endocrine: Negative for cold intolerance, heat intolerance, polydipsia, polyphagia and polyuria.        No hair loss, but has dry skin.  No hot flashes.     Genitourinary:  Positive for dyspareunia (has some vaginal dryness). Negative for difficulty urinating, dysuria, flank pain, frequency, hematuria, pelvic pain, urgency, vaginal bleeding and vaginal discharge.        No nocturia, incomplete emptying, or incontinence.   Musculoskeletal:  Negative for arthralgias, back pain, gait problem, joint swelling, myalgias, neck pain and neck stiffness.        No joint stiffness.   Skin:  Negative for rash.        No new skin lesions or changes in skin lesions. No breast pain or masses.  No nipple discharge or nipple inversion.   Neurological:  Negative for dizziness, tremors, syncope, speech difficulty, weakness, light-headedness, numbness and headaches.        No tingling.  No memory loss.  No decreased concentration.   Hematological:  Negative for  "adenopathy. Does not bruise/bleed easily.   Psychiatric/Behavioral:  Negative for confusion, self-injury, sleep disturbance and suicidal ideas. The patient is not nervous/anxious.         No depression.           Objective     Vitals:    01/11/24 0843   BP: 128/72   BP Location: Right arm   Patient Position: Sitting   Cuff Size: Adult   Pulse: 56   Resp: 16   Temp: 97.8 °F (36.6 °C)   TempSrc: Infrared   Weight: 103 kg (227 lb)   Height: 168.3 cm (66.25\")   PainSc: 0-No pain       Body mass index is 36.36 kg/m².    Physical Exam  Vitals and nursing note reviewed.   Constitutional:       Appearance: Normal appearance. She is well-developed.      Comments: BMI greater than 35.   HENT:      Head: Normocephalic and atraumatic.      Right Ear: Tympanic membrane, ear canal and external ear normal.      Left Ear: Tympanic membrane, ear canal and external ear normal.      Mouth/Throat:      Mouth: Mucous membranes are moist. No oral lesions.      Pharynx: Oropharynx is clear.      Comments: Tonsils absent.  Eyes:      Extraocular Movements: Extraocular movements intact.      Conjunctiva/sclera: Conjunctivae normal.      Pupils: Pupils are equal, round, and reactive to light.      Comments: Fundi normal bilaterally.   Neck:      Thyroid: No thyromegaly.      Vascular: No carotid bruit.   Cardiovascular:      Rate and Rhythm: Normal rate and regular rhythm.      Pulses: Normal pulses.      Heart sounds: Normal heart sounds. No murmur heard.     No friction rub. No gallop.   Pulmonary:      Effort: Pulmonary effort is normal.      Breath sounds: Normal breath sounds.   Chest:   Breasts:     Right: No inverted nipple, mass, nipple discharge, skin change or tenderness.      Left: No inverted nipple, mass, nipple discharge, skin change or tenderness.   Abdominal:      General: Bowel sounds are normal. There is no distension or abdominal bruit.      Palpations: Abdomen is soft. There is no hepatomegaly, splenomegaly or mass.      " Tenderness: There is no abdominal tenderness.   Genitourinary:     Comments:  and rectal exam deferred.  Musculoskeletal:         General: Normal range of motion.      Cervical back: Normal range of motion and neck supple.      Right lower leg: No edema.      Left lower leg: No edema.   Feet:      Right foot:      Skin integrity: Dry skin (slight) present. No ulcer, blister, skin breakdown or callus.      Left foot:      Skin integrity: Dry skin (slight) present. No ulcer, blister, skin breakdown or callus.   Lymphadenopathy:      Cervical: No cervical adenopathy.      Upper Body:      Right upper body: No supraclavicular or axillary adenopathy.      Left upper body: No supraclavicular or axillary adenopathy.   Skin:     Findings: No rash.      Comments: No atypical skin lesions.   Neurological:      Mental Status: She is alert.      Cranial Nerves: No cranial nerve deficit.      Motor: Motor function is intact. No abnormal muscle tone.      Coordination: Coordination is intact.      Gait: Gait is intact.      Deep Tendon Reflexes: Reflexes are normal and symmetric.   Psychiatric:         Mood and Affect: Mood normal.         PHQ-2 Depression Screening  Little interest or pleasure in doing things? 0-->not at all   Feeling down, depressed, or hopeless? 0-->not at all   PHQ-2 Total Score 0         Counseling was given to patient for the following topics: appropriate exercise, healthy eating habits, disease prevention, risk factors for cancer, importance of self breast exam and breast health, importance of immunizations, including risks and benefits, sun safety, seatbelt use, and safe driving. Also discussed the importance of regular dental and vision care, as well recommendation for a yearly screening skin exam after age 40.  Written information provided to patient on these topics and other health maintenance issues.    Results for orders placed or performed in visit on 01/11/24   POC Glycosylated Hemoglobin (Hb  A1C)    Specimen: Blood   Result Value Ref Range    Hemoglobin A1C 5.9 (A) 4.5 - 5.7 %    Lot Number [id number]     Expiration Date 7/30/25          Diagnoses and all orders for this visit:    1. Medicare annual wellness visit, subsequent (Primary)    2. Encounter for health maintenance examination in adult  -     Lipid Panel  -     Comprehensive Metabolic Panel  -     TSH  -     CBC & Differential  -     T4, Free    3. Type 2 diabetes mellitus without complication, without long-term current use of insulin  -     POC Glycosylated Hemoglobin (Hb A1C)  -     Ambulatory Referral to Diabetes Counseling  -     Lipid Panel  -     Comprehensive Metabolic Panel  -     TSH  -     CBC & Differential   Continue current medication(s) as noted in the history of present illness.    4. Primary hypertension  -     Lipid Panel  -     Comprehensive Metabolic Panel  -     TSH  -     CBC & Differential   Continue current medication(s) as noted in the history of present illness.    5. Dyslipidemia  -     Lipid Panel  -     Comprehensive Metabolic Panel  -     TSH  -     CBC & Differential   Continue current medication(s) as noted in the history of present illness.    6. Acquired hypothyroidism  -     TSH  -     T4, Free   Continue current medication(s) as noted in the history of present illness.    7. Colon neoplasm, Tis   Colonoscopy up-to-date.    8. Benign colonic polyp   Colonoscopy up-to-date.2    9. Primary osteoarthritis involving multiple joints   Continue current medication(s) as noted in the history of present illness.    10. Menopause  -     estradiol (ESTRACE VAGINAL) 0.1 MG/GM vaginal cream; Insert 2 applicators into the vagina 2 (Two) Times a Week.  Dispense: 42.5 g; Refill: 11        Class 2 Severe Obesity (BMI >=35 and <=39.9). Obesity-related health conditions include the following: obstructive sleep apnea, hypertension, diabetes mellitus, dyslipidemias, and osteoarthritis. Obesity is worsening. BMI is is above average;  BMI management plan is completed. We discussed portion control and increasing exercise.            Return in about 6 months (around 7/11/2024) for Recheck Diabetes, fasting.

## 2024-01-11 NOTE — PATIENT INSTRUCTIONS
Health Maintenance for Postmenopausal Women  Menopause is a normal process in which your ability to get pregnant comes to an end. This process happens slowly over many months or years, usually between the ages of 48 and 55. Menopause is complete when you have missed your menstrual period for 12 months.  It is important to talk with your health care provider about some of the most common conditions that affect women after menopause (postmenopausal women). These include heart disease, cancer, and bone loss (osteoporosis). Adopting a healthy lifestyle and getting preventive care can help to promote your health and wellness. The actions you take can also lower your chances of developing some of these common conditions.  What are the signs and symptoms of menopause?  During menopause, you may have the following symptoms:  Hot flashes. These can be moderate or severe.  Night sweats.  Decrease in sex drive.  Mood swings.  Headaches.  Tiredness (fatigue).  Irritability.  Memory problems.  Problems falling asleep or staying asleep.  Talk with your health care provider about treatment options for your symptoms.  Do I need hormone replacement therapy?  Hormone replacement therapy is effective in treating symptoms that are caused by menopause, such as hot flashes and night sweats.  Hormone replacement carries certain risks, especially as you become older. If you are thinking about using estrogen or estrogen with progestin, discuss the benefits and risks with your health care provider.  How can I reduce my risk for heart disease and stroke?  The risk of heart disease, heart attack, and stroke increases as you age. One of the causes may be a change in the body's hormones during menopause. This can affect how your body uses dietary fats, triglycerides, and cholesterol. Heart attack and stroke are medical emergencies. There are many things that you can do to help prevent heart disease and stroke.  Watch your blood pressure  High  blood pressure causes heart disease and increases the risk of stroke. This is more likely to develop in people who have high blood pressure readings or are overweight.  Have your blood pressure checked:  Every 3-5 years if you are 18-39 years of age.  Every year if you are 40 years old or older.  Eat a healthy diet    Eat a diet that includes plenty of vegetables, fruits, low-fat dairy products, and lean protein.  Do not eat a lot of foods that are high in solid fats, added sugars, or sodium.  Get regular exercise  Get regular exercise. This is one of the most important things you can do for your health. Most adults should:  Try to exercise for at least 150 minutes each week. The exercise should increase your heart rate and make you sweat (moderate-intensity exercise).  Try to do strengthening exercises at least twice each week. Do these in addition to the moderate-intensity exercise.  Spend less time sitting. Even light physical activity can be beneficial.  Other tips  Work with your health care provider to achieve or maintain a healthy weight.  Do not use any products that contain nicotine or tobacco. These products include cigarettes, chewing tobacco, and vaping devices, such as e-cigarettes. If you need help quitting, ask your health care provider.  Know your numbers. Ask your health care provider to check your cholesterol and your blood sugar (glucose). Continue to have your blood tested as directed by your health care provider.  Do I need screening for cancer?  Depending on your health history and family history, you may need to have cancer screenings at different stages of your life. This may include screening for:  Breast cancer.  Cervical cancer.  Lung cancer.  Colorectal cancer.  What is my risk for osteoporosis?  After menopause, you may be at increased risk for osteoporosis. Osteoporosis is a condition in which bone destruction happens more quickly than new bone creation. To help prevent osteoporosis or  the bone fractures that can happen because of osteoporosis, you may take the following actions:  If you are 19-50 years old, get at least 1,000 mg of calcium and at least 600 international units (IU) of vitamin D per day.  If you are older than age 50 but younger than age 70, get at least 1,200 mg of calcium and at least 600 international units (IU) of vitamin D per day.  If you are older than age 70, get at least 1,200 mg of calcium and at least 800 international units (IU) of vitamin D per day.  Smoking and drinking excessive alcohol increase the risk of osteoporosis. Eat foods that are rich in calcium and vitamin D, and do weight-bearing exercises several times each week as directed by your health care provider.  How does menopause affect my mental health?  Depression may occur at any age, but it is more common as you become older. Common symptoms of depression include:  Feeling depressed.  Changes in sleep patterns.  Changes in appetite or eating patterns.  Feeling an overall lack of motivation or enjoyment of activities that you previously enjoyed.  Frequent crying spells.  Talk with your health care provider if you think that you are experiencing any of these symptoms.  General instructions  See your health care provider for regular wellness exams and vaccines. This may include:  Scheduling regular health, dental, and eye exams.  Getting and maintaining your vaccines. These include:  Influenza vaccine. Get this vaccine each year before the flu season begins.  Pneumonia vaccine.  Shingles vaccine.  Tetanus, diphtheria, and pertussis (Tdap) booster vaccine.  Your health care provider may also recommend other immunizations.  Tell your health care provider if you have ever been abused or do not feel safe at home.  Summary  Menopause is a normal process in which your ability to get pregnant comes to an end.  This condition causes hot flashes, night sweats, decreased interest in sex, mood swings, headaches, or lack  of sleep.  Treatment for this condition may include hormone replacement therapy.  Take actions to keep yourself healthy, including exercising regularly, eating a healthy diet, watching your weight, and checking your blood pressure and blood sugar levels.  Get screened for cancer and depression. Make sure that you are up to date with all your vaccines.  This information is not intended to replace advice given to you by your health care provider. Make sure you discuss any questions you have with your health care provider.  Document Revised: 05/09/2022 Document Reviewed: 05/09/2022  [employer] Patient Education © 2023 [employer]      MyPlate from [employer]    MyPlate is an outline of a general healthy diet based on the Dietary Guidelines for Americans, 6263-2882, from the U.[place]. Department of Agriculture (USDA). It sets guidelines for how much food you should eat from each food group based on your age, sex, and level of physical activity.  What are tips for following MyPlate?  To follow MyPlate recommendations:  Eat a wide variety of fruits and vegetables, grains, and protein foods.  Serve smaller portions and eat less food throughout the day.  Limit portion sizes to avoid overeating.  Enjoy your food.  Get at least 150 minutes of exercise every week. This is about 30 minutes each day, 5 or more days per week.  It can be difficult to have every meal look like MyPlate. Think about MyPlate as eating guidelines for an entire day, rather than each individual meal.  Fruits and vegetables  Make one half of your plate fruits and vegetables.  Eat many different colors of fruits and vegetables each day.  For a 2,000-calorie daily food plan, eat:  2½ cups of vegetables every day.  2 cups of fruit every day.  1 cup is equal to:  1 cup raw or cooked vegetables.  1 cup raw fruit.  1 medium-sized orange, apple, or banana.  1 cup 100% fruit or vegetable juice.  2 cups raw leafy greens, such as lettuce, spinach, or kale.  ½ cup dried  fruit.  Grains  One fourth of your plate should be grains.  Make at least half of the grains you eat each day whole grains.  For a 2,000-calorie daily food plan, eat 6 oz of grains every day.  1 oz is equal to:  1 slice bread.  1 cup cereal.  ½ cup cooked rice, cereal, or pasta.  Protein  One fourth of your plate should be protein.  Eat a wide variety of protein foods, including meat, poultry, fish, eggs, beans, nuts, and tofu.  For a 2,000-calorie daily food plan, eat 5½ oz of protein every day.  1 oz is equal to:  1 oz meat, poultry, or fish.  ¼ cup cooked beans.  1 egg.  ½ oz nuts or seeds.  1 Tbsp peanut butter.  Dairy  Drink fat-free or low-fat (1%) milk.  Eat or drink dairy as a side to meals.  For a 2,000-calorie daily food plan, eat or drink 3 cups of dairy every day.  1 cup is equal to:  1 cup milk, yogurt, cottage cheese, or soy milk (soy beverage).  2 oz processed cheese.  1½ oz natural cheese.  Fats, oils, salt, and sugars  Only small amounts of oils are recommended.  Avoid foods that are high in calories and low in nutritional value (empty calories), like foods high in fat or added sugars.  Choose foods that are low in salt (sodium). Choose foods that have less than 140 milligrams (mg) of sodium per serving.  Drink water instead of sugary drinks. Drink enough fluid to keep your urine pale yellow.  Where to find support  Work with your health care provider or a dietitian to develop a customized eating plan that is right for you.  Download an moises (mobile application) to help you track your daily food intake.  Where to find more information  USDA: ChooseMyPlate.gov  Summary  MyPlate is a general guideline for healthy eating from the USDA. It is based on the Dietary Guidelines for Americans, 4712-4867.  In general, fruits and vegetables should take up one half of your plate, grains should take up one fourth of your plate, and protein should take up one fourth of your plate.  This information is not intended  to replace advice given to you by your health care provider. Make sure you discuss any questions you have with your health care provider.  Document Revised: 11/08/2021 Document Reviewed: 11/08/2021  [employer] Patient Education © 2023 [employer]      Calorie Counting for Weight Loss  Calories are units of energy. Your body needs a certain number of calories from food to keep going throughout the day. When you eat or drink more calories than your body needs, your body stores the extra calories mostly as fat. When you eat or drink fewer calories than your body needs, your body burns fat to get the energy it needs.  Calorie counting means keeping track of how many calories you eat and drink each day. Calorie counting can be helpful if you need to lose weight. If you eat fewer calories than your body needs, you should lose weight. Ask your health care provider what a healthy weight is for you.  For calorie counting to work, you will need to eat the right number of calories each day to lose a healthy amount of weight per week. A dietitian can help you figure out how many calories you need in a day and will suggest ways to reach your calorie goal.  A healthy amount of weight to lose each week is usually 1-2 lb (0.5-0.9 kg). This usually means that your daily calorie intake should be reduced by 500-750 calories.  Eating 1,200-1,500 calories a day can help most women lose weight.  Eating 1,500-1,800 calories a day can help most men lose weight.  What do I need to know about calorie counting?  Work with your health care provider or dietitian to determine how many calories you should get each day. To meet your daily calorie goal, you will need to:  Find out how many calories are in each food that you would like to eat. Try to do this before you eat.  Decide how much of the food you plan to eat.  Keep a food log. Do this by writing down what you ate and how many calories it had.  To successfully lose weight, it is important  to balance calorie counting with a healthy lifestyle that includes regular activity.  Where do I find calorie information?    The number of calories in a food can be found on a Nutrition Facts label. If a food does not have a Nutrition Facts label, try to look up the calories online or ask your dietitian for help.  Remember that calories are listed per serving. If you choose to have more than one serving of a food, you will have to multiply the calories per serving by the number of servings you plan to eat. For example, the label on a package of bread might say that a serving size is 1 slice and that there are 90 calories in a serving. If you eat 1 slice, you will have eaten 90 calories. If you eat 2 slices, you will have eaten 180 calories.  How do I keep a food log?  After each time that you eat, record the following in your food log as soon as possible:  What you ate. Be sure to include toppings, sauces, and other extras on the food.  How much you ate. This can be measured in cups, ounces, or number of items.  How many calories were in each food and drink.  The total number of calories in the food you ate.  Keep your food log near you, such as in a pocket-sized notebook or on an moises or website on your mobile phone. Some programs will calculate calories for you and show you how many calories you have left to meet your daily goal.  What are some portion-control tips?  Know how many calories are in a serving. This will help you know how many servings you can have of a certain food.  Use a measuring cup to measure serving sizes. You could also try weighing out portions on a kitchen scale. With time, you will be able to estimate serving sizes for some foods.  Take time to put servings of different foods on your favorite plates or in your favorite bowls and cups so you know what a serving looks like.  Try not to eat straight from a food's packaging, such as from a bag or box. Eating straight from the package makes it  hard to see how much you are eating and can lead to overeating. Put the amount you would like to eat in a cup or on a plate to make sure you are eating the right portion.  Use smaller plates, glasses, and bowls for smaller portions and to prevent overeating.  Try not to multitask. For example, avoid watching TV or using your computer while eating. If it is time to eat, sit down at a table and enjoy your food. This will help you recognize when you are full. It will also help you be more mindful of what and how much you are eating.  What are tips for following this plan?  Reading food labels  Check the calorie count compared with the serving size. The serving size may be smaller than what you are used to eating.  Check the source of the calories. Try to choose foods that are high in protein, fiber, and vitamins, and low in saturated fat, trans fat, and sodium.  Shopping  Read nutrition labels while you shop. This will help you make healthy decisions about which foods to buy.  Pay attention to nutrition labels for low-fat or fat-free foods. These foods sometimes have the same number of calories or more calories than the full-fat versions. They also often have added sugar, starch, or salt to make up for flavor that was removed with the fat.  Make a grocery list of lower-calorie foods and stick to it.  Cooking  Try to cook your favorite foods in a healthier way. For example, try baking instead of frying.  Use low-fat dairy products.  Meal planning  Use more fruits and vegetables. One-half of your plate should be fruits and vegetables.  Include lean proteins, such as chicken, turkey, and fish.  Lifestyle  Each week, aim to do one of the followin minutes of moderate exercise, such as walking.  75 minutes of vigorous exercise, such as running.  General information  Know how many calories are in the foods you eat most often. This will help you calculate calorie counts faster.  Find a way of tracking calories that  works for you. Get creative. Try different apps or programs if writing down calories does not work for you.  What foods should I eat?    Eat nutritious foods. It is better to have a nutritious, high-calorie food, such as an avocado, than a food with few nutrients, such as a bag of potato chips.  Use your calories on foods and drinks that will fill you up and will not leave you hungry soon after eating.  Examples of foods that fill you up are nuts and nut butters, vegetables, lean proteins, and high-fiber foods such as whole grains. High-fiber foods are foods with more than 5 g of fiber per serving.  Pay attention to calories in drinks. Low-calorie drinks include water and unsweetened drinks.  The items listed above may not be a complete list of foods and beverages you can eat. Contact a dietitian for more information.  What foods should I limit?  Limit foods or drinks that are not good sources of vitamins, minerals, or protein or that are high in unhealthy fats. These include:  Candy.  Other sweets.  Sodas, specialty coffee drinks, alcohol, and juice.  The items listed above may not be a complete list of foods and beverages you should avoid. Contact a dietitian for more information.  How do I count calories when eating out?  Pay attention to portions. Often, portions are much larger when eating out. Try these tips to keep portions smaller:  Consider sharing a meal instead of getting your own.  If you get your own meal, eat only half of it. Before you start eating, ask for a container and put half of your meal into it.  When available, consider ordering smaller portions from the menu instead of full portions.  Pay attention to your food and drink choices. Knowing the way food is cooked and what is included with the meal can help you eat fewer calories.  If calories are listed on the menu, choose the lower-calorie options.  Choose dishes that include vegetables, fruits, whole grains, low-fat dairy products, and lean  proteins.  Choose items that are boiled, broiled, grilled, or steamed. Avoid items that are buttered, battered, fried, or served with cream sauce. Items labeled as crispy are usually fried, unless stated otherwise.  Choose water, low-fat milk, unsweetened iced tea, or other drinks without added sugar. If you want an alcoholic beverage, choose a lower-calorie option, such as a glass of wine or light beer.  Ask for dressings, sauces, and syrups on the side. These are usually high in calories, so you should limit the amount you eat.  If you want a salad, choose a garden salad and ask for grilled meats. Avoid extra toppings such as parisi, cheese, or fried items. Ask for the dressing on the side, or ask for olive oil and vinegar or lemon to use as dressing.  Estimate how many servings of a food you are given. Knowing serving sizes will help you be aware of how much food you are eating at restaurants.  Where to find more information  Centers for Disease Control and Prevention: www.cdc.gov  [place]. Department of Agriculture: myplate.gov  Summary  Calorie counting means keeping track of how many calories you eat and drink each day. If you eat fewer calories than your body needs, you should lose weight.  A healthy amount of weight to lose per week is usually 1-2 lb (0.5-0.9 kg). This usually means reducing your daily calorie intake by 500-750 calories.  The number of calories in a food can be found on a Nutrition Facts label. If a food does not have a Nutrition Facts label, try to look up the calories online or ask your dietitian for help.  Use smaller plates, glasses, and bowls for smaller portions and to prevent overeating.  Use your calories on foods and drinks that will fill you up and not leave you hungry shortly after a meal.  This information is not intended to replace advice given to you by your health care provider. Make sure you discuss any questions you have with your health care provider.  Document Revised:  01/28/2021 Document Reviewed: 01/28/2021  [employer] Patient Education © 2023 [employer]      Exercising to Lose Weight  Getting regular exercise is important for everyone. It is especially important if you are overweight. Being overweight increases your risk of heart disease, stroke, diabetes, high blood pressure, and several types of cancer. Exercising, and reducing the calories you consume, can help you lose weight and improve fitness and health.  Exercise can be moderate or vigorous intensity. To lose weight, most people need to do a certain amount of moderate or vigorous-intensity exercise each week.  How can exercise affect me?  You lose weight when you exercise enough to burn more calories than you eat. Exercise also reduces body fat and builds muscle. The more muscle you have, the more calories you burn. Exercise also:  Improves mood.  Reduces stress and tension.  Improves your overall fitness, flexibility, and endurance.  Increases bone strength.  Moderate-intensity exercise    Moderate-intensity exercise is any activity that gets you moving enough to burn at least three times more energy (calories) than if you were sitting.  Examples of moderate exercise include:  Walking a mile in 15 minutes.  Doing light yard work.  Biking at an easy pace.  Most people should get at least 150 minutes of moderate-intensity exercise a week to maintain their body weight.  Vigorous-intensity exercise  Vigorous-intensity exercise is any activity that gets you moving enough to burn at least six times more calories than if you were sitting. When you exercise at this intensity, you should be working hard enough that you are not able to carry on a conversation.  Examples of vigorous exercise include:  Running.  Playing a team sport, such as football, basketball, and soccer.  Jumping rope.  Most people should get at least 75 minutes a week of vigorous exercise to maintain their body weight.  What actions can I take to lose  weight?  The amount of exercise you need to lose weight depends on:  Your age.  The type of exercise.  Any health conditions you have.  Your overall physical ability.  Talk to your health care provider about how much exercise you need and what types of activities are safe for you.  Nutrition    Make changes to your diet as told by your health care provider or diet and nutrition specialist (dietitian). This may include:  Eating fewer calories.  Eating more protein.  Eating less unhealthy fats.  Eating a diet that includes fresh fruits and vegetables, whole grains, low-fat dairy products, and lean protein.  Avoiding foods with added fat, salt, and sugar.  Drink plenty of water while you exercise to prevent dehydration or heat stroke.  Activity  Choose an activity that you enjoy and set realistic goals. Your health care provider can help you make an exercise plan that works for you.  Exercise at a moderate or vigorous intensity most days of the week.  The intensity of exercise may vary from person to person. You can tell how intense a workout is for you by paying attention to your breathing and heartbeat. Most people will notice their breathing and heartbeat get faster with more intense exercise.  Do resistance training twice each week, such as:  Push-ups.  Sit-ups.  Lifting weights.  Using resistance bands.  Getting short amounts of exercise can be just as helpful as long, structured periods of exercise. If you have trouble finding time to exercise, try doing these things as part of your daily routine:  Get up, stretch, and walk around every 30 minutes throughout the day.  Go for a walk during your lunch break.  Park your car farther away from your destination.  If you take public transportation, get off one stop early and walk the rest of the way.  Make phone calls while standing up and walking around.  Take the stairs instead of elevators or escalators.  Wear comfortable clothes and shoes with good support.  Do not  exercise so much that you hurt yourself, feel dizzy, or get very short of breath.  Where to find more information  [place] Department of Health and Human Services: www.hhs.gov  Centers for Disease Control and Prevention: www.cdc.gov  Contact a health care provider:  Before starting a new exercise program.  If you have questions or concerns about your weight.  If you have a medical problem that keeps you from exercising.  Get help right away if:  You have any of the following while exercising:  Injury.  Dizziness.  Difficulty breathing or shortness of breath that does not go away when you stop exercising.  Chest pain.  Rapid heartbeat.  These symptoms may represent a serious problem that is an emergency. Do not wait to see if the symptoms will go away. Get medical help right away. Call your local emergency services (911 in the [place] Do not drive yourself to the hospital.  Summary  Getting regular exercise is especially important if you are overweight.  Being overweight increases your risk of heart disease, stroke, diabetes, high blood pressure, and several types of cancer.  Losing weight happens when you burn more calories than you eat.  Reducing the amount of calories you eat, and getting regular moderate or vigorous exercise each week, helps you lose weight.  This information is not intended to replace advice given to you by your health care provider. Make sure you discuss any questions you have with your health care provider.  Document Revised: 02/13/2022 Document Reviewed: 02/13/2022  [email or web address] Patient Education © 2023 [employer]      Fall Prevention in the Home, Adult  Falls can cause injuries and affect people of all ages. There are many simple things that you can do to make your home safe and to help prevent falls. Ask for help when making these changes, if needed.  What actions can I take to prevent falls?  General instructions  Use good lighting in all rooms. Replace any light bulbs that burn out, turn on  lights if it is dark, and use night-lights.  Place frequently used items in easy-to-reach places. Lower the shelves around your home if necessary.  Set up furniture so that there are clear paths around it. Avoid moving your furniture around.  Remove throw rugs and other tripping hazards from the floor.  Avoid walking on wet floors.  Fix any uneven floor surfaces.  Add color or contrast paint or tape to grab bars and handrails in your home. Place contrasting color strips on the first and last steps of staircases.  When you use a stepladder, make sure that it is completely opened and that the sides and supports are firmly locked. Have someone hold the ladder while you are using it. Do not climb a closed stepladder.  Know where your pets are when moving through your home.  What can I do in the bathroom?         Keep the floor dry. Immediately clean up any water that is on the floor.  Remove soap buildup in the tub or shower regularly.  Use nonskid mats or decals on the floor of the tub or shower.  Attach bath mats securely with double-sided, nonslip rug tape.  If you need to sit down while you are in the shower, use a plastic, nonslip stool.  Install grab bars by the toilet and in the tub and shower. Do not use towel bars as grab bars.  What can I do in the bedroom?  Make sure that a bedside light is easy to reach.  Do not use oversized bedding that reaches the floor.  Have a firm chair that has side arms to use for getting dressed.  What can I do in the kitchen?  Clean up any spills right away.  If you need to reach for something above you, use a sturdy step stool that has a grab bar.  Keep electrical cables out of the way.  Do not use floor polish or wax that makes floors slippery. If you must use wax, make sure that it is non-skid floor wax.  What can I do with my stairs?  Do not leave any items on the stairs.  Make sure that you have a light switch at the top and the bottom of the stairs. Have them installed if you  do not have them.  Make sure that there are handrails on both sides of the stairs. Fix handrails that are broken or loose. Make sure that handrails are as long as the staircases.  Install non-slip stair treads on all stairs in your home.  Avoid having throw rugs at the top or bottom of stairs, or secure the rugs with carpet tape to prevent them from moving.  Choose a carpet design that does not hide the edge of steps on the stairs.  Check any carpeting to make sure that it is firmly attached to the stairs. Fix any carpet that is loose or worn.  What can I do on the outside of my home?  Use bright outdoor lighting.  Regularly repair the edges of walkways and driveways and fix any cracks.  Remove high doorway thresholds.  Trim any shrubbery on the main path into your home.  Regularly check that handrails are securely fastened and in good repair. Both sides of all steps should have handrails.  Install guardrails along the edges of any raised decks or porches.  Clear walkways of debris and clutter, including tools and rocks.  Have leaves, snow, and ice cleared regularly.  Use sand or salt on walkways during winter months.  In the garage, clean up any spills right away, including grease or oil spills.  What other actions can I take?  Wear closed-toe shoes that fit well and support your feet. Wear shoes that have rubber soles or low heels.  Use mobility aids as needed, such as canes, walkers, scooters, and crutches.  Review your medicines with your health care provider. Some medicines can cause dizziness or changes in blood pressure, which increase your risk of falling.  Talk with your health care provider about other ways that you can decrease your risk of falls. This may include working with a physical therapist or  to improve your strength, balance, and endurance.  Where to find more information  Centers for Disease Control and PreventionLORI: www.cdc.gov  [employer] [place] [employer] Aging:  www.daniel.nih.gov  Contact a health care provider if:  You are afraid of falling at home.  You feel weak, drowsy, or dizzy at home.  You fall at home.  Summary  There are many simple things that you can do to make your home safe and to help prevent falls.  Ways to make your home safe include removing tripping hazards and installing grab bars in the bathroom.  Ask for help when making these changes in your home.  This information is not intended to replace advice given to you by your health care provider. Make sure you discuss any questions you have with your health care provider.  Document Revised: 09/19/2022 Document Reviewed: 07/21/2021  [employer] Patient Education © 2023 [employer]      Sit-to-Stand Exercise    The sit-to-stand exercise (also known as the chair stand or chair rise exercise) strengthens your lower body and helps you maintain or improve your mobility and independence. The end goal is to do the sit-to-stand exercise without using your hands. This will be easier as you become stronger. You should always talk with your health care provider before starting any exercise program, especially if you have had recent surgery.  Do the exercise exactly as told by your health care provider and adjust it as directed. It is normal to feel mild stretching, pulling, tightness, or discomfort as you do this exercise, but you should stop right away if you feel sudden pain or your pain gets worse. Do not begin doing this exercise until told by your health care provider.  What the sit-to-stand exercise does  The sit-to-stand exercise helps to strengthen the muscles in your thighs and the muscles in the center of your body that give you stability (core muscles). This exercise is especially helpful if:  You have had knee or hip surgery.  You have trouble getting up from a chair, out of a car, or off the toilet due to muscle weakness.  How to do the sit-to-stand exercise  Sit toward the front edge of a sturdy chair without  armrests. Your knees should be bent and your feet should be flat on the floor and shoulder-width apart and underneath your hips.  Place your hands lightly on each side of the seat. Keep your back and neck as straight as possible, with your chest slightly forward.  Breathe in slowly. Lean forward and slightly shift your weight to the front of your feet.  Breathe out as you slowly stand up. Try not to support any weight with your hands.  Stand and pause for a full breath in and out.  Breathe in as you sit down slowly. Tighten your core and abdominal muscles to control your lowering as much as possible. You should lower yourself back to the chair slowly, not just drop back into the seat.  Breathe out slowly.  Do this exercise 10-15 times. If needed, do it fewer times until you build up strength.  Rest for 1 minute, then do another set of 10-15 repetitions.  To change the difficulty of the sit-to-stand exercise  If the exercise is too difficult, use a chair with sturdy armrests, and push off the armrests to help you come to the standing position. You can also use the armrests to help slowly lower yourself back to sitting. As this gets easier, try to use your arms less. You can also place a firm cushion or pillow on the chair to make the surface higher.  If this exercise is too easy, do not use your arms to help raise or lower yourself. You can also wear a weighted vest, use hand weights, increase your repetitions, or try a lower chair.  General tips  You may feel tired when starting an exercise routine. This is normal.  You may have muscle soreness that lasts a few days. This is normal. As you get stronger, you may not feel muscle soreness.  Use smooth, steady movements.  Do not  hold your breath during strength exercises. This can cause unsafe changes in your blood pressure.  Breathe in slowly through your nose, and breathe out slowly through your mouth.  Summary  Strengthening your lower body is an important step to  help you move safely and independently.  The sit-to-stand exercise helps strengthen the muscles in your thighs and core.  You should always talk with your health care provider before starting any exercise program, especially if you have had recent surgery.  This information is not intended to replace advice given to you by your health care provider. Make sure you discuss any questions you have with your health care provider.  Document Revised: 04/10/2022 Document Reviewed: 04/10/2022  [employer] Patient Education © 2023 [employer].

## 2024-01-12 ENCOUNTER — TELEPHONE (OUTPATIENT)
Dept: INTERNAL MEDICINE | Facility: CLINIC | Age: 74
End: 2024-01-12
Payer: MEDICARE

## 2024-01-12 NOTE — TELEPHONE ENCOUNTER
Called pharmacy and clarified prescription. SIG stated 2 applicators, which is a total of 8grams, and dose is 2g. Good verb given and patient should be able to  prescription.

## 2024-01-22 ENCOUNTER — HOSPITAL ENCOUNTER (OUTPATIENT)
Dept: DIABETES SERVICES | Facility: HOSPITAL | Age: 74
Setting detail: RECURRING SERIES
Discharge: HOME | End: 2024-01-22
Payer: MEDICARE

## 2024-01-22 PROCEDURE — G0108 DIAB MANAGE TRN  PER INDIV: HCPCS

## 2024-01-22 NOTE — PROGRESS NOTES
Patient seen for initial diabetes nutrition education class. RD spent 60 minutes in person with patient today. Epic users--notes will appear under media tab. Non-Epic users--notes will be forwarded per protocol.

## 2024-02-04 DIAGNOSIS — E11.9 TYPE 2 DIABETES MELLITUS WITHOUT COMPLICATION, WITHOUT LONG-TERM CURRENT USE OF INSULIN: ICD-10-CM

## 2024-03-16 DIAGNOSIS — E03.9 ACQUIRED HYPOTHYROIDISM: Chronic | ICD-10-CM

## 2024-03-18 RX ORDER — LEVOTHYROXINE SODIUM 0.1 MG/1
100 TABLET ORAL DAILY
Qty: 90 TABLET | Refills: 1 | Status: SHIPPED | OUTPATIENT
Start: 2024-03-18

## 2024-04-01 DIAGNOSIS — I10 PRIMARY HYPERTENSION: Chronic | ICD-10-CM

## 2024-04-01 RX ORDER — NEBIVOLOL 5 MG/1
5 TABLET ORAL DAILY
Qty: 30 TABLET | Refills: 5 | Status: SHIPPED | OUTPATIENT
Start: 2024-04-01

## 2024-05-28 DIAGNOSIS — E78.1 ESSENTIAL HYPERTRIGLYCERIDEMIA: Chronic | ICD-10-CM

## 2024-05-28 RX ORDER — SIMVASTATIN 20 MG
TABLET ORAL
Qty: 90 TABLET | Refills: 1 | Status: SHIPPED | OUTPATIENT
Start: 2024-05-28

## 2024-06-07 NOTE — PROGRESS NOTES
Subjective:     Encounter Date:06/12/2024      Patient ID: Angelina Pimentel is a 73 y.o.   white female from [place], [place], a retired  at [employer].     INTERNIST: Leola Villa MD  GENERAL SURGEON: Barbara Zaldivar MD  COLORECTAL SURGEON:  Don Ramos MD  NEUROLOGIST/SLEEP PHYSICIAN: Almas Henao MD.    Chief Complaint:   Chief Complaint   Patient presents with    Hypertensive heart disease without heart failure    Hypertension     Problem List:  Abnormal EKG/hypertensive cardiovascular disease:  Remote stress test for left arm numbness before 2008; negative per patient-data deficit  CT cardiac calcium score was 0 on 10/17/2017  Residual class I symptoms with echocardiogram demonstrating mild LVH and diastolic relaxation abnormality, no PE, valvulopathy, or pulmonary artery hypertension, April 2018  Residual class I symptoms, February 2020, March 2021, October 2023 with new LBBB, asymptomatic  Echocardiogram 10/6/2023: LVEF 61 to 65%, LV wall thickness consistent with borderline concentric hypertrophy, trace MR, mild TR, RVSP less than 35 mmHg  Hypertension  Hyperlipidemia  Type 2 diabetes mellitus, diagnosed in 2010; last hemoglobin A1c was 5.5%, March 2017, 5.6% December 2020, 6.3% September 2023, 5.9% January 2024  LBBB  History of mitral valve prolapse  Hypothyroidism after radiation for thyroid nodule/goiter  Moderate obesity: BMI 36.63  Benign positional vertigo, December 2019, with right CRP  Transient global amnesia with acceptable brain MRI, May 2019  Surgical history:  Left lumpectomy: Benign  Colonoscopy  [facility]  Cataracts  Tonsillectomy  Eyelid reduction  Right hemicolectomy on 10/13/2023 with Dr. Ramos    Allergies   Allergen Reactions    Cefdinir GI Intolerance      Abdominal pain    Penicillins Rash    Sulfa Antibiotics Rash       Current Outpatient Medications   Medication Instructions    Cetirizine HCl 10 MG capsule Take 10 mg by mouth Every  "Night.    glucose blood test strip Daily    Lancets (onetouch ultrasoft) lancets USE ONE LANCET TO TEST DAILY    levothyroxine (SYNTHROID, LEVOTHROID) 100 mcg, Oral, Daily    lisinopril (PRINIVIL,ZESTRIL) 40 mg, Oral, Daily    metFORMIN (GLUCOPHAGE) 500 MG tablet TAKE ONE TABLET BY MOUTH DAILY WITH BREAKFAST    Multiple Vitamins-Minerals (CENTRUM SILVER ADULT 50+ PO) 1 tablet, Oral, Daily    nebivolol (BYSTOLIC) 5 mg, Oral, Daily    simvastatin (ZOCOR) 20 MG tablet TAKE ONE TABLET BY MOUTH ONCE NIGHTLY         HISTORY OF PRESENT ILLNESS:  The patient is here after an 8-month hiatus. The patient canceled the last 3 appointments in April and May 2024.  At her last appointment she was found to have a new LBBB.  At her last appointment her lisinopril was increased to 40 mg daily. Echocardiogram 10/6/2023 showed LVEF 61 to 65%, LV wall thickness consistent with borderline concentric hypertrophy, trace MR, mild TR, RVSP less than 35 mmHg.  Patient had a right hemicolectomy October 2023 with Dr. Ramos.  She felt like she recovered quickly from this surgery.  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  She says that she has gained some weight because of this.  She denies any chest pain, shortness of breath, palpitations, dizziness, presyncope, or syncope.  She has a cuffed blood pressure monitor at home but does not often check her blood pressure.  The patient takes her lisinopril and Bystolic at night.    ROS   All other systems reviewed and otherwise negative.    Procedures       Objective:       Vitals:    06/12/24 0837 06/12/24 0840 06/12/24 0855   BP: 168/76 176/76 170/70   BP Location: Right arm Right arm Right arm   Patient Position: Sitting Standing Sitting   Pulse: 56 54    SpO2: 98%     Weight: 105 kg (230 lb 6.4 oz)     Height: 168.9 cm (66.5\")       Body mass index is 36.63 kg/m².  Wt Readings from Last 2 " Encounters:   06/12/24 105 kg (230 lb 6.4 oz)   01/11/24 103 kg (227 lb)        Constitutional:       Appearance: Healthy appearance. Not in distress.   Neck:      Vascular: No JVR. JVD normal.   Pulmonary:      Effort: Pulmonary effort is normal.      Breath sounds: Normal breath sounds. No wheezing. No rhonchi. No rales.   Chest:      Chest wall: Not tender to palpatation.   Cardiovascular:      PMI at left midclavicular line. Normal rate. Regular rhythm. Normal S1. Normal S2.       Murmurs: There is a grade 2/6 systolic murmur at the URSB and LLSB, radiating to the neck.      No gallop.  No click. No rub.   Pulses:     Intact distal pulses.   Edema:     Peripheral edema absent.   Abdominal:      General: Bowel sounds are normal.      Palpations: Abdomen is soft.      Tenderness: There is no abdominal tenderness.   Musculoskeletal: Normal range of motion.         General: No tenderness. Skin:     General: Skin is warm and dry.   Neurological:      General: No focal deficit present.      Mental Status: Alert and oriented to person, place and time.           Lab Review:   Lab Results   Component Value Date    GLUCOSE 98 01/11/2024    BUN 17 01/11/2024    CREATININE 0.88 01/11/2024    EGFRIFNONA 80 12/15/2021    EGFRIFAFRI 95 09/10/2015    BCR 19.3 01/11/2024    CO2 25.4 01/11/2024    CALCIUM 9.7 01/11/2024    PROTENTOTREF 7.4 09/10/2015    ALBUMIN 4.2 01/11/2024    LABIL2 1.4 09/10/2015    AST 23 01/11/2024    ALT 15 01/11/2024       Lab Results   Component Value Date    WBC 7.48 01/11/2024    HGB 14.3 01/11/2024    HCT 42.4 01/11/2024    MCV 92.0 01/11/2024     01/11/2024       Lab Results   Component Value Date    HGBA1C 5.9 (A) 01/11/2024       Lab Results   Component Value Date    TSH 1.660 01/11/2024       Lab Results   Component Value Date    CHOL 143 01/11/2024    CHOL 139 06/26/2023     Lab Results   Component Value Date    TRIG 91 01/11/2024    TRIG 91 06/26/2023     Lab Results   Component Value  Date    HDL 40 01/11/2024    HDL 40 06/26/2023     Lab Results   Component Value Date    LDL 86 01/11/2024    LDL 82 06/26/2023           Results for orders placed in visit on 10/06/23    Adult Transthoracic Echo Complete W/ Cont if Necessary Per Protocol    Interpretation Summary    Left ventricular ejection fraction appears to be 61 - 65%.    Left ventricular wall thickness is consistent with borderline concentric hypertrophy.    Trace mitral valve regurgitation is present.    Mild tricuspid valve regurgitation is present. Estimated right ventricular systolic pressure from tricuspid regurgitation is normal (<35 mmHg).            Advance Care Planning   ACP discussion was held with the patient during this visit. Patient has an advance directive (not in EMR), copy requested.      Assessment:     Overall continued acceptable course with no new interim cardiopulmonary complaints with good functional status. We will defer additional diagnostic or therapeutic intervention from a cardiac perspective at this time.  Patient has chronic LBBB.  Echocardiogram 10/6/2023 showed LVEF 61 to 65%, LV wall thickness consistent with borderline concentric hypertrophy, trace MR, mild TR, RVSP less than 35 mmHg.  Patient's blood pressure is uncontrolled so I will add amlodipine 2.5 mg daily and enroll the patient in the hypertension moises.       Diagnosis Plan   1. Mitral valve prolapse syndrome  Echocardiogram 10/6/2023 showed LVEF 61 to 65%, LV wall thickness consistent with borderline concentric hypertrophy, trace MR, mild TR, RVSP less than 35 mmHg.       2. Hypertensive heart disease without heart failure   Patient's blood pressure is uncontrolled so I will add amlodipine 2.5 mg daily and enroll the patient in the hypertension moises.      3. LBBB (left bundle branch block)  Chronic, asymptomatic, may consider stress test after next appointment      4. Dyslipidemia  Acceptable lipid panel January 2024, continue simvastatin              Plan:         Patient to continue current medications and close follow up with the above providers.  Tentative cardiology follow up in December 2024 or patient may return sooner PRN.  Enroll patient in hypertension care companion  Amlodipine 2.5 mg daily      Electronically signed by SHREE Staples, 06/12/24, 8:58 AM EDT.

## 2024-06-12 ENCOUNTER — OFFICE VISIT (OUTPATIENT)
Dept: CARDIOLOGY | Facility: CLINIC | Age: 74
End: 2024-06-12
Payer: MEDICARE

## 2024-06-12 VITALS
BODY MASS INDEX: 36.16 KG/M2 | HEART RATE: 54 BPM | HEIGHT: 67 IN | DIASTOLIC BLOOD PRESSURE: 70 MMHG | WEIGHT: 230.4 LBS | SYSTOLIC BLOOD PRESSURE: 170 MMHG | OXYGEN SATURATION: 98 %

## 2024-06-12 DIAGNOSIS — I11.9 HYPERTENSIVE HEART DISEASE WITHOUT HEART FAILURE: ICD-10-CM

## 2024-06-12 DIAGNOSIS — I44.7 LBBB (LEFT BUNDLE BRANCH BLOCK): ICD-10-CM

## 2024-06-12 DIAGNOSIS — E78.5 DYSLIPIDEMIA: ICD-10-CM

## 2024-06-12 DIAGNOSIS — I34.1 MITRAL VALVE PROLAPSE SYNDROME: Primary | Chronic | ICD-10-CM

## 2024-06-12 PROCEDURE — 1159F MED LIST DOCD IN RCRD: CPT | Performed by: NURSE PRACTITIONER

## 2024-06-12 PROCEDURE — 3078F DIAST BP <80 MM HG: CPT | Performed by: NURSE PRACTITIONER

## 2024-06-12 PROCEDURE — 1160F RVW MEDS BY RX/DR IN RCRD: CPT | Performed by: NURSE PRACTITIONER

## 2024-06-12 PROCEDURE — 3077F SYST BP >= 140 MM HG: CPT | Performed by: NURSE PRACTITIONER

## 2024-06-12 PROCEDURE — 99214 OFFICE O/P EST MOD 30 MIN: CPT | Performed by: NURSE PRACTITIONER

## 2024-06-12 RX ORDER — AMLODIPINE BESYLATE 2.5 MG/1
2.5 TABLET ORAL DAILY
Qty: 30 TABLET | Refills: 11 | Status: SHIPPED | OUTPATIENT
Start: 2024-06-12

## 2024-06-24 RX ORDER — AMLODIPINE BESYLATE 2.5 MG/1
2.5 TABLET ORAL DAILY
Qty: 30 TABLET | Refills: 11 | OUTPATIENT
Start: 2024-06-24

## 2024-06-24 RX ORDER — AMLODIPINE BESYLATE 2.5 MG/1
2.5 TABLET ORAL 2 TIMES DAILY
Qty: 60 TABLET | Refills: 3 | Status: SHIPPED | OUTPATIENT
Start: 2024-06-24

## 2024-07-08 RX ORDER — AMLODIPINE BESYLATE 5 MG/1
5 TABLET ORAL DAILY
Qty: 90 TABLET | Refills: 3 | Status: SHIPPED | OUTPATIENT
Start: 2024-07-08

## 2024-07-12 ENCOUNTER — OFFICE VISIT (OUTPATIENT)
Dept: INTERNAL MEDICINE | Facility: CLINIC | Age: 74
End: 2024-07-12
Payer: MEDICARE

## 2024-07-12 VITALS
TEMPERATURE: 98.2 F | WEIGHT: 228 LBS | DIASTOLIC BLOOD PRESSURE: 84 MMHG | SYSTOLIC BLOOD PRESSURE: 142 MMHG | RESPIRATION RATE: 16 BRPM | HEART RATE: 60 BPM | BODY MASS INDEX: 36.25 KG/M2

## 2024-07-12 DIAGNOSIS — E66.01 CLASS 2 SEVERE OBESITY WITH SERIOUS COMORBIDITY AND BODY MASS INDEX (BMI) OF 36.0 TO 36.9 IN ADULT, UNSPECIFIED OBESITY TYPE: ICD-10-CM

## 2024-07-12 DIAGNOSIS — R82.998 LEUKOCYTES IN URINE: ICD-10-CM

## 2024-07-12 DIAGNOSIS — E03.9 ACQUIRED HYPOTHYROIDISM: Chronic | ICD-10-CM

## 2024-07-12 DIAGNOSIS — M15.9 PRIMARY OSTEOARTHRITIS INVOLVING MULTIPLE JOINTS: Chronic | ICD-10-CM

## 2024-07-12 DIAGNOSIS — K63.5 BENIGN COLONIC POLYP: ICD-10-CM

## 2024-07-12 DIAGNOSIS — D01.0 COLON NEOPLASM, TIS: ICD-10-CM

## 2024-07-12 DIAGNOSIS — I44.7 LBBB (LEFT BUNDLE BRANCH BLOCK): ICD-10-CM

## 2024-07-12 DIAGNOSIS — E78.5 DYSLIPIDEMIA: ICD-10-CM

## 2024-07-12 DIAGNOSIS — I10 PRIMARY HYPERTENSION: ICD-10-CM

## 2024-07-12 DIAGNOSIS — E11.9 TYPE 2 DIABETES MELLITUS WITHOUT COMPLICATION, WITHOUT LONG-TERM CURRENT USE OF INSULIN: Primary | ICD-10-CM

## 2024-07-12 LAB
ALBUMIN SERPL-MCNC: 4.3 G/DL (ref 3.5–5.2)
ALBUMIN/CREATININE RATIO, URINE: NORMAL
ALBUMIN/GLOB SERPL: 1.4 G/DL
ALP SERPL-CCNC: 136 U/L (ref 39–117)
ALT SERPL W P-5'-P-CCNC: 22 U/L (ref 1–33)
ANION GAP SERPL CALCULATED.3IONS-SCNC: 12.1 MMOL/L (ref 5–15)
AST SERPL-CCNC: 21 U/L (ref 1–32)
BASOPHILS # BLD AUTO: 0.04 10*3/MM3 (ref 0–0.2)
BASOPHILS NFR BLD AUTO: 0.4 % (ref 0–1.5)
BILIRUB BLD-MCNC: NEGATIVE MG/DL
BILIRUB SERPL-MCNC: 0.3 MG/DL (ref 0–1.2)
BUN SERPL-MCNC: 12 MG/DL (ref 8–23)
BUN/CREAT SERPL: 12.5 (ref 7–25)
CALCIUM SPEC-SCNC: 9.9 MG/DL (ref 8.6–10.5)
CHLORIDE SERPL-SCNC: 107 MMOL/L (ref 98–107)
CHOLEST SERPL-MCNC: 137 MG/DL (ref 0–200)
CLARITY, POC: ABNORMAL
CO2 SERPL-SCNC: 23.9 MMOL/L (ref 22–29)
COLOR UR: YELLOW
CREAT SERPL-MCNC: 0.96 MG/DL (ref 0.57–1)
DEPRECATED RDW RBC AUTO: 43.9 FL (ref 37–54)
EGFRCR SERPLBLD CKD-EPI 2021: 62.2 ML/MIN/1.73
EOSINOPHIL # BLD AUTO: 0.15 10*3/MM3 (ref 0–0.4)
EOSINOPHIL NFR BLD AUTO: 1.7 % (ref 0.3–6.2)
ERYTHROCYTE [DISTWIDTH] IN BLOOD BY AUTOMATED COUNT: 13 % (ref 12.3–15.4)
EXPIRATION DATE: ABNORMAL
EXPIRATION DATE: ABNORMAL
EXPIRATION DATE: NORMAL
GLOBULIN UR ELPH-MCNC: 3.1 GM/DL
GLUCOSE SERPL-MCNC: 102 MG/DL (ref 65–99)
GLUCOSE UR STRIP-MCNC: NEGATIVE MG/DL
HBA1C MFR BLD: 5.9 % (ref 4.5–5.7)
HCT VFR BLD AUTO: 44.6 % (ref 34–46.6)
HDLC SERPL-MCNC: 40 MG/DL (ref 40–60)
HGB BLD-MCNC: 14.8 G/DL (ref 12–15.9)
IMM GRANULOCYTES # BLD AUTO: 0.05 10*3/MM3 (ref 0–0.05)
IMM GRANULOCYTES NFR BLD AUTO: 0.6 % (ref 0–0.5)
KETONES UR QL: NEGATIVE
LDLC SERPL CALC-MCNC: 78 MG/DL (ref 0–100)
LDLC/HDLC SERPL: 1.92 {RATIO}
LEUKOCYTE EST, POC: ABNORMAL
LYMPHOCYTES # BLD AUTO: 2.4 10*3/MM3 (ref 0.7–3.1)
LYMPHOCYTES NFR BLD AUTO: 26.7 % (ref 19.6–45.3)
Lab: ABNORMAL
Lab: ABNORMAL
Lab: NORMAL
MCH RBC QN AUTO: 30.6 PG (ref 26.6–33)
MCHC RBC AUTO-ENTMCNC: 33.2 G/DL (ref 31.5–35.7)
MCV RBC AUTO: 92.3 FL (ref 79–97)
MONOCYTES # BLD AUTO: 0.58 10*3/MM3 (ref 0.1–0.9)
MONOCYTES NFR BLD AUTO: 6.5 % (ref 5–12)
NEUTROPHILS NFR BLD AUTO: 5.77 10*3/MM3 (ref 1.7–7)
NEUTROPHILS NFR BLD AUTO: 64.1 % (ref 42.7–76)
NITRITE UR-MCNC: NEGATIVE MG/ML
NRBC BLD AUTO-RTO: 0 /100 WBC (ref 0–0.2)
PH UR: 5 [PH] (ref 5–8)
PLATELET # BLD AUTO: 274 10*3/MM3 (ref 140–450)
PMV BLD AUTO: 9.9 FL (ref 6–12)
POC CREATININE URINE: 300
POC MICROALBUMIN URINE: 80
POTASSIUM SERPL-SCNC: 4.5 MMOL/L (ref 3.5–5.2)
PROT SERPL-MCNC: 7.4 G/DL (ref 6–8.5)
PROT UR STRIP-MCNC: ABNORMAL MG/DL
RBC # BLD AUTO: 4.83 10*6/MM3 (ref 3.77–5.28)
RBC # UR STRIP: ABNORMAL /UL
SODIUM SERPL-SCNC: 143 MMOL/L (ref 136–145)
SP GR UR: 1.01 (ref 1–1.03)
TRIGL SERPL-MCNC: 102 MG/DL (ref 0–150)
TSH SERPL DL<=0.05 MIU/L-ACNC: 2.23 UIU/ML (ref 0.27–4.2)
UROBILINOGEN UR QL: NORMAL
VLDLC SERPL-MCNC: 19 MG/DL (ref 5–40)
WBC NRBC COR # BLD AUTO: 8.99 10*3/MM3 (ref 3.4–10.8)

## 2024-07-12 PROCEDURE — 82044 UR ALBUMIN SEMIQUANTITATIVE: CPT | Performed by: INTERNAL MEDICINE

## 2024-07-12 PROCEDURE — 3077F SYST BP >= 140 MM HG: CPT | Performed by: INTERNAL MEDICINE

## 2024-07-12 PROCEDURE — 85025 COMPLETE CBC W/AUTO DIFF WBC: CPT | Performed by: INTERNAL MEDICINE

## 2024-07-12 PROCEDURE — 80061 LIPID PANEL: CPT | Performed by: INTERNAL MEDICINE

## 2024-07-12 PROCEDURE — 1159F MED LIST DOCD IN RCRD: CPT | Performed by: INTERNAL MEDICINE

## 2024-07-12 PROCEDURE — 3044F HG A1C LEVEL LT 7.0%: CPT | Performed by: INTERNAL MEDICINE

## 2024-07-12 PROCEDURE — 84443 ASSAY THYROID STIM HORMONE: CPT | Performed by: INTERNAL MEDICINE

## 2024-07-12 PROCEDURE — G2211 COMPLEX E/M VISIT ADD ON: HCPCS | Performed by: INTERNAL MEDICINE

## 2024-07-12 PROCEDURE — 3079F DIAST BP 80-89 MM HG: CPT | Performed by: INTERNAL MEDICINE

## 2024-07-12 PROCEDURE — 1160F RVW MEDS BY RX/DR IN RCRD: CPT | Performed by: INTERNAL MEDICINE

## 2024-07-12 PROCEDURE — 1126F AMNT PAIN NOTED NONE PRSNT: CPT | Performed by: INTERNAL MEDICINE

## 2024-07-12 PROCEDURE — 87086 URINE CULTURE/COLONY COUNT: CPT | Performed by: INTERNAL MEDICINE

## 2024-07-12 PROCEDURE — 99214 OFFICE O/P EST MOD 30 MIN: CPT | Performed by: INTERNAL MEDICINE

## 2024-07-12 PROCEDURE — 1111F DSCHRG MED/CURRENT MED MERGE: CPT | Performed by: INTERNAL MEDICINE

## 2024-07-12 PROCEDURE — 83036 HEMOGLOBIN GLYCOSYLATED A1C: CPT | Performed by: INTERNAL MEDICINE

## 2024-07-12 PROCEDURE — 81003 URINALYSIS AUTO W/O SCOPE: CPT | Performed by: INTERNAL MEDICINE

## 2024-07-12 PROCEDURE — 80053 COMPREHEN METABOLIC PANEL: CPT | Performed by: INTERNAL MEDICINE

## 2024-07-12 PROCEDURE — 3062F POS MACROALBUMINURIA REV: CPT | Performed by: INTERNAL MEDICINE

## 2024-07-12 NOTE — PROGRESS NOTES
Subjective       Angelina Pimentel is a 73 y.o. female.     Chief Complaint   Patient presents with    Diabetes     6 month follow up    Hypertension    Hyperlipidemia    Hypothyroidism       History obtained from the patient.      History of Present Illness     Cardiac Follow-up: The patient is here today for a 6 month follow-up.        Her Diabetes has been stable.   Medication: Metformin, Simvastatin, and Lisinopril.   Her Hypertension has been unstable.   Medication: Lisinopril, Amlodipine, and Bystolic.   Her Hyperlipidemia has been stable.   Her LDL goal is < 70 and last LDL was 86, TG 91.   Medication: Simvastiatin.   Side Effects: None.      Interval Events: Last Hemoglobin A1c on 1/11/2024 was 5.9.  She states her blood sugar at home has been 100-115, fasting.  She does not check postprandial levels.  She denies episodes of low blood sugar.  Since last visit, she has been to [facility] she re-established care with Cardiology, Raisa SWANN, on 10/6/2023, for preoperative check.  Lisinopril was increased to 40 mg daily.  There were no other medication changes made.  On 1/11/2024, blood pressure was 128/72 in our office.  She saw Riasa SWANN for a follow-up on 6/12/2024.  Blood pressure was 170/70.  Amlodipine 2.5 mg daily was added.  The patient messaged me on 6/17/2024 with persistently elevated home blood pressure readings.  I increased the Amlodipine to 5 mg daily.  Since then, her blood pressure at home has been 130's- 150's / 70's-80's, slightly higher in the evenings, but overall trending down (home blood pressure log reviewed).  Last Ophthalmology visit was 4/10/23, no retinopathy ([facility]).  She has an appointment scheduled on 7/19/2024 she does check her feet daily.  She does check her feet daily.      Symptoms: Denies chest pain, dyspnea, FLORES, orthopnea, PND, palpitations, syncope, lower extremity edema, claudication, lightheadedness, and dizziness.  "  Associated Symptoms: No significant weight change in the past 6 months.  No fatigue, headache, polydipsia, polyuria, myalgias, arthralgias, visual impairment, memory loss, or concentration problems.   Denies foot pain, numbness / tingling of the feet, and a foot ulcer.     Lifestyle: She states her diet is \"horrible\".  She works out with a  once per week.  Tobacco Use: Never a smoker.      Hypothyroidism Follow-Up: The patient is being seen for follow-up of Hypothyroidism, which is stable.  Interval Events:  TSH and T4 normal on 1/1/2024.  Symptoms: No significant weight change in the past 6 months.  Denies diarrhea, constipation, heat/cold intolerance, fatigue, memory loss, trouble concentrating, dry skin, and hair loss.   Associated Symptoms: no arthralgias, myalgias, or paresthesias.   Medications:  Levothyroxine (Synthroid).       Colonic Polyp Follow-up: The patient is being seen for a routine clinic follow-up of Colon Polyp(s), which is stable.   Interval Events: Last Colonoscopy 8/1/2023 showed a transverse colon tubular adenoma, and 2 ascending colon tubular adenomas.  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  She had a partial colon resection on 10/13/2023.  Pathology was c/w limited residual adenoma without high-grade dysplasia or malignancy, and clear surgical margins.  In addition, there was a benign calcified right pelvic nodule.    Symptoms:  No abdominal pain, diarrhea, constipation, hematochezia, melena, or change in stool.   Medication:  None.         Osteoarthritis Follow-Up: The patient is being seen for a routine clinic follow-up of Osteoarthritis, which has been stable.   Osteoarthritis, no complications.    Interval Events: None.  Symptoms: Reports stable left knee pain.  Has some right thumb pain only with certain movements.  No arthralgias, back pain, neck " pain, hip pain, shoulder pain, or wrist pain.  Associated Symptoms: No joint swelling or stiffness.   Medications:  Ibuprofen prn.     Current Outpatient Medications on File Prior to Visit   Medication Sig Dispense Refill    amLODIPine (NORVASC) 5 MG tablet Take 1 tablet by mouth Daily. 90 tablet 3    Cetirizine HCl 10 MG capsule Take 10 mg by mouth Every Night.      glucose blood test strip Daily.      Lancets (onetouch ultrasoft) lancets USE ONE LANCET TO TEST DAILY 100 each 3    levothyroxine (SYNTHROID, LEVOTHROID) 100 MCG tablet TAKE 1 TABLET BY MOUTH DAILY 90 tablet 1    lisinopril (PRINIVIL,ZESTRIL) 40 MG tablet Take 1 tablet by mouth Daily. 90 tablet 3    metFORMIN (GLUCOPHAGE) 500 MG tablet TAKE ONE TABLET BY MOUTH DAILY WITH BREAKFAST 90 tablet 3    Multiple Vitamins-Minerals (CENTRUM SILVER ADULT 50+ PO) Take 1 tablet by mouth Daily.      nebivolol (BYSTOLIC) 5 MG tablet TAKE 1 TABLET BY MOUTH DAILY 30 tablet 5    simvastatin (ZOCOR) 20 MG tablet TAKE ONE TABLET BY MOUTH ONCE NIGHTLY 90 tablet 1     No current facility-administered medications on file prior to visit.       Current outpatient and discharge medications have been reconciled for the patient.  Reviewed by: Leola Villa MD        The following portions of the patient's history were reviewed and updated as appropriate: allergies, current medications, past family history, past medical history, past social history, past surgical history, and problem list.    Review of Systems   Constitutional:  Negative for fatigue and unexpected weight change.   Eyes:  Negative for visual disturbance.   Respiratory:  Negative for cough, shortness of breath and wheezing.    Cardiovascular:  Negative for chest pain, palpitations and leg swelling.        No FLORES, orthopnea, or claudication.   Gastrointestinal:  Negative for abdominal pain, blood in stool, constipation, diarrhea, nausea and vomiting.        Denies melena.   Endocrine: Negative for polydipsia and  polyuria.   Musculoskeletal:  Negative for arthralgias and myalgias.   Neurological:  Negative for dizziness, syncope, light-headedness and headaches.        No memory issues.   Psychiatric/Behavioral:  Negative for decreased concentration.          Objective       Blood pressure 142/84, pulse 60, temperature 98.2 °F (36.8 °C), temperature source Infrared, resp. rate 16, weight 103 kg (228 lb), not currently breastfeeding.  Body mass index is 36.25 kg/m².      Physical Exam  Vitals and nursing note reviewed.   Constitutional:       Appearance: She is well-developed.      Comments: BMI greater than 35   Neck:      Thyroid: No thyroid mass or thyromegaly.      Vascular: No carotid bruit.   Cardiovascular:      Rate and Rhythm: Normal rate and regular rhythm.      Pulses: Normal pulses.      Heart sounds: Normal heart sounds. No murmur heard.     No friction rub. No gallop.   Pulmonary:      Effort: Pulmonary effort is normal.      Breath sounds: Normal breath sounds.   Musculoskeletal:      Right lower leg: No edema.      Left lower leg: No edema.   Neurological:      Mental Status: She is alert.   Psychiatric:         Mood and Affect: Mood normal.       Results for orders placed or performed in visit on 07/12/24   POC Glycosylated Hemoglobin (Hb A1C)    Specimen: Blood   Result Value Ref Range    Hemoglobin A1C 5.9 (A) 4.5 - 5.7 %    Lot Number [id number]     Expiration Date 3/18/26    POC Urinalysis Dipstick, Automated    Specimen: Urine   Result Value Ref Range    Color Yellow Yellow, Straw, Dark Yellow, Eileen    Clarity, UA Cloudy (A) Clear    Specific Gravity  1.015 1.005 - 1.030    pH, Urine 5.0 5.0 - 8.0    Leukocytes 500 Jacoby/ul (A) Negative    Nitrite, UA Negative Negative    Protein, POC 30 mg/dL (A) Negative mg/dL    Glucose, UA Negative Negative mg/dL    Ketones, UA Negative Negative    Urobilinogen, UA Normal Normal, 0.2 E.U./dL    Bilirubin Negative Negative    Blood, UA 50 Sudhir/ul (A) Negative    Lot  Number [id number]     Expiration Date 3/31/25    POC Microalbumin    Specimen: Urine   Result Value Ref Range    Microalbumin, Urine 80     Creatinine, Urine 300     Lot Number [id number]     Expiration Date 4/30/25     Albumin/Creatinine Ratio, Urine           Assessment / Plan:  Diagnoses and all orders for this visit:    1. Type 2 diabetes mellitus without complication, without long-term current use of insulin (Primary)  -     POC Glycosylated Hemoglobin (Hb A1C)  -     POC Urinalysis Dipstick, Automated  -     POC Microalbumin  -     Lipid Panel  -     Comprehensive Metabolic Panel  -     TSH  -     CBC & Differential  -     Tirzepatide (MOUNJARO) 2.5 MG/0.5ML solution pen-injector pen; Inject 0.5 mL under the skin into the appropriate area as directed 1 (One) Time Per Week.  Dispense: 2 mL; Refill: 0- NEW   Continue current medication(s) as noted in the history of present illness.    2. Primary hypertension  -     POC Urinalysis Dipstick, Automated  -     Lipid Panel  -     Comprehensive Metabolic Panel  -     TSH  -     CBC & Differential   Continue current medication(s) as noted in the history of present illness.  \  3. Dyslipidemia  -     Lipid Panel  -     Comprehensive Metabolic Panel  -     TSH  -     CBC & Differential   Continue current medication(s) as noted in the history of present illness.    4. Acquired hypothyroidism  -     TSH   Continue current medication(s) as noted in the history of present illness.    5. Class 2 severe obesity with serious comorbidity and body mass index (BMI) of 36.0 to 36.9 in adult, unspecified obesity type  -     Tirzepatide (MOUNJARO) 2.5 MG/0.5ML solution pen-injector pen; Inject 0.5 mL under the skin into the appropriate area as directed 1 (One) Time Per Week.  Dispense: 2 mL; Refill: 0- NEW   Discussed heart healthy diet and exercise.    6. LBBB (left bundle branch block)    7. Benign colonic polyp   Colonoscopy up-to-date.    8. Colon neoplasm, Tis   Colonoscopy  up-to-date.    9. Primary osteoarthritis involving multiple joints   Continue current medication(s) as noted in the history of present illness.    10. Leukocytes in urine  -     Urine Culture - Urine, Urine, Clean Catch       I recommended the COVID-19 bivalent vaccine and ? RSV vaccine at the pharmacy, September 2024.  I also recommended a yearly Influenza vaccine.      Return in about 3 months (around 10/12/2024) for Recheck Diabetes, fasting (after 10/12/24).

## 2024-07-12 NOTE — PATIENT INSTRUCTIONS
I recommend the COVID-19 bivalent vaccine and ? RSV vaccine at the pharmacy, September 2024, as we discussed.  I also recommend a yearly Influenza vaccine.

## 2024-07-14 LAB — BACTERIA SPEC AEROBE CULT: NO GROWTH

## 2024-07-29 DIAGNOSIS — E11.9 TYPE 2 DIABETES MELLITUS WITHOUT COMPLICATION, WITHOUT LONG-TERM CURRENT USE OF INSULIN: ICD-10-CM

## 2024-07-29 DIAGNOSIS — E66.01 CLASS 2 SEVERE OBESITY WITH SERIOUS COMORBIDITY AND BODY MASS INDEX (BMI) OF 36.0 TO 36.9 IN ADULT, UNSPECIFIED OBESITY TYPE: ICD-10-CM

## 2024-08-22 DIAGNOSIS — E66.01 CLASS 2 SEVERE OBESITY WITH SERIOUS COMORBIDITY AND BODY MASS INDEX (BMI) OF 36.0 TO 36.9 IN ADULT, UNSPECIFIED OBESITY TYPE: ICD-10-CM

## 2024-08-22 DIAGNOSIS — E11.9 TYPE 2 DIABETES MELLITUS WITHOUT COMPLICATION, WITHOUT LONG-TERM CURRENT USE OF INSULIN: ICD-10-CM

## 2024-08-22 RX ORDER — TIRZEPATIDE 5 MG/.5ML
INJECTION, SOLUTION SUBCUTANEOUS
Qty: 2 ML | Refills: 0 | Status: SHIPPED | OUTPATIENT
Start: 2024-08-22

## 2024-09-12 DIAGNOSIS — E66.01 CLASS 2 SEVERE OBESITY WITH SERIOUS COMORBIDITY AND BODY MASS INDEX (BMI) OF 36.0 TO 36.9 IN ADULT, UNSPECIFIED OBESITY TYPE: ICD-10-CM

## 2024-09-12 DIAGNOSIS — E03.9 ACQUIRED HYPOTHYROIDISM: Chronic | ICD-10-CM

## 2024-09-12 DIAGNOSIS — E11.9 TYPE 2 DIABETES MELLITUS WITHOUT COMPLICATION, WITHOUT LONG-TERM CURRENT USE OF INSULIN: ICD-10-CM

## 2024-09-12 RX ORDER — TIRZEPATIDE 5 MG/.5ML
INJECTION, SOLUTION SUBCUTANEOUS
Qty: 2 ML | Refills: 0 | Status: SHIPPED | OUTPATIENT
Start: 2024-09-12

## 2024-09-12 RX ORDER — LEVOTHYROXINE SODIUM 100 UG/1
100 TABLET ORAL DAILY
Qty: 90 TABLET | Refills: 1 | Status: SHIPPED | OUTPATIENT
Start: 2024-09-12

## 2024-09-26 ENCOUNTER — PRIOR AUTHORIZATION (OUTPATIENT)
Dept: INTERNAL MEDICINE | Facility: CLINIC | Age: 74
End: 2024-09-26
Payer: MEDICARE

## 2024-09-26 DIAGNOSIS — E11.9 TYPE 2 DIABETES MELLITUS WITHOUT COMPLICATION, WITHOUT LONG-TERM CURRENT USE OF INSULIN: Primary | ICD-10-CM

## 2024-09-27 DIAGNOSIS — I10 PRIMARY HYPERTENSION: Chronic | ICD-10-CM

## 2024-09-27 RX ORDER — LISINOPRIL 40 MG/1
40 TABLET ORAL DAILY
Qty: 90 TABLET | Refills: 3 | Status: SHIPPED | OUTPATIENT
Start: 2024-09-27

## 2024-09-27 RX ORDER — NEBIVOLOL 5 MG/1
5 TABLET ORAL DAILY
Qty: 90 TABLET | Refills: 0 | Status: SHIPPED | OUTPATIENT
Start: 2024-09-27

## 2024-09-30 ENCOUNTER — TRANSCRIBE ORDERS (OUTPATIENT)
Dept: ADMINISTRATIVE | Facility: HOSPITAL | Age: 74
End: 2024-09-30
Payer: MEDICARE

## 2024-09-30 ENCOUNTER — TRANSCRIBE ORDERS (OUTPATIENT)
Dept: INTERNAL MEDICINE | Facility: CLINIC | Age: 74
End: 2024-09-30
Payer: MEDICARE

## 2024-09-30 DIAGNOSIS — Z12.31 SCREENING MAMMOGRAM FOR BREAST CANCER: Primary | ICD-10-CM

## 2024-10-15 ENCOUNTER — OFFICE VISIT (OUTPATIENT)
Dept: INTERNAL MEDICINE | Facility: CLINIC | Age: 74
End: 2024-10-15
Payer: MEDICARE

## 2024-10-15 VITALS
WEIGHT: 212 LBS | DIASTOLIC BLOOD PRESSURE: 82 MMHG | OXYGEN SATURATION: 97 % | HEART RATE: 68 BPM | SYSTOLIC BLOOD PRESSURE: 114 MMHG | BODY MASS INDEX: 34.07 KG/M2 | TEMPERATURE: 98 F | HEIGHT: 66 IN

## 2024-10-15 DIAGNOSIS — K63.5 BENIGN COLONIC POLYP: ICD-10-CM

## 2024-10-15 DIAGNOSIS — E11.9 TYPE 2 DIABETES MELLITUS WITHOUT COMPLICATION, WITHOUT LONG-TERM CURRENT USE OF INSULIN: Primary | ICD-10-CM

## 2024-10-15 DIAGNOSIS — M15.0 PRIMARY OSTEOARTHRITIS INVOLVING MULTIPLE JOINTS: Chronic | ICD-10-CM

## 2024-10-15 DIAGNOSIS — I10 PRIMARY HYPERTENSION: ICD-10-CM

## 2024-10-15 DIAGNOSIS — R11.0 NAUSEA: ICD-10-CM

## 2024-10-15 DIAGNOSIS — E78.5 DYSLIPIDEMIA: ICD-10-CM

## 2024-10-15 DIAGNOSIS — E03.9 ACQUIRED HYPOTHYROIDISM: Chronic | ICD-10-CM

## 2024-10-15 LAB
ALBUMIN SERPL-MCNC: 4.2 G/DL (ref 3.5–5.2)
ALBUMIN/GLOB SERPL: 1.4 G/DL
ALP SERPL-CCNC: 124 U/L (ref 39–117)
ALT SERPL W P-5'-P-CCNC: 14 U/L (ref 1–33)
ANION GAP SERPL CALCULATED.3IONS-SCNC: 10.4 MMOL/L (ref 5–15)
AST SERPL-CCNC: 18 U/L (ref 1–32)
BASOPHILS # BLD AUTO: 0.04 10*3/MM3 (ref 0–0.2)
BASOPHILS NFR BLD AUTO: 0.4 % (ref 0–1.5)
BILIRUB SERPL-MCNC: 0.3 MG/DL (ref 0–1.2)
BUN SERPL-MCNC: 13 MG/DL (ref 8–23)
BUN/CREAT SERPL: 15.5 (ref 7–25)
CALCIUM SPEC-SCNC: 9.3 MG/DL (ref 8.6–10.5)
CHLORIDE SERPL-SCNC: 103 MMOL/L (ref 98–107)
CO2 SERPL-SCNC: 23.6 MMOL/L (ref 22–29)
CREAT SERPL-MCNC: 0.84 MG/DL (ref 0.57–1)
DEPRECATED RDW RBC AUTO: 43.8 FL (ref 37–54)
EGFRCR SERPLBLD CKD-EPI 2021: 72.6 ML/MIN/1.73
EOSINOPHIL # BLD AUTO: 0.12 10*3/MM3 (ref 0–0.4)
EOSINOPHIL NFR BLD AUTO: 1.3 % (ref 0.3–6.2)
ERYTHROCYTE [DISTWIDTH] IN BLOOD BY AUTOMATED COUNT: 12.7 % (ref 12.3–15.4)
EXPIRATION DATE: NORMAL
GLOBULIN UR ELPH-MCNC: 3 GM/DL
GLUCOSE SERPL-MCNC: 80 MG/DL (ref 65–99)
HBA1C MFR BLD: 5.4 % (ref 4.5–5.7)
HCT VFR BLD AUTO: 43.7 % (ref 34–46.6)
HGB BLD-MCNC: 14 G/DL (ref 12–15.9)
IMM GRANULOCYTES # BLD AUTO: 0.03 10*3/MM3 (ref 0–0.05)
IMM GRANULOCYTES NFR BLD AUTO: 0.3 % (ref 0–0.5)
LYMPHOCYTES # BLD AUTO: 2.13 10*3/MM3 (ref 0.7–3.1)
LYMPHOCYTES NFR BLD AUTO: 23.5 % (ref 19.6–45.3)
Lab: NORMAL
MCH RBC QN AUTO: 30.4 PG (ref 26.6–33)
MCHC RBC AUTO-ENTMCNC: 32 G/DL (ref 31.5–35.7)
MCV RBC AUTO: 94.8 FL (ref 79–97)
MONOCYTES # BLD AUTO: 0.53 10*3/MM3 (ref 0.1–0.9)
MONOCYTES NFR BLD AUTO: 5.8 % (ref 5–12)
NEUTROPHILS NFR BLD AUTO: 6.21 10*3/MM3 (ref 1.7–7)
NEUTROPHILS NFR BLD AUTO: 68.7 % (ref 42.7–76)
NRBC BLD AUTO-RTO: 0 /100 WBC (ref 0–0.2)
PLATELET # BLD AUTO: 286 10*3/MM3 (ref 140–450)
PMV BLD AUTO: 10 FL (ref 6–12)
POTASSIUM SERPL-SCNC: 4.2 MMOL/L (ref 3.5–5.2)
PROT SERPL-MCNC: 7.2 G/DL (ref 6–8.5)
RBC # BLD AUTO: 4.61 10*6/MM3 (ref 3.77–5.28)
SODIUM SERPL-SCNC: 137 MMOL/L (ref 136–145)
WBC NRBC COR # BLD AUTO: 9.06 10*3/MM3 (ref 3.4–10.8)

## 2024-10-15 PROCEDURE — 1126F AMNT PAIN NOTED NONE PRSNT: CPT | Performed by: INTERNAL MEDICINE

## 2024-10-15 PROCEDURE — 1159F MED LIST DOCD IN RCRD: CPT | Performed by: INTERNAL MEDICINE

## 2024-10-15 PROCEDURE — G2211 COMPLEX E/M VISIT ADD ON: HCPCS | Performed by: INTERNAL MEDICINE

## 2024-10-15 PROCEDURE — 80053 COMPREHEN METABOLIC PANEL: CPT | Performed by: INTERNAL MEDICINE

## 2024-10-15 PROCEDURE — 1111F DSCHRG MED/CURRENT MED MERGE: CPT | Performed by: INTERNAL MEDICINE

## 2024-10-15 PROCEDURE — 3074F SYST BP LT 130 MM HG: CPT | Performed by: INTERNAL MEDICINE

## 2024-10-15 PROCEDURE — 85025 COMPLETE CBC W/AUTO DIFF WBC: CPT | Performed by: INTERNAL MEDICINE

## 2024-10-15 PROCEDURE — 99214 OFFICE O/P EST MOD 30 MIN: CPT | Performed by: INTERNAL MEDICINE

## 2024-10-15 PROCEDURE — 3044F HG A1C LEVEL LT 7.0%: CPT | Performed by: INTERNAL MEDICINE

## 2024-10-15 PROCEDURE — 83036 HEMOGLOBIN GLYCOSYLATED A1C: CPT | Performed by: INTERNAL MEDICINE

## 2024-10-15 PROCEDURE — 3079F DIAST BP 80-89 MM HG: CPT | Performed by: INTERNAL MEDICINE

## 2024-10-15 PROCEDURE — 1160F RVW MEDS BY RX/DR IN RCRD: CPT | Performed by: INTERNAL MEDICINE

## 2024-10-15 RX ORDER — ONDANSETRON 8 MG/1
8 TABLET, ORALLY DISINTEGRATING ORAL EVERY 8 HOURS PRN
Qty: 30 TABLET | Refills: 0 | Status: SHIPPED | OUTPATIENT
Start: 2024-10-15

## 2024-10-15 NOTE — PROGRESS NOTES
Subjective       Angelina Pimentel is a 74 y.o. female.     Chief Complaint   Patient presents with    Diabetes     6-month follow up     Hypertension    Hyperlipidemia    Hypothyroidism       History obtained from the patient.      History of Present Illness     Cardiac Follow-up: The patient is here today for a 6 month follow-up.        Her Diabetes has been stable.   Medication: Mounjaro, Metformin, Simvastatin, and Lisinopril.   Her Hypertension has been stable.   Medication: Lisinopril, Amlodipine, and Bystolic.   Her Hyperlipidemia has been stable.   Her LDL goal is < 70 and last LDL was 78, .   Medication: Simvastiatin.   Side Effects: None.      Interval Events: Last Hemoglobin A1c on 7/12/2024 was 5.9.  Mounjaro was started.  She states her blood sugar at home has been , fasting.  She does not check postprandial levels.  She denies episodes of low blood sugar.  She has not been checking her blood pressure at home recently.  Last Ophthalmology visit was 7/19/2024, glaucoma suspect but no retinopathy [facility]  She does check her feet daily.      Symptoms: Denies chest pain, dyspnea, FLORES, orthopnea, PND, palpitations, syncope, lower extremity edema, claudication, lightheadedness, and dizziness.   Associated Symptoms: Weight decreased 16 pounds intentionally in the past 3 months.  No fatigue, headache, polydipsia, polyuria, myalgias, arthralgias, visual impairment, memory loss, or concentration problems.   Denies foot pain, numbness / tingling of the feet, and a foot ulcer.     Lifestyle: She reports a heart healthy diet.  She works out with a  once per week.  Tobacco Use: Never a smoker.      Hypothyroidism Follow-Up: The patient is being seen for follow-up of Hypothyroidism, which is stable.  Interval Events: On 7/12/2024, TSH was normal.  Symptoms:Weight decreased 16 pounds intentionally in the past 3 months.   Denies diarrhea, constipation, heat/cold intolerance, fatigue, memory  loss, trouble concentrating, dry skin, and hair loss.   Associated Symptoms: no arthralgias, myalgias, or paresthesias.   Medications:  Levothyroxine (Synthroid).       Colonic Polyp Follow-up: The patient is being seen for a routine clinic follow-up of Colon Polyp(s), which is stable.   Interval Events: Last Colonoscopy 8/1/2023 showed a transverse colon tubular adenoma, and 2 ascending colon tubular adenomas.  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  She had a partial colon resection on 10/13/2023.  Pathology was c/w limited residual adenoma without high-grade dysplasia or malignancy, and clear surgical margins.  In addition, there was a benign calcified right pelvic nodule.  She reports her follow-up colonoscopy is scheduled for 10/21/2024.  She is requesting a prescription to treat the nausea associated with the prep.  Symptoms:  No abdominal pain, diarrhea, constipation, hematochezia, melena, or change in stool.   Medication:  None.         Osteoarthritis Follow-Up: The patient is being seen for a routine clinic follow-up of Osteoarthritis, which has been stable.   Osteoarthritis, no complications.    Interval Events: None.  Symptoms: Reports stable left knee pain.   No arthralgias, back pain, neck pain, hip pain, shoulder pain, or hand/wrist pain.  Associated Symptoms: No joint swelling or stiffness.   Medications:  Ibuprofen prn.     Current Outpatient Medications on File Prior to Visit   Medication Sig Dispense Refill    amLODIPine (NORVASC) 5 MG tablet Take 1 tablet by mouth Daily. 90 tablet 3    Cetirizine HCl 10 MG capsule Take 10 mg by mouth Every Night.      glucose blood test strip Daily.      Lancets (onetouch ultrasoft) lancets USE ONE LANCET TO TEST DAILY 100 each 3    levothyroxine (SYNTHROID, LEVOTHROID) 100 MCG tablet TAKE 1 TABLET BY MOUTH DAILY 90 tablet 1    lisinopril  "(PRINIVIL,ZESTRIL) 40 MG tablet TAKE 1 TABLET BY MOUTH DAILY 90 tablet 3    metFORMIN (GLUCOPHAGE) 500 MG tablet TAKE ONE TABLET BY MOUTH DAILY WITH BREAKFAST 90 tablet 3    Multiple Vitamins-Minerals (CENTRUM SILVER ADULT 50+ PO) Take 1 tablet by mouth Daily.      nebivolol (BYSTOLIC) 5 MG tablet TAKE 1 TABLET BY MOUTH DAILY 90 tablet 0    simvastatin (ZOCOR) 20 MG tablet TAKE ONE TABLET BY MOUTH ONCE NIGHTLY 90 tablet 1    Tirzepatide (MOUNJARO) 7.5 MG/0.5ML solution pen-injector pen Inject 0.5 mL under the skin into the appropriate area as directed 1 (One) Time Per Week. 2 mL 0     No current facility-administered medications on file prior to visit.       Current outpatient and discharge medications have been reconciled for the patient.  Reviewed by: Leola Villa MD        The following portions of the patient's history were reviewed and updated as appropriate: allergies, current medications, past family history, past medical history, past social history, past surgical history, and problem list.    Review of Systems   Constitutional:  Negative for fatigue and unexpected weight change.   Eyes:  Negative for visual disturbance.   Respiratory:  Negative for cough, shortness of breath and wheezing.    Cardiovascular:  Negative for chest pain, palpitations and leg swelling.        No FLORES, orthopnea, or claudication.   Gastrointestinal:  Negative for abdominal pain, blood in stool, constipation, diarrhea, nausea and vomiting.        Denies melena.   Endocrine: Negative for polydipsia, polyphagia and polyuria.   Musculoskeletal:  Negative for arthralgias and myalgias.   Neurological:  Negative for dizziness, syncope, light-headedness and headaches.        No memory issues.   Psychiatric/Behavioral:  Negative for decreased concentration.          Objective       Blood pressure 114/82, pulse 68, temperature 98 °F (36.7 °C), temperature source Temporal, height 168.9 cm (66.5\"), weight 96.2 kg (212 lb), SpO2 97%, not " currently breastfeeding.  Body mass index is 33.71 kg/m².      Physical Exam  Vitals and nursing note reviewed.   Constitutional:       Appearance: She is well-developed.      Comments: BMI greater than 30   Neck:      Thyroid: No thyroid mass or thyromegaly.      Vascular: No carotid bruit.   Cardiovascular:      Rate and Rhythm: Normal rate and regular rhythm.      Pulses: Normal pulses.      Heart sounds: Normal heart sounds. No murmur heard.  Pulmonary:      Effort: Pulmonary effort is normal.      Breath sounds: Normal breath sounds.   Musculoskeletal:      Right lower leg: No edema.      Left lower leg: No edema.   Neurological:      Mental Status: She is alert.   Psychiatric:         Mood and Affect: Mood normal.       Results for orders placed or performed in visit on 10/15/24   POC Glycosylated Hemoglobin (Hb A1C)    Collection Time: 10/15/24  9:53 AM    Specimen: Blood   Result Value Ref Range    Hemoglobin A1C 5.4 4.5 - 5.7 %    Lot Number [id number]     Expiration Date 06/20/26        Assessment / Plan:  Diagnoses and all orders for this visit:    1. Type 2 diabetes mellitus without complication, without long-term current use of insulin (Primary)  -     POC Glycosylated Hemoglobin (Hb A1C)  -     CBC & Differential  -     Comprehensive Metabolic Panel   Continue current medication(s) as noted in the history of present illness.    2. Primary hypertension  -     CBC & Differential  -     Comprehensive Metabolic Panel   Continue current medication(s) as noted in the history of present illness.    3. Dyslipidemia  -     CBC & Differential  -     Comprehensive Metabolic Panel   Continue current medication(s) as noted in the history of present illness.    4. Acquired hypothyroidism   Continue current medication(s) as noted in the history of present illness.    5. Benign colonic polyp   Colonoscopy is scheduled for 10/21/2024.     6. Primary osteoarthritis involving multiple joints   Continue current  medication(s) as noted in the history of present illness.    7. Nausea  -     ondansetron ODT (ZOFRAN-ODT) 8 MG disintegrating tablet; Place 1 tablet on the tongue Every 8 (Eight) Hours As Needed for Nausea or Vomiting.  Dispense: 30 tablet; Refill: 0              Return in about 6 months (around 4/15/2025) for schedule Subseq Medicare Wellness Exam and Annual physical, fasting (same date).

## 2024-10-23 DIAGNOSIS — E11.9 TYPE 2 DIABETES MELLITUS WITHOUT COMPLICATION, WITHOUT LONG-TERM CURRENT USE OF INSULIN: Primary | ICD-10-CM

## 2024-10-31 LAB
NCCN CRITERIA FLAG: ABNORMAL
TYRER CUZICK SCORE: 2.6

## 2024-10-31 NOTE — PROGRESS NOTES
This patient recently took the CARE risk assessment as part of their mammogram appointment. Based on the patient's responses, NCCN criteria for genetic testing was met.     Navigator follow-up:   The patient declined genetic testing at the time of assessment completion.

## 2024-11-15 ENCOUNTER — PATIENT MESSAGE (OUTPATIENT)
Dept: INTERNAL MEDICINE | Facility: CLINIC | Age: 74
End: 2024-11-15
Payer: MEDICARE

## 2024-11-18 DIAGNOSIS — E11.9 TYPE 2 DIABETES MELLITUS WITHOUT COMPLICATION, WITHOUT LONG-TERM CURRENT USE OF INSULIN: ICD-10-CM

## 2024-11-18 RX ORDER — TIRZEPATIDE 10 MG/.5ML
INJECTION, SOLUTION SUBCUTANEOUS
Qty: 2 ML | OUTPATIENT
Start: 2024-11-18

## 2024-11-22 DIAGNOSIS — E78.1 ESSENTIAL HYPERTRIGLYCERIDEMIA: Chronic | ICD-10-CM

## 2024-11-22 RX ORDER — SIMVASTATIN 20 MG
TABLET ORAL
Qty: 90 TABLET | Refills: 1 | Status: SHIPPED | OUTPATIENT
Start: 2024-11-22

## 2024-11-25 DIAGNOSIS — E11.9 TYPE 2 DIABETES MELLITUS WITHOUT COMPLICATION, WITHOUT LONG-TERM CURRENT USE OF INSULIN: ICD-10-CM

## 2024-11-25 RX ORDER — LANCETS
EACH MISCELLANEOUS
Qty: 100 EACH | Refills: 3 | Status: SHIPPED | OUTPATIENT
Start: 2024-11-25

## 2024-12-09 ENCOUNTER — PATIENT MESSAGE (OUTPATIENT)
Dept: INTERNAL MEDICINE | Facility: CLINIC | Age: 74
End: 2024-12-09
Payer: MEDICARE

## 2024-12-09 DIAGNOSIS — E11.9 TYPE 2 DIABETES MELLITUS WITHOUT COMPLICATION, WITHOUT LONG-TERM CURRENT USE OF INSULIN: Primary | ICD-10-CM

## 2024-12-09 RX ORDER — TIRZEPATIDE 12.5 MG/.5ML
INJECTION, SOLUTION SUBCUTANEOUS
Qty: 2 ML | Refills: 0 | Status: SHIPPED | OUTPATIENT
Start: 2024-12-09 | End: 2024-12-09 | Stop reason: DRUGHIGH

## 2024-12-25 DIAGNOSIS — I10 PRIMARY HYPERTENSION: Chronic | ICD-10-CM

## 2024-12-26 RX ORDER — NEBIVOLOL 5 MG/1
5 TABLET ORAL DAILY
Qty: 90 TABLET | Refills: 0 | Status: SHIPPED | OUTPATIENT
Start: 2024-12-26

## 2024-12-30 RX ORDER — BLOOD SUGAR DIAGNOSTIC
STRIP MISCELLANEOUS
Qty: 100 EACH | Refills: 3 | Status: SHIPPED | OUTPATIENT
Start: 2024-12-30

## 2025-01-24 ENCOUNTER — HOSPITAL ENCOUNTER (OUTPATIENT)
Dept: MAMMOGRAPHY | Facility: HOSPITAL | Age: 75
Discharge: HOME | End: 2025-01-24
Admitting: INTERNAL MEDICINE
Payer: MEDICARE

## 2025-01-24 DIAGNOSIS — Z12.31 SCREENING MAMMOGRAM FOR BREAST CANCER: ICD-10-CM

## 2025-01-24 PROCEDURE — 77067 SCR MAMMO BI INCL CAD: CPT

## 2025-01-24 PROCEDURE — 77063 BREAST TOMOSYNTHESIS BI: CPT

## 2025-01-28 DIAGNOSIS — E11.9 TYPE 2 DIABETES MELLITUS WITHOUT COMPLICATION, WITHOUT LONG-TERM CURRENT USE OF INSULIN: ICD-10-CM

## 2025-03-09 DIAGNOSIS — E03.9 ACQUIRED HYPOTHYROIDISM: Chronic | ICD-10-CM

## 2025-03-10 RX ORDER — LEVOTHYROXINE SODIUM 100 UG/1
100 TABLET ORAL DAILY
Qty: 90 TABLET | Refills: 1 | Status: SHIPPED | OUTPATIENT
Start: 2025-03-10

## 2025-03-20 DIAGNOSIS — E11.9 TYPE 2 DIABETES MELLITUS WITHOUT COMPLICATION, WITHOUT LONG-TERM CURRENT USE OF INSULIN: ICD-10-CM

## 2025-03-20 RX ORDER — AVOBENZONE, HOMOSALATE, OCTISALATE, OCTOCRYLENE 30; 40; 45; 26 MG/ML; MG/ML; MG/ML; MG/ML
CREAM TOPICAL
Qty: 100 EACH | Refills: 3 | Status: SHIPPED | OUTPATIENT
Start: 2025-03-20

## 2025-03-24 DIAGNOSIS — I10 PRIMARY HYPERTENSION: Chronic | ICD-10-CM

## 2025-03-24 RX ORDER — NEBIVOLOL 5 MG/1
5 TABLET ORAL DAILY
Qty: 90 TABLET | Refills: 0 | Status: SHIPPED | OUTPATIENT
Start: 2025-03-24

## 2025-03-25 ENCOUNTER — PATIENT MESSAGE (OUTPATIENT)
Dept: INTERNAL MEDICINE | Facility: CLINIC | Age: 75
End: 2025-03-25
Payer: MEDICARE

## 2025-03-25 RX ORDER — BLOOD SUGAR DIAGNOSTIC
STRIP MISCELLANEOUS
Qty: 100 EACH | Refills: 3 | Status: SHIPPED | OUTPATIENT
Start: 2025-03-25

## 2025-03-25 NOTE — TELEPHONE ENCOUNTER
RECEIVED FAX TO REFILL PRESCRIPTION,      Paperwork signed   per Dr. Daisy tafoya to refill electronically

## 2025-03-26 ENCOUNTER — TELEPHONE (OUTPATIENT)
Dept: INTERNAL MEDICINE | Facility: CLINIC | Age: 75
End: 2025-03-26
Payer: MEDICARE

## 2025-03-26 NOTE — TELEPHONE ENCOUNTER
Angelina LING [facility]elal[facility] (supporting You)        3/25/25  3:54 PM  [facility] will not refill my prescription for lancets until they get a directive from you as to how many times a day I should use them. As long as I have had this prescription, insurance has never made this a stipulation.  The directions have always been “daily” but now I guess they want “once a day”, “twice” , etc.  I always just test once a day. Angelina Pimentel   :  1950. Thanks!

## 2025-03-28 ENCOUNTER — TELEPHONE (OUTPATIENT)
Dept: INTERNAL MEDICINE | Facility: CLINIC | Age: 75
End: 2025-03-28
Payer: MEDICARE

## 2025-03-28 DIAGNOSIS — B37.2 CANDIDAL SKIN INFECTION: Primary | ICD-10-CM

## 2025-03-28 RX ORDER — FLUCONAZOLE 150 MG/1
150 TABLET ORAL ONCE
Qty: 2 TABLET | Refills: 1 | Status: SHIPPED | OUTPATIENT
Start: 2025-03-28 | End: 2025-03-28

## 2025-03-28 NOTE — TELEPHONE ENCOUNTER
Caller: Angelina Pimentel    Relationship: Self    Best call back number: [phone]     What medication are you requesting: SOMETHING FOR SYMPTOMS     What are your current symptoms: ITCHING IN VAGINAL AREA     How long have you been experiencing symptoms: SINCE 3/27/25     Have you had these symptoms before:    [x] Yes  [] No    Have you been treated for these symptoms before:   [x] Yes  [] No    If a prescription is needed, what is your preferred pharmacy and phone number: [id number] [place] [facility] [address]  - [phone]  - [phone]     Additional notes:

## 2025-03-28 NOTE — TELEPHONE ENCOUNTER
Pt confirmed it is the same rash from the [employer] message. She did  diflucan so she is good. Thanks.

## 2025-03-28 NOTE — TELEPHONE ENCOUNTER
Call patient please.  Is this the same rash she referred to in her [employer] email message?  I did send the Diflucan in earlier for that.

## 2025-04-15 ENCOUNTER — OFFICE VISIT (OUTPATIENT)
Dept: INTERNAL MEDICINE | Facility: CLINIC | Age: 75
End: 2025-04-15
Payer: MEDICARE

## 2025-04-15 ENCOUNTER — TELEPHONE (OUTPATIENT)
Dept: INTERNAL MEDICINE | Facility: CLINIC | Age: 75
End: 2025-04-15

## 2025-04-15 ENCOUNTER — RESULTS FOLLOW-UP (OUTPATIENT)
Dept: INTERNAL MEDICINE | Facility: CLINIC | Age: 75
End: 2025-04-15

## 2025-04-15 VITALS
HEIGHT: 67 IN | WEIGHT: 178.8 LBS | DIASTOLIC BLOOD PRESSURE: 70 MMHG | TEMPERATURE: 98 F | SYSTOLIC BLOOD PRESSURE: 110 MMHG | HEART RATE: 68 BPM | RESPIRATION RATE: 16 BRPM | BODY MASS INDEX: 28.06 KG/M2

## 2025-04-15 DIAGNOSIS — Z23 NEED FOR COVID-19 VACCINE: ICD-10-CM

## 2025-04-15 DIAGNOSIS — Z00.00 MEDICARE ANNUAL WELLNESS VISIT, SUBSEQUENT: Primary | ICD-10-CM

## 2025-04-15 DIAGNOSIS — Z13.6 SCREENING FOR CARDIOVASCULAR CONDITION: ICD-10-CM

## 2025-04-15 DIAGNOSIS — G47.33 OBSTRUCTIVE SLEEP APNEA SYNDROME: ICD-10-CM

## 2025-04-15 DIAGNOSIS — M15.0 PRIMARY OSTEOARTHRITIS INVOLVING MULTIPLE JOINTS: Chronic | ICD-10-CM

## 2025-04-15 DIAGNOSIS — M25.551 RIGHT HIP PAIN: ICD-10-CM

## 2025-04-15 DIAGNOSIS — E78.5 DYSLIPIDEMIA: ICD-10-CM

## 2025-04-15 DIAGNOSIS — Z00.00 ENCOUNTER FOR HEALTH MAINTENANCE EXAMINATION IN ADULT: ICD-10-CM

## 2025-04-15 DIAGNOSIS — I44.7 LBBB (LEFT BUNDLE BRANCH BLOCK): ICD-10-CM

## 2025-04-15 DIAGNOSIS — D01.0 COLON NEOPLASM, TIS: ICD-10-CM

## 2025-04-15 DIAGNOSIS — K63.5 BENIGN COLONIC POLYP: ICD-10-CM

## 2025-04-15 DIAGNOSIS — E11.9 TYPE 2 DIABETES MELLITUS WITHOUT COMPLICATION, WITHOUT LONG-TERM CURRENT USE OF INSULIN: ICD-10-CM

## 2025-04-15 DIAGNOSIS — I10 PRIMARY HYPERTENSION: ICD-10-CM

## 2025-04-15 DIAGNOSIS — E03.9 ACQUIRED HYPOTHYROIDISM: Chronic | ICD-10-CM

## 2025-04-15 LAB
ALBUMIN SERPL-MCNC: 4 G/DL (ref 3.5–5.2)
ALBUMIN/GLOB SERPL: 1.3 G/DL
ALP SERPL-CCNC: 115 U/L (ref 39–117)
ALT SERPL W P-5'-P-CCNC: 10 U/L (ref 1–33)
ANION GAP SERPL CALCULATED.3IONS-SCNC: 9 MMOL/L (ref 5–15)
AST SERPL-CCNC: 17 U/L (ref 1–32)
BASOPHILS # BLD AUTO: 0.04 10*3/MM3 (ref 0–0.2)
BASOPHILS NFR BLD AUTO: 0.5 % (ref 0–1.5)
BILIRUB SERPL-MCNC: 0.3 MG/DL (ref 0–1.2)
BUN SERPL-MCNC: 10 MG/DL (ref 8–23)
BUN/CREAT SERPL: 13 (ref 7–25)
CALCIUM SPEC-SCNC: 9.2 MG/DL (ref 8.6–10.5)
CHLORIDE SERPL-SCNC: 106 MMOL/L (ref 98–107)
CHOLEST SERPL-MCNC: 124 MG/DL (ref 0–200)
CO2 SERPL-SCNC: 24 MMOL/L (ref 22–29)
CREAT SERPL-MCNC: 0.77 MG/DL (ref 0.57–1)
DEPRECATED RDW RBC AUTO: 43.9 FL (ref 37–54)
EGFRCR SERPLBLD CKD-EPI 2021: 80.6 ML/MIN/1.73
EOSINOPHIL # BLD AUTO: 0.13 10*3/MM3 (ref 0–0.4)
EOSINOPHIL NFR BLD AUTO: 1.6 % (ref 0.3–6.2)
ERYTHROCYTE [DISTWIDTH] IN BLOOD BY AUTOMATED COUNT: 12.8 % (ref 12.3–15.4)
EXPIRATION DATE: NORMAL
GLOBULIN UR ELPH-MCNC: 3.2 GM/DL
GLUCOSE SERPL-MCNC: 83 MG/DL (ref 65–99)
HBA1C MFR BLD: 5.3 % (ref 4.5–5.7)
HCT VFR BLD AUTO: 42 % (ref 34–46.6)
HDLC SERPL-MCNC: 47 MG/DL (ref 40–60)
HGB BLD-MCNC: 13.7 G/DL (ref 12–15.9)
IMM GRANULOCYTES # BLD AUTO: 0.03 10*3/MM3 (ref 0–0.05)
IMM GRANULOCYTES NFR BLD AUTO: 0.4 % (ref 0–0.5)
LDLC SERPL CALC-MCNC: 62 MG/DL (ref 0–100)
LDLC/HDLC SERPL: 1.33 {RATIO}
LYMPHOCYTES # BLD AUTO: 2.08 10*3/MM3 (ref 0.7–3.1)
LYMPHOCYTES NFR BLD AUTO: 24.8 % (ref 19.6–45.3)
Lab: NORMAL
MCH RBC QN AUTO: 30.9 PG (ref 26.6–33)
MCHC RBC AUTO-ENTMCNC: 32.6 G/DL (ref 31.5–35.7)
MCV RBC AUTO: 94.6 FL (ref 79–97)
MONOCYTES # BLD AUTO: 0.43 10*3/MM3 (ref 0.1–0.9)
MONOCYTES NFR BLD AUTO: 5.1 % (ref 5–12)
NEUTROPHILS NFR BLD AUTO: 5.67 10*3/MM3 (ref 1.7–7)
NEUTROPHILS NFR BLD AUTO: 67.6 % (ref 42.7–76)
NRBC BLD AUTO-RTO: 0 /100 WBC (ref 0–0.2)
PLATELET # BLD AUTO: 314 10*3/MM3 (ref 140–450)
PMV BLD AUTO: 10.2 FL (ref 6–12)
POTASSIUM SERPL-SCNC: 4.1 MMOL/L (ref 3.5–5.2)
PROT SERPL-MCNC: 7.2 G/DL (ref 6–8.5)
RBC # BLD AUTO: 4.44 10*6/MM3 (ref 3.77–5.28)
SODIUM SERPL-SCNC: 139 MMOL/L (ref 136–145)
T4 FREE SERPL-MCNC: 1.62 NG/DL (ref 0.92–1.68)
TRIGL SERPL-MCNC: 72 MG/DL (ref 0–150)
TSH SERPL DL<=0.05 MIU/L-ACNC: 1.4 UIU/ML (ref 0.27–4.2)
VLDLC SERPL-MCNC: 15 MG/DL (ref 5–40)
WBC NRBC COR # BLD AUTO: 8.38 10*3/MM3 (ref 3.4–10.8)

## 2025-04-15 PROCEDURE — 84443 ASSAY THYROID STIM HORMONE: CPT | Performed by: INTERNAL MEDICINE

## 2025-04-15 PROCEDURE — 84439 ASSAY OF FREE THYROXINE: CPT | Performed by: INTERNAL MEDICINE

## 2025-04-15 PROCEDURE — 80053 COMPREHEN METABOLIC PANEL: CPT | Performed by: INTERNAL MEDICINE

## 2025-04-15 PROCEDURE — 80061 LIPID PANEL: CPT | Performed by: INTERNAL MEDICINE

## 2025-04-15 PROCEDURE — 85025 COMPLETE CBC W/AUTO DIFF WBC: CPT | Performed by: INTERNAL MEDICINE

## 2025-04-15 RX ORDER — MELOXICAM 15 MG/1
15 TABLET ORAL DAILY PRN
Qty: 30 TABLET | Refills: 0 | Status: SHIPPED | OUTPATIENT
Start: 2025-04-15

## 2025-04-15 RX ORDER — KETOCONAZOLE 20 MG/G
CREAM TOPICAL
COMMUNITY
Start: 2025-04-05

## 2025-04-15 NOTE — PATIENT INSTRUCTIONS
Health Maintenance for Postmenopausal Women  Menopause is a normal process in which your ability to get pregnant comes to an end. This process happens slowly over many months or years, usually between the ages of 48 and 55. Menopause is complete when you have missed your menstrual period for 12 months.  It is important to talk with your health care provider about some of the most common conditions that affect women after menopause (postmenopausal women). These include heart disease, cancer, and bone loss (osteoporosis). Adopting a healthy lifestyle and getting preventive care can help to promote your health and wellness. The actions you take can also lower your chances of developing some of these common conditions.  What are the signs and symptoms of menopause?  During menopause, you may have the following symptoms:  Hot flashes. These can be moderate or severe.  Night sweats.  Decrease in sex drive.  Mood swings.  Headaches.  Tiredness (fatigue).  Irritability.  Memory problems.  Problems falling asleep or staying asleep.  Talk with your health care provider about treatment options for your symptoms.  Do I need hormone replacement therapy?  Hormone replacement therapy is effective in treating symptoms that are caused by menopause, such as hot flashes and night sweats.  Hormone replacement carries certain risks, especially as you become older. If you are thinking about using estrogen or estrogen with progestin, discuss the benefits and risks with your health care provider.  How can I reduce my risk for heart disease and stroke?  The risk of heart disease, heart attack, and stroke increases as you age. One of the causes may be a change in the body's hormones during menopause. This can affect how your body uses dietary fats, triglycerides, and cholesterol. Heart attack and stroke are medical emergencies. There are many things that you can do to help prevent heart disease and stroke.  Watch your blood pressure  High  blood pressure causes heart disease and increases the risk of stroke. This is more likely to develop in people who have high blood pressure readings or are overweight.  Have your blood pressure checked:  Every 3-5 years if you are 18-39 years of age.  Every year if you are 40 years old or older.  Eat a healthy diet    Eat a diet that includes plenty of vegetables, fruits, low-fat dairy products, and lean protein.  Do not eat a lot of foods that are high in solid fats, added sugars, or sodium.  Get regular exercise  Get regular exercise. This is one of the most important things you can do for your health. Most adults should:  Try to exercise for at least 150 minutes each week. The exercise should increase your heart rate and make you sweat (moderate-intensity exercise).  Try to do strengthening exercises at least twice each week. Do these in addition to the moderate-intensity exercise.  Spend less time sitting. Even light physical activity can be beneficial.  Other tips  Work with your health care provider to achieve or maintain a healthy weight.  Do not use any products that contain nicotine or tobacco. These products include cigarettes, chewing tobacco, and vaping devices, such as e-cigarettes. If you need help quitting, ask your health care provider.  Know your numbers. Ask your health care provider to check your cholesterol and your blood sugar (glucose). Continue to have your blood tested as directed by your health care provider.  Do I need screening for cancer?  Depending on your health history and family history, you may need to have cancer screenings at different stages of your life. This may include screening for:  Breast cancer.  Cervical cancer.  Lung cancer.  Colorectal cancer.  What is my risk for osteoporosis?  After menopause, you may be at increased risk for osteoporosis. Osteoporosis is a condition in which bone destruction happens more quickly than new bone creation. To help prevent osteoporosis or  the bone fractures that can happen because of osteoporosis, you may take the following actions:  If you are 19-50 years old, get at least 1,000 mg of calcium and at least 600 international units (IU) of vitamin D per day.  If you are older than age 50 but younger than age 70, get at least 1,200 mg of calcium and at least 600 international units (IU) of vitamin D per day.  If you are older than age 70, get at least 1,200 mg of calcium and at least 800 international units (IU) of vitamin D per day.  Smoking and drinking excessive alcohol increase the risk of osteoporosis. Eat foods that are rich in calcium and vitamin D, and do weight-bearing exercises several times each week as directed by your health care provider.  How does menopause affect my mental health?  Depression may occur at any age, but it is more common as you become older. Common symptoms of depression include:  Feeling depressed.  Changes in sleep patterns.  Changes in appetite or eating patterns.  Feeling an overall lack of motivation or enjoyment of activities that you previously enjoyed.  Frequent crying spells.  Talk with your health care provider if you think that you are experiencing any of these symptoms.  General instructions  See your health care provider for regular wellness exams and vaccines. This may include:  Scheduling regular health, dental, and eye exams.  Getting and maintaining your vaccines. These include:  Influenza vaccine. Get this vaccine each year before the flu season begins.  Pneumonia vaccine.  Shingles vaccine.  Tetanus, diphtheria, and pertussis (Tdap) booster vaccine.  Your health care provider may also recommend other immunizations.  Tell your health care provider if you have ever been abused or do not feel safe at home.  Summary  Menopause is a normal process in which your ability to get pregnant comes to an end.  This condition causes hot flashes, night sweats, decreased interest in sex, mood swings, headaches, or lack  of sleep.  Treatment for this condition may include hormone replacement therapy.  Take actions to keep yourself healthy, including exercising regularly, eating a healthy diet, watching your weight, and checking your blood pressure and blood sugar levels.  Get screened for cancer and depression. Make sure that you are up to date with all your vaccines.  This information is not intended to replace advice given to you by your health care provider. Make sure you discuss any questions you have with your health care provider.  Document Revised: 05/09/2022 Document Reviewed: 05/09/2022  [employer] Patient Education © 2024 [employer]    MyPlate from [employer]    MyPlate is an outline of a general healthy diet based on the Dietary Guidelines for Americans, 0678-0404, from the U.S. Department of Agriculture (USDA). It sets guidelines for how much food you should eat from each food group based on your age, sex, and level of physical activity.  What are tips for following MyPlate?  To follow MyPlate recommendations:  Eat a wide variety of fruits and vegetables, grains, and protein foods.  Serve smaller portions and eat less food throughout the day.  Limit portion sizes to avoid overeating.  Enjoy your food.  Get at least 150 minutes of exercise every week. This is about 30 minutes each day, 5 or more days per week.  It can be difficult to have every meal look like MyPlate. Think about MyPlate as eating guidelines for an entire day, rather than each individual meal.  Fruits and vegetables  Make one half of your plate fruits and vegetables.  Eat many different colors of fruits and vegetables each day.  For a 2,000-calorie daily food plan, eat:  2½ cups of vegetables every day.  2 cups of fruit every day.  1 cup is equal to:  1 cup raw or cooked vegetables.  1 cup raw fruit.  1 medium-sized orange, apple, or banana.  1 cup 100% fruit or vegetable juice.  2 cups raw leafy greens, such as lettuce, spinach, or kale.  ½ cup dried  fruit.  Grains  One fourth of your plate should be grains.  Make at least half of the grains you eat each day whole grains.  For a 2,000-calorie daily food plan, eat 6 oz of grains every day.  1 oz is equal to:  1 slice bread.  1 cup cereal.  ½ cup cooked rice, cereal, or pasta.  Protein  One fourth of your plate should be protein.  Eat a wide variety of protein foods, including meat, poultry, fish, eggs, beans, nuts, and tofu.  For a 2,000-calorie daily food plan, eat 5½ oz of protein every day.  1 oz is equal to:  1 oz meat, poultry, or fish.  ¼ cup cooked beans.  1 egg.  ½ oz nuts or seeds.  1 Tbsp peanut butter.  Dairy  Drink fat-free or low-fat (1%) milk.  Eat or drink dairy as a side to meals.  For a 2,000-calorie daily food plan, eat or drink 3 cups of dairy every day.  1 cup is equal to:  1 cup milk, yogurt, cottage cheese, or soy milk (soy beverage).  2 oz processed cheese.  1½ oz natural cheese.  Fats, oils, salt, and sugars  Only small amounts of oils are recommended.  Avoid foods that are high in calories and low in nutritional value (empty calories), like foods high in fat or added sugars.  Choose foods that are low in salt (sodium). Choose foods that have less than 140 milligrams (mg) of sodium per serving.  Drink water instead of sugary drinks. Drink enough fluid to keep your urine pale yellow.  Where to find support  Work with your health care provider or a dietitian to develop a customized eating plan that is right for you.  Download an moises (mobile application) to help you track your daily food intake.  Where to find more information  USDA: ChooseMyPlate.gov  Summary  MyPlate is a general guideline for healthy eating from the USDA. It is based on the Dietary Guidelines for Americans, 0047-3218.  In general, fruits and vegetables should take up one half of your plate, grains should take up one fourth of your plate, and protein should take up one fourth of your plate.  This information is not intended  to replace advice given to you by your health care provider. Make sure you discuss any questions you have with your health care provider.  Document Revised: 11/08/2021 Document Reviewed: 11/08/2021  [employer] Patient Education © 2024 [employer]    Exercising to Stay Healthy  To become healthy and stay healthy, it is recommended that you do moderate-intensity and vigorous-intensity exercise. You can tell that you are exercising at a moderate intensity if your heart starts beating faster and you start breathing faster but can still hold a conversation. You can tell that you are exercising at a vigorous intensity if you are breathing much harder and faster and cannot hold a conversation while exercising.  How can exercise benefit me?  Exercising regularly is important. It has many health benefits, such as:  Improving overall fitness, flexibility, and endurance.  Increasing bone density.  Helping with weight control.  Decreasing body fat.  Increasing muscle strength and endurance.  Reducing stress and tension, anxiety, depression, or anger.  Improving overall health.  What guidelines should I follow while exercising?  Before you start a new exercise program, talk with your health care provider.  Do not exercise so much that you hurt yourself, feel dizzy, or get very short of breath.  Wear comfortable clothes and wear shoes with good support.  Drink plenty of water while you exercise to prevent dehydration or heat stroke.  Work out until your breathing and your heartbeat get faster (moderate intensity).  How often should I exercise?  Choose an activity that you enjoy, and set realistic goals. Your health care provider can help you make an activity plan that is individually designed and works best for you.  Exercise regularly as told by your health care provider. This may include:  Doing strength training two times a week, such as:  Lifting weights.  Using resistance bands.  Push-ups.  Sit-ups.  Yoga.  Doing a certain  intensity of exercise for a given amount of time. Choose from these options:  A total of 150 minutes of moderate-intensity exercise every week.  A total of 75 minutes of vigorous-intensity exercise every week.  A mix of moderate-intensity and vigorous-intensity exercise every week.  Children, pregnant women, people who have not exercised regularly, people who are overweight, and older adults may need to talk with a health care provider about what activities are safe to perform. If you have a medical condition, be sure to talk with your health care provider before you start a new exercise program.  What are some exercise ideas?  Moderate-intensity exercise ideas include:  Walking 1 mile (1.6 km) in about 15 minutes.  Biking.  Hiking.  Golfing.  Dancing.  Water aerobics.  Vigorous-intensity exercise ideas include:  Walking 4.5 miles (7.2 km) or more in about 1 hour.  Jogging or running 5 miles (8 km) in about 1 hour.  Biking 10 miles (16.1 km) or more in about 1 hour.  Lap swimming.  Roller-skating or in-line skating.  Cross-country skiing.  Vigorous competitive sports, such as football, basketball, and soccer.  Jumping rope.  Aerobic dancing.  What are some everyday activities that can help me get exercise?  Yard work, such as:  Pushing a .  Raking and bagging leaves.  Washing your car.  Pushing a stroller.  Shoveling snow.  Gardening.  Washing windows or floors.  How can I be more active in my day-to-day activities?  Use stairs instead of an elevator.  Take a walk during your lunch break.  If you drive, park your car farther away from your work or school.  If you take public transportation, get off one stop early and walk the rest of the way.  Stand up or walk around during all of your indoor phone calls.  Get up, stretch, and walk around every 30 minutes throughout the day.  Enjoy exercise with a friend. Support to continue exercising will help you keep a regular routine of activity.  Where to find more  information  You can find more information about exercising to stay healthy from:  U.S. Department of Health and Human Services: www.hhs.gov  Centers for Disease Control and Prevention (CDC): www.cdc.gov  Summary  Exercising regularly is important. It will improve your overall fitness, flexibility, and endurance.  Regular exercise will also improve your overall health. It can help you control your weight, reduce stress, and improve your bone density.  Do not exercise so much that you hurt yourself, feel dizzy, or get very short of breath.  Before you start a new exercise program, talk with your health care provider.  This information is not intended to replace advice given to you by your health care provider. Make sure you discuss any questions you have with your health care provider.  Document Revised: 04/15/2022 Document Reviewed: 04/15/2022  Elsevier Patient Education © 2024 [employer] Inc.

## 2025-04-15 NOTE — PROGRESS NOTES
Subjective   The ABCs of the Annual Wellness Visit  Medicare Wellness Visit      Angelina Pimentel is a 74 y.o. patient who presents for a Medicare Wellness Visit.    The following portions of the patient's history were reviewed and   updated as appropriate: allergies, current medications, past family history, past medical history, past social history, past surgical history, and problem list.    Compared to one year ago, the patient's physical   health is better.  Compared to one year ago, the patient's mental   health is the same.    Recent Hospitalizations:  She was not admitted to the hospital during the last year.     Current Medical Providers:  Patient Care Team:  Leola Villa MD as PCP - General (Internal Medicine)  Leola Villa MD as PCP - Family Medicine  Kathie Arias MD as Consulting Physician (Dermatology)  Bc Red MD as Consulting Physician (Ophthalmology)  Raisa Bobo APRN as Nurse Practitioner (Cardiology)    Outpatient Medications Prior to Visit   Medication Sig Dispense Refill    amLODIPine (NORVASC) 5 MG tablet Take 1 tablet by mouth Daily. (Patient taking differently: Take 2 tablets by mouth Daily.) 90 tablet 3    Cetirizine HCl 10 MG capsule Take 10 mg by mouth Every Night.      glucose blood (OneTouch Ultra Test) test strip Use as instructed 100 each 3    ketoconazole (NIZORAL) 2 % cream       Lancets misc Use to test blood glucose daily.  E11.9  Fill with brand preferred by insurance or patient. 100 each 3    levothyroxine (SYNTHROID, LEVOTHROID) 100 MCG tablet TAKE 1 TABLET BY MOUTH DAILY 90 tablet 1    lisinopril (PRINIVIL,ZESTRIL) 40 MG tablet TAKE 1 TABLET BY MOUTH DAILY 90 tablet 3    metFORMIN (GLUCOPHAGE) 500 MG tablet TAKE 1 TABLET BY MOUTH DAILY WITH BREAKFAST 90 tablet 3    Multiple Vitamins-Minerals (CENTRUM SILVER ADULT 50+ PO) Take 1 tablet by mouth Daily.      nebivolol (BYSTOLIC) 5 MG tablet TAKE 1 TABLET BY MOUTH DAILY (Patient taking  differently: Take 2 tablets by mouth Daily.) 90 tablet 0    simvastatin (ZOCOR) 20 MG tablet TAKE ONE TABLET BY MOUTH ONCE NIGHTLY 90 tablet 1    Tirzepatide 15 MG/0.5ML solution auto-injector Inject 15 mg under the skin into the appropriate area as directed 1 (One) Time Per Week. 2 mL 5    ondansetron ODT (ZOFRAN-ODT) 8 MG disintegrating tablet Place 1 tablet on the tongue Every 8 (Eight) Hours As Needed for Nausea or Vomiting. 30 tablet 0     No facility-administered medications prior to visit.     No opioid medication identified on active medication list. I have reviewed chart for other potential  high risk medication/s and harmful drug interactions in the elderly.      Aspirin is not on active medication list.  Aspirin use is not indicated based on review of current medical condition/s. Risk of harm outweighs potential benefits.  .    Patient Active Problem List   Diagnosis    Diabetes mellitus    Benign colonic polyp    Hypothyroidism    Osteoarthritis    Hypertension    Allergic rhinitis    Basal cell carcinoma of skin    Hypertriglyceridemia    Mitral valve prolapse syndrome    Squamous cell carcinoma of skin    Obstructive sleep apnea syndrome    Menopause    Hypertensive heart disease without heart failure    Dyslipidemia    Choroidal nevus of left eye    Dermatochalasis of both upper eyelids    Glaucoma suspect of both eyes    PCO (posterior capsular opacification), right    Meibomian gland dysfunction (MGD)    LBBB (left bundle branch block)    Colon neoplasm, Tis    Status post colon resection     Advance Care Planning Advance Directive is on file.  ACP discussion was held with the patient during this visit. Patient has an advance directive in EMR which is still valid.     ACP information on the AVS declined by the patient.            Objective   Vitals:    04/15/25 0837   BP: 110/70   BP Location: Right arm   Patient Position: Sitting   Cuff Size: Adult   Pulse: 68   Resp: 16   Temp: 98 °F (36.7 °C)  "  TempSrc: Infrared   Weight: 81.1 kg (178 lb 12.8 oz)   Height: 168.9 cm (66.5\")   PainSc: 0-No pain       Estimated body mass index is 28.43 kg/m² as calculated from the following:    Height as of this encounter: 168.9 cm (66.5\").    Weight as of this encounter: 81.1 kg (178 lb 12.8 oz).    BMI is >= 25 and <30. (Overweight) The following options were offered after discussion;: exercise counseling/recommendations and nutrition counseling/recommendations           Does the patient have evidence of cognitive impairment? No   Finger Rub Hearing Test (right ear):passed  Finger Rub Hearing Test (left ear):passed                                                                                               Health  Risk Assessment    Smoking Status:  Social History     Tobacco Use   Smoking Status Never    Passive exposure: Past (childhood)   Smokeless Tobacco Never     Alcohol Consumption:  Social History     Substance and Sexual Activity   Alcohol Use Never    Comment: Never drank alcohol       Fall Risk Screen  STEADI Fall Risk Assessment was completed, and patient is at LOW risk for falls.Assessment completed on:4/15/2025    Depression Screening   Little interest or pleasure in doing things? Not at all   Feeling down, depressed, or hopeless? Not at all   PHQ-2 Total Score 0      Health Habits and Functional and Cognitive Screenin/15/2025     8:38 AM   Functional & Cognitive Status   Do you have difficulty preparing food and eating? No   Do you have difficulty bathing yourself, getting dressed or grooming yourself? No   Do you have difficulty using the toilet? No   Do you have difficulty moving around from place to place? No   Do you have trouble with steps or getting out of a bed or a chair? No   Current Diet Well Balanced Diet   Dental Exam Up to date   Eye Exam Up to date   Exercise (times per week) 0 times per week   Current Exercises Include Walking   Do you need help using the phone?  No   Are you deaf " or do you have serious difficulty hearing?  No   Do you need help to go to places out of walking distance? No   Do you need help shopping? No   Do you need help preparing meals?  No   Do you need help with housework?  No   Do you need help with laundry? No   Do you need help taking your medications? No   Do you need help managing money? No   Do you ever drive or ride in a car without wearing a seat belt? No   Have you felt unusual stress, anger or loneliness in the last month? No   Who do you live with? Spouse   If you need help, do you have trouble finding someone available to you? No   Have you been bothered in the last four weeks by sexual problems? No   Do you have difficulty concentrating, remembering or making decisions? No           Age-appropriate Screening Schedule:  Refer to the list below for future screening recommendations based on patient's age, sex and/or medical conditions. Orders for these recommended tests are listed in the plan section. The patient has been provided with a written plan.    Health Maintenance List  Health Maintenance   Topic Date Due    ANNUAL WELLNESS VISIT  01/11/2025    COVID-19 Vaccine (9 - 2024-25 season) 04/01/2025    HEMOGLOBIN A1C  04/15/2025    URINE MICROALBUMIN-CREATININE RATIO (uACR)  07/12/2025    INFLUENZA VACCINE  07/01/2025    LIPID PANEL  07/12/2025    DIABETIC EYE EXAM  07/19/2025    DIABETIC FOOT EXAM  04/04/2026    TDAP/TD VACCINES (3 - Td or Tdap) 04/05/2027    DXA SCAN  02/07/2028    HEPATITIS C SCREENING  Completed    RSV Vaccine - Adults  Completed    Pneumococcal Vaccine 50+  Completed    ZOSTER VACCINE  Completed    MAMMOGRAM  Discontinued    COLORECTAL CANCER SCREENING  Discontinued                                                                                                                                                CMS Preventative Services Quick Reference  Risk Factors Identified During Encounter  Immunizations Discussed/Encouraged:  "COVID19  Dental Screening Recommended  Vision Screening Recommended    The above risks/problems have been discussed with the patient.  Pertinent information has been shared with the patient in the After Visit Summary.  An After Visit Summary and PPPS were made available to the patient.    Follow Up:   Next Medicare Wellness visit to be scheduled in 1 year.         Additional E&M Note during same encounter follows:  Patient has additional, significant, and separately identifiable condition(s)/problem(s) that require work above and beyond the Medicare Wellness Visit     Chief Complaint  No chief complaint on file.    Subjective   HPI                  Objective   Vital Signs:  /70 (BP Location: Right arm, Patient Position: Sitting, Cuff Size: Adult)   Pulse 68   Temp 98 °F (36.7 °C) (Infrared)   Resp 16   Ht 168.9 cm (66.5\")   Wt 81.1 kg (178 lb 12.8 oz)   BMI 28.43 kg/m²   Physical Exam               Assessment and Plan                Follow Up   No follow-ups on file.  Patient was given instructions and counseling regarding her condition or for health maintenance advice. Please see specific information pulled into the AVS if appropriate.    "

## 2025-04-15 NOTE — TELEPHONE ENCOUNTER
Call patient please.    Her Hemoglobin A1c was only 5.3.  I think it would be a good idea to stop her Metformin.    In addition, at the time of her visit, I forgot to enter the order for her hip x-ray.  I have now entered this and she can stop by at her convenience to have this done.  I have also sent a prescription for Meloxicam to the pharmacy to see if this helps the pain.  She should hold her Ibuprofen while on the Meloxicam.

## 2025-04-15 NOTE — LETTER
Angelina Pimentel  [address]   [place] [address]    April 16, 2025     Dear Ms. Pimentel:    Below are the results from your recent visit:    Resulted Orders   POC Glycosylated Hemoglobin (Hb A1C)   Result Value Ref Range    Hemoglobin A1C 5.3 4.5 - 5.7 %    Lot Number [id number]     Expiration Date 12/03/2026    Lipid Panel   Result Value Ref Range    Total Cholesterol 124 0 - 200 mg/dL    Triglycerides 72 0 - 150 mg/dL    HDL Cholesterol 47 40 - 60 mg/dL    LDL Cholesterol  62 0 - 100 mg/dL    VLDL Cholesterol 15 5 - 40 mg/dL    LDL/HDL Ratio 1.33    Comprehensive Metabolic Panel   Result Value Ref Range    Glucose 83 65 - 99 mg/dL    BUN 10 8 - 23 mg/dL    Creatinine 0.77 0.57 - 1.00 mg/dL    Sodium 139 136 - 145 mmol/L    Potassium 4.1 3.5 - 5.2 mmol/L    Chloride 106 98 - 107 mmol/L    CO2 24.0 22.0 - 29.0 mmol/L    Calcium 9.2 8.6 - 10.5 mg/dL    Total Protein 7.2 6.0 - 8.5 g/dL    Albumin 4.0 3.5 - 5.2 g/dL    ALT (SGPT) 10 1 - 33 U/L    AST (SGOT) 17 1 - 32 U/L    Alkaline Phosphatase 115 39 - 117 U/L    Total Bilirubin 0.3 0.0 - 1.2 mg/dL    Globulin 3.2 gm/dL    A/G Ratio 1.3 g/dL    BUN/Creatinine Ratio 13.0 7.0 - 25.0    Anion Gap 9.0 5.0 - 15.0 mmol/L    eGFR 80.6 >60.0 mL/min/1.73   TSH   Result Value Ref Range    TSH 1.400 0.270 - 4.200 uIU/mL   T4, Free   Result Value Ref Range    Free T4 1.62 0.92 - 1.68 ng/dL   CBC Auto Differential   Result Value Ref Range    WBC 8.38 3.40 - 10.80 10*3/mm3    RBC 4.44 3.77 - 5.28 10*6/mm3    Hemoglobin 13.7 12.0 - 15.9 g/dL    Hematocrit 42.0 34.0 - 46.6 %    MCV 94.6 79.0 - 97.0 fL    MCH 30.9 26.6 - 33.0 pg    MCHC 32.6 31.5 - 35.7 g/dL    RDW 12.8 12.3 - 15.4 %    RDW-SD 43.9 37.0 - 54.0 fl    MPV 10.2 6.0 - 12.0 fL    Platelets 314 140 - 450 10*3/mm3    Neutrophil % 67.6 42.7 - 76.0 %    Lymphocyte % 24.8 19.6 - 45.3 %    Monocyte % 5.1 5.0 - 12.0 %    Eosinophil % 1.6 0.3 - 6.2 %    Basophil % 0.5 0.0 - 1.5 %    Immature Grans % 0.4 0.0 - 0.5 %     Neutrophils, Absolute 5.67 1.70 - 7.00 10*3/mm3    Lymphocytes, Absolute 2.08 0.70 - 3.10 10*3/mm3    Monocytes, Absolute 0.43 0.10 - 0.90 10*3/mm3    Eosinophils, Absolute 0.13 0.00 - 0.40 10*3/mm3    Basophils, Absolute 0.04 0.00 - 0.20 10*3/mm3    Immature Grans, Absolute 0.03 0.00 - 0.05 10*3/mm3    nRBC 0.0 0.0 - 0.2 /100 WBC       The test results show that your Hemoglobin A1c is only 5.3.  I recommend discontinuing Metformin, as was discussed on the phone.    Rest of the labs look good.  Please continue your current medication and plan.      If you have any questions or concerns, please don't hesitate to call.         Sincerely,        Leola Villa MD

## 2025-04-15 NOTE — PROGRESS NOTES
Subjective     Chief Complaint:  Physical Exam.    Chief Complaint   Patient presents with    Medicare Wellness-subsequent    Annual Exam    Diabetes     6-month follow-up    Hypertension    Hyperlipidemia    Hypothyroidism       History of Present Illness    History obtained from the patient.    The patient has RUPAL, stable on CPAP.    Cardiac Follow-up: The patient is here today for a 6 month follow-up.        Her Diabetes has been stable.   Medication: Mounjaro, Metformin, Simvastatin, and Lisinopril.   Her Hypertension has been stable.   Medication: Lisinopril, Amlodipine, and Bystolic.   Her Hyperlipidemia has been stable.   Her LDL goal is < 70 and last LDL was 78, .   Medication: Simvastiatin.   Side Effects: Reports nausea and constipation with Mounjaro.      Interval Events: Mounjaro was started on 7/12/2024.  Last Hemoglobin A1c on 10/15/2024 was 5.4.  She states her blood sugar at home has been < 100, fasting.  She does not check postprandial levels.  She denies episodes of low blood sugar.  She has not been checking her blood pressure at home recently.  Last Ophthalmology visit was 7/19/2024, glaucoma suspect but no retinopathy ([facility]).  She does check her feet daily.      Symptoms: Denies chest pain, dyspnea, FLORES, orthopnea, PND, palpitations, syncope, lower extremity edema, claudication, lightheadedness, and dizziness.   Associated Symptoms: Weight decreased 44 pounds intentionally in the past 6 months, and decreased 50 pounds intentionally since 7/12/2024.  No fatigue, headache, polydipsia, polyuria, myalgias, arthralgias, visual impairment, memory loss, or concentration problems.   Denies foot pain, numbness / tingling of the feet, and a foot ulcer.     Lifestyle: She reports a heart healthy diet.  No recent exercise she had been working out with a  once per week, but she has left.  She is looking into doing Zoom training sessions with her.  Tobacco Use: Never a smoker.       Hypothyroidism Follow-Up: The patient is being seen for follow-up of Hypothyroidism, which is stable.  Interval Events: On 7/12/2024, TSH was normal.  Symptoms:Weight decreased 44 pounds intentionally in the past 6 months, and decreased 50 pounds intentionally since 7/12/2024.  There has been some mild cold intolerance since she lost weight.  Denies palpitations, lower extremity edema, fatigue, diarrhea, constipation, heat intolerance, memory loss, trouble concentrating, dry skin, and hair loss.   Associated Symptoms: no arthralgias, myalgias, or paresthesias.   Medications:  Levothyroxine.       Colonic Polyp Follow-up: The patient is being seen for a routine clinic follow-up of Colon Polyp(s), which is stable.   Interval Events: Colonoscopy 8/1/2023 showed a transverse colon tubular adenoma, and 2 ascending colon tubular adenomas.  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  She had a partial colon resection on 10/13/2023.  Pathology was c/w limited residual adenoma without high-grade dysplasia or malignancy, and clear surgical margins.  In addition, there was a benign calcified right pelvic nodule.  Follow-up colonoscopy is scheduled on 10/21/2024 showed a benign serrated sessile polyp, sigmoid diverticulosis, and normal ileocecal anastomosis.    Symptoms:  No abdominal pain, diarrhea, constipation, hematochezia, melena, or change in stool.   Medication:  None.         Osteoarthritis Follow-Up: The patient is being seen for a routine clinic follow-up of Osteoarthritis, which has been stable.   Osteoarthritis, no complications.    Interval Events: The patient reports new onset mild right hip pain, located in the right buttock.  This has been occurring intermittently for several months.  She denies known injury, trauma, or overuse activity.  She denies lower extremity numbness, tingling,  or weakness.  She has not taken any medication for this.   Symptoms: No onset hip pain as above.  Reports stable left knee pain.   No generalized arthralgias, back pain, neck pain, shoulder pain, or hand/wrist pain.  Associated Symptoms: No joint swelling or stiffness.   Medications:  Ibuprofen prn.    Angelina Pimentel is a 74 y.o. female who presents for an Annual Physical.      PMH, PSH, SocHx, FamHx, Allergies, and Medications: Reviewed and updated.    Outpatient Medications Prior to Visit   Medication Sig Dispense Refill    Cetirizine HCl 10 MG capsule Take 10 mg by mouth Every Night.      glucose blood (OneTouch Ultra Test) test strip Use as instructed 100 each 3    ketoconazole (NIZORAL) 2 % cream       Lancets misc Use to test blood glucose daily.  E11.9  Fill with brand preferred by insurance or patient. 100 each 3    levothyroxine (SYNTHROID, LEVOTHROID) 100 MCG tablet TAKE 1 TABLET BY MOUTH DAILY 90 tablet 1    lisinopril (PRINIVIL,ZESTRIL) 40 MG tablet TAKE 1 TABLET BY MOUTH DAILY 90 tablet 3    Multiple Vitamins-Minerals (CENTRUM SILVER ADULT 50+ PO) Take 1 tablet by mouth Daily.      nebivolol (BYSTOLIC) 5 MG tablet TAKE 1 TABLET BY MOUTH DAILY (Patient taking differently: Take 2 tablets by mouth Daily.) 90 tablet 0    simvastatin (ZOCOR) 20 MG tablet TAKE ONE TABLET BY MOUTH ONCE NIGHTLY 90 tablet 1    Tirzepatide 15 MG/0.5ML solution auto-injector Inject 15 mg under the skin into the appropriate area as directed 1 (One) Time Per Week. 2 mL 5    amLODIPine (NORVASC) 5 MG tablet Take 1 tablet by mouth Daily. (Patient taking differently: Take 2 tablets by mouth Daily.) 90 tablet 3    metFORMIN (GLUCOPHAGE) 500 MG tablet TAKE 1 TABLET BY MOUTH DAILY WITH BREAKFAST 90 tablet 3    ondansetron ODT (ZOFRAN-ODT) 8 MG disintegrating tablet Place 1 tablet on the tongue Every 8 (Eight) Hours As Needed for Nausea or Vomiting. 30 tablet 0     No facility-administered medications prior to visit.        Immunization History   Administered Date(s) Administered    Arexvy (RSV, Adults 60+ yrs) 09/22/2023    COVID-19 (MODERNA) 12YRS+ (SPIKEVAX) 10/01/2024    COVID-19 (MODERNA) 1st,2nd,3rd Dose Monovalent 01/09/2021, 02/06/2021, 10/25/2021, 06/10/2022    COVID-19 (MODERNA) BIVALENT 12+YRS 09/16/2022    COVID-19 (PFIZER) 12YRS+ (COMIRNATY) 09/25/2023, 04/15/2025    COVID-19 (PFIZER) BIVALENT 12+YRS 06/26/2023    FLUAD TRI 65YR+ 10/11/2019    Fluad Quad 65+ 08/31/2020, 09/13/2023    Fluzone High-Dose 65+YRS 10/14/2015, 10/10/2016, 10/11/2017, 08/30/2018, 09/30/2021, 10/01/2024    Fluzone High-Dose 65+yrs 10/01/2021, 09/16/2022, 09/13/2023    Hepatitis A 06/01/1998, 01/01/1999    Pneumococcal Conjugate 13-Valent (PCV13) 11/24/2014    Pneumococcal Conjugate 20-Valent (PCV20) 12/30/2022    Pneumococcal Polysaccharide (PPSV23) 09/20/2010, 03/25/2016    Shingrix 05/30/2018, 08/30/2018    Td (TDVAX) 04/05/2017    Tdap 02/25/2009    Zostavax 05/21/2010         Patient Active Problem List   Diagnosis    Diabetes mellitus    Benign colonic polyp    Hypothyroidism    Osteoarthritis    Hypertension    Allergic rhinitis    Basal cell carcinoma of skin    Hypertriglyceridemia    Mitral valve prolapse syndrome    Squamous cell carcinoma of skin    Obstructive sleep apnea syndrome    Menopause    Hypertensive heart disease without heart failure    Dyslipidemia    Choroidal nevus of left eye    Dermatochalasis of both upper eyelids    Glaucoma suspect of both eyes    PCO (posterior capsular opacification), right    Meibomian gland dysfunction (MGD)    LBBB (left bundle branch block)    Colon neoplasm, Tis    Status post colon resection       Health Habits:  Dental Exam. up to date  Eye Exam. up to date  Hearing Loss:  No  Exercise: 0 times/week.  Current exercise activities include: none  Diet: Healthy  Multivitamin: Yes    Safe Driving:  Yes  Seat Belt:  Yes  Bike Helmet:  N/A  Skin Screening:  Yes  Sunscreen: Yes  SBE / VIGNESH:  No  Sexual Activity:  Not currently   Birth Control:  Menopause  STD Prevention:  N/A    Last Pap: N/A  Last Mammogram: 1/24/2025, category 1  Last DEXA Scan: 2/7/2023, normal  Last Colonoscopy: 10/21/2024 showed a benign serrated sessile polyp, sigmoid diverticulosis, and normal ileocecal anastomosis.    Last PSA: N/A.    Social:    Social History     Socioeconomic History    Marital status:     Number of children: 2   Tobacco Use    Smoking status: Never     Passive exposure: Past (childhood)    Smokeless tobacco: Never   Vaping Use    Vaping status: Never Used   Substance and Sexual Activity    Alcohol use: Never     Comment: Never drank alcohol    Drug use: Never    Sexual activity: Not Currently     Partners: Male     Birth control/protection: Hysterectomy         Current Medical Providers:    Leola Villa MD (Internal Medicine / Pediatrics)    The [facility] providers who are involved in the care of this patient are listed above.         Review of Systems   Constitutional:  Negative for fatigue and unexpected weight change.   HENT:  Negative for hearing loss.    Eyes:  Negative for visual disturbance.   Respiratory:  Negative for cough, shortness of breath and wheezing.    Cardiovascular:  Negative for chest pain, palpitations and leg swelling.        No FLORES, orthopnea, or claudication.   Gastrointestinal:  Positive for constipation and nausea. Negative for abdominal pain, blood in stool, diarrhea and vomiting.        Denies melena.   Endocrine: Positive for cold intolerance. Negative for heat intolerance, polydipsia, polyphagia and polyuria.   Genitourinary:  Negative for difficulty urinating, dysuria, frequency, hematuria and urgency.   Musculoskeletal:  Negative for arthralgias, back pain, gait problem, joint swelling, myalgias, neck pain and neck stiffness.   Neurological:  Negative for dizziness, syncope, light-headedness and headaches.        No memory issues.   Psychiatric/Behavioral:   "Negative for decreased concentration, self-injury, sleep disturbance and suicidal ideas. The patient is not nervous/anxious.         Denies depression           Objective     Vitals:    04/15/25 0837   BP: 110/70   BP Location: Right arm   Patient Position: Sitting   Cuff Size: Adult   Pulse: 68   Resp: 16   Temp: 98 °F (36.7 °C)   TempSrc: Infrared   Weight: 81.1 kg (178 lb 12.8 oz)   Height: 168.9 cm (66.5\")   PainSc: 0-No pain       Body mass index is 28.43 kg/m².    Physical Exam  Vitals and nursing note reviewed.   Constitutional:       Appearance: Normal appearance. She is well-developed.      Comments: BMI greater than 25   HENT:      Head: Normocephalic and atraumatic.      Right Ear: Tympanic membrane, ear canal and external ear normal.      Left Ear: Tympanic membrane, ear canal and external ear normal.      Mouth/Throat:      Mouth: Mucous membranes are moist. No oral lesions.      Pharynx: Oropharynx is clear.      Comments: Tonsils absent.  Eyes:      Extraocular Movements: Extraocular movements intact.      Conjunctiva/sclera: Conjunctivae normal.      Pupils: Pupils are equal, round, and reactive to light.      Comments: Fundi normal bilaterally.   Neck:      Thyroid: No thyromegaly.      Vascular: No carotid bruit.   Cardiovascular:      Rate and Rhythm: Normal rate and regular rhythm.      Pulses: Normal pulses.      Heart sounds: Normal heart sounds. No murmur heard.     No friction rub. No gallop.   Pulmonary:      Effort: Pulmonary effort is normal.      Breath sounds: Normal breath sounds.   Chest:   Breasts:     Right: No inverted nipple, mass, nipple discharge, skin change or tenderness.      Left: No inverted nipple, mass, nipple discharge, skin change or tenderness.   Abdominal:      General: Bowel sounds are normal. There is no distension or abdominal bruit.      Palpations: Abdomen is soft. There is no hepatomegaly, splenomegaly or mass.      Tenderness: There is no abdominal tenderness. "   Genitourinary:     Comments:  and rectal exam deferred.  Musculoskeletal:         General: Normal range of motion.      Cervical back: Normal range of motion and neck supple.      Right lower leg: No edema.      Left lower leg: No edema.   Lymphadenopathy:      Cervical: No cervical adenopathy.      Upper Body:      Right upper body: No supraclavicular or axillary adenopathy.      Left upper body: No supraclavicular or axillary adenopathy.   Skin:     Findings: No rash.      Comments: No atypical skin lesions.   Neurological:      Mental Status: She is alert.      Cranial Nerves: No cranial nerve deficit.      Motor: Motor function is intact. No abnormal muscle tone.      Coordination: Coordination is intact.      Gait: Gait is intact.      Deep Tendon Reflexes: Reflexes are normal and symmetric.   Psychiatric:         Mood and Affect: Mood normal.         PHQ-2 Depression Screening  Little interest or pleasure in doing things? Not at all   Feeling down, depressed, or hopeless? Not at all   PHQ-2 Total Score 0           Counseling was given to patient for the following topics: appropriate exercise, healthy eating habits, disease prevention, risk factors for cancer, importance of self breast exam and breast health, importance of immunizations, including risks and benefits, sun safety, seatbelt use, and safe driving. Also discussed the importance of regular dental and vision care, as well recommendation for a yearly screening skin exam after age 40.  Written information provided to patient on these topics and other health maintenance issues.    Results for orders placed or performed in visit on 04/15/25   POC Glycosylated Hemoglobin (Hb A1C)    Collection Time: 04/15/25  9:48 AM    Specimen: Blood   Result Value Ref Range    Hemoglobin A1C 5.3 4.5 - 5.7 %    Lot Number [id number]     Expiration Date 12/03/2026          Diagnoses and all orders for this visit:    1. Medicare annual wellness visit, subsequent  (Primary)    2. Encounter for health maintenance examination in adult    3. Type 2 diabetes mellitus without complication, without long-term current use of insulin  -     POC Glycosylated Hemoglobin (Hb A1C)  -     Lipid Panel  -     Comprehensive Metabolic Panel  -     TSH  -     CBC & Differential   Metformin discontinued (low A1c)   Continue current medication(s) as noted in the history of present illness.    4. Primary hypertension  -     Lipid Panel  -     Comprehensive Metabolic Panel  -     TSH  -     CBC & Differential   Continue current medication(s) as noted in the history of present illness.   Amlodipine discontinued (low BP)    5. Dyslipidemia  -     Lipid Panel  -     Comprehensive Metabolic Panel  -     TSH  -     CBC & Differential   Continue current medication(s) as noted in the history of present illness.    6. Acquired hypothyroidism  -     TSH  -     T4, Free   Continue current medication(s) as noted in the history of present illness.    7. LBBB (left bundle branch block)    8. Colon neoplasm, Tis   Colonoscopy up-to-date.    9. Benign colonic polyp   Colonoscopy up-to-date.    10. Primary osteoarthritis involving multiple joints   The patient is to hold Ibuprofen while on Meloxicam.    11. Obstructive sleep apnea syndrome   Continue CPAP.    12. Right hip pain  -     XR Hip With or Without Pelvis 2 - 3 View Right; Future  -     meloxicam (MOBIC) 15 MG tablet; Take 1 tablet by mouth Daily As Needed for Moderate Pain.  Dispense: 30 tablet; Refill: 0    13. Screening for cardiovascular condition  -     CT Cardiac Calcium Score Without Dye; Future    14. Need for COVID-19 vaccine  -     COVID-19 (Pfizer) 12yrs+ (COMIRNATY)        BMI is >= 25 and <30. (Overweight) The following options were offered after discussion;: exercise counseling/recommendations and nutrition counseling/recommendations            Return in about 6 months (around 10/15/2025) for Recheck Diabetes, fasting.

## 2025-04-17 ENCOUNTER — HOSPITAL ENCOUNTER (OUTPATIENT)
Dept: GENERAL RADIOLOGY | Facility: HOSPITAL | Age: 75
Discharge: HOME | End: 2025-04-17
Admitting: INTERNAL MEDICINE
Payer: MEDICARE

## 2025-04-17 DIAGNOSIS — M25.551 RIGHT HIP PAIN: ICD-10-CM

## 2025-04-17 PROCEDURE — 73502 X-RAY EXAM HIP UNI 2-3 VIEWS: CPT

## 2025-05-25 DIAGNOSIS — E78.5 DYSLIPIDEMIA: Primary | ICD-10-CM

## 2025-05-25 DIAGNOSIS — E78.1 ESSENTIAL HYPERTRIGLYCERIDEMIA: Chronic | ICD-10-CM

## 2025-05-25 DIAGNOSIS — E11.9 TYPE 2 DIABETES MELLITUS WITHOUT COMPLICATION, WITHOUT LONG-TERM CURRENT USE OF INSULIN: ICD-10-CM

## 2025-05-27 RX ORDER — SIMVASTATIN 20 MG
20 TABLET ORAL NIGHTLY
Qty: 90 TABLET | Refills: 3 | Status: SHIPPED | OUTPATIENT
Start: 2025-05-27

## 2025-05-27 RX ORDER — TIRZEPATIDE 15 MG/.5ML
INJECTION, SOLUTION SUBCUTANEOUS
Qty: 2 ML | Refills: 11 | Status: SHIPPED | OUTPATIENT
Start: 2025-05-27

## 2025-06-20 DIAGNOSIS — I10 PRIMARY HYPERTENSION: Chronic | ICD-10-CM

## 2025-06-20 RX ORDER — NEBIVOLOL 5 MG/1
5 TABLET ORAL DAILY
Qty: 90 TABLET | Refills: 0 | Status: SHIPPED | OUTPATIENT
Start: 2025-06-20

## 2025-06-25 ENCOUNTER — TELEPHONE (OUTPATIENT)
Dept: INTERNAL MEDICINE | Facility: CLINIC | Age: 75
End: 2025-06-25
Payer: MEDICARE

## 2025-06-25 DIAGNOSIS — E11.9 TYPE 2 DIABETES MELLITUS WITHOUT COMPLICATION, WITHOUT LONG-TERM CURRENT USE OF INSULIN: Primary | ICD-10-CM

## 2025-06-25 RX ORDER — DIPHENHYDRAMINE HYDROCHLORIDE 25 MG/1
CAPSULE, LIQUID FILLED ORAL
Qty: 1 EACH | Refills: 0 | Status: SHIPPED | OUTPATIENT
Start: 2025-06-25

## 2025-06-25 RX ORDER — LANCETS 30 GAUGE
1 EACH MISCELLANEOUS DAILY
Qty: 100 EACH | Refills: 3 | Status: SHIPPED | OUTPATIENT
Start: 2025-06-25

## 2025-06-25 NOTE — TELEPHONE ENCOUNTER
Pharmacy Name: [facility] [id number] - [place], [place] - [address] - [phone] PH - [phone]      Pharmacy representative phone number: [phone]     What medication are you calling in regards to: ONE TOUCH ULTRA SOFT LANCETS     What question does the pharmacy have: ONE TOUCH ULTRA SOFT LANCETS ARE DISCONTINUED THEY ARE ASKING FOR A PRESCRIPTION FOR DIFFERENT LANCETS. THEY ARE SPECIFICALLY WANTING TO SEE ABOUT THE ONE TOUCH DELICA AND THE DEVICE     Who is the provider that prescribed the medication: DR LEI    Additional notes:

## 2025-07-17 PROCEDURE — 87088 URINE BACTERIA CULTURE: CPT | Performed by: NURSE PRACTITIONER

## 2025-07-17 PROCEDURE — 87186 SC STD MICRODIL/AGAR DIL: CPT | Performed by: NURSE PRACTITIONER

## 2025-07-17 PROCEDURE — 87086 URINE CULTURE/COLONY COUNT: CPT | Performed by: NURSE PRACTITIONER

## 2025-07-21 ENCOUNTER — RESULTS FOLLOW-UP (OUTPATIENT)
Dept: URGENT CARE | Facility: CLINIC | Age: 75
End: 2025-07-21
Payer: MEDICARE

## 2025-07-28 ENCOUNTER — OFFICE VISIT (OUTPATIENT)
Dept: INTERNAL MEDICINE | Facility: CLINIC | Age: 75
End: 2025-07-28
Payer: MEDICARE

## 2025-07-28 VITALS
HEIGHT: 66 IN | HEART RATE: 60 BPM | DIASTOLIC BLOOD PRESSURE: 84 MMHG | TEMPERATURE: 98.2 F | OXYGEN SATURATION: 99 % | WEIGHT: 169 LBS | SYSTOLIC BLOOD PRESSURE: 124 MMHG | RESPIRATION RATE: 20 BRPM | BODY MASS INDEX: 27.16 KG/M2

## 2025-07-28 DIAGNOSIS — N39.0 ACUTE UTI: Primary | ICD-10-CM

## 2025-07-28 LAB
BILIRUB BLD-MCNC: NEGATIVE MG/DL
CLARITY, POC: CLEAR
COLOR UR: YELLOW
EXPIRATION DATE: ABNORMAL
GLUCOSE UR STRIP-MCNC: NEGATIVE MG/DL
KETONES UR QL: NEGATIVE
LEUKOCYTE EST, POC: ABNORMAL
Lab: ABNORMAL
NITRITE UR-MCNC: NEGATIVE MG/ML
PH UR: 6 [PH] (ref 5–8)
PROT UR STRIP-MCNC: NEGATIVE MG/DL
RBC # UR STRIP: NEGATIVE /UL
SP GR UR: 1.01 (ref 1–1.03)
UROBILINOGEN UR QL: NORMAL

## 2025-07-28 PROCEDURE — 99213 OFFICE O/P EST LOW 20 MIN: CPT | Performed by: INTERNAL MEDICINE

## 2025-07-28 PROCEDURE — 81003 URINALYSIS AUTO W/O SCOPE: CPT | Performed by: INTERNAL MEDICINE

## 2025-07-28 PROCEDURE — 3074F SYST BP LT 130 MM HG: CPT | Performed by: INTERNAL MEDICINE

## 2025-07-28 PROCEDURE — 1160F RVW MEDS BY RX/DR IN RCRD: CPT | Performed by: INTERNAL MEDICINE

## 2025-07-28 PROCEDURE — 87086 URINE CULTURE/COLONY COUNT: CPT | Performed by: INTERNAL MEDICINE

## 2025-07-28 PROCEDURE — 3079F DIAST BP 80-89 MM HG: CPT | Performed by: INTERNAL MEDICINE

## 2025-07-28 PROCEDURE — 3044F HG A1C LEVEL LT 7.0%: CPT | Performed by: INTERNAL MEDICINE

## 2025-07-28 PROCEDURE — 1159F MED LIST DOCD IN RCRD: CPT | Performed by: INTERNAL MEDICINE

## 2025-07-28 PROCEDURE — 1126F AMNT PAIN NOTED NONE PRSNT: CPT | Performed by: INTERNAL MEDICINE

## 2025-07-28 NOTE — PROGRESS NOTES
Subjective       Angelina Pimentel is a 74 y.o. female.     Chief Complaint   Patient presents with    Urinary Tract Infection     [facility] follow up       History obtained from chart review and the patient.      History of Present Illness     Patient is a 74-year-old female with a history of diabetes, presents with blood spots on toilet tissue after urinating and in underwear.  Has occasional mild stress incontinence at baseline.  Denies dysuria currently but does note a couple episodes of dysuria within last few days that resolved No urinary frequency or urgency above baseline.  She has no history UTIs.  Blood glucose 91,  A1c <6%  History of colon cancer s/p colon resection with no additional treatment requires  History of Present Illness  The patient presents for an Urgent Care follow-up on 07/17/2025 for a urinary tract infection (UTI).    She reports no current symptoms. Her initial visit to urgent care was prompted by the presence of blood on the toilet paper after urination, although she did not observe any blood in her urine. She was prescribed Macrobid at the urgent care center and completed the course of medication.  She has not experienced any further bleeding since then. She also reports no burning sensation during urination, increased frequency or urgency of urination, pelvic pain, vaginal bleeding or discharge, back pain, fever, chills, headache, nausea, vomiting, diarrhea, or abdominal pain. This was her first experience with a UTI.     Results  Labs ([facility])   - Urinalysis: 3+ blood, 2+ protein, and large leukocytes.    - Urine culture: positive for 50,000 E. coli    The following portions of the patient's history were reviewed and updated as appropriate: allergies, current medications, past family history, past medical history, past social history, past surgical history, and problem list.      Review of Systems   Constitutional:  Negative for chills and fever.   Gastrointestinal:  Negative for abdominal  "pain, diarrhea, nausea and vomiting.   Genitourinary:  Negative for dysuria, flank pain, frequency, hematuria, pelvic pain, urgency, vaginal bleeding and vaginal discharge.   Neurological:  Negative for headaches.           Objective     Blood pressure 124/84, pulse 60, temperature 98.2 °F (36.8 °C), resp. rate 20, height 167.6 cm (65.98\"), weight 76.7 kg (169 lb), SpO2 99%, not currently breastfeeding.    Physical Exam  Vitals and nursing note reviewed.   Constitutional:       Appearance: She is well-developed and normal weight.   Cardiovascular:      Rate and Rhythm: Normal rate and regular rhythm.      Heart sounds: Murmur (2/6 WHITNEY) heard.   Pulmonary:      Effort: Pulmonary effort is normal.      Breath sounds: Normal breath sounds.   Abdominal:      General: Bowel sounds are normal. There is no distension.      Palpations: Abdomen is soft. There is no hepatomegaly, splenomegaly or mass.      Tenderness: There is no abdominal tenderness. There is no right CVA tenderness or left CVA tenderness.   Skin:     Findings: No rash.   Neurological:      Mental Status: She is alert.   Psychiatric:         Mood and Affect: Mood normal.       Results for orders placed or performed in visit on 07/28/25   POC Urinalysis Dipstick, Automated    Collection Time: 07/28/25 11:32 AM    Specimen: Urine   Result Value Ref Range    Color Yellow Yellow, Straw, Dark Yellow, Eileen    Clarity, UA Clear Clear    Specific Gravity  1.010 1.005 - 1.030    pH, Urine 6.0 5.0 - 8.0    Leukocytes 25 Jacoby/ul (A) Negative    Nitrite, UA Negative Negative    Protein, POC Negative Negative mg/dL    Glucose, UA Negative Negative mg/dL    Ketones, UA Negative Negative    Urobilinogen, UA Normal Normal, 0.2 E.U./dL    Bilirubin Negative Negative    Blood, UA Negative Negative    Lot Number [id number]     Expiration Date 04 30 2026          Assessment & Plan   Diagnoses and all orders for this visit:    1. Acute UTI (Primary)  -     POC Urinalysis " Dipstick, Automated   - Reports no current symptoms  -     Urine Culture - Urine, Urine, Clean Catch   - Urine culture will be sent to ensure UTI is completely cleared up; no further treatment unless culture shows growth.        Return if symptoms worsen or fail to improve.             Patient or patient representative verbalized consent for the use of Ambient Listening during the visit with  Leola Villa MD for chart documentation. 7/28/2025  18:11 EDT

## 2025-07-30 LAB — BACTERIA SPEC AEROBE CULT: NO GROWTH

## 2025-08-16 ENCOUNTER — RESULTS FOLLOW-UP (OUTPATIENT)
Dept: INTERNAL MEDICINE | Facility: CLINIC | Age: 75
End: 2025-08-16
Payer: MEDICARE

## (undated) DEVICE — APPL CHLORAPREP W/TINT 26ML BLU

## (undated) DEVICE — PK MINOR SPLT 10

## (undated) DEVICE — SUT VIC 0 TIES 18IN J912G

## (undated) DEVICE — SPNG LAP PREWSH SFTPK 18X18IN STRL PK/5

## (undated) DEVICE — APPL CHLORAPREP TINTED 26ML TEAL

## (undated) DEVICE — SUT PDS 1 CTX 36IN VIO PDP371T

## (undated) DEVICE — AIRWY 90MM NO9

## (undated) DEVICE — INTENDED FOR TISSUE SEPARATION, AND OTHER PROCEDURES THAT REQUIRE A SHARP SURGICAL BLADE TO PUNCTURE OR CUT.: Brand: BARD-PARKER ® STAINLESS STEEL BLADES

## (undated) DEVICE — MEDI-VAC YANKAUER SUCTION HANDLE W/BULBOUS TIP: Brand: CARDINAL HEALTH

## (undated) DEVICE — LAPAROSCOPIC SMOKE FILTRATION SYSTEM: Brand: PALL LAPAROSHIELD® PLUS LAPAROSCOPIC SMOKE FILTRATION SYSTEM

## (undated) DEVICE — TUBING, SUCTION, 1/4" X 10', STRAIGHT: Brand: MEDLINE

## (undated) DEVICE — ANTIBACTERIAL UNDYED BRAIDED (POLYGLACTIN 910), SYNTHETIC ABSORBABLE SUTURE: Brand: COATED VICRYL

## (undated) DEVICE — GLV SURG TRIUMPH ORTHO W/ALOE PF LTX 7.5 STRL

## (undated) DEVICE — SUT MNCRYL PLS ANTIB UD 4/0 PS2 18IN

## (undated) DEVICE — ENDOCUT SCISSOR TIP, DISPOSABLE: Brand: RENEW

## (undated) DEVICE — SAFESECURE,SECUREMENT,FOLEY CATH,STERILE: Brand: MEDLINE

## (undated) DEVICE — GOWN,REINF,POLY,ECL,PP SLV,XXL: Brand: MEDLINE

## (undated) DEVICE — CLTH CLENS READYCLEANSE PERI CARE PK/5

## (undated) DEVICE — MEDI-VAC YANKAUER SUCTION HANDLE: Brand: CARDINAL HEALTH

## (undated) DEVICE — TOWEL,OR,DSP,ST,BLUE,STD,4/PK,20PK/CS: Brand: MEDLINE

## (undated) DEVICE — DRSNG SURESITE WNDW 4X4.5

## (undated) DEVICE — PK LAP LASR CHOLE 10

## (undated) DEVICE — ELECTRD NDL EDGE/INSUL/PFTE.787MM 2.84IN

## (undated) DEVICE — MEDI-VAC NON-CONDUCTIVE SUCTION TUBING: Brand: CARDINAL HEALTH

## (undated) DEVICE — DEV TRANSPEC W/PERF COMP PLT

## (undated) DEVICE — SUT MNCRYL PLS ANTIB UD 4/0 PC3 18IN

## (undated) DEVICE — CANNULA,OXY,ADULT,SUPERSOFT,W/7'TUB,UC: Brand: MEDLINE

## (undated) DEVICE — TBG PENCL TELESCP MEGADYNE SMOKE EVAC 10FT

## (undated) DEVICE — IRRIGATOR BULB ASEPTO 60CC STRL